# Patient Record
Sex: MALE | Race: WHITE | NOT HISPANIC OR LATINO | Employment: UNEMPLOYED | URBAN - METROPOLITAN AREA
[De-identification: names, ages, dates, MRNs, and addresses within clinical notes are randomized per-mention and may not be internally consistent; named-entity substitution may affect disease eponyms.]

---

## 2017-01-18 ENCOUNTER — ALLSCRIPTS OFFICE VISIT (OUTPATIENT)
Dept: OTHER | Facility: OTHER | Age: 52
End: 2017-01-18

## 2017-02-22 ENCOUNTER — ALLSCRIPTS OFFICE VISIT (OUTPATIENT)
Dept: OTHER | Facility: OTHER | Age: 52
End: 2017-02-22

## 2018-01-13 VITALS
HEART RATE: 67 BPM | TEMPERATURE: 99 F | SYSTOLIC BLOOD PRESSURE: 130 MMHG | HEIGHT: 70 IN | OXYGEN SATURATION: 97 % | WEIGHT: 189.13 LBS | DIASTOLIC BLOOD PRESSURE: 84 MMHG | BODY MASS INDEX: 27.08 KG/M2

## 2018-01-14 VITALS
BODY MASS INDEX: 25.98 KG/M2 | HEIGHT: 70 IN | DIASTOLIC BLOOD PRESSURE: 88 MMHG | SYSTOLIC BLOOD PRESSURE: 128 MMHG | WEIGHT: 181.5 LBS | OXYGEN SATURATION: 96 % | HEART RATE: 79 BPM | TEMPERATURE: 99 F

## 2018-01-16 NOTE — PROGRESS NOTES
Assessment    1  Accelerated hypertension (401 0) (I10)   2  Benign essential hypertension (401 1) (I10)   3  Need for prophylactic vaccination and inoculation against influenza (V04 81) (Z23)   4  Alcohol abuse, episodic (305 02) (F10 10)   5  Hyponatremia (276 1) (E87 1)    Plan  Accelerated hypertension    · Metoprolol Tartrate 25 MG Oral Tablet; TAKE 1 TABLET TWICE DAILY   · Call [phone] if: You become dizzy or lightheaded, especially when you stand up after sitting  for a while ; Status:Complete;   Done: 36VXK1613 05:12PM   · Call [phone] if: You develop double vision (see two of everything) ; Status:Complete;   Done:  62EKP1268 05:12PM   · Call [phone] if: Your blood pressure is frequently higher than 140/90 ; Status:Complete;    Done: 13EKQ3005 05:12PM   · Call 911 if: You experience a new kind of chest pain (angina) or pressure ; Status:Complete;   Done:  25ZMQ7658 05:12PM   · Call 911 if: You have any symptoms of a stroke ; Status:Complete;   Done: 67ZMH5273 05:12PM   · Seek Immediate Medical Attention if: You have a severe headache that will not go away ;  Status:Complete;   Done: 27LFQ1588 05:12PM   · Seek Immediate Medical Attention if: Your blood pressure is greater than 250/120 for 2 consecutive  readings ; Status:Complete;   Done: 43HWU7557 05:12PM   · Eat a low fat and low cholesterol diet ; Status:Complete;   Done: 52CSR8906 05:12PM   · Keep a diary of when and what you eat ; Status:Complete;   Done: 74UCM9978 05:12PM  Need for prophylactic vaccination and inoculation against influenza    · Stop: Influenza    Discussion/Summary  Discussion Summary:   Blood pressure still not controlled  Of note, pt also smoked prior to visit  Add metoprolol 25 mg twice daily in addition to amlodipine and valsartan  Need to get updated BMP, pt did not have done prior to visit  Pt continues to actively decrease alcohol and smoking  He decided to do this on his own  Great job!     Continue appropriate diet - 3 meals daily  Follow up 2 weeks  Get labs done prior  Do not smoke prior to appt  Counseling Documentation With Imm: The patient was counseled regarding instructions for management, risk factor reductions, prognosis, patient and family education, impressions, risks and benefits of treatment options, importance of compliance with treatment  Medication SE Review and Pt Understands Tx: Possible side effects of new medications were reviewed with the patient/guardian today  The treatment plan was reviewed with the patient/guardian  The patient/guardian understands and agrees with the treatment plan      Chief Complaint  Chief Complaint Free Text Note Form: pt is here for 2 wk htn f/u      History of Present Illness  Hypertension (Follow-Up): The patient presents for follow-up of essential hypertension  The patient states he has been doing poorly with his blood pressure control since the last visit  Interval Events: Telmisartan d/c and started on valstartan at last visit  Amlodipine inreased to 10 mg  Symptoms: denies impaired vision, denies dyspnea, denies chest pain, denies intermittent leg claudication and denies lower extremity edema  Associated symptoms include no headache and no focal neurologic deficits  Home monitoring: The patient is not checking blood pressure at home  Medications: the patient is adherent with his medication regimen  He denies medication side effects  Disease Management: the patient is not doing well with his blood pressure goals  Additional History: Pt cut down smoking to 4 cigs/day  Alcohol Abuse (Follow-Up): The patient is being seen for follow-up of alcohol abuse and Notes he was off form work over [employer] when this event occures and he was drinking very heaviliy  He has since returned to his normal intake of 2 beers per day  The patient reports continuing to use alcohol     Interval symptoms:  denies mood swings, denies shakiness, denies abdominal pain, denies nausea and denies vomiting  Associated symptoms: no falling    The patient presents with complaints of no job difficulties (wants to return full duty  Needs work note)  Medications include folic acid and thiamine  Medications:  the patient is adherent to his medication regimen, but he denies medication side effects  Disease management:  the patient is not doing well with his goals  Alcohol use is 2 beers drinks per day  Electrolyte Imbalance (Brief): The patient is being seen for a routine clinic follow-up of electrolyte imbalance  Symptoms:  No recurrance of passing out  No seizure like activity  No change in bowel or bladder function  , but no weakness, no dizziness, no lightheadedness and no seizure(s)  Review of Systems  Complete-Male:   Constitutional: no fever and no chills  Cardiovascular: no chest pain and no palpitations  Respiratory: no shortness of breath, no cough and no wheezing  Gastrointestinal: no abdominal pain, no nausea, no constipation and no diarrhea  Neurological: no headache and no dizziness  Active Problems    1  Accelerated hypertension (401 0) (I10)   2  Alcohol abuse, episodic (305 02) (F10 10)   3  Benign essential hypertension (401 1) (I10)   4  Elevated liver enzymes (790 5) (R74 8)   5  Hypertension, accelerated (401 0) (I10)   6  Hyponatremia (276 1) (E87 1)   7  Screening for depression (V79 0) (Z13 89)   8  Syncope (780 2) (R55)    Past Medical History    1  History of Benign essential hypertension (401 1) (I10)  Active Problems And Past Medical History Reviewed: The active problems and past medical history were reviewed and updated today  Surgical History    1  History of Hand Surgery  Surgical History Reviewed: The surgical history was reviewed and updated today  Family History    1  Family history of Benign essential hypertension    2  Family history of Benign essential hypertension    3   Family history of Benign essential hypertension  Family History Reviewed: The family history was reviewed and updated today  Social History    · Current every day smoker (305 1) (F17 200)   · No drug use   · Social alcohol use (F10 99)  Social History Reviewed: The social history was reviewed and updated today  The social history was reviewed and is unchanged  Current Meds   1  AmLODIPine Besylate 10 MG Oral Tablet; TAKE 1 TABLET DAILY; Therapy: 96ZKO8205 to (Evaluate:64Xoe9324)  Requested for: 12Jan2016; Last Rx:12Jan2016   Ordered   2  Folic Acid 1 MG Oral Tablet; Take 1 tablet daily; Therapy: 94MEB4718 to (Evaluate:28Ker8147)  Requested for: 20Jan2016; Last Rx:20Jan2016   Ordered   3  Thiamine HCl - 100 MG Oral Tablet; TAKE 1 TABLET DAILY; Therapy: 29DWS1800 to (Evaluate:53Ljb6668)  Requested for: 20Jan2016; Last Rx:20Jan2016   Ordered   4  Valsartan 320 MG Oral Tablet; TAKE 1 TABLET DAILY; Therapy: 90BYX0622 to (Evaluate:65Onp9045)  Requested for: 20Jan2016; Last Rx:20Jan2016   Ordered  Medication List Reviewed: The medication list was reviewed and updated today  Allergies    1  No Known Drug Allergies    2  No Known Environmental Allergies   3  No Known Food Allergies    Vitals  Vital Signs [Data Includes: Current Encounter]    Recorded: 15Xcq3985 05:09PM Recorded: 47Kdd1220 04:56PM   Temperature  98 3 F, Oral   Heart Rate  971   Systolic 091, RUE, Sitting 192, LUE, Sitting   Diastolic 889, RUE, Sitting 100, LUE, Sitting   BP Cuff Size  Large   Height  5 ft 10 in   Weight  177 lb    BMI Calculated  25 4   BSA Calculated  1 98   O2 Saturation  99, RA     Physical Exam    Constitutional   General appearance: Abnormal   disheveled clothing and unkempt appearance  Eyes   Conjunctiva and lids: No swelling, erythema, or discharge  Pupils and irises: Equal, round and reactive to light  Ears, Nose, Mouth, and Throat   Oropharynx: Normal with no erythema, edema, exudate or lesions      Pulmonary   Respiratory effort: No increased work of breathing or signs of respiratory distress  Auscultation of lungs: Clear to auscultation, equal breath sounds bilaterally, no wheezes, no rales, no rhonci  Cardiovascular   Auscultation of heart: Abnormal   The heart rate was tachycardic  The rhythm was regular  Examination of extremities for edema and/or varicosities: Normal     Abdomen   Abdomen: Non-tender, no masses  Lymphatic   Palpation of lymph nodes in neck: No lymphadenopathy  Musculoskeletal   Gait and station: Normal     Skin   Skin and subcutaneous tissue: Normal without rashes or lesions  Neurologic   Cranial nerves: Cranial nerves 2-12 intact  Psychiatric   Orientation to person, place and time: Normal     Mood and affect: Normal          Health Management  Screening for depression   *VB-Depression Screening; every 1 year; Last 04HQN8596; Next Due: 76NTJ7910;  Active    Signatures   Electronically signed by : DRISS Sweet; Feb 2 2016  5:17PM EST                       (Author)    Electronically signed by : Patricio Menjivar MD; Feb  3 2016  1:50PM EST

## 2018-02-17 ENCOUNTER — APPOINTMENT (INPATIENT)
Dept: RADIOLOGY | Facility: HOSPITAL | Age: 53
DRG: 264 | End: 2018-02-17
Payer: COMMERCIAL

## 2018-02-17 ENCOUNTER — HOSPITAL ENCOUNTER (INPATIENT)
Facility: HOSPITAL | Age: 53
LOS: 21 days | Discharge: HOSPICE | DRG: 264 | End: 2018-03-10
Attending: EMERGENCY MEDICINE | Admitting: INTERNAL MEDICINE
Payer: COMMERCIAL

## 2018-02-17 DIAGNOSIS — E87.1 HYPONATREMIA: ICD-10-CM

## 2018-02-17 DIAGNOSIS — K70.31 ASCITES DUE TO ALCOHOLIC CIRRHOSIS (HCC): ICD-10-CM

## 2018-02-17 DIAGNOSIS — R79.1 ELEVATED INR: ICD-10-CM

## 2018-02-17 DIAGNOSIS — K74.60 DECOMPENSATED HEPATIC CIRRHOSIS (HCC): ICD-10-CM

## 2018-02-17 DIAGNOSIS — K72.90 HEPATIC ENCEPHALOPATHY (HCC): ICD-10-CM

## 2018-02-17 DIAGNOSIS — R18.8 ASCITES: ICD-10-CM

## 2018-02-17 DIAGNOSIS — K76.7 HEPATORENAL SYNDROME (HCC): ICD-10-CM

## 2018-02-17 DIAGNOSIS — K70.31 ALCOHOLIC CIRRHOSIS OF LIVER WITH ASCITES (HCC): Primary | ICD-10-CM

## 2018-02-17 DIAGNOSIS — M86.9 OSTEOMYELITIS OF TOE OF RIGHT FOOT (HCC): ICD-10-CM

## 2018-02-17 DIAGNOSIS — R10.84 GENERALIZED ABDOMINAL PAIN: ICD-10-CM

## 2018-02-17 DIAGNOSIS — E88.09 HYPOALBUMINEMIA: ICD-10-CM

## 2018-02-17 DIAGNOSIS — D68.9 COAGULOPATHY (HCC): ICD-10-CM

## 2018-02-17 DIAGNOSIS — N17.9 AKI (ACUTE KIDNEY INJURY) (HCC): ICD-10-CM

## 2018-02-17 DIAGNOSIS — R45.1 RESTLESSNESS AND AGITATION: ICD-10-CM

## 2018-02-17 DIAGNOSIS — R17 ELEVATED BILIRUBIN: ICD-10-CM

## 2018-02-17 DIAGNOSIS — F41.8 ANXIETY ABOUT HEALTH: ICD-10-CM

## 2018-02-17 DIAGNOSIS — D62 ACUTE BLOOD LOSS ANEMIA: ICD-10-CM

## 2018-02-17 DIAGNOSIS — K66.8 PNEUMOPERITONEUM: ICD-10-CM

## 2018-02-17 DIAGNOSIS — E87.2 LACTIC ACIDOSIS: ICD-10-CM

## 2018-02-17 DIAGNOSIS — K72.90 DECOMPENSATED HEPATIC CIRRHOSIS (HCC): ICD-10-CM

## 2018-02-17 DIAGNOSIS — I95.9 HYPOTENSION: ICD-10-CM

## 2018-02-17 PROBLEM — D63.8 ANEMIA OF CHRONIC DISEASE: Status: ACTIVE | Noted: 2018-02-17

## 2018-02-17 PROBLEM — R74.8 ELEVATED ALKALINE PHOSPHATASE LEVEL: Status: ACTIVE | Noted: 2018-02-17

## 2018-02-17 PROBLEM — K70.10 ALCOHOLIC HEPATITIS: Status: ACTIVE | Noted: 2018-02-17

## 2018-02-17 PROBLEM — R05.9 COUGH: Status: ACTIVE | Noted: 2018-02-17

## 2018-02-17 PROBLEM — E72.20 HYPERAMMONEMIA (HCC): Status: ACTIVE | Noted: 2018-02-17

## 2018-02-17 PROBLEM — R65.10 SIRS (SYSTEMIC INFLAMMATORY RESPONSE SYNDROME) (HCC): Status: ACTIVE | Noted: 2018-02-17

## 2018-02-17 PROBLEM — R09.02 HYPOXIA: Status: ACTIVE | Noted: 2018-02-17

## 2018-02-17 PROBLEM — F10.10 ALCOHOL ABUSE: Status: ACTIVE | Noted: 2018-02-17

## 2018-02-17 PROBLEM — R10.9 ABDOMINAL PAIN: Status: ACTIVE | Noted: 2018-02-17

## 2018-02-17 LAB
ALBUMIN FLD-MCNC: <0.6 G/DL
ALBUMIN SERPL BCP-MCNC: 1.7 G/DL (ref 3.5–5)
ALP SERPL-CCNC: 122 U/L (ref 46–116)
ALT SERPL W P-5'-P-CCNC: 28 U/L (ref 12–78)
AMMONIA PLAS-SCNC: 78 UMOL/L (ref 11–35)
ANION GAP SERPL CALCULATED.3IONS-SCNC: 13 MMOL/L (ref 4–13)
APTT PPP: 45 SECONDS (ref 23–35)
AST SERPL W P-5'-P-CCNC: 59 U/L (ref 5–45)
ATRIAL RATE: 104 BPM
BASOPHILS # BLD AUTO: 0.06 THOUSANDS/ΜL (ref 0–0.1)
BASOPHILS NFR BLD AUTO: 1 % (ref 0–1)
BILIRUB DIRECT SERPL-MCNC: 2.12 MG/DL (ref 0–0.2)
BILIRUB SERPL-MCNC: 4.67 MG/DL (ref 0.2–1)
BUN SERPL-MCNC: 33 MG/DL (ref 5–25)
CALCIUM SERPL-MCNC: 7.9 MG/DL (ref 8.3–10.1)
CHLORIDE SERPL-SCNC: 95 MMOL/L (ref 100–108)
CO2 SERPL-SCNC: 19 MMOL/L (ref 21–32)
CREAT SERPL-MCNC: 3.32 MG/DL (ref 0.6–1.3)
CRP SERPL QL: 8.8 MG/L
EOSINOPHIL # BLD AUTO: 0.22 THOUSAND/ΜL (ref 0–0.61)
EOSINOPHIL NFR BLD AUTO: 3 % (ref 0–6)
ERYTHROCYTE [DISTWIDTH] IN BLOOD BY AUTOMATED COUNT: 24.6 % (ref 11.6–15.1)
ERYTHROCYTE [SEDIMENTATION RATE] IN BLOOD: 36 MM/HOUR (ref 0–10)
GFR SERPL CREATININE-BSD FRML MDRD: 20 ML/MIN/1.73SQ M
GLUCOSE FLD-MCNC: 134 MG/DL
GLUCOSE SERPL-MCNC: 125 MG/DL (ref 65–140)
HCT VFR BLD AUTO: 24 % (ref 36.5–49.3)
HGB BLD-MCNC: 8.5 G/DL (ref 12–17)
HISTIOCYTES NFR FLD: 15 %
INR PPP: 1.97 (ref 0.86–1.16)
LACTATE SERPL-SCNC: 2 MMOL/L (ref 0.5–2)
LACTATE SERPL-SCNC: 3.2 MMOL/L (ref 0.5–2)
LIPASE SERPL-CCNC: 247 U/L (ref 73–393)
LYMPHOCYTES # BLD AUTO: 1.31 THOUSANDS/ΜL (ref 0.6–4.47)
LYMPHOCYTES NFR BLD AUTO: 17 % (ref 14–44)
LYMPHOCYTES NFR BLD AUTO: 5 %
MCH RBC QN AUTO: 32.7 PG (ref 26.8–34.3)
MCHC RBC AUTO-ENTMCNC: 35.4 G/DL (ref 31.4–37.4)
MCV RBC AUTO: 92 FL (ref 82–98)
MONO+MESO NFR FLD MANUAL: 3 %
MONOCYTES # BLD AUTO: 1.37 THOUSAND/ΜL (ref 0.17–1.22)
MONOCYTES NFR BLD AUTO: 18 % (ref 4–12)
MONOCYTES NFR BLD AUTO: 37 %
NEUTROPHILS # BLD AUTO: 4.7 THOUSANDS/ΜL (ref 1.85–7.62)
NEUTS SEG NFR BLD AUTO: 40 %
NEUTS SEG NFR BLD AUTO: 61 % (ref 43–75)
NRBC BLD AUTO-RTO: 0 /100 WBCS
P AXIS: 66 DEGREES
PLATELET # BLD AUTO: 128 THOUSANDS/UL (ref 149–390)
PMV BLD AUTO: 9.6 FL (ref 8.9–12.7)
POTASSIUM SERPL-SCNC: 4.3 MMOL/L (ref 3.5–5.3)
PR INTERVAL: 168 MS
PROT SERPL-MCNC: 7.1 G/DL (ref 6.4–8.2)
PROTHROMBIN TIME: 22.6 SECONDS (ref 12.1–14.4)
QRS AXIS: 78 DEGREES
QRSD INTERVAL: 76 MS
QT INTERVAL: 308 MS
QTC INTERVAL: 405 MS
RBC # BLD AUTO: 2.6 MILLION/UL (ref 3.88–5.62)
SODIUM SERPL-SCNC: 127 MMOL/L (ref 136–145)
T WAVE AXIS: 39 DEGREES
TOTAL CELLS COUNTED SPEC: 100
VENTRICULAR RATE: 104 BPM
WBC # BLD AUTO: 7.71 THOUSAND/UL (ref 4.31–10.16)
WBC # FLD MANUAL: 51 /UL

## 2018-02-17 PROCEDURE — 85610 PROTHROMBIN TIME: CPT | Performed by: EMERGENCY MEDICINE

## 2018-02-17 PROCEDURE — 73630 X-RAY EXAM OF FOOT: CPT

## 2018-02-17 PROCEDURE — 88112 CYTOPATH CELL ENHANCE TECH: CPT | Performed by: EMERGENCY MEDICINE

## 2018-02-17 PROCEDURE — 99223 1ST HOSP IP/OBS HIGH 75: CPT | Performed by: INTERNAL MEDICINE

## 2018-02-17 PROCEDURE — 82140 ASSAY OF AMMONIA: CPT | Performed by: EMERGENCY MEDICINE

## 2018-02-17 PROCEDURE — 87205 SMEAR GRAM STAIN: CPT | Performed by: EMERGENCY MEDICINE

## 2018-02-17 PROCEDURE — 0W9G3ZX DRAINAGE OF PERITONEAL CAVITY, PERCUTANEOUS APPROACH, DIAGNOSTIC: ICD-10-PCS | Performed by: EMERGENCY MEDICINE

## 2018-02-17 PROCEDURE — 85025 COMPLETE CBC W/AUTO DIFF WBC: CPT | Performed by: EMERGENCY MEDICINE

## 2018-02-17 PROCEDURE — 94762 N-INVAS EAR/PLS OXIMTRY CONT: CPT

## 2018-02-17 PROCEDURE — 87040 BLOOD CULTURE FOR BACTERIA: CPT | Performed by: INTERNAL MEDICINE

## 2018-02-17 PROCEDURE — 88305 TISSUE EXAM BY PATHOLOGIST: CPT | Performed by: EMERGENCY MEDICINE

## 2018-02-17 PROCEDURE — 99254 IP/OBS CNSLTJ NEW/EST MOD 60: CPT | Performed by: INTERNAL MEDICINE

## 2018-02-17 PROCEDURE — 71046 X-RAY EXAM CHEST 2 VIEWS: CPT

## 2018-02-17 PROCEDURE — 36415 COLL VENOUS BLD VENIPUNCTURE: CPT | Performed by: EMERGENCY MEDICINE

## 2018-02-17 PROCEDURE — 85730 THROMBOPLASTIN TIME PARTIAL: CPT | Performed by: EMERGENCY MEDICINE

## 2018-02-17 PROCEDURE — 99284 EMERGENCY DEPT VISIT MOD MDM: CPT

## 2018-02-17 PROCEDURE — 87070 CULTURE OTHR SPECIMN AEROBIC: CPT | Performed by: EMERGENCY MEDICINE

## 2018-02-17 PROCEDURE — 93005 ELECTROCARDIOGRAM TRACING: CPT

## 2018-02-17 PROCEDURE — 82945 GLUCOSE OTHER FLUID: CPT | Performed by: EMERGENCY MEDICINE

## 2018-02-17 PROCEDURE — 93010 ELECTROCARDIOGRAM REPORT: CPT | Performed by: INTERNAL MEDICINE

## 2018-02-17 PROCEDURE — 80053 COMPREHEN METABOLIC PANEL: CPT | Performed by: EMERGENCY MEDICINE

## 2018-02-17 PROCEDURE — 85652 RBC SED RATE AUTOMATED: CPT | Performed by: INTERNAL MEDICINE

## 2018-02-17 PROCEDURE — 89051 BODY FLUID CELL COUNT: CPT | Performed by: EMERGENCY MEDICINE

## 2018-02-17 PROCEDURE — 88305 TISSUE EXAM BY PATHOLOGIST: CPT | Performed by: PATHOLOGY

## 2018-02-17 PROCEDURE — 86140 C-REACTIVE PROTEIN: CPT | Performed by: INTERNAL MEDICINE

## 2018-02-17 PROCEDURE — 87798 DETECT AGENT NOS DNA AMP: CPT | Performed by: INTERNAL MEDICINE

## 2018-02-17 PROCEDURE — 82248 BILIRUBIN DIRECT: CPT | Performed by: EMERGENCY MEDICINE

## 2018-02-17 PROCEDURE — 88112 CYTOPATH CELL ENHANCE TECH: CPT | Performed by: PATHOLOGY

## 2018-02-17 PROCEDURE — 83605 ASSAY OF LACTIC ACID: CPT | Performed by: EMERGENCY MEDICINE

## 2018-02-17 PROCEDURE — 82042 OTHER SOURCE ALBUMIN QUAN EA: CPT | Performed by: EMERGENCY MEDICINE

## 2018-02-17 PROCEDURE — 83690 ASSAY OF LIPASE: CPT | Performed by: EMERGENCY MEDICINE

## 2018-02-17 RX ORDER — LACTULOSE 20 G/30ML
20 SOLUTION ORAL 3 TIMES DAILY PRN
Status: DISCONTINUED | OUTPATIENT
Start: 2018-02-17 | End: 2018-02-17

## 2018-02-17 RX ORDER — AMLODIPINE BESYLATE 10 MG/1
10 TABLET ORAL DAILY
COMMUNITY
End: 2018-03-10 | Stop reason: HOSPADM

## 2018-02-17 RX ORDER — THIAMINE MONONITRATE (VIT B1) 100 MG
100 TABLET ORAL DAILY
Status: DISCONTINUED | OUTPATIENT
Start: 2018-02-18 | End: 2018-03-09

## 2018-02-17 RX ORDER — ONDANSETRON 2 MG/ML
4 INJECTION INTRAMUSCULAR; INTRAVENOUS EVERY 8 HOURS PRN
Status: DISCONTINUED | OUTPATIENT
Start: 2018-02-17 | End: 2018-03-10 | Stop reason: HOSPADM

## 2018-02-17 RX ORDER — LACTULOSE 20 G/30ML
20 SOLUTION ORAL 3 TIMES DAILY PRN
Status: DISCONTINUED | OUTPATIENT
Start: 2018-02-17 | End: 2018-02-18

## 2018-02-17 RX ORDER — FOLIC ACID 1 MG/1
TABLET ORAL DAILY
COMMUNITY
End: 2018-03-10 | Stop reason: HOSPADM

## 2018-02-17 RX ORDER — FOLIC ACID 1 MG/1
1 TABLET ORAL DAILY
Status: DISCONTINUED | OUTPATIENT
Start: 2018-02-18 | End: 2018-03-09

## 2018-02-17 RX ORDER — ALBUMIN (HUMAN) 12.5 G/50ML
25 SOLUTION INTRAVENOUS EVERY 8 HOURS
Status: DISCONTINUED | OUTPATIENT
Start: 2018-02-17 | End: 2018-02-19

## 2018-02-17 RX ORDER — VALSARTAN 320 MG/1
320 TABLET ORAL DAILY
COMMUNITY
End: 2018-03-10 | Stop reason: HOSPADM

## 2018-02-17 RX ADMIN — ALBUMIN HUMAN 25 G: 0.25 SOLUTION INTRAVENOUS at 18:18

## 2018-02-17 NOTE — ED PROVIDER NOTES
History  Chief Complaint   Patient presents with    Ascites     Pt "I need something for my belly  I got tapped for the first time two weeks ago  And its all hard and big again " Roomate "He is suppose to be on liver medication but its too expensive and the doctor wont prescribe anything" Pt c/o SOB "sometimes"      73-year-old male with past medical history of cirrhosis secondary to alcohol abuse and hypertension who is presenting with massive ascites, confusion, and generalized weakness  In triage, patient was noted to be significantly hypotensive and tachycardic  Collateral history is obtained from patient's girlfriend [employer] at the bedside  She states that the patient was recently hospitalized at [facility] and was diagnosed with cirrhosis  Review of records indicates the patient was hospitalized on January 29th and discharged on February 7th  At that time, he received a new diagnosis of alcoholic cirrhosis and was admitted to the medical ICU secondary to severe hyponatremia  He also received an upper endoscopy which demonstrated a 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  Since his discharge, patient has not followed up with a GI specialist   He was prescribed rifaximin but secondary to cost issues was unable to take it  [employer] indicates that he has been declining for the past 5 days  She reports that he is becoming slightly confused  His abdominal distension has worsened and it is affecting his breathing  Patient took his home antihypertensives on the morning of presentation  On my evaluation, patient was hypotensive and tachycardic  He however was alert and oriented to time, person, place, and situation  He reported mild abdominal tenderness but denied any other symptoms  Plan:  Decompensated alcoholic cirrhosis with concern for hepatic encephalopathy and possible spontaneous bacterial peritonitis   CBC to evaluate for leukocytosis, anemia, and thrombocytopenia; CMP to evaluate for electrolyte abnormalities, renal function, and hepatobiliary pathology; lipase to evaluate for pancreatitis; urinalysis to evaluate for UTI and hematuria  We will also add on ammonia level, direct bilirubin, and coagulation tests including PTT and INR  We will perform paracentesis and obtain ascitic fluid to evaluate for spontaneous bacterial peritonitis  Admission  Prior to Admission Medications   Prescriptions Last Dose Informant Patient Reported? Taking? amLODIPine (NORVASC) 10 mg tablet 2/17/2018 at Unknown time  Yes Yes   Sig: Take 10 mg by mouth daily   folic acid (FOLVITE) 1 mg tablet 2/17/2018 at Unknown time  Yes Yes   Sig: Take by mouth daily   rifaximin (XIFAXAN) 550 mg tablet Unknown at Unknown time  Yes No   Sig: Take 550 mg by mouth every 12 (twelve) hours   valsartan (DIOVAN) 320 MG tablet 2/17/2018 at Unknown time  Yes Yes   Sig: Take 320 mg by mouth daily      Facility-Administered Medications: None       Past Medical History:   Diagnosis Date    [facility]osis [facility] )     Hypertension        History reviewed  No pertinent surgical history  History reviewed  No pertinent family history  I have reviewed and agree with the history as documented  Social History   Substance Use Topics    Smoking status: Current Every Day Smoker     Packs/day: 0 20     Years: 35 00     Types: Cigarettes    Smokeless tobacco: Never Used    Alcohol use Yes      Comment: Pt "I quite a couple of weeks ago"        Review of Systems   Constitutional: Negative for diaphoresis, fever and unexpected weight change  HENT: Negative for congestion, rhinorrhea and sore throat  Eyes: Negative for pain, discharge and visual disturbance  Respiratory: Positive for shortness of breath  Negative for cough and wheezing  Cardiovascular: Positive for leg swelling  Negative for chest pain and palpitations  Gastrointestinal: Positive for abdominal distention   Negative for abdominal pain, blood in stool, constipation, diarrhea, nausea and vomiting  Genitourinary: Negative for dysuria, flank pain and hematuria  Musculoskeletal: Negative for arthralgias and myalgias  Skin: Positive for color change (mild jaundice)  Negative for rash and wound  Allergic/Immunologic: Negative for environmental allergies and food allergies  Neurological: Negative for dizziness, seizures, weakness and numbness  Hematological: Negative for adenopathy  Psychiatric/Behavioral: Negative for confusion and hallucinations  Physical Exam  ED Triage Vitals [02/17/18 1132]   Temperature Pulse Respirations Blood Pressure SpO2   97 5 °F (36 4 °C) (!) 111 (!) 24 (!) 87/56 100 %      Temp Source Heart Rate Source Patient Position - Orthostatic VS BP Location FiO2 (%)   Oral Monitor Sitting Right arm --      Pain Score       7           Orthostatic Vital Signs  Vitals:    02/17/18 1451 02/17/18 1500 02/17/18 1552 02/17/18 1700   BP: 128/61 119/65 118/62 95/60   Pulse: 103 (!) 108 (!) 106 (!) 111   Patient Position - Orthostatic VS: Lying  Lying Lying       Physical Exam   Constitutional: He is oriented to person, place, and time  He appears well-developed and well-nourished  HENT:   Head: Normocephalic and atraumatic  Right Ear: External ear normal    Left Ear: External ear normal    Nose: Nose normal    Eyes: EOM are normal  Pupils are equal, round, and reactive to light  Scleral icterus is present  Neck: Normal range of motion  Neck supple  Cardiovascular: Normal rate and regular rhythm  Murmur heard  Systolic ejection murmur consistent with aortic stenosis  Pulmonary/Chest: Effort normal and breath sounds normal  No respiratory distress  He has no wheezes  He has no rales  Abdominal: Bowel sounds are normal  He exhibits distension and fluid wave  There is tenderness  There is no guarding  Massive ascites with fluid wave  Abdomen is diffusely tender without guarding or peritoneal signs  Musculoskeletal: Normal range of motion  He exhibits edema  He exhibits no deformity  There is 2+ pitting edema of the bilateral lower extremities  There is diffuse mild anasarca  Neurological: He is alert and oriented to person, place, and time  No asterixis noted  Skin: Skin is warm and dry  Capillary refill takes less than 2 seconds  He is not diaphoretic  Psychiatric: He has a normal mood and affect  His behavior is normal  Judgment and thought content normal    Nursing note and vitals reviewed  ED Medications  Medications   folic acid (FOLVITE) tablet 1 mg (not administered)   albumin human (FLEXBUMIN) 25 % injection 25 g (0 g Intravenous Stopped 2/17/18 1900)   thiamine (VITAMIN B1) tablet 100 mg (not administered)   lactulose 20 g/30 mL oral solution 20 g (not administered)   ondansetron (ZOFRAN) injection 4 mg (not administered)   influenza inactivated quadrivalent vaccine (FLULAVAL) IM injection 0 5 mL (not administered)       Diagnostic Studies  Results Reviewed     Procedure Component Value Units Date/Time    Body fluid culture and Gram stain [[id number]] Collected:  02/17/18 1336    Lab Status:  Preliminary result Specimen: Body Fluid from Paracentesis Updated:  02/17/18 2223     Gram Stain Result Rare Polys      No bacteria seen    Lactic acid, plasma [[id number]]  (Normal) Collected:  02/17/18 1553    Lab Status:  Final result Specimen:  Blood from Arm, Right Updated:  02/17/18 1637     LACTIC ACID 2 0 mmol/L     Narrative:         Result may be elevated if tourniquet was used during collection  Body fluid white cell count with differential [[id number]] Collected:  02/17/18 1336    Lab Status:  Final result Specimen: Body Fluid from Paracentesis Updated:  02/17/18 1535     Site --     WBC, Fluid 51 /ul     Body Fluid Diff [[id number]] Collected:  02/17/18 1336    Lab Status:  Final result Specimen:   Body Fluid from Paracentesis Updated:  02/17/18 1535     Total Counted 100     Neutrophils % (Fluid) 40 %      Lymphs % (Fluid) 5 %      Mesothelial % (Fluid) 3 %      Histiocyte % (Fluid) 15 %      Monocytes % (Fluid) 37 %     Albumin, fluid [[id number]]  (Normal) Collected:  02/17/18 1336    Lab Status:  Final result Specimen: Body Fluid from Other Updated:  02/17/18 1435     Albumin, Fluid <0 6 g/dL     Glucose, body fluid [[id number]]  (Normal) Collected:  02/17/18 1336    Lab Status:  Final result Specimen: Body Fluid from Other Updated:  02/17/18 1435     Glucose, Fluid 134 mg/dL     Lactic acid, plasma [[id number]]  (Abnormal) Collected:  02/17/18 1217    Lab Status:  Final result Specimen:  Blood from Arm, Right Updated:  02/17/18 1316     LACTIC ACID 3 2 (HH) mmol/L     Narrative:         Result may be elevated if tourniquet was used during collection  Protime-INR [[id number]]  (Abnormal) Collected:  02/17/18 1217    Lab Status:  Final result Specimen:  Blood from Arm, Right Updated:  02/17/18 1303     Protime 22 6 (H) seconds      INR 1 97 (H)    APTT [[id number]]  (Abnormal) Collected:  02/17/18 1217    Lab Status:  Final result Specimen:  Blood from Arm, Right Updated:  02/17/18 1303     PTT 45 (H) seconds     Narrative:          Therapeutic Heparin Range = 60-90 seconds    Comprehensive metabolic panel [[id number]]  (Abnormal) Collected:  02/17/18 1154    Lab Status:  Final result Specimen:  Blood from Arm, Right Updated:  02/17/18 1301     Sodium 127 (L) mmol/L      Potassium 4 3 mmol/L      Chloride 95 (L) mmol/L      CO2 19 (L) mmol/L      Anion Gap 13 mmol/L      BUN 33 (H) mg/dL      Creatinine 3 32 (H) mg/dL      Glucose 125 mg/dL      Calcium 7 9 (L) mg/dL      AST 59 (H) U/L      ALT 28 U/L      Alkaline Phosphatase 122 (H) U/L      Total Protein 7 1 g/dL      Albumin 1 7 (L) g/dL      Total Bilirubin 4 67 (H) mg/dL      eGFR 20 ml/min/1 73sq m     Narrative:         National Kidney Disease Education Program recommendations are as follows:  GFR calculation is accurate only with a steady state creatinine  Chronic Kidney disease less than 60 ml/min/1 73 sq  meters  Kidney failure less than 15 ml/min/1 73 sq  meters      Lipase [[id number]]  (Normal) Collected:  02/17/18 1154    Lab Status:  Final result Specimen:  Blood from Arm, Right Updated:  02/17/18 1301     Lipase 247 u/L     Ammonia [[id number]]  (Abnormal) Collected:  02/17/18 1217    Lab Status:  Final result Specimen:  Blood from Arm, Right Updated:  02/17/18 1301     Ammonia 78 (H) umol/L     Bilirubin, direct [[id number]]  (Abnormal) Collected:  02/17/18 1154    Lab Status:  Final result Specimen:  Blood from Arm, Right Updated:  02/17/18 1301     Bilirubin, Direct 2 12 (H) mg/dL     CBC and differential [[id number]]  (Abnormal) Collected:  02/17/18 1155    Lab Status:  Final result Specimen:  Blood from Arm, Right Updated:  02/17/18 1200     WBC 7 71 Thousand/uL      RBC 2 60 (L) Million/uL      Hemoglobin 8 5 (L) g/dL      Hematocrit 24 0 (L) %      MCV 92 fL      MCH 32 7 pg      MCHC 35 4 g/dL      RDW 24 6 (H) %      MPV 9 6 fL      Platelets 073 (L) Thousands/uL      nRBC 0 /100 WBCs      Neutrophils Relative 61 %      Lymphocytes Relative 17 %      Monocytes Relative 18 (H) %      Eosinophils Relative 3 %      Basophils Relative 1 %      Neutrophils Absolute 4 70 Thousands/µL      Lymphocytes Absolute 1 31 Thousands/µL      Monocytes Absolute 1 37 (H) Thousand/µL      Eosinophils Absolute 0 22 Thousand/µL      Basophils Absolute 0 06 Thousands/µL                  XR chest pa & lateral    (Results Pending)   XR foot 3+ vw right    (Results Pending)         Procedures  ECG 12 Lead Documentation  Date/Time: 2/17/2018 3:02 PM  Performed by: Claudette Mayer  Authorized by: Shawna Curry     ECG reviewed by me, the ED Provider: yes    Patient location:  ED  Previous ECG:     Previous ECG:  Unavailable    Comparison to cardiac monitor: Yes    Interpretation:     Interpretation: abnormal    Quality:     Tracing quality:  Limited by artifact  Rate:     ECG rate:  104    ECG rate assessment: tachycardic Rhythm:     Rhythm: sinus rhythm and sinus tachycardia    Ectopy:     Ectopy: none    QRS:     QRS axis:  Normal    QRS intervals:  Normal  Conduction:     Conduction: normal    ST segments:     ST segments:  Normal  T waves:     T waves: normal    Comments:      EKG demonstrates sinus tachycardia at 104 beats per minute  No acute ischemic changes such as Q waves, T wave inversions, ST segment depressions, or ST segment elevations  No right heart strain  No prior EKG available  Overall, nonspecific EKG  Paracentesis  Date/Time: 2/17/2018 3:43 PM  Performed by: Chick Goodell by: Marcie Emerson     Patient location:  ED  Other Assisting Provider: Yes (comment) Juanita Artis MD)    Consent:     Consent obtained:  Verbal    Consent given by:  Patient    Risks discussed:  Bleeding, infection, pain and bowel perforation  Universal protocol:     Imaging studies available: yes      Site/side marked: yes      Patient identity confirmed:  Verbally with patient and arm band  Pre-procedure details:     Initial or Subsequent Exam:  Initial    Procedure purpose:  Diagnostic    Indications: suspected peritonitis      Preparation: Patient was prepped and draped in usual sterile fashion    Anesthesia (see MAR for exact dosages): Anesthesia method:  Local infiltration    Local anesthetic:  Lidocaine 1% w/o epi  Procedure details:     Needle gauge:  20    Equipment: Paracentesis kit used      Ultrasound guidance: yes      Approach:  Percutaneous    Puncture site:  L lower quadrant    Fluid removed amount:  60    Fluid appearance:  Yellow    Dressing:  4x4 sterile gauze  Post-procedure details:     Patient tolerance of procedure:   Tolerated well, no immediate complications          Phone Consults  ED Phone Contact    ED Course  ED Course as of Feb 17 2223   Sat Feb 17, 2018   1150 Blood Pressure: (!) 87/56   1150 Temperature: 97 5 °F (36 4 °C)   1150 Pulse: (!) 111   1150 Respirations: (!) 24   1150 SpO2: 100 %   1212 We will monitor blood pressure closely  Blood Pressure: (!) 85/51   1310 CMP demonstrates hyponatremia, hypochloremia, low bicarbonate, elevated BUN, elevated creatinine, hypocalcemia, elevated AST, low albumin, and elevated total bilirubin  [place]: (!) 2 12   1310 Ammonia: (!) 78   1310 PTT: (!) 45   1310 INR: (!) 1 97   1310 Improved after fluids  Blood Pressure: 97/60   1316 Patient has known hepatic cirrhosis; feel that this is secondary to inability to metabolize lactic acid  LACTIC ACID: (!!) 3 2   1339 Review of records indicates that patient's creatinine was 0 73 on his discharge from [facility] on February 7th 1347 Diagnostic paracentesis performed  Therapeutic paracentesis currently in process  [address] paged  1438 Albumin, Fluid: <0 6   1438 GLUCOSE FLUID: 134   1455 Blood Pressure: 128/61   1549 WBC, Fluid: 51   1549 Neutrophils % (Fluid): 40   1549 Results of diagnostic paracentesis are not consistent with spontaneous bacterial peritonitis  1550 Blood pressure continues to be stable  Blood Pressure: 119/65                         Initial Sepsis Screening     Row Name 02/17/18 2223                Is the patient's history suggestive of a new or worsening infection? No  -MG        Suspected source of infection          Are two or more of the following signs & symptoms of infection both present and new to the patient?         Indicate SIRS criteria          If the answer is yes to both questions, suspicion of sepsis is present          If severe sepsis is present AND tissue hypoperfusion perists in the hour after fluid resuscitation or lactate > 4, the patient meets criteria for SEPTIC SHOCK          Are any of the following organ dysfunction criteria present within 6 hours of suspected infection and SIRS criteria that are NOT considered to be chronic conditions?           Organ dysfunction          Date of presentation of severe sepsis   Time of presentation of severe sepsis          Tissue hypoperfusion persists in the hour after crystalloid fluid administration, evidenced, by either:          Was hypotension present within one hour of the conclusion of crystalloid fluid administration?         Date of presentation of septic shock          Time of presentation of septic shock            User Key  (r) = Recorded By, (t) = Taken By, (c) = Cosigned By    [address] Name Provider Type     Carolyn Petit MD Resident                  MDM  Number of Diagnoses or Management Options  Alcoholic cirrhosis of liver with ascites ([facility] ): established and worsening  Decompensated hepatic cirrhosis ([facility] ): established and worsening  Elevated bilirubin: established and worsening  Elevated INR: established and worsening  Hepatorenal syndrome ([facility] ): new and requires workup  Hypoalbuminemia: established and worsening  Hyponatremia: established and worsening  Hypotension: new and requires workup  Lactic acidosis: new and requires workup  Osteomyelitis of toe of right foot ([facility] [address] ): established and worsening  Diagnosis management comments:     50year old male with recently diagnosed alcoholic cirrhosis presenting with massive ascites and confusion  Noted to be hypotensive and tachycardic  Found to have marked elevation in creatinine concerning for hepatorenal syndrome and elevated ammonia level  1  Cirrhosis: Patient recently diagnosed with advanced cirrhosis  Presented with massive ascites and confusion in the setting noncompliance with medications  Physical examination consistent with cirrhosis; no findings were found to suggest SBP  Mild confusion was present which may be due to early hepatic encephalopathy  In the course of evaluation, numerous lab abnormalities were found that were all likely secondary to cirrhosis such as hyponatremia, elevated creatinine, elevated bilirubin, elevated ammonia, elevated INR, and others   Diagnostic paracentesis did not suggest SBP  2  Hepatorenal syndrome: Patient found to have marked elevation in creatinine  It was 0 73 on 2/7 and increased to 3 32 on presentation  In the setting of advanced cirrhosis, this is concerning for hepatorenal syndrome  BUN/creatinine is about 10, making pre-renal etiology less likely  GI and Nephrology evaluation recommended  3  Lactic acidosis: Patient has known severe cirrhosis with diminished ability to metabolize endogenous lactate  Patient did meet SIRS criteria on presentation  However, other than SBP, he did not have any source for infection  Diagnostic paracentesis did not suggest SBP  Feel that SIRS is likely due to patient's underlying medical condition rather than infection  No treatment for sepsis necessary at this time  Portions of the chart may have been created with voice recognition software  Occasional wrong word or "sound a like" substitutions may have occurred due to the inherent limitations of voice recognition software  Please read the chart carefully and recognize, using context, where substitutions have occurred         Amount and/or Complexity of Data Reviewed  Clinical lab tests: ordered and reviewed  Tests in the radiology section of CPT®: ordered and reviewed  Decide to obtain previous medical records or to obtain history from someone other than the patient: yes  Obtain history from someone other than the patient: yes  Review and summarize past medical records: yes  Discuss the patient with other providers: yes  Independent visualization of images, tracings, or specimens: yes    Risk of Complications, Morbidity, and/or Mortality  Presenting problems: high  Diagnostic procedures: low  Management options: minimal    Patient Progress  Patient progress: improved    CritCare Time    Disposition  Final diagnoses:   Alcoholic cirrhosis of liver with ascites ([facility] [address] )   Hypotension   Hepatorenal syndrome ([facility] [address] )   Hyponatremia   Lactic acidosis   Elevated INR   Elevated bilirubin   Hypoalbuminemia   Decompensated hepatic cirrhosis (HCC)   Osteomyelitis of toe of right foot ([facility] )   PRANAY (acute kidney injury) ([facility] [address] )     Time reflects when diagnosis was documented in both MDM as applicable and the Disposition within this note     Time User Action Codes Description Comment    2/17/2018  2:53 PM Monique Forward Add [R12 49] Alcoholic cirrhosis of liver with ascites ([facility] [address] )     2/17/2018  2:54 PM Monique Forward Add [I95 9] Hypotension     2/17/2018  2:54 PM Monique Forward Add [K76 7] Hepatorenal syndrome ([facility] )     2/17/2018  2:54 PM Monique Forward Add [E87 1] Hyponatremia     2/17/2018  2:54 PM Monique Forward Add [E87 2] Lactic acidosis     2/17/2018  2:54 PM Monique Forward Add [R79 1] Elevated INR     2/17/2018  2:54 PM Monique Forward Add [R17] Elevated bilirubin     2/17/2018  2:55 PM Monique Forward Add [E88 09] Hypoalbuminemia     2/17/2018  5:30 PM Kaye Pel [I95 9] Hypotension     2/17/2018  5:30 PM Kaye Pel [E87 1] Hyponatremia     2/17/2018  5:30 PM Kaye Pel [E87 2] Lactic acidosis     2/17/2018  5:30 PM [place] Modify [E88 09] Hypoalbuminemia     2/17/2018  5:30 PM Jacqualin Haus [K72 90] Decompensated hepatic cirrhosis ([facility] )     2/17/2018  5:30 PM Kaye Pel [I95 9] Hypotension     2/17/2018  5:30 PM Kaye Pel [E87 1] Hyponatremia     2/17/2018  5:30 PM [place] Modify [E87 2] Lactic acidosis     2/17/2018  5:30 PM [place] Modify [E88 09] Hypoalbuminemia     2/17/2018  5:30 PM Kaye Pel [I95 9] Hypotension     2/17/2018  5:30 PM [place] Modify [E87 1] Hyponatremia     2/17/2018  5:30 PM [place] Modify [E87 2] Lactic acidosis     2/17/2018  5:30 PM [place] Modify [E88 09] Hypoalbuminemia     2/17/2018  5:31 PM Baylee Gomez Modify [I95 9] Hypotension     2/17/2018  5:31 PM Baylee Gomez Modify [E87 1] Hyponatremia     2/17/2018  5:31 PM Cyna Titus [E87 2] Lactic acidosis     2/17/2018  5:31 PM Ellis Console Modify [E88 09] Hypoalbuminemia     2/17/2018  5:31 PM Ellis Console Add [M86 9] Osteomyelitis of toe of right foot ([facility] )     2/17/2018  5:31 PM Ellis Console Modify [I95 9] Hypotension     2/17/2018  5:31 PM Ellis Console Modify [E87 1] Hyponatremia     2/17/2018  5:31 PM [employer] Modify [E87 2] Lactic acidosis     2/17/2018  5:31 PM [employer] Modify [E88 09] Hypoalbuminemia     2/17/2018  5:31 PM [employer] Modify [I95 9] Hypotension     2/17/2018  5:31 PM Ellis Console Modify [E87 1] Hyponatremia     2/17/2018  5:31 PM Ellis Console Modify [E87 2] Lactic acidosis     2/17/2018  5:31 PM [employer] Console Modify [E88 09] Hypoalbuminemia     2/17/2018  5:33 PM Cyndra Titus [I95 9] Hypotension     2/17/2018  5:33 PM [employer] Modify [E87 1] Hyponatremia     2/17/2018  5:33 PM [employer] Modify [E87 2] Lactic acidosis     2/17/2018  5:33 PM [employer] Modify [E88 09] Hypoalbuminemia     2/17/2018  5:34 PM Cyndra Titus [I95 9] Hypotension     2/17/2018  5:34 PM Tea Douse, Mariama Modify [E87 1] Hyponatremia     2/17/2018  5:34 PM Ellis Console Modify [E87 2] Lactic acidosis     2/17/2018  5:34 PM Ellis Console Modify [E88 09] Hypoalbuminemia     2/17/2018  5:34 PM Tea Douse, Mariama Add [N17 9] PRANAY (acute kidney injury) ([facility] )     2/17/2018  5:34 PM Ellis Console Modify [I95 9] Hypotension     2/17/2018  5:34 PM Ellis Console Modify [E87 1] Hyponatremia     2/17/2018  5:34 PM Ellis Console Modify [E87 2] Lactic acidosis     2/17/2018  5:34 PM Ellis Console Modify [E88 09] Hypoalbuminemia     2/17/2018  5:34 PM Cyndra Titus [I95 9] Hypotension     2/17/2018  5:34 PM Ellis Console Modify [E87 1] Hyponatremia     2/17/2018  5:34 PM Ellis Console Modify [E87 2] Lactic acidosis     2/17/2018  5:34 PM Ellis Console Modify [E88 09] Hypoalbuminemia       ED Disposition     ED Disposition Condition Comment    Admit  Case was discussed with SOD and the patient's admission status was agreed to be Admission Status: inpatient status to the service of Dr Autumn Foley  Follow-up Information    None       Current Discharge Medication List      CONTINUE these medications which have NOT CHANGED    Details   amLODIPine (NORVASC) 10 mg tablet Take 10 mg by mouth daily      folic acid (FOLVITE) 1 mg tablet Take by mouth daily      valsartan (DIOVAN) 320 MG tablet Take 320 mg by mouth daily      rifaximin (XIFAXAN) 550 mg tablet Take 550 mg by mouth every 12 (twelve) hours           No discharge procedures on file  ED Provider  Attending physically available and evaluated Sita Moise  I managed the patient along with the ED Attending      Electronically Signed by         Mariola Penaloza MD  02/17/18 1125

## 2018-02-17 NOTE — H&P
INTERNAL MEDICINE HISTORY AND PHYSICAL  [facility] 628-01 SOD Team C     NAME: Ayad Burns  AGE: 50 y o  SEX: male  : 1965   MRN: [id number]  ENCOUNTER: [id number]    DATE: 2018  TIME: 9:14 PM    Primary Care Physician: Sunshine Parra MD  Admitting Provider: David Tadeo MD    Chief complaint: Abdominal pain    History of Present Illness     Ayad Burns is a 50 y o  male with history of recently diagnosed advanced liver cirrhosis 2/2 alcohol abuse, HTN and tobacco abuse who was brought to ER earlier today by his girlfriend for slight confusion and worsening abdominal pain  He states his drink of choice is beer, but he has not drank alcohol in 3 weeks and has never experienced withdrawal symptoms such as seizures or tremors  Of note, he was recently admitted to [place] [address] from 17- 18 (all records are Care Everywhere tab) for hyponatremia abdominal distention and it was during that admission he was found to have extensive ascites (had a therapeutic paracentesis of 12 L) and was newly diagnosed with cirrhosis  Initially on admission at [facility] his sodium was 112, so he was admitted to the ICU until his sodium improved  He was also anemic, hyponatremic and had elevated INR and low platelets  However his renal function was normal with avg Creatinine around 0 5  He was seen by the Gi service there and had a full gi workup including extensive imaging and labs as well as EGD  which showed portal gastropathy and one small varix  While there, he was also found to have right great toe gangrene and osteomyelitis, treated with brief course of IV Vancomycin by the ID service  He apparently refused surgical management by podiatry for this  His diuretics were held due to the large volume paracentesis that was done  For his anemia, EGD showed no source but C-scope was offered by Gi and the patient refused   He was ultimately discharged with a script for Rifaximin, which he was unfortunately unable to afford and lactulose 20 g nightly (ammonia was 112 on admission), but the patients states he stopped taking this because one dose gave him very severe diarrhea and he no longer wanted to take it  He currently smokes several cigarettes daily  He has c/o worsening abdominal pain and distention over the past few days, along with shortness of breath, chills, malaise and cough productive of yellow sputum  He admits his gf has also been sick with a cold recently  He denies fevers, blood in his stool, hemoptysis or hematemesis  He also stated his gf said he was getting confused on and off  In the ED, the patient was tachycardic, intermittently hypoxic on RA and hypotensive to the 16N systolic which improved without intervention  He had a LA of 3 2 which came down to 2 0  He has no elevated WBC  Ammonia level is 78, INR 1 97, Hg 8 5, Cr 3 32 (elevated from prior), AST 59, ALT 28, , albumin 1 7, t  Bili 4 67 (MELD of 34 currently), platelets of 154 and Na of 127 (at recent baseline)  Diagnostic tap in ED (1 L removed) is negative for SBP and showed SAAG of 1 1  No further imaging was done in ED  On exam he has noticeable jaundice, productive cough with rhonchi bilaterally, chills and edema of bilateral LEs  He is AAOx3 and does not appear confused at time of exam     Review of Systems   Review of Systems   Constitutional: Positive for chills and fatigue  Negative for diaphoresis and fever  HENT: Negative for congestion and sore throat  Respiratory: Positive for shortness of breath and wheezing  Cardiovascular: Positive for leg swelling  Negative for chest pain and palpitations  Gastrointestinal: Positive for abdominal distention, abdominal pain and nausea  Negative for blood in stool, constipation, diarrhea and vomiting  Genitourinary: Negative for dysuria  Skin: Negative for rash  Neurological: Negative for dizziness, tremors, weakness and headaches     Psychiatric/Behavioral: Negative for confusion  The patient is not nervous/anxious  Past Medical History     Past Medical History:   Diagnosis Date    Cirrhosis ([facility] [address] )     Hypertension        Past Surgical History   History reviewed  No pertinent surgical history  Social History     History   Alcohol Use    Yes     Comment: Pt "I quite a couple of weeks ago"     History   Drug Use No     Comment: prior history      History   Smoking Status    Current Every Day Smoker    Packs/day: 0 20    Years: 35 00    Types: Cigarettes   Smokeless Tobacco    Never Used       Family History   History reviewed  No pertinent family history  Medications Prior to Admission     Prior to Admission medications    Medication Sig Start Date End Date Taking? Authorizing Provider   amLODIPine (NORVASC) 10 mg tablet Take 10 mg by mouth daily   Yes Historical Provider, MD   folic acid (FOLVITE) 1 mg tablet Take by mouth daily   Yes Historical Provider, MD   valsartan (DIOVAN) 320 MG tablet Take 320 mg by mouth daily   Yes Historical Provider, MD   rifaximin (XIFAXAN) 550 mg tablet Take 550 mg by mouth every 12 (twelve) hours    Historical Provider, MD       Allergies   No Known Allergies    Objective     Vitals:    02/17/18 1552 02/17/18 1700 02/17/18 1955 02/17/18 1957   BP: 118/62 95/60     BP Location: Left arm Left arm     Pulse: (!) 106 (!) 111     Resp: 20 18     Temp:  97 5 °F (36 4 °C)     TempSrc:  Oral     SpO2: 99% 97% 97% 94%   Weight:       Height:         Body mass index is 25 11 kg/m²  No intake or output data in the 24 hours ending 02/17/18 2114  Invasive Devices     Peripheral Intravenous Line            Peripheral IV 02/17/18 Right Antecubital less than 1 day                Physical Exam  GENERAL: Thin-appearing, malnourished  Poor hygiene  AAOx3  HEENT: +scleral icterus   NECK: Neck supple with no lymphadenopathy  Trachea midline  CARDIOVASCULAR: S1 and S2 are present    Regular rate and rhythm  + systolic ejection murmur (new) RESPIRATORY: Significant rhonchi bilaterally   ABDOMINAL: +BS, distended, fluid-filled, tender to palpation diffusely  No rebound/guarding  EXTREMITIES: 2+ DP and PT pulses bilaterally; 3+ pitting edema bilateral LEs  +Right great toe ulcer, dry and not draining currently  NEUROLOGIC: Patient is alert and oriented to person, place, and time  No focal deficits  Answers questions appropriately  SKIN: +jaundice   PSYCHIATRIC: Normal mood and affect     Lab Results: I have personally reviewed pertinent reports      CBC:     Results from last 7 days  Lab Units 02/17/18  1155   WBC Thousand/uL 7 71   RBC Million/uL 2 60*   HEMOGLOBIN g/dL 8 5*   HEMATOCRIT % 24 0*   MCV fL 92   MCH pg 32 7   MCHC g/dL 35 4   RDW % 24 6*   MPV fL 9 6   PLATELETS Thousands/uL 128*   NRBC AUTO /100 WBCs 0   NEUTROS PCT % 61   LYMPHS PCT % 17   MONOS PCT % 18*   EOS PCT % 3   BASOS PCT % 1   NEUTROS ABS Thousands/µL 4 70   LYMPHS ABS Thousands/µL 1 31   MONOS ABS Thousand/µL 1 37*   EOS ABS Thousand/µL 0 22   , Chemistry Profile:     Results from last 7 days  Lab Units 02/17/18  1154   SODIUM mmol/L 127*   POTASSIUM mmol/L 4 3   CHLORIDE mmol/L 95*   CO2 mmol/L 19*   ANION GAP mmol/L 13   BUN mg/dL 33*   CREATININE mg/dL 3 32*   GLUCOSE RANDOM mg/dL 125   CALCIUM mg/dL 7 9*   AST U/L 59*   ALT U/L 28   ALK PHOS U/L 122*   TOTAL PROTEIN g/dL 7 1   BILIRUBIN TOTAL mg/dL 4 67*   EGFR ml/min/1 73sq m 20   , Coagulation Studies:     Results from last 7 days  Lab Units 02/17/18  1217   PROTIME seconds 22 6*   INR  1 97*   PTT seconds 45*   , Cardiac Studies:   , Additional Labs:     Results from last 7 days  Lab Units 02/17/18  1553 02/17/18  1217 02/17/18  1154   LIPASE u/L  --   --  247   LACTIC ACID mmol/L 2 0 3 2*  --    AMMONIA umol/L  --  78*  --    , iSTAT CHEM 8:     Results from last 7 days  Lab Units 02/17/18  1155 02/17/18  1154   EGFR ml/min/1 73sq m  --  20   GLUCOSE RANDOM mg/dL  --  125   HEMOGLOBIN g/dL 8 5*  -- Imaging:   None past 24h    Microbiology: UA pending    EKG: Unremarkable      Assessment and Plan     Plan:     #Decompensated cirrhosis 2/2 alcohol abuse  -MELD of 34 today   -Hypotension improved without intervention  -Discussed with Dr Grant Joel today, will initiate IV albumin therapy 25 g q8h  -Diagnostic tap in ED negative for SBP- no need for abx  Hold off on further tap for now given very elevated Cr  -CT abd and US abd with doppler studies at [facility] negative for liver lesions or acute thrombosis   -Low salt diet  -EGD 2/7/18 with small varix and portal gastropathy   Recommended repeat in 1-2 years   -Neuro checks qshift  -Hold diuresis at this time due to PRANAY  -Did not initiate Rifaximin outpatient due to cost, hold for now    #Hepatic Encephalopathy  -With hyperammonemia of 78, however stable as it was 114 on admission at [facility]  -Currently AAOx3, answered questions appropriately  -Pt did not want to take Lactulose at this time due to taste and frequent diarrhea, explained to patient that Lactulose is used to   cause frequent BMs and the risk of not taking; Pt agreeable to making Lactulose PRN for AMS at this time    #Alcoholic hepatitis  -Discriminant function score of 40 today  -last alcoholic drink was 3 weeks ago per patient  -AST/ALT close to WNL  -Per Dr Grant Joel will possible initiate steroid therapy tomorrow if infectious workup is negative    #PRANAY  -Unclear cause, likely multi-factorial  Pre-renal vs ATN vs hepatorenal syndrome  -48 hr fluid challenge with IV Albumin q8h standing  -Nephrology consult  -Urine Na ordered    #SIRS  -With hypoxia, chills, cough, tachycardia, hypotension (resolved without intervention), LA  -No obvious source at this time, no elevated WBC  -Infectious workup with CXR, UA, blood cultures, Flu PCR  -Does not appear to be 2/2 right toe osteomyelitis as blood cx were negative at [facility] and gangrene appears chronic  more than acute- no active draining    #Lactic acidosis- resolved without intervention  -Infectious workup as above    #Anemia of Chronic Disease  -Hg 8 5 (was between 6-8 at [facility])  -Iron panel and B12/Folate were normal at [facility]  -No active bleeding, EGD negative for bleeding source  -C-scope offered by Gi at [facility], pt refused    #Right great toe osteomyelitis  -S/p short course if IV Vancomycin, blood cx negative at [facility]  -F/u repeat blood cx and infectious workup  -Podiatry and ID consultation  -XR foot  -Toe amputation offered by podiatry at [facility] but patient refused    #Abdominal Pain  -Likely 2/2 above problems  -Avoid NSAIDS and opioids given PRANAY  -Possible therapeutic tap in future if Cr improves    #HTN  -Hold home Valsartan and Lisinopril given PRANAY    #Hyponatremia  -Likely 2/2 fluid overload  -Na is around baseline at 127 (was 112-132 at [facility])    #Hypoalbuminemia  -2/2 cirrhosis, management as above  -Treating with IV albumin    #Hyperbilirubinemia  -With diffuse jaundice  -Trend with AM CMP, if does not improve consider repeat   RUQ US with doppler study to r/o obstruction    #Chronic thrombocytopenia  -2/2 cirrhosis, stable at 128 (were 58-62 at [facility])    #Systolic murmur, new (LUSB): Consider obtaining ECHO  This was not done at [facility] and he   has never had one  Endocarditis less likely unless blood cx are positive here (they were negative at [facility]   and he was not systemically ill at that time)    #Hx of alcohol abuse  -Has not drank in 3 weeks  -C/t thiamine, folate  -Does not appear to be in withdrawal and has never experienced seizures or withdrawal in the past    #Tobacco abuse  -Pt refused nicotine patch      Code Status: Level 1 - Full Code  VTE Pharmacologic Prophylaxis: Reason for no pharmacologic prophylaxis low platelets   VTE Mechanical Prophylaxis: sequential compression device  Admission Status: INPATIENT     Admission Time  I spent 1 hour admitting the patient    This involved direct patient contact where I performed a full history and physical, reviewing previous records, and reviewing laboratory and other diagnostic studies      Jose Retana, DO  Internal Medicine  PGY-2

## 2018-02-18 ENCOUNTER — APPOINTMENT (INPATIENT)
Dept: RADIOLOGY | Facility: HOSPITAL | Age: 53
DRG: 264 | End: 2018-02-18
Payer: COMMERCIAL

## 2018-02-18 LAB
ABO GROUP BLD: NORMAL
ALBUMIN SERPL BCP-MCNC: 1.9 G/DL (ref 3.5–5)
ALP SERPL-CCNC: 92 U/L (ref 46–116)
ALT SERPL W P-5'-P-CCNC: 20 U/L (ref 12–78)
AMORPH URATE CRY URNS QL MICRO: ABNORMAL /HPF
ANION GAP SERPL CALCULATED.3IONS-SCNC: 10 MMOL/L (ref 4–13)
ANION GAP SERPL CALCULATED.3IONS-SCNC: 9 MMOL/L (ref 4–13)
ARTERIAL PATENCY WRIST A: YES
AST SERPL W P-5'-P-CCNC: 36 U/L (ref 5–45)
BACTERIA UR QL AUTO: ABNORMAL /HPF
BASE EXCESS BLDA CALC-SCNC: -3.5 MMOL/L
BILIRUB SERPL-MCNC: 3.22 MG/DL (ref 0.2–1)
BILIRUB UR QL STRIP: ABNORMAL
BLD GP AB SCN SERPL QL: NEGATIVE
BODY TEMPERATURE: 97.5 DEGREES FEHRENHEIT
BUN SERPL-MCNC: 38 MG/DL (ref 5–25)
BUN SERPL-MCNC: 39 MG/DL (ref 5–25)
CALCIUM SERPL-MCNC: 7.8 MG/DL (ref 8.3–10.1)
CALCIUM SERPL-MCNC: 7.9 MG/DL (ref 8.3–10.1)
CHLORIDE SERPL-SCNC: 96 MMOL/L (ref 100–108)
CHLORIDE SERPL-SCNC: 98 MMOL/L (ref 100–108)
CLARITY UR: ABNORMAL
CO2 SERPL-SCNC: 20 MMOL/L (ref 21–32)
CO2 SERPL-SCNC: 20 MMOL/L (ref 21–32)
COLOR UR: ABNORMAL
CREAT SERPL-MCNC: 3.59 MG/DL (ref 0.6–1.3)
CREAT SERPL-MCNC: 3.82 MG/DL (ref 0.6–1.3)
CREAT UR-MCNC: 47 MG/DL
ERYTHROCYTE [DISTWIDTH] IN BLOOD BY AUTOMATED COUNT: 24 % (ref 11.6–15.1)
FLUAV AG SPEC QL: NORMAL
FLUBV AG SPEC QL: NORMAL
GFR SERPL CREATININE-BSD FRML MDRD: 17 ML/MIN/1.73SQ M
GFR SERPL CREATININE-BSD FRML MDRD: 19 ML/MIN/1.73SQ M
GLUCOSE SERPL-MCNC: 100 MG/DL (ref 65–140)
GLUCOSE SERPL-MCNC: 123 MG/DL (ref 65–140)
GLUCOSE SERPL-MCNC: 174 MG/DL (ref 65–140)
GLUCOSE UR STRIP-MCNC: ABNORMAL MG/DL
HCO3 BLDA-SCNC: 20.8 MMOL/L (ref 22–28)
HCT VFR BLD AUTO: 18.4 % (ref 36.5–49.3)
HGB BLD-MCNC: 6.6 G/DL (ref 12–17)
HGB BLD-MCNC: 7 G/DL (ref 12–17)
HGB UR QL STRIP.AUTO: ABNORMAL
INR PPP: 2.39 (ref 0.86–1.16)
KETONES UR STRIP-MCNC: ABNORMAL MG/DL
LACTATE SERPL-SCNC: 1.9 MMOL/L (ref 0.5–2)
LEUKOCYTE ESTERASE UR QL STRIP: ABNORMAL
MCH RBC QN AUTO: 32.8 PG (ref 26.8–34.3)
MCHC RBC AUTO-ENTMCNC: 35.9 G/DL (ref 31.4–37.4)
MCV RBC AUTO: 92 FL (ref 82–98)
NITRITE UR QL STRIP: POSITIVE
NON VENT ROOM AIR: ABNORMAL %
NON-SQ EPI CELLS URNS QL MICRO: ABNORMAL /HPF
O2 CT BLDA-SCNC: 8.9 ML/DL (ref 16–23)
OSMOLALITY UR/SERPL-RTO: 272 MMOL/KG (ref 282–298)
OSMOLALITY UR: 284 MMOL/KG
OXYHGB MFR BLDA: 94.5 % (ref 94–97)
PCO2 BLDA: 33.4 MM HG (ref 36–44)
PCO2 TEMP ADJ BLDA: 32.5 MM HG (ref 36–44)
PH BLD: 7.42 [PH] (ref 7.35–7.45)
PH BLDA: 7.41 [PH] (ref 7.35–7.45)
PH UR STRIP.AUTO: 5 [PH] (ref 4.5–8)
PLATELET # BLD AUTO: 74 THOUSANDS/UL (ref 149–390)
PMV BLD AUTO: 9.2 FL (ref 8.9–12.7)
PO2 BLD: 80.8 MM HG (ref 75–129)
PO2 BLDA: 84 MM HG (ref 75–129)
POTASSIUM SERPL-SCNC: 4.1 MMOL/L (ref 3.5–5.3)
POTASSIUM SERPL-SCNC: 4.1 MMOL/L (ref 3.5–5.3)
PROT SERPL-MCNC: 5.7 G/DL (ref 6.4–8.2)
PROT UR STRIP-MCNC: ABNORMAL MG/DL
PROTHROMBIN TIME: 26.4 SECONDS (ref 12.1–14.4)
RBC # BLD AUTO: 2.01 MILLION/UL (ref 3.88–5.62)
RBC #/AREA URNS AUTO: ABNORMAL /HPF
RH BLD: POSITIVE
RSV B RNA SPEC QL NAA+PROBE: NORMAL
SODIUM 24H UR-SCNC: 6 MOL/L
SODIUM SERPL-SCNC: 126 MMOL/L (ref 136–145)
SODIUM SERPL-SCNC: 127 MMOL/L (ref 136–145)
SP GR UR STRIP.AUTO: 1.02 (ref 1–1.03)
SPECIMEN EXPIRATION DATE: NORMAL
SPECIMEN SOURCE: ABNORMAL
UROBILINOGEN UR QL STRIP.AUTO: 1 E.U./DL
WBC # BLD AUTO: 5.96 THOUSAND/UL (ref 4.31–10.16)
WBC #/AREA URNS AUTO: ABNORMAL /HPF

## 2018-02-18 PROCEDURE — 30233K1 TRANSFUSION OF NONAUTOLOGOUS FROZEN PLASMA INTO PERIPHERAL VEIN, PERCUTANEOUS APPROACH: ICD-10-PCS | Performed by: INTERNAL MEDICINE

## 2018-02-18 PROCEDURE — 99252 IP/OBS CONSLTJ NEW/EST SF 35: CPT | Performed by: INTERNAL MEDICINE

## 2018-02-18 PROCEDURE — 99291 CRITICAL CARE FIRST HOUR: CPT | Performed by: INTERNAL MEDICINE

## 2018-02-18 PROCEDURE — 83605 ASSAY OF LACTIC ACID: CPT | Performed by: PHYSICIAN ASSISTANT

## 2018-02-18 PROCEDURE — P9016 RBC LEUKOCYTES REDUCED: HCPCS

## 2018-02-18 PROCEDURE — 99233 SBSQ HOSP IP/OBS HIGH 50: CPT | Performed by: INTERNAL MEDICINE

## 2018-02-18 PROCEDURE — 86901 BLOOD TYPING SEROLOGIC RH(D): CPT | Performed by: INTERNAL MEDICINE

## 2018-02-18 PROCEDURE — 80048 BASIC METABOLIC PNL TOTAL CA: CPT | Performed by: PHYSICIAN ASSISTANT

## 2018-02-18 PROCEDURE — 94762 N-INVAS EAR/PLS OXIMTRY CONT: CPT

## 2018-02-18 PROCEDURE — P9017 PLASMA 1 DONOR FRZ W/IN 8 HR: HCPCS

## 2018-02-18 PROCEDURE — 86900 BLOOD TYPING SEROLOGIC ABO: CPT | Performed by: INTERNAL MEDICINE

## 2018-02-18 PROCEDURE — 30233N1 TRANSFUSION OF NONAUTOLOGOUS RED BLOOD CELLS INTO PERIPHERAL VEIN, PERCUTANEOUS APPROACH: ICD-10-PCS | Performed by: INTERNAL MEDICINE

## 2018-02-18 PROCEDURE — 82948 REAGENT STRIP/BLOOD GLUCOSE: CPT

## 2018-02-18 PROCEDURE — 80053 COMPREHEN METABOLIC PANEL: CPT | Performed by: INTERNAL MEDICINE

## 2018-02-18 PROCEDURE — 82805 BLOOD GASES W/O2 SATURATION: CPT | Performed by: PHYSICIAN ASSISTANT

## 2018-02-18 PROCEDURE — 83930 ASSAY OF BLOOD OSMOLALITY: CPT | Performed by: PHYSICIAN ASSISTANT

## 2018-02-18 PROCEDURE — 85018 HEMOGLOBIN: CPT | Performed by: PHYSICIAN ASSISTANT

## 2018-02-18 PROCEDURE — 84300 ASSAY OF URINE SODIUM: CPT | Performed by: PHYSICIAN ASSISTANT

## 2018-02-18 PROCEDURE — 83935 ASSAY OF URINE OSMOLALITY: CPT | Performed by: PHYSICIAN ASSISTANT

## 2018-02-18 PROCEDURE — 74176 CT ABD & PELVIS W/O CONTRAST: CPT

## 2018-02-18 PROCEDURE — 82570 ASSAY OF URINE CREATININE: CPT | Performed by: PHYSICIAN ASSISTANT

## 2018-02-18 PROCEDURE — 81001 URINALYSIS AUTO W/SCOPE: CPT | Performed by: INTERNAL MEDICINE

## 2018-02-18 PROCEDURE — 85027 COMPLETE CBC AUTOMATED: CPT | Performed by: INTERNAL MEDICINE

## 2018-02-18 PROCEDURE — 86850 RBC ANTIBODY SCREEN: CPT | Performed by: INTERNAL MEDICINE

## 2018-02-18 PROCEDURE — 86920 COMPATIBILITY TEST SPIN: CPT

## 2018-02-18 PROCEDURE — 99254 IP/OBS CNSLTJ NEW/EST MOD 60: CPT | Performed by: PHYSICIAN ASSISTANT

## 2018-02-18 PROCEDURE — 85610 PROTHROMBIN TIME: CPT | Performed by: INTERNAL MEDICINE

## 2018-02-18 PROCEDURE — 86927 PLASMA FRESH FROZEN: CPT

## 2018-02-18 PROCEDURE — 36620 INSERTION CATHETER ARTERY: CPT

## 2018-02-18 RX ORDER — ALBUMIN (HUMAN) 12.5 G/50ML
12.5 SOLUTION INTRAVENOUS ONCE
Status: COMPLETED | OUTPATIENT
Start: 2018-02-18 | End: 2018-02-18

## 2018-02-18 RX ORDER — ALBUMIN, HUMAN INJ 5% 5 %
12.5 SOLUTION INTRAVENOUS ONCE
Status: COMPLETED | OUTPATIENT
Start: 2018-02-18 | End: 2018-02-18

## 2018-02-18 RX ORDER — PANTOPRAZOLE SODIUM 40 MG/1
40 TABLET, DELAYED RELEASE ORAL
Status: DISCONTINUED | OUTPATIENT
Start: 2018-02-18 | End: 2018-02-22

## 2018-02-18 RX ORDER — LACTULOSE 20 G/30ML
20 SOLUTION ORAL 2 TIMES DAILY
Status: DISCONTINUED | OUTPATIENT
Start: 2018-02-18 | End: 2018-02-21

## 2018-02-18 RX ORDER — OCTREOTIDE ACETATE 100 UG/ML
100 INJECTION, SOLUTION INTRAVENOUS; SUBCUTANEOUS EVERY 8 HOURS SCHEDULED
Status: DISCONTINUED | OUTPATIENT
Start: 2018-02-18 | End: 2018-03-09

## 2018-02-18 RX ORDER — METHYLPREDNISOLONE 16 MG/1
32 TABLET ORAL
Status: DISCONTINUED | OUTPATIENT
Start: 2018-02-18 | End: 2018-02-23

## 2018-02-18 RX ORDER — ALBUMIN (HUMAN) 12.5 G/50ML
12.5 SOLUTION INTRAVENOUS ONCE
Status: DISCONTINUED | OUTPATIENT
Start: 2018-02-18 | End: 2018-02-18

## 2018-02-18 RX ORDER — MIDODRINE HYDROCHLORIDE 5 MG/1
10 TABLET ORAL
Status: DISCONTINUED | OUTPATIENT
Start: 2018-02-18 | End: 2018-02-21

## 2018-02-18 RX ORDER — LIDOCAINE HYDROCHLORIDE 10 MG/ML
INJECTION, SOLUTION EPIDURAL; INFILTRATION; INTRACAUDAL; PERINEURAL
Status: COMPLETED
Start: 2018-02-18 | End: 2018-02-18

## 2018-02-18 RX ADMIN — ALBUMIN HUMAN 25 G: 0.25 SOLUTION INTRAVENOUS at 01:53

## 2018-02-18 RX ADMIN — ALBUMIN HUMAN 12.5 G: 0.25 SOLUTION INTRAVENOUS at 10:32

## 2018-02-18 RX ADMIN — OCTREOTIDE ACETATE 100 MCG: 100 INJECTION, SOLUTION INTRAVENOUS; SUBCUTANEOUS at 15:00

## 2018-02-18 RX ADMIN — ALBUMIN HUMAN 12.5 G: 0.05 INJECTION, SOLUTION INTRAVENOUS at 11:05

## 2018-02-18 RX ADMIN — ALBUMIN HUMAN 25 G: 0.25 SOLUTION INTRAVENOUS at 08:00

## 2018-02-18 RX ADMIN — LACTULOSE 20 G: 20 SOLUTION ORAL at 18:11

## 2018-02-18 RX ADMIN — Medication 100 MG: at 08:03

## 2018-02-18 RX ADMIN — MIDODRINE HYDROCHLORIDE 10 MG: 5 TABLET ORAL at 18:11

## 2018-02-18 RX ADMIN — FOLIC ACID 1 MG: 1 TABLET ORAL at 08:03

## 2018-02-18 RX ADMIN — OCTREOTIDE ACETATE 100 MCG: 100 INJECTION, SOLUTION INTRAVENOUS; SUBCUTANEOUS at 23:12

## 2018-02-18 RX ADMIN — ALBUMIN HUMAN 25 G: 0.25 SOLUTION INTRAVENOUS at 17:00

## 2018-02-18 RX ADMIN — RIFAXIMIN 550 MG: 550 TABLET ORAL at 20:08

## 2018-02-18 RX ADMIN — LACTULOSE 20 G: 20 SOLUTION ORAL at 12:12

## 2018-02-18 RX ADMIN — PANTOPRAZOLE SODIUM 40 MG: 40 TABLET, DELAYED RELEASE ORAL at 12:12

## 2018-02-18 RX ADMIN — LIDOCAINE HYDROCHLORIDE 50 MG: 10 INJECTION, SOLUTION EPIDURAL; INFILTRATION; INTRACAUDAL; PERINEURAL at 13:19

## 2018-02-18 RX ADMIN — RIFAXIMIN 550 MG: 550 TABLET ORAL at 12:12

## 2018-02-18 RX ADMIN — PHYTONADIONE 10 MG: 10 INJECTION, EMULSION INTRAMUSCULAR; INTRAVENOUS; SUBCUTANEOUS at 16:00

## 2018-02-18 NOTE — PROGRESS NOTES
Patient has not voided since arrival on unit @ 1700  Bladder scanned patient for amounts in the 600 mL range but believe that these are inaccurate secondary to gross ascites present  Notified Adalberto (SOD resident) of findings  Advised not to straight cath patient at this time    Follow up in am

## 2018-02-18 NOTE — CONSULTS
Consultation - Nephrology   Benjamin Camara 50 y o  male MRN: [id number]  [facility]: [id number]      ASSESSMENT and PLAN:    1  Acute kidney injury  -initially presented yesterday  -was started on volume expansion with albumin  -blood pressures did improve hemoglobin dropped blood pressures decrease  -etiology acute kidney injury overall likely related to ATN from hypotension  -however given advanced liver disease cannot rule out hepatorenal syndrome  -check a urine sodium, urine creatinine  -start midodrine 10 mg t i d  along with octreotide 100 mcg t i d   -ICU to start pressors  -he is alert and oriented x3, knew the president knew the season  -consented for dialysis if needed overnight currently no indication  -agree with Mendoza catheter placement    2  Electrolytes  -hyponatremia-in the setting of volume overload    3   Acid-base  -respiratory alkalosis-in the setting of cirrhosis with metabolic acidosis likely to compensate  -would check a VBG    4  Volume overload  -continue volume expansion and pressors for now  -he has no respiratory distress  -hold off on diuretics    5  Cirrhosis  -alcoholic cirrhosis and GI following    6  Anemia  -blood transfusion per ICU      HISTORY OF PRESENT ILLNESS:  Requesting Physician: Meño Rich DO  Reason for Consult:  Acute kidney injury    Benjamin Camara is a 50y o  year old male who was admitted to [facility] after presenting with decompensated cirrhosis  A renal consultation is requested today for assistance in the management of acute kidney injury  He has a past medical history of newly diagnosed alcoholic cirrhosis he originally presented to [facility] where he had a large volume paracentesis and cirrhosis  He was also found to be hyponatremic and require ICU stay for improvement in the he was discharged home and subsequently presented to the emergency room decompensate    It was thought that he was volume depleted 2 L of fluid was removed from his abdomen in the ER he subsequently had a 2 unit drop in his hemoglobin after his paracentesis    He denies any chest pain or shortness of Breath no fevers or chills no nausea vomiting  Creatinine continues to worsen with worsening urine output    PAST MEDICAL HISTORY:  Past Medical History:   Diagnosis Date    Cirrhosis ([address] )     Hypertension        PAST SURGICAL HISTORY:  History reviewed  No pertinent surgical history  ALLERGIES:  No Known Allergies    SOCIAL HISTORY:  History   Alcohol Use    Yes     Comment: Pt "I quite a couple of weeks ago"     History   Drug Use No     Comment: prior history      History   Smoking Status    Current Every Day Smoker    Packs/day: 0 20    Years: 35 00    Types: Cigarettes   Smokeless Tobacco    Never Used       FAMILY HISTORY:  History reviewed  No pertinent family history      MEDICATIONS:    Current Facility-Administered Medications:     albumin human (FLEXBUMIN) 25 % injection 25 g, 25 g, Intravenous, Q8H, Mariama Hurtado DO, Last Rate: 0 mL/hr at 02/18/18 0230, 25 g at 51/29/15 5629    folic acid (FOLVITE) tablet 1 mg, 1 mg, Oral, Daily, Mariama Hurtado DO, 1 mg at 02/18/18 0803    influenza inactivated quadrivalent vaccine (FLULAVAL) IM injection 0 5 mL, 0 5 mL, Intramuscular, Prior to discharge, Alice Lopez MD    lactulose 20 g/30 mL oral solution 20 g, 20 g, Oral, BID, KARIME Castro-C, 20 g at 02/18/18 1212    methylPREDNISolone (MEDROL) tablet 32 mg, 32 mg, Oral, Daily With Breakfast, Oziel Nichole PA-C    ondansetron (ZOFRAN) injection 4 mg, 4 mg, Intravenous, Q8H PRN, Mariama Hurtado DO    pantoprazole (PROTONIX) EC tablet 40 mg, 40 mg, Oral, Early Morning, KARIME Castro-C, 40 mg at 02/18/18 1212    phytonadione (AQUA-MEPHYTON) 10 mg/mL 10 mg in sodium chloride 0 9 % 50 mL IVPB, 10 mg, Intravenous, Once, [facility]    rifaximin (XIFAXAN) tablet 550 mg, 550 mg, Oral, Q12H [facility] CUCA Luque PA-C, 550 mg at 02/18/18 1212    thiamine (VITAMIN B1) tablet 100 mg, 100 mg, Oral, Daily, Mariama Hurtado DO, 100 mg at 02/18/18 6003    REVIEW OF SYSTEMS:  All the systems were reviewed and were negative except as documented on the HPI        PHYSICAL EXAM:  Current Weight: Weight - Scale: 79 4 kg (175 lb)  First Weight: Weight - Scale: 79 4 kg (175 lb)  Vitals:    02/18/18 1154 02/18/18 1237 02/18/18 1330 02/18/18 1345   BP: (!) 88/38 (!) 82/34 (!) 85/40 (!) (P) 102/35   BP Location:       Pulse:   (!) 106 (!) 106   Resp:   18 21   Temp:   97 5 °F (36 4 °C) 97 5 °F (36 4 °C)   TempSrc:       SpO2:   99% (P) 92%   Weight:       Height:           Intake/Output Summary (Last 24 hours) at 02/18/18 1352  Last data filed at 02/18/18 1157   Gross per 24 hour   Intake             1130 ml   Output             3685 ml   Net            -2555 ml     General: conscious, cooperative, in not acute distress  Eyes: conjunctivae pink, scleral icterus  ENT: lips and mucous membranes moist  Neck: supple, no JVD  Chest: clear breath sounds bilateral, no crackles, ronchus or wheezings  CVS: distinct S1 & S2, normal rate, regular rhythm  Abdomen:  Abdomen nontender  Extremities:  Anasarca  Skin: no rash  Neuro:  Positive asterixis       Lab Results:     Results from last 7 days  Lab Units 02/18/18  0628 02/17/18  1155 02/17/18  1154   WBC Thousand/uL 5 96 7 71  --    HEMOGLOBIN g/dL 6 6* 8 5*  --    HEMATOCRIT % 18 4* 24 0*  --    PLATELETS Thousands/uL 74* 128*  --    SODIUM mmol/L 127*  --  127*   POTASSIUM mmol/L 4 1  --  4 3   CHLORIDE mmol/L 98*  --  95*   CO2 mmol/L 20*  --  19*   BUN mg/dL 38*  --  33*   CREATININE mg/dL 3 59*  --  3 32*   CALCIUM mg/dL 7 8*  --  7 9*   TOTAL PROTEIN g/dL 5 7*  --  7 1   BILIRUBIN TOTAL mg/dL 3 22*  --  4 67*   ALK PHOS U/L 92  --  122*   ALT U/L 20  --  28   AST U/L 36  --  59*   GLUCOSE RANDOM mg/dL 100  --  125       Other Studies:  Please see previous notes

## 2018-02-18 NOTE — CONSULTS
Consultation - Infectious Disease   Sharon Gonzales 50 y o  male MRN: [id number]  Unit/Bed#: [facility] 628-01 Encounter: [id number]      IMPRESSION & RECOMMENDATIONS:   Impression/Recommendations: This is a 50 y o  male, with alcoholic cirrhosis, came to the ER earlier today with abdominal pain and confusion  Patient has evidence of decompensated cirrhosis and hepatic encephalopathy  Patient also has chronic right great toe gangrene and osteomyelitis  1   Right great toe gangrene and osteomyelitis  There is no evidence of cellulitis clinically  Patient has no fever leukocytosis  No antibiotic is necessary  However, patient needs to have this toe amputated, to avoid development of wet gangrene  I discussed this with the patient in great detail  He still refuses  No antibiotic needed at present time  Serial toe exam   Recommend toe amputation  2   Decompensated cirrhosis  This is due to patient's noncompliance with prescribed treatment plan  There is no evidence of peritonitis clinically  Patient is status post paracentesis with benign peritoneal fluid  No antibiotic needed  Management per GI and primary service  3   Encephalopathy  This is most likely hepatic encephalopathy, with patient not compliant with lactulose  No clinical signs of CNS infection  Recommend restart lactulose  Monitor mental status  4   PRANAY  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  Dehydration may contribute  Any antibiotic will need to have dosages adjusted accordingly  Monitor creatinine  [facility] records reviewed in detail  As far 2nd toe, patient was never septic or systemically toxic during hospitalization  Discussed with the patient in detail regarding the above plan  Thank you for this consultation  We will follow along with you  HISTORY OF PRESENT ILLNESS:  Reason for Consult:  Right big toe osteomyelitis      HPI: Sharon Gonzales is a 50 y o  male, with alcoholic cirrhosis, came to the ER earlier today with abdominal pain and confusion  Patient had paracentesis done with relatively benign peritoneal fluid parameters  He has recently diagnosed right big toe osteomyelitis  For these reasons, we are asked to evaluate the patient  Patient was recently admitted at [facility] per  until  for hypernatremia and abdominal distention  He had and large ascites and had a therapeutic paracentesis done  No evidence of peritonitis  During that admission, patient was found to have right big toe gangrene and osteomyelitis  Toe amputation was recommended the patient refused  He was treated with a brief course of IV vancomycin  He was also given lactulose for hepatic encephalopathy  However, after discharge, patient did not take his/close as prescribed  At present, patient feels reasonably well  He is sleepy but arousable  He has mild confusion  He has mild diffuse abdominal discomfort  No pain in his right great toe  REVIEW OF SYSTEMS:  A complete 12 point system-based review of systems is otherwise negative  PAST MEDICAL HISTORY:  Past Medical History:   Diagnosis Date    Cirrhosis ([facility] [address] )     Hypertension      History reviewed  No pertinent surgical history  Problem list reviewed  FAMILY HISTORY:  Non-contributory    SOCIAL HISTORY:  History   Alcohol Use    Yes     Comment: Pt "I quite a couple of weeks ago"     History   Drug Use No     Comment: prior history      History   Smoking Status    Current Every Day Smoker    Packs/day: 0 20    Years: 35 00    Types: Cigarettes   Smokeless Tobacco    Never Used       ALLERGIES:  No Known Allergies    MEDICATIONS:  All current active medications have been reviewed  Patient is currently not on any antibiotic      PHYSICAL EXAM:  Vitals:  HR:  [100-111] 111  Resp:  [18-24] 18  BP: ()/(47-66) 95/60  SpO2:  [68 %-100 %] 94 %  Temp (24hrs), Av 5 °F (36 4 °C), Min:97 5 °F (36 4 °C), Max:97 5 °F (36 4 °C)  Current: Temperature: 97 5 °F (36 4 °C)     Physical Exam:  General:  Chronically ill-appearing, comfortable, nontoxic, in no acute distress  Awake, alert and oriented x 3 but with mild confusion  Eyes:  Conjunctive clear with no hemorrhages or effusions  Oropharynx:  No ulcers, no lesions, pharynx benign, no tonsillitis  Neck:  Supple, no lymphadenopathy, no mass, nontender  Lungs:  Expansion symmetric, no rales, no wheezing, no accessory muscle use  Cardiac:  Tachycardic with regular rhythm, normal S1, normal S2, II/VI RAYSA  Abdomen:  Soft, distended, mild diffuse tenderness, no HSM  Extremities:  1+ leg edema  Right great toe with ischemic/gangrenous changes  No erythema/warmth  No fluctuance  No tenderness  Skin:  No rashes, no ulcers  Neurological:  Moves all four extremities spontaneously, sensation grossly intact    LABS, IMAGING, & OTHER STUDIES:  Lab Results:  I have personally reviewed pertinent labs  Results from last 7 days  Lab Units 02/17/18  1154   SODIUM mmol/L 127*   POTASSIUM mmol/L 4 3   CHLORIDE mmol/L 95*   CO2 mmol/L 19*   ANION GAP mmol/L 13   BUN mg/dL 33*   CREATININE mg/dL 3 32*   EGFR ml/min/1 73sq m 20   GLUCOSE RANDOM mg/dL 125   CALCIUM mg/dL 7 9*   AST U/L 59*   ALT U/L 28   ALK PHOS U/L 122*   TOTAL PROTEIN g/dL 7 1   BILIRUBIN TOTAL mg/dL 4 67*       Results from last 7 days  Lab Units 02/17/18  1155   WBC Thousand/uL 7 71   HEMOGLOBIN g/dL 8 5*   PLATELETS Thousands/uL 128*           Imaging Studies:   I have personally reviewed pertinent imaging study reports and images in PACS  EKG, Pathology, and Other Studies:   I have personally reviewed pertinent reports

## 2018-02-18 NOTE — CONSULTS
Consultation - [facility]  Dyan Craig 50 y o  male MRN: [id number]  Unit/Bed#: [facility] 628-01 Encounter: [id number]         Reason for Consult / Principal Problem: Alcoholic cirrhosis    HPI:  Kavon is a 53-year-old male with a history of decompensated cirrhosis secondary to alcohol abuse complicated by large volume ascites and hepatic encephalopathy, chronic osteomyelitis of the right toe, medical noncompliance and hypertension  Patient presented to the ER last night for abdominal distension, shortness of breath and hypotension  LFTs on admission are as follows: AST 59, ALT 28, Alk Phos 122, total bili 4 67  Patient was also found to be hyponatremic at 131  It is also important to note that his creatinine was elevated at 3 32   A diagnostic paracentesis was performed in the ER early this morning which showed a SAAG greater than 1 1 consistent with her cirrhosis, no elevated ascitic white count, and Gram stain did not show any bacteria  Cytology pending  The site of paracentesis continued to drain, therefore a colostomy bag was placed over the site  Patient was seen at the bedside this morning  Patient seemed lethargic but was able to answer questions appropriately  Patient denies any abdominal pain, nausea, vomiting, hematemesis, melena, or hematochezia  He denies fevers or chills  Patient's only complaint is that he feels tired and has abdominal distention that is uncomfortable  His last bowel movement was yesterday morning, which he reports was soft and without any bright red blood or melena  Patient's last EGD was during a recent admission at [facility] in early February this year  Per Care everywhere, EGD showed portal hypertensive gastropathy and a small esophageal varix  Patient has never had a colonoscopy, and was encouraged to have one performed while at [facility] due to chronic anemia    During that admission, it was noted that the patient has a history of medical noncompliance  He does not take his lactulose regularly due to diarrhea complaints  His insurance also does not cover Xifaxan  When asked about alcohol use, he reports that his drink of choice is beer  He states that his last drink was 3 weeks ago  He denies experiencing any seizures or change in mental status at home  Review of Systems:    CONSTITUTIONAL: Denies any fever, chills, or rigors  Good appetite, and no recent weight loss  HEENT: No earache or tinnitus  Denies hearing loss or visual disturbances  CARDIOVASCULAR: No chest pain or palpitations  RESPIRATORY: Denies any cough, hemoptysis, shortness of breath or dyspnea on exertion  GASTROINTESTINAL: As noted in the History of Present Illness  GENITOURINARY: Denies any hematuria or dysuria  NEUROLOGIC: No dizziness or vertigo, denies headaches  MUSCULOSKELETAL: Denies any muscle or joint pain  SKIN: Denies skin rashes or itching  ENDOCRINE: Denies excessive thirst  Denies intolerance to heat or cold  PSYCHOSOCIAL: Denies depression or anxiety  Denies any recent memory loss  Historical Information   Past Medical History:   Diagnosis Date    [facility]     Hypertension      History reviewed  No pertinent surgical history  Social History   History   Alcohol Use    Yes     Comment: Pt "I quite a couple of weeks ago"     History   Drug Use No     Comment: prior history      History   Smoking Status    Current Every Day Smoker    Packs/day: 0 20    Years: 35 00    Types: Cigarettes   Smokeless Tobacco    Never Used     History reviewed  No pertinent family history       Meds/Allergies     Current Facility-Administered Medications   Medication Dose Route Frequency    albumin human (FLEXBUMIN) 25 % injection 25 g  25 g Intravenous D7U    folic acid (FOLVITE) tablet 1 mg  1 mg Oral Daily    influenza inactivated quadrivalent vaccine (FLULAVAL) IM injection 0 5 mL  0 5 mL Intramuscular Prior to discharge    lactulose 20 g/30 mL oral solution 20 g  20 g Oral BID    methylPREDNISolone (MEDROL) tablet 32 mg  32 mg Oral Daily With Breakfast    ondansetron (ZOFRAN) injection 4 mg  4 mg Intravenous Q8H PRN    pantoprazole (PROTONIX) EC tablet 40 mg  40 mg Oral Early Morning    rifaximin (XIFAXAN) tablet 550 mg  550 mg Oral Q12H [facility]    thiamine (VITAMIN B1) tablet 100 mg  100 mg Oral Daily       No Known Allergies      Objective     Blood pressure (!) 78/30, pulse (!) 107, temperature 97 5 °F (36 4 °C), temperature source Oral, resp  rate 20, height 5' 10" (1 778 m), weight 79 4 kg (175 lb), SpO2 100 %  Intake/Output Summary (Last 24 hours) at 02/18/18 1134  Last data filed at 02/18/18 1120   Gross per 24 hour   Intake              880 ml   Output             3535 ml   Net            -2655 ml         PHYSICAL EXAM:      General Appearance:   Alert and oriented x 3  Lethargic but cooperative, and in no respiratory distress  No asterixis  HEENT:   Scleral icterus noted  Normocephalic, atraumatic  Neck:  Supple, symmetrical, trachea midline   Lungs:   Clear to auscultation bilaterally   Heart[de-identified]   Regular rate and rhythm   Abdomen:   Soft, non-tender, markedly distended from ascites, ostomy bag noted over the paracentesis site on the left lower quadrant with blood-tinged fluid; normal bowel sounds; unable to assess for masses or organomegaly secondary to body habitus    Genitalia:   Deferred    Rectal:   Deferred    Extremities:  Clubbing of fingernails noted bilaterally  +2 pitting edema of the lower extremities     Pulses:  2+ and symmetric all extremities    Skin:  Patient is jaundiced   Lymph nodes:  No palpable cervical or supraclavicular lymphadenopathy        Lab Results:     Results from last 7 days  Lab Units 02/18/18  0628 02/17/18  1155   WBC Thousand/uL 5 96 7 71   HEMOGLOBIN g/dL 6 6* 8 5*   HEMATOCRIT % 18 4* 24 0*   PLATELETS Thousands/uL 74* 128*   NEUTROS PCT %  --  61   LYMPHS PCT %  --  17   MONOS PCT %  -- 18*   EOS PCT %  --  3       Results from last 7 days  Lab Units 02/18/18  0628   SODIUM mmol/L 127*   POTASSIUM mmol/L 4 1   CHLORIDE mmol/L 98*   CO2 mmol/L 20*   BUN mg/dL 38*   CREATININE mg/dL 3 59*   CALCIUM mg/dL 7 8*   TOTAL PROTEIN g/dL 5 7*   BILIRUBIN TOTAL mg/dL 3 22*   ALK PHOS U/L 92   ALT U/L 20   AST U/L 36   GLUCOSE RANDOM mg/dL 100       Results from last 7 days  Lab Units 02/18/18  0044   INR  2 39*       Results from last 7 days  Lab Units 02/17/18  1154   LIPASE u/L 247       Imaging Studies: I have personally reviewed pertinent imaging studies  Xr Chest Pa & Lateral    Result Date: 2/18/2018  Impression: 1  No active pulmonary disease  2   Free intraperitoneal air which may be related to reported paracentesis the prior day though clinical correlation for acute abdominal symptoms recommended  I personally discussed this study with Rhae Call on 2/18/2018 at 10:48 AM  Workstation performed: [id number]       ASSESSMENT and PLAN:    Principal Problem:    Decompensated hepatic cirrhosis ([address] )  Active Problems:    Abdominal pain    Ascites    Hepatic encephalopathy (HCC)    PRANAY (acute kidney injury) ([address] )    Hypoalbuminemia    Hyponatremia    Hypotension    SIRS (systemic inflammatory response syndrome) (HCC)    Hyperammonemia (HCC)    Lactic acidosis    Elevated alkaline phosphatase level    Anemia of chronic disease    Osteomyelitis of toe of right foot (HCC)    Alcohol abuse    Alcoholic hepatitis    Cough    Hypoxia    1) Decompensated cirrhosis secondary to alcohol abuse complicated by large volume ascites and episodes of hepatic encephalopathy - MELD 35, Child [place] C, and discriminant function is over 32 points  Recent EGD at [facility] revealed a small esophageal varix  Unlikely patient can be given nadalol in the setting of hypotension  Due to drowsiness, patient may have mild hepatic encephalopathy    However on exam he is able to answer questions appropriately such as person, place, and time  Patient has history of medical noncompliance has not been taking lactulose  His last drink was 3 weeks ago per patient  - We will schedule lactulose 20 g PO BID for now, titrate to 2-3 stools per day  - Ordered Xifaxan 550 mg BID  - Due to discriminant function, we will start 32 mg of methylprednisolone daily which should be continued for the next 28 days with a taper of 2 weeks afterwards; spoke to Infectious Disease who deemed his osteomyelitis as chronic and steroids should not affect this  - Continue to monitor mental status  - Continue to monitor for overt bleeding and transfuse as neccessary   - Educated to avoid NSAIDs  - Continue daily hepatic function and PT/INR labs    2) Hyponatermia with acute on chronic kidney injury - Na is 127 today  Agree with holding diuretics  Agree with Nephrology consult as he is at risk for hepatorenal syndrome   - Patient has large volume ascites, however given hypotension and hyponatremia we will hold off for now and defer to Nephrology for their recommendations  - Continue albumin as you are doing  - Follow up on urine sodium results      The patient was seen and examined by Dr Jerrilyn Spatz, all dooley medical decisions were made with Dr Jerrilyn Spatz  Thank you for allowing us to participate in the care of this pleasant patient  We will follow up with you closely

## 2018-02-18 NOTE — PROGRESS NOTES
Patient has remained hypotensive despite multiple boluses of albumin; patient hemoglobin 6 6 this AM and 1 unit PRBC transfusion is pending  I attempted to have goals of care discussion with patient, however patient with limited understanding of condition and unclear if patient fully understands gravity of situation at this time  He is, however, AAOx3 and responds readily to voice  I attempted to contact patient's son, Derald Holter, using listed number however I was unable to reach him  I discussed the case with ICU attending, Dr Stacey Dang, who has accepted patient for critical care level of care given persistent hypotension with potential need for pressor therapy  Further attempt to reach family member regarding change in level of care unsuccessful        Gabby Devlin,   PGY-3 IM

## 2018-02-18 NOTE — PROGRESS NOTES
Rounds done with Dr Mackenzie Hinojosa and sod resident Abdirahman Spearing  Patients bp continues to be low with large ammounts of drainage from paracentesis puncture site  Patient still mentating well  Plan for extra dose of albumin and 1unit PRBC (hgb 6 6)   Will continue to monitor

## 2018-02-18 NOTE — PROGRESS NOTES
Excessive volume of fluid draining from paracentesis puncture site  Discussed with on call SOD resident who came to assess patient  Agreed to try ostomy bag at site  Cleaned and prepared side, appliance placed over puncture site  Will continue to monitor

## 2018-02-18 NOTE — PROGRESS NOTES
Patients bp still 70/34 after extra dose of albumin (12 5g)  Awaiting one unit of blood  Also patient has still not urinated  Kelton Serve with SOD here to see patient   Will continue to monitor

## 2018-02-18 NOTE — PROGRESS NOTES
Patients bp 78/50, alert and able to follow commands  [facility] with SOD aware, will give scheduled albumin dose early   Will continue to monitor

## 2018-02-18 NOTE — PROGRESS NOTES
IM Residency Progress Note   Unit/Bed#: [facility] 628-01 Encounter: [id number]  SOD Team C       Manoj Ramirez 50 y o  male [id number]    Hospital Stay Days: 1      Assessment/Plan:    Principal Problem:    Decompensated hepatic cirrhosis ([facility] )  Active Problems:    Abdominal pain    Ascites    Hepatic encephalopathy (HCC)    PRANAY (acute kidney injury) ([facility] )    Hypoalbuminemia    Hyponatremia    Hypotension    SIRS (systemic inflammatory response syndrome) (HCC)    Hyperammonemia (HCC)    Lactic acidosis    Elevated alkaline phosphatase level    Anemia of chronic disease    Osteomyelitis of toe of right foot (HCC)    Alcohol abuse    Alcoholic hepatitis    Cough    Hypoxia    #Decompensated cirrhosis 2/2 alcohol abuse  -complicated by hepatic encephalopathy, ascites, small varices  -MELD of 34 on admission, 35 this AM  -Hypotension present this morning, will give scheduled albumin now was  -IV albumin therapy 25 g q8h  -Diagnostic tap in ED negative for SBP- no need for abx  Hold off on further tap for now given very elevated Cr  -CT abd and US abd with doppler studies at [facility] negative for liver lesions or acute thrombosis   -Low salt diet  -EGD 2/7/18 with small varix and portal gastropathy   Recommended repeat in 1-2 years   -Neuro checks qshift  -Hold diuresis at this time due to PRANAY     #Hepatic Encephalopathy  -With hyperammonemia of 78, however stable as it was 114 on admission at [facility]  -Currently AAOx3, answered questions appropriately  -will continue lactulose, will schedule     #Alcoholic hepatitis  -Discriminant function score of 40 on admission, 65 this AM  -last alcoholic drink was 3 weeks ago per patient  -AST/ALT close to WNL  -possible initiation of steroids, however await GI evaluation     #PRANAY  -Unclear cause, likely multi-factorial  Pre-renal vs ATN vs hepatorenal syndrome  -48 hr fluid challenge with IV Albumin q8h standing  -Nephrology consult, will appreciate recommendations     #SIRS  -With hypoxia, chills, cough, tachycardia, hypotension (resolved without intervention), LA  -No obvious source at this time, no elevated WBC  -Infectious workup with CXR, UA, blood cultures, Flu PCR  -Does not appear to be 2/2 right toe osteomyelitis as blood cx were negative at [facility] and gangrene appears chronic   -appreciate ID recommendations, hold antibiotics at this time unless clear source is found or patient should decompensate     #Lactic acidosis- resolved without intervention  -Infectious workup as above     #Anemia of Chronic Disease  -Hg 6 6 this AM (was between 6-8 at [facility])  -Iron panel and B12/Folate were normal at [facility]  -No active bleeding, EGD negative for bleeding source  -C-scope offered by Gi at [facility], pt refused  -transfuse 1 unit PRBC     #Right great toe osteomyelitis  -S/p short course if IV Vancomycin, blood cx negative at [facility]  -F/u repeat blood cx and infectious workup  -Podiatry and ID consultation  -XR foot  -Toe amputation offered by podiatry at [facility] but patient refused     #Abdominal Pain  -Likely 2/2 above problems  -Avoid NSAIDS and opioids given PRANAY  -Possible therapeutic tap in future if Cr improves     #HTN  -Hold home Valsartan and Lisinopril given PRANAY     #Hyponatremia  -Likely 2/2 fluid overload  -Na is around baseline at 127 (was 112-132 at [facility])     #Hypoalbuminemia  -2/2 cirrhosis, management as above  -Treating with IV albumin     #Hyperbilirubinemia  -With diffuse jaundice  -Trend with AM CMP, if does not improve consider repeat   RUQ US with doppler study to r/o obstruction     #Chronic thrombocytopenia  -2/2 cirrhosis, stable at 128 (were 58-62 at [facility])     Systolic murmur, new (LUSB): Consider obtaining ECHO  This was not done at [facility] and he has never had one   Endocarditis less likely unless blood cx are positive here     Hx of alcohol abuse  -Has not drank in 3 weeks  -C/t thiamine, folate  -Does not appear to be in withdrawal and has never experienced seizures or withdrawal in the past     #Tobacco abuse  -Pt refusing nicotine patch    Disposition:  Continue inpatient care given decompensated cirrhosis  Await specialist input, which will be appreciated  Subjective:   Patient report hypotensive this morning by nurse to 78/50  I saw and examined patient  He is answering questions appropriately, continues to complain of persistent abdominal pain  Denies nausea/vomiting  Denies fevers/chills, hemoptysis or hematemesis  Denies shortness of breath  Vitals: Temp (24hrs), Av 6 °F (36 4 °C), Min:97 5 °F (36 4 °C), Max:97 9 °F (36 6 °C)  Current: Temperature: 97 9 °F (36 6 °C)  Vitals:    18 1700 18 1955 18 19518 2300   BP: 95/60   117/59   BP Location: Left arm   Left arm   Pulse: (!) 111   (!) 114   Resp: 18   18   Temp: 97 5 °F (36 4 °C)   97 9 °F (36 6 °C)   TempSrc: Oral   Oral   SpO2: 97% 97% 94% 99%   Weight:       Height:        Body mass index is 25 11 kg/m²  I/O last 24 hours: In: 300 [P O :300]  Out: 1485 [Other:1485]      Physical Exam:   General:  Thin appearing, malnourished, appears older than stated age  HEENT:  Positive scleral and sublingual icterus, moist mucous membranes  Neck:  Supple, no adenopathy  Cardiovascular:  Regular rate rhythm, S3-R9, 3/6 holosystolic LUSB murmur radiating to left axilla  Respiratory:  Bilateral rhonchi  Abdomen:  Distended, positive fluid wave, mild tenderness to palpation    No rebound/guarding  Extremities:  2+ pitting edema bilaterally in lower extremities, right great toe ulcer that is dry and not currently draining  Skin:  Jaundiced  Neurologic:  AAO x3, no focal deficits    Invasive Devices     Peripheral Intravenous Line            Peripheral IV 18 Right Antecubital less than 1 day                          Labs:   Recent Results (from the past 24 hour(s))   Comprehensive metabolic panel    Collection Time: 18 11:54 AM   Result Value Ref Range    Sodium 127 (L) 136 - 145 mmol/L Potassium 4 3 3 5 - 5 3 mmol/L    Chloride 95 (L) 100 - 108 mmol/L    CO2 19 (L) 21 - 32 mmol/L    Anion Gap 13 4 - 13 mmol/L    BUN 33 (H) 5 - 25 mg/dL    Creatinine 3 32 (H) 0 60 - 1 30 mg/dL    Glucose 125 65 - 140 mg/dL    Calcium 7 9 (L) 8 3 - 10 1 mg/dL    AST 59 (H) 5 - 45 U/L    ALT 28 12 - 78 U/L    Alkaline Phosphatase 122 (H) 46 - 116 U/L    Total Protein 7 1 6 4 - 8 2 g/dL    Albumin 1 7 (L) 3 5 - 5 0 g/dL    Total Bilirubin 4 67 (H) 0 20 - 1 00 mg/dL    eGFR 20 ml/min/1 73sq m   Lipase    Collection Time: 02/17/18 11:54 AM   Result Value Ref Range    Lipase 247 73 - 393 u/L   Bilirubin, direct    Collection Time: 02/17/18 11:54 AM   Result Value Ref Range    Bilirubin, Direct 2 12 (H) 0 00 - 0 20 mg/dL   CBC and differential    Collection Time: 02/17/18 11:55 AM   Result Value Ref Range    WBC 7 71 4 31 - 10 16 Thousand/uL    RBC 2 60 (L) 3 88 - 5 62 Million/uL    Hemoglobin 8 5 (L) 12 0 - 17 0 g/dL    Hematocrit 24 0 (L) 36 5 - 49 3 %    MCV 92 82 - 98 fL    MCH 32 7 26 8 - 34 3 pg    MCHC 35 4 31 4 - 37 4 g/dL    RDW 24 6 (H) 11 6 - 15 1 %    MPV 9 6 8 9 - 12 7 fL    Platelets 045 (L) 515 - 390 Thousands/uL    nRBC 0 /100 WBCs    Neutrophils Relative 61 43 - 75 %    Lymphocytes Relative 17 14 - 44 %    Monocytes Relative 18 (H) 4 - 12 %    Eosinophils Relative 3 0 - 6 %    Basophils Relative 1 0 - 1 %    Neutrophils Absolute 4 70 1 85 - 7 62 Thousands/µL    Lymphocytes Absolute 1 31 0 60 - 4 47 Thousands/µL    Monocytes Absolute 1 37 (H) 0 17 - 1 22 Thousand/µL    Eosinophils Absolute 0 22 0 00 - 0 61 Thousand/µL    Basophils Absolute 0 06 0 00 - 0 10 Thousands/µL   ECG 12 lead    Collection Time: 02/17/18 12:01 PM   Result Value Ref Range    Ventricular Rate 104 BPM    Atrial Rate 104 BPM    KS Interval 168 ms    QRSD Interval 76 ms    QT Interval 308 ms    QTC Interval 405 ms    P Axis 66 degrees    QRS Axis 78 degrees    T Wave Axis 39 degrees   Lactic acid, plasma    Collection Time: 02/17/18 12:17 PM   Result Value Ref Range    LACTIC ACID 3 2 (HH) 0 5 - 2 0 mmol/L   Ammonia    Collection Time: 02/17/18 12:17 PM   Result Value Ref Range    Ammonia 78 (H) 11 - 35 umol/L   Protime-INR    Collection Time: 02/17/18 12:17 PM   Result Value Ref Range    Protime 22 6 (H) 12 1 - 14 4 seconds    INR 1 97 (H) 0 86 - 1 16   APTT    Collection Time: 02/17/18 12:17 PM   Result Value Ref Range    PTT 45 (H) 23 - 35 seconds   Body fluid culture and Gram stain    Collection Time: 02/17/18  1:36 PM   Result Value Ref Range    Gram Stain Result Rare Polys     Gram Stain Result No bacteria seen    Body fluid white cell count with differential    Collection Time: 02/17/18  1:36 PM   Result Value Ref Range    Site      WBC, Fluid 51 /ul   Albumin, fluid    Collection Time: 02/17/18  1:36 PM   Result Value Ref Range    Albumin, Fluid <0 6 g/dL   Glucose, body fluid    Collection Time: 02/17/18  1:36 PM   Result Value Ref Range    Glucose, Fluid 134 mg/dL   Body Fluid Diff    Collection Time: 02/17/18  1:36 PM   Result Value Ref Range    Total Counted 100     Neutrophils % (Fluid) 40 %    Lymphs % (Fluid) 5 %    Mesothelial % (Fluid) 3 %    Histiocyte % (Fluid) 15 %    Monocytes % (Fluid) 37 %   Lactic acid, plasma    Collection Time: 02/17/18  3:53 PM   Result Value Ref Range    LACTIC ACID 2 0 0 5 - 2 0 mmol/L   Sedimentation rate, automated    Collection Time: 02/17/18  7:23 PM   Result Value Ref Range    Sed Rate 36 (H) 0 - 10 mm/hour   C-reactive protein    Collection Time: 02/17/18  7:23 PM   Result Value Ref Range    CRP 8 8 (H) <3 0 mg/L   Protime-INR    Collection Time: 02/18/18 12:44 AM   Result Value Ref Range    Protime 26 4 (H) 12 1 - 14 4 seconds    INR 2 39 (H) 0 86 - 1 16       Radiology Results: I have personally reviewed pertinent reports  Other Diagnostic Testing:   I have personally reviewed pertinent reports          Active Meds:   Current Facility-Administered Medications   Medication Dose Route Frequency    albumin human (FLEXBUMIN) 25 % injection 25 g  25 g Intravenous M6T    folic acid (FOLVITE) tablet 1 mg  1 mg Oral Daily    influenza inactivated quadrivalent vaccine (FLULAVAL) IM injection 0 5 mL  0 5 mL Intramuscular Prior to discharge    lactulose 20 g/30 mL oral solution 20 g  20 g Oral TID PRN    ondansetron (ZOFRAN) injection 4 mg  4 mg Intravenous Q8H PRN    thiamine (VITAMIN B1) tablet 100 mg  100 mg Oral Daily         VTE Pharmacologic Prophylaxis: Reason for no pharmacologic prophylaxis Low platelet count  VTE Mechanical Prophylaxis: sequential compression device    Austin Patches, DO  PGY-3 IM

## 2018-02-18 NOTE — PROGRESS NOTES
Progress Note / acceptance note - Critical Care   Louis Guzman 50 y o  male MRN: [id number]  Unit/Bed#: MICU 07 Encounter: [id number]    Assessment:   1  Hypotension, suspect mild hypovolemic and due to liver disease, doubt sepsis  2  Acute blood loss anemia on anemia of chronic disease, suspect intraperitoneal/intra-abdominal bleed secondary to paracentesis  3  Decompensated alcoholic cirrhosis with portal hypertension and large ascites, MELD 35, with small esophageal varix  4  Oliguric PRANAY most likely HRS versus prerenal versus ATN  5  Improving hepatic encephalopathy  6  Pneumoperitoneum secondary to thoracentesis, cannot rule out iatrogenic bowel perforation  7  SIRS with no signs/source of sepsis, ascitic fluid does not reflect SBP but patient is at risk of secondary bacterial peritonitis given the recent paracentesis with bleeding and pneumoperitoneum  8  Resolved lactic acidosis secondary to liver disease and anemia  9  Right great toe gangrenous chronic osteomyelitis and no antibiotics necessary at this time as per ID  10  Chronic Thrombocytopenia secondary to cirrhosis  11  Alcohol abuse  12  Former smoker as per patient quit 1 year ago  15  Chronic hyponatremia secondary to liver cirrhosis    Plan:   · Transfer patient to MICU  · Transfuse blood  · Give albumin  · Check abdominal CT scan without contrast  · To consider surgery consult  · Consult Nephrology and start on midodrine, octreotide and continue albumin  · Mendoza catheter for acute I/O monitoring  · Continue to trend hemoglobin and hematocrit and if further bleeding or instability to consider IR consult  · Monitor off antibiotics, but if febrile or septic to start Zosyn or cefepime +Flagyl  · Give vitamin K and 2 units of FFP  · Follow with GI    Critical Care Time: 45 min  Documented critical care time excludes any procedures documented elsewhere   It also excludes any family updates    _____________________________________________________________________    HPI/24hr events:   Patient with history of alcoholic cirrhosis/ascites with paracentesis a month ago admitted yesterday with worsening shortness of breath and increasing ascites, underwent paracentesis in the ED yesterday for about 1 L (no documented note yet), today started to have hypotension and had bloody drainage from paracentesis site in the collection bag  No fevers or chills, no chest pain or shortness of breath or cough  Patient is known to have alcohol abuse/dependence, his last drink was 3 weeks ago as he states, he quit in the past for longest about 2 months without any withdrawal seizures  He is a former smoker, quit 1 year ago    He lives at home with his 2 adult sons, he is  in used to work as a      Medications:    Current Facility-Administered Medications:  albumin human 25 g Intravenous Q8H Mann Meneses DO Last Rate: 0 g (49/72/13 6536)   folic acid 1 mg Oral Daily Mariama Hurtado DO    influenza vaccine 0 5 mL Intramuscular Prior to discharge Eliana Villafana MD    lactulose 20 g Oral BID Marj Staples PA-C    methylprednisolone 32 mg Oral Daily With Breakfast Marj Staples PA-C    midodrine 10 mg Oral TID AC Mann Meneses DO    octreotide 100 mcg Intravenous [facility] Mati Glasgow DO    ondansetron 4 mg Intravenous Q8H PRN Mariama Hurtado DO    pantoprazole 40 mg Oral Early Morning Marj Staples PA-C    phytonadione 10 mg [facility]ous [facility]    rifaximin 550 mg Oral Q12H [address] Marj Staples PA-C    thiamine 100 mg Oral Daily Ericka Khanna DO               Physical exam:  Vitals:   Vitals:    02/18/18 1330 02/18/18 1345 02/18/18 1400 02/18/18 1445   BP: (!) 85/40 (!) 102/35     BP Location:       Pulse: (!) 106 (!) 106 104 (!) 108   Resp: 18 21 16 16   Temp: 97 5 °F (36 4 °C) 97 5 °F (36 4 °C) 97 5 °F (36 4 °C) (!) 97 2 °F (36 2 °C)   TempSrc:       SpO2: 99% 92% (!) 85% 98%   Weight:       Height:         Temp  Min: 97 2 °F (36 2 °C)  Max: 97 9 °F (36 6 °C)  IBW: 73 kg    SpO2: 98 %  SpO2 Activity: At Rest  O2 Device: None (Room air) (Simultaneous filing  User may not have seen previous data )      Intake/Output Summary (Last 24 hours) at 02/18/18 1511  Last data filed at 02/18/18 1401   Gross per 24 hour   Intake             1130 ml   Output             3745 ml   Net            -2615 ml       Invasive/non-invasive ventilation settings:   Respiratory    Lab Data (Last 4 hours)      02/18 1407            pH, Arterial       7 412           pCO2, Arterial       (!)33 4           pO2, Arterial       84 0           HCO3, Arterial       (!)20 8           Base Excess, Arterial       -3 5                O2/Vent Data     None              Invasive Devices     Peripheral Intravenous Line            Peripheral IV 02/17/18 Right Antecubital 1 day    Peripheral IV 02/18/18 Left Forearm less than 1 day          Arterial Line            Arterial Line 02/18/18 Right Radial less than 1 day          Drain            Urethral Catheter Temperature probe 16 Fr  less than 1 day                  Physical Exam:  Gen:  Alert, not in acute distress  HEENT: PERRL, no icteric sclera or cyanosis  Neck:  Supple, no lymphadenopathy  Chest:  Equal breath sounds and clear to auscultation bilaterally  Cor:  S1-S2 regular rate and rhythm, no murmurs or gallops  Abd:  Soft, distended with ascites, bowel sounds present, noted drainage bag around site of recent paracentesis with little amount of bloody secretion in the bag  Ext: + lower extremities edema, no clubbing or cyanosis  Right great toe the small ulceration  Neuro:  Awake alert and oriented x3, moves all extremities and follows commands, + flapping tremor/ Asterixes  Skin:  Intact except for wheezing from paracentesis site in the left lower quadrant      Diagnostic Data:  Lab: I have personally reviewed pertinent lab results     CBC:     Results from last 7 days  Lab Units 02/18/18  0628 02/17/18  1155   WBC Thousand/uL 5 96 7 71   HEMOGLOBIN g/dL 6 6* 8 5*   HEMATOCRIT % 18 4* 24 0*   PLATELETS Thousands/uL 74* 128*       CMP:     Results from last 7 days  Lab Units 02/18/18  0628 02/17/18  1154   SODIUM mmol/L 127* 127*   POTASSIUM mmol/L 4 1 4 3   CHLORIDE mmol/L 98* 95*   CO2 mmol/L 20* 19*   BUN mg/dL 38* 33*   CREATININE mg/dL 3 59* 3 32*   CALCIUM mg/dL 7 8* 7 9*   TOTAL PROTEIN g/dL 5 7* 7 1   BILIRUBIN TOTAL mg/dL 3 22* 4 67*   ALK PHOS U/L 92 122*   ALT U/L 20 28   AST U/L 36 59*   GLUCOSE RANDOM mg/dL 100 125     PT/INR:   Lab Results   Component Value Date    INR 2 39 (H) 02/18/2018   ,   Magnesium:     Phosphorous:       Microbiology:    Results from last 7 days  Lab Units 02/17/18  1829 02/17/18  1336   GRAM STAIN RESULT   --  Rare Polys  No bacteria seen   BODY FLUID CULTURE, STERILE   --  No growth   INFLUENZA A PCR  None Detected  --    INFLUENZA B PCR  None Detected  --    RSV PCR  None Detected  --         Imaging:  Chest x-ray reviewed on PACs:  Clear lungs  free air under right hemidiaphragm and also possible free air under left hemidiaphragm    Cardiac lab/EKG/telemetry/ECHO:   Telemetry:  Mild sinus tachycardia    VTE Prophylaxis:  SCDs    Code Status: Level 1 - Full Code    Yamile Trujillo MD    Portions of the record may have been created with voice recognition software  Occasional wrong word or "sound a like" substitutions may have occurred due to the inherent limitations of voice recognition software  Read the chart carefully and recognize, using context, where substitutions have occurred

## 2018-02-18 NOTE — CONSULTS
Consultation - General Surgery   Brian Lee 50 y o  male MRN: [id number]  Unit/Bed#: MICU 07 Encounter: [id number]    Assessment/Plan     Assessment:      50year old male with decompensated alcoholic cirrhosis (Child C, MELD 35) transferred to ICU for hypotension, oliguria, hepatic encephalopathy, blood loss anemia  CT abdomen pelvis noted possible iatrogenic injury to a patent umbilical vein  Also has free intrabdominal air which is more than expected after a paracentesis  Concerning for hollow viscus injury however patient has no tenderness and there is an open communication to atmosphere as evidenced by patient draininng bloody ascites through paracentesis site  Plan:      -transfuse for hemoglobin >7  -recommend transfusion of FFP to correct INR  -agree with Vitamin ki  -would transfuse a pack of platelets as they are decreasing  -serial abdominal exams  -paracentesis negative for SBP  Follow up culture   -recommend antibiotics if worsening abdominal pain or fevers or increasing white blood cell count  -high risk for any open abdominal procedure with mortality >80%      History of Present Illness     HPI:  Brian Lee is a 50 y o  male with cirrhosis 2/2 to alcohol who was admitted for worsening encephalopathy likely due to non compliance with lactulose and rifaximin  Recent admit to [facility] for decompensated cirrhosis, hyponatremia, anemia, coagulopathy, thrombocytopenia  Of note his renal function was normal earlier this month  At [facility], GI did EGD which showed portal gastropathy, esophageal varix non bleeding  Patient refused Cscope  He underwent decompressive paracentesis for 12L at [facility]  He was eventually stabilized and discharged on rifaximin and lactulose  Patient was non compliant and returns with encephalopathy  Apparently, although I do not see official documentation, our emergency department did a bedside paracentesis yesterday   Today on the floor he became hypotensive and unstable and was transferred to ICU  His hemoglobin dropped 2 grams  CT shows large volume ascites with dense layering in pelvis  He has ostomy bag around paracentesis site draining blood  Ct shows large umbilical vein collateral around paracentesis site  Obvious things being obvious, it is likely that the paracentesis needle injured this umbilical vein collateral and patient bled externally and into abdomen  He is currently non tender and being transfused blood  Consults    Review of Systems   Unable to perform ROS: Mental status change       Historical Information   Past Medical History:   Diagnosis Date    Cirrhosis ([facility] [address] )     Hypertension      History reviewed  No pertinent surgical history    Social History   History   Alcohol Use    Yes     Comment: Pt "I quite a couple of weeks ago"     History   Drug Use No     Comment: prior history      History   Smoking Status    Current Every Day Smoker    Packs/day: 0 20    Years: 35 00    Types: Cigarettes   Smokeless Tobacco    Never Used     Family History: non-contributory    Meds/Allergies   all current active meds have been reviewed  No Known Allergies    Objective   First Vitals:   Blood Pressure: (!) 87/56 (02/17/18 1132)  Pulse: (!) 111 (02/17/18 1132)  Temperature: 97 5 °F (36 4 °C) (02/17/18 1132)  Temp Source: Oral (02/17/18 1132)  Respirations: (!) 24 (02/17/18 1132)  Height: 5' 10" (177 8 cm) (02/17/18 1132)  Weight - Scale: 79 4 kg (175 lb) (02/17/18 1132)  SpO2: 100 % (02/17/18 1132)    Current Vitals:   Blood Pressure: (!) 102/35 (02/18/18 1345)  Pulse: (!) 106 (02/18/18 1515)  Temperature: (!) 97 2 °F (36 2 °C) (02/18/18 1515)  Temp Source: Oral (02/18/18 0729)  Respirations: 17 (02/18/18 1515)  Height: 5' 10" (177 8 cm) (02/17/18 1132)  Weight - Scale: 79 4 kg (175 lb) (02/17/18 1132)  SpO2: 100 % (02/18/18 1515)      Intake/Output Summary (Last 24 hours) at 02/18/18 1600  Last data filed at 02/18/18 1401   Gross per 24 hour   Intake             1130 ml Output             3745 ml   Net            -2615 ml       Invasive Devices     Peripheral Intravenous Line            Peripheral IV 02/17/18 Right Antecubital 1 day    Peripheral IV 02/18/18 Left Forearm less than 1 day          Arterial Line            Arterial Line 02/18/18 Right Radial less than 1 day          Drain            Urethral Catheter Temperature probe 16 Fr  less than 1 day                Physical Exam   Constitutional: He appears lethargic  Non-toxic appearance  He has a sickly appearance  He appears ill  No distress  Hypotension    Eyes:   jaundice   Neck: Trachea normal and phonation normal    Cardiovascular: Normal rate, regular rhythm, normal heart sounds and normal pulses  Pulmonary/Chest: No stridor  He is in respiratory distress  Abdominal:   LLQ ostomy bag draining bloody ascites  Mostly blood    Neurological: He appears lethargic  GCS eye subscore is 4  GCS verbal subscore is 4  GCS motor subscore is 6     Skin:   jaundice       Lab Results:   CBC:   Lab Results   Component Value Date    WBC 5 96 02/18/2018    HGB 6 6 (LL) 02/18/2018    HCT 18 4 (L) 02/18/2018    MCV 92 02/18/2018    PLT 74 (L) 02/18/2018    MCH 32 8 02/18/2018    MCHC 35 9 02/18/2018    RDW 24 0 (H) 02/18/2018    MPV 9 2 02/18/2018   , CMP:   Lab Results   Component Value Date     (L) 02/18/2018    K 4 1 02/18/2018    CL 98 (L) 02/18/2018    CO2 20 (L) 02/18/2018    ANIONGAP 9 02/18/2018    BUN 38 (H) 02/18/2018    CREATININE 3 59 (H) 02/18/2018    GLUCOSE 100 02/18/2018    CALCIUM 7 8 (L) 02/18/2018    AST 36 02/18/2018    ALT 20 02/18/2018    ALKPHOS 92 02/18/2018    PROT 5 7 (L) 02/18/2018    BILITOT 3 22 (H) 02/18/2018    EGFR 19 02/18/2018   , Coagulation:   Lab Results   Component Value Date    INR 2 39 (H) 02/18/2018   , Urinalysis:   Lab Results   Component Value Date    COLORU Briseyda 02/18/2018    CLARITYU Cloudy 02/18/2018    SPECGRAV 1 021 02/18/2018    PHUR 5 0 02/18/2018    LEUKOCYTESUR Small (A) 02/18/2018    NITRITE Positive (A) 02/18/2018    PROTEINUA 100 (2+) (A) 02/18/2018    GLUCOSEU 100 (1/10%) (A) 02/18/2018    KETONESU Trace (A) 02/18/2018    BILIRUBINUR Interference- unable to analyze (A) 02/18/2018    BLOODU Small (A) 02/18/2018   , Amylase: No results found for: AMYLASE, Lipase: No results found for: LIPASE,   Imaging: I have personally reviewed pertinent reports  EKG, Pathology, and Other Studies: I have personally reviewed pertinent reports

## 2018-02-18 NOTE — CONSULTS
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   2  B/L LE edema     Plan:  Patient was evaluated at bedside, all labs and imaging results were reviewed  Currently, foot wound is stable without acute signs of infection and agree no antibiotics are necessary  Given the chronicity of wound, OM is likely present although wound is not deep and does not probe to bone  Patient adamantly refusing any surgical intervention, but is amendable to local wound care while in house  Will ordered vascular leads, as pulses were non-palpable due to edema, and to rule out any vascular etiology for delayed wound healing  Recommend compression and elevation for B/L LE edema, however due to patient's non-compliance I doubt he will     Thank you for consultation  Will discuss plan with attending  Podiatry to follow along while in house  History of Present Illness   Physician Requesting Consult: Tiny Valentine MD  Reason for Consult / Principal Problem: right great toe ulcer with OM   Hx and PE limited by: patient very fatigue and poor historian   HPI: Antonio London is a 50y o  year old male who presents with right great toe ulceration  He states that it has been present for 4-5 years  He has been self treating it with neosporin and Band-Aid  He has not seen a doctor for treatment, except for when he was in the hospital   He has been repeatedly told that he has a bone infection and he required a toe amputation, however he adamantly refuses  He was asked about the swelling in his legs, but states "they don't bother me" and so doesn't think anything needs to be done       He states he is very hungry and very tired and was mumbling during entire evaluation, thus further more accurate history was unobtainable  He denies any pain to his lower extremities and does not think any treatment is necessary  Inpatient consult to Podiatry     Performed by  Adeel Trevino by Harpal Rodriguez       Consent: Verbal consent obtained  Review of Systems   Constitutional: Positive for fatigue  Cardiovascular: Positive for leg swelling  Gastrointestinal: Positive for abdominal pain  Skin: Positive for wound  Neurological: Positive for weakness  Historical Information   Past Medical History:   Diagnosis Date    Cirrhosis ([facility] [address] )     Hypertension      History reviewed  No pertinent surgical history  Social History   History   Alcohol Use    Yes     Comment: Pt "I quite a couple of weeks ago"     History   Drug Use No     Comment: prior history      History   Smoking Status    Current Every Day Smoker    Packs/day: 0 20    Years: 35 00    Types: Cigarettes   Smokeless Tobacco    Never Used     Family History: History reviewed  No pertinent family history  Meds/Allergies   all current active meds have been reviewed    No Known Allergies    Objective       Intake/Output Summary (Last 24 hours) at 02/18/18 4586  Last data filed at 02/18/18 0813   Gross per 24 hour   Intake              300 ml   Output             2260 ml   Net            -1960 ml           Physical Exam   Constitutional: He is oriented to person, place, and time  He appears well-developed and well-nourished  HENT:   Head: Normocephalic and atraumatic  Eyes: EOM are normal  Pupils are equal, round, and reactive to light  Neck: Normal range of motion  Cardiovascular:   DP/PT pulses non-palpable due to pitting edema, no pedal hairs present, +3/4 pitting edema, skin temp warm to warm from toes to tibial tuberosity, cap refill <3 secs    Pulmonary/Chest: No respiratory distress  Abdominal: There is tenderness  Musculoskeletal: He exhibits edema  He exhibits no tenderness     Motor function intact, MMT limited due to patient being fatigue, no calf tenderness   Neurological: He is alert and oriented to person, place, and time  Gross sensation intact, epicritic sensation diminished    Skin: Skin is warm  There is erythema  Ulceration noted to distal right hallux: measuring 1 0x0 6x0 2cm, no deep probe to bone, dry and granular wound base, no cellulitis, no fluctuance, no purulence, no crepitus, no malodor, no acute signs of infection     (right hallux edema > left hallux, however patient has diffuse B/L LE edema)       Lab Results:   I have personally reviewed pertinent labs  CBC:   Lab Results   Component Value Date    WBC 5 96 02/18/2018    RBC 2 01 (L) 02/18/2018     CMP:   Lab Results   Component Value Date     (L) 02/18/2018    CL 98 (L) 02/18/2018    CO2 20 (L) 02/18/2018    ANIONGAP 9 02/18/2018    BUN 38 (H) 02/18/2018    CREATININE 3 59 (H) 02/18/2018    GLUCOSE 100 02/18/2018    CALCIUM 7 8 (L) 02/18/2018    AST 36 02/18/2018    ALT 20 02/18/2018    ALKPHOS 92 02/18/2018    PROT 5 7 (L) 02/18/2018    BILITOT 3 22 (H) 02/18/2018    EGFR 19 02/18/2018     Lab Results   Component Value Date    CRP 8 8 (H) 02/17/2018     Blood Culture: No results found for: Judah Dubin      Results from last 7 days  Lab Units 02/17/18  1336   GRAM STAIN RESULT  Rare Polys  No bacteria seen     Imaging Studies: I have personally reviewed pertinent reports  XR chest pa & lateral    (Results Pending)   XR foot 3+ vw right    (Results Pending)     EKG, Pathology, and Other Studies: I have personally reviewed pertinent reports

## 2018-02-18 NOTE — ED ATTENDING ATTESTATION
Paul Carvalho DO, saw and evaluated the patient  I have discussed the patient with the resident/non-physician practitioner and agree with the resident's/non-physician practitioner's findings, Plan of Care, and MDM as documented in the resident's/non-physician practitioner's note, except where noted  All available labs and Radiology studies were reviewed  At this point I agree with the current assessment done in the Emergency Department  I have conducted an independent evaluation of this patient a history and physical is as follows:    51 y/o male past medical history of alcoholic cirrhosis and hypertension presenting with massive ascites, generalized weakness and shortness of breath  Patient claimed that his abdomen is getting increasingly more distended  Also notes worsening lower extremity edema  Denies fevers or chills  On exam, patient is hypotensive, tachycardic, has significant ascites with distended abdomen and 3+ lower extremity edema with ulceration on the right great toe  will perform paracentesis to assess for peritonitis  Administer IV fluids, albumin for hypotension, sepsis evaluation  Will med ICU for hepatorenal syndrome, alcoholic cirrhosis and decompensated cirrhosis  Critical Care Time  The patient presented with a condition in which there was a high probability of imminent or life-threatening deterioration, and critical care services (excluding separately billable procedures) totalled 30-74 minutes          Procedures

## 2018-02-19 ENCOUNTER — APPOINTMENT (INPATIENT)
Dept: RADIOLOGY | Facility: HOSPITAL | Age: 53
DRG: 264 | End: 2018-02-19
Payer: COMMERCIAL

## 2018-02-19 ENCOUNTER — APPOINTMENT (INPATIENT)
Dept: NON INVASIVE DIAGNOSTICS | Facility: HOSPITAL | Age: 53
DRG: 264 | End: 2018-02-19
Payer: COMMERCIAL

## 2018-02-19 LAB
ABO GROUP BLD BPU: NORMAL
ALBUMIN SERPL BCP-MCNC: 2.9 G/DL (ref 3.5–5)
ALP SERPL-CCNC: 80 U/L (ref 46–116)
ALT SERPL W P-5'-P-CCNC: 18 U/L (ref 12–78)
AMMONIA PLAS-SCNC: 104 UMOL/L (ref 11–35)
ANION GAP SERPL CALCULATED.3IONS-SCNC: 10 MMOL/L (ref 4–13)
ANION GAP SERPL CALCULATED.3IONS-SCNC: 11 MMOL/L (ref 4–13)
ANION GAP SERPL CALCULATED.3IONS-SCNC: 12 MMOL/L (ref 4–13)
ANION GAP SERPL CALCULATED.3IONS-SCNC: 12 MMOL/L (ref 4–13)
AST SERPL W P-5'-P-CCNC: 35 U/L (ref 5–45)
BASOPHILS # BLD AUTO: 0.02 THOUSANDS/ΜL (ref 0–0.1)
BASOPHILS NFR BLD AUTO: 0 % (ref 0–1)
BILIRUB SERPL-MCNC: 5.88 MG/DL (ref 0.2–1)
BPU ID: NORMAL
BUN SERPL-MCNC: 39 MG/DL (ref 5–25)
BUN SERPL-MCNC: 39 MG/DL (ref 5–25)
BUN SERPL-MCNC: 43 MG/DL (ref 5–25)
BUN SERPL-MCNC: 43 MG/DL (ref 5–25)
CALCIUM SERPL-MCNC: 8.1 MG/DL (ref 8.3–10.1)
CALCIUM SERPL-MCNC: 8.3 MG/DL (ref 8.3–10.1)
CHLORIDE SERPL-SCNC: 96 MMOL/L (ref 100–108)
CHLORIDE SERPL-SCNC: 96 MMOL/L (ref 100–108)
CHLORIDE SERPL-SCNC: 97 MMOL/L (ref 100–108)
CHLORIDE SERPL-SCNC: 97 MMOL/L (ref 100–108)
CO2 SERPL-SCNC: 18 MMOL/L (ref 21–32)
CO2 SERPL-SCNC: 19 MMOL/L (ref 21–32)
CO2 SERPL-SCNC: 19 MMOL/L (ref 21–32)
CO2 SERPL-SCNC: 20 MMOL/L (ref 21–32)
CREAT SERPL-MCNC: 3.96 MG/DL (ref 0.6–1.3)
CREAT SERPL-MCNC: 3.97 MG/DL (ref 0.6–1.3)
CREAT SERPL-MCNC: 4.17 MG/DL (ref 0.6–1.3)
CREAT SERPL-MCNC: 4.64 MG/DL (ref 0.6–1.3)
CROSSMATCH: NORMAL
EOSINOPHIL # BLD AUTO: 0.11 THOUSAND/ΜL (ref 0–0.61)
EOSINOPHIL NFR BLD AUTO: 2 % (ref 0–6)
ERYTHROCYTE [DISTWIDTH] IN BLOOD BY AUTOMATED COUNT: 23.5 % (ref 11.6–15.1)
GFR SERPL CREATININE-BSD FRML MDRD: 14 ML/MIN/1.73SQ M
GFR SERPL CREATININE-BSD FRML MDRD: 16 ML/MIN/1.73SQ M
GFR SERPL CREATININE-BSD FRML MDRD: 16 ML/MIN/1.73SQ M
GFR SERPL CREATININE-BSD FRML MDRD: 17 ML/MIN/1.73SQ M
GLUCOSE SERPL-MCNC: 126 MG/DL (ref 65–140)
GLUCOSE SERPL-MCNC: 132 MG/DL (ref 65–140)
GLUCOSE SERPL-MCNC: 165 MG/DL (ref 65–140)
GLUCOSE SERPL-MCNC: 180 MG/DL (ref 65–140)
HCT VFR BLD AUTO: 19.7 % (ref 36.5–49.3)
HGB BLD-MCNC: 6.9 G/DL (ref 12–17)
HGB BLD-MCNC: 7 G/DL (ref 12–17)
HGB BLD-MCNC: 7.2 G/DL (ref 12–17)
HGB BLD-MCNC: 7.7 G/DL (ref 12–17)
INR PPP: 2.36 (ref 0.86–1.16)
LYMPHOCYTES # BLD AUTO: 0.91 THOUSANDS/ΜL (ref 0.6–4.47)
LYMPHOCYTES NFR BLD AUTO: 16 % (ref 14–44)
MCH RBC QN AUTO: 32 PG (ref 26.8–34.3)
MCHC RBC AUTO-ENTMCNC: 35.5 G/DL (ref 31.4–37.4)
MCV RBC AUTO: 90 FL (ref 82–98)
MONOCYTES # BLD AUTO: 1.07 THOUSAND/ΜL (ref 0.17–1.22)
MONOCYTES NFR BLD AUTO: 19 % (ref 4–12)
NEUTROPHILS # BLD AUTO: 3.48 THOUSANDS/ΜL (ref 1.85–7.62)
NEUTS SEG NFR BLD AUTO: 63 % (ref 43–75)
NRBC BLD AUTO-RTO: 0 /100 WBCS
PLATELET # BLD AUTO: 72 THOUSANDS/UL (ref 149–390)
PMV BLD AUTO: 9.3 FL (ref 8.9–12.7)
POTASSIUM SERPL-SCNC: 4.3 MMOL/L (ref 3.5–5.3)
POTASSIUM SERPL-SCNC: 4.3 MMOL/L (ref 3.5–5.3)
POTASSIUM SERPL-SCNC: 4.4 MMOL/L (ref 3.5–5.3)
POTASSIUM SERPL-SCNC: 4.8 MMOL/L (ref 3.5–5.3)
PROT SERPL-MCNC: 6.1 G/DL (ref 6.4–8.2)
PROTHROMBIN TIME: 26.1 SECONDS (ref 12.1–14.4)
RBC # BLD AUTO: 2.19 MILLION/UL (ref 3.88–5.62)
SODIUM SERPL-SCNC: 126 MMOL/L (ref 136–145)
SODIUM SERPL-SCNC: 126 MMOL/L (ref 136–145)
SODIUM SERPL-SCNC: 127 MMOL/L (ref 136–145)
SODIUM SERPL-SCNC: 128 MMOL/L (ref 136–145)
UNIT DISPENSE STATUS: NORMAL
UNIT PRODUCT CODE: NORMAL
UNIT RH: NORMAL
WBC # BLD AUTO: 5.63 THOUSAND/UL (ref 4.31–10.16)

## 2018-02-19 PROCEDURE — 76705 ECHO EXAM OF ABDOMEN: CPT

## 2018-02-19 PROCEDURE — 85018 HEMOGLOBIN: CPT | Performed by: PHYSICIAN ASSISTANT

## 2018-02-19 PROCEDURE — 99232 SBSQ HOSP IP/OBS MODERATE 35: CPT | Performed by: INTERNAL MEDICINE

## 2018-02-19 PROCEDURE — 99232 SBSQ HOSP IP/OBS MODERATE 35: CPT | Performed by: SURGERY

## 2018-02-19 PROCEDURE — 85025 COMPLETE CBC W/AUTO DIFF WBC: CPT | Performed by: PHYSICIAN ASSISTANT

## 2018-02-19 PROCEDURE — 99233 SBSQ HOSP IP/OBS HIGH 50: CPT | Performed by: INTERNAL MEDICINE

## 2018-02-19 PROCEDURE — 80053 COMPREHEN METABOLIC PANEL: CPT | Performed by: INTERNAL MEDICINE

## 2018-02-19 PROCEDURE — 80048 BASIC METABOLIC PNL TOTAL CA: CPT | Performed by: INTERNAL MEDICINE

## 2018-02-19 PROCEDURE — 85018 HEMOGLOBIN: CPT | Performed by: INTERNAL MEDICINE

## 2018-02-19 PROCEDURE — 82140 ASSAY OF AMMONIA: CPT | Performed by: EMERGENCY MEDICINE

## 2018-02-19 PROCEDURE — 93923 UPR/LXTR ART STDY 3+ LVLS: CPT

## 2018-02-19 PROCEDURE — 85610 PROTHROMBIN TIME: CPT | Performed by: INTERNAL MEDICINE

## 2018-02-19 PROCEDURE — 80048 BASIC METABOLIC PNL TOTAL CA: CPT | Performed by: PHYSICIAN ASSISTANT

## 2018-02-19 RX ORDER — ALBUMIN (HUMAN) 12.5 G/50ML
25 SOLUTION INTRAVENOUS EVERY 6 HOURS
Status: COMPLETED | OUTPATIENT
Start: 2018-02-19 | End: 2018-02-21

## 2018-02-19 RX ADMIN — ALBUMIN HUMAN 25 G: 0.25 SOLUTION INTRAVENOUS at 21:39

## 2018-02-19 RX ADMIN — CEFTRIAXONE 1000 MG: 1 INJECTION, SOLUTION INTRAVENOUS at 00:21

## 2018-02-19 RX ADMIN — FOLIC ACID 1 MG: 1 TABLET ORAL at 08:47

## 2018-02-19 RX ADMIN — OCTREOTIDE ACETATE 100 MCG: 100 INJECTION, SOLUTION INTRAVENOUS; SUBCUTANEOUS at 13:33

## 2018-02-19 RX ADMIN — ALBUMIN HUMAN 25 G: 0.25 SOLUTION INTRAVENOUS at 14:10

## 2018-02-19 RX ADMIN — MIDODRINE HYDROCHLORIDE 10 MG: 5 TABLET ORAL at 11:11

## 2018-02-19 RX ADMIN — OCTREOTIDE ACETATE 100 MCG: 100 INJECTION, SOLUTION INTRAVENOUS; SUBCUTANEOUS at 06:03

## 2018-02-19 RX ADMIN — OCTREOTIDE ACETATE 100 MCG: 100 INJECTION, SOLUTION INTRAVENOUS; SUBCUTANEOUS at 23:00

## 2018-02-19 RX ADMIN — RIFAXIMIN 550 MG: 550 TABLET ORAL at 21:40

## 2018-02-19 RX ADMIN — LACTULOSE 20 G: 20 SOLUTION ORAL at 08:48

## 2018-02-19 RX ADMIN — ALBUMIN HUMAN 25 G: 0.25 SOLUTION INTRAVENOUS at 00:54

## 2018-02-19 RX ADMIN — MIDODRINE HYDROCHLORIDE 10 MG: 5 TABLET ORAL at 17:38

## 2018-02-19 RX ADMIN — PANTOPRAZOLE SODIUM 40 MG: 40 TABLET, DELAYED RELEASE ORAL at 06:03

## 2018-02-19 RX ADMIN — RIFAXIMIN 550 MG: 550 TABLET ORAL at 08:47

## 2018-02-19 RX ADMIN — Medication 100 MG: at 08:47

## 2018-02-19 RX ADMIN — MIDODRINE HYDROCHLORIDE 10 MG: 5 TABLET ORAL at 06:03

## 2018-02-19 RX ADMIN — ALBUMIN HUMAN 25 G: 0.25 SOLUTION INTRAVENOUS at 08:55

## 2018-02-19 RX ADMIN — METHYLPREDNISOLONE 32 MG: 16 TABLET ORAL at 08:47

## 2018-02-19 RX ADMIN — LACTULOSE 20 G: 20 SOLUTION ORAL at 17:38

## 2018-02-19 NOTE — PROGRESS NOTES
NEPHROLOGY PROGRESS NOTE   Ayad Burns 50 y o  male MRN: [id number]  Unit/Bed#: MICU 07 Encounter: [id number]      ASSESSMENT & PLAN:  1  Acute kidney injury, given history of decompensated cirrhosis suspicious for hepatorenal syndrome, of course other possibility could be ATN related to hypotension  Recommend to continue with triple therapy for possible hepatorenal syndrome with albumin (I increased the dose to 25 g q 6 hours for 48 hours), midodrine and octreotide  Monitor strict ins and outs, follow serial labs, unfortunately his kidney function is not improving  My partner got a consent yesterday for renal replacement therapy if needed  Discussed with critical care attending Dr Alondra Aguilar, there is no urgent indication for hemodialysis at this moment, will continue with full medical support, recommend to contact patient's family to discuss goals of care  Unfortunately seems like he is heading toward needing dialysis in the next 24-48 hours  2   Decompensated alcoholic cirrhosis, history of noncompliance  3   Encephalopathy, continue with medical treatment, suspected component of hepatic encephalopathy, noted his ammonia today is 104, continue with lactulose per primary team     4   Hyponatremia seems to be chronic in the setting end-stage liver disease now with a component of acute renal failure inability to excrete free mai, continue with fluid restriction follow serial labs  5   Lactic acidosis, it cleared  6   Hypotension, continue with volume expansion with albumin, midodrine and octreotide, further inotropics/pressor support as per primary team, goal to keep systolic blood pressure over 110 as possible  7   Anemia, follow H&H and transfusion as needed  Needs to rule out GI bleeding      Discussed with Dr Alondra Aguilar from critical care team     SUBJECTIVE:  Denies any significant complaints, denies chest pain or abdominal pain, denies shortness of breath, patient unfortunately is confused and disoriented      OBJECTIVE:  Current Weight: Weight - Scale: 96 8 kg (213 lb 6 5 oz)  Vitals:    02/19/18 0800   BP:    Pulse: 94   Resp: 15   Temp:    SpO2: 97%       Intake/Output Summary (Last 24 hours) at 02/19/18 0902  Last data filed at 02/19/18 0749   Gross per 24 hour   Intake             1350 ml   Output             1066 ml   Net              284 ml       General:  Confused, chronically ill, in no acute distress  Skin:  Warm, no rash  Eyes:  No jaundice  ENT:  Mucous membranes moist  Neck:  Supple  Chest:  Clear to auscultation   CVS:  Regular rate and rhythm  Abdomen:  Distended, normal bowel sounds, no rebound, positive ascites  Extremities:  1 to 2+ bilateral lower extremity edema   Neuro:  Alert, confused  Psych:  Unable to be assessed    Medications:    Current Facility-Administered Medications:     albumin human (FLEXBUMIN) 25 % injection 25 g, 25 g, Intravenous, Q6H, Eleonora Chi MD, 25 g at 02/19/18 0855    cefTRIAXone (ROCEPHIN) IVPB (premix) 1,000 mg, 1,000 mg, Intravenous, Q24H, Marla Lugo DO, Stopped at 01/47/69 3683    folic acid (FOLVITE) tablet 1 mg, 1 mg, Oral, Daily, Mariama Hurtado DO, 1 mg at 02/19/18 0847    influenza inactivated quadrivalent vaccine (FLULAVAL) IM injection 0 5 mL, 0 5 mL, Intramuscular, Prior to discharge, Zachary Roy MD    lactulose 20 g/30 mL oral solution 20 g, 20 g, Oral, BID, Shara Luque PA-C, 20 g at 02/19/18 0848    methylPREDNISolone (MEDROL) tablet 32 mg, 32 mg, Oral, Daily With Breakfast, Theo Bence, PA-C, 32 mg at 02/19/18 0847    midodrine (PROAMATINE) tablet 10 mg, 10 mg, Oral, TID AC, Mati Glasgow DO, 10 mg at 02/19/18 0603    octreotide (SandoSTATIN) injection 100 mcg, 100 mcg, Intravenous, Q8H [facility], Mati Glasgow DO, 100 mcg at 02/19/18 0603    ondansetron (ZOFRAN) injection 4 mg, 4 mg, Intravenous, Q8H PRN, Mariama Hurtado DO    pantoprazole (PROTONIX) EC tablet 40 mg, 40 mg, Oral, Early Morning, Shara THURMAN MOISÉS Luque, 40 mg at 02/19/18 0603    rifaximin (XIFAXAN) tablet 550 mg, 550 mg, Oral, Q12H [facility], Patrice LindseyMOISÉS, 550 mg at 02/19/18 0847    thiamine (VITAMIN B1) tablet 100 mg, 100 mg, Oral, Daily, Mariama Lawtoni, DO, 100 mg at 02/19/18 0847    Invasive Devices:   Urethral Catheter Temperature probe 16 Fr  (Active)   Amt returned on insertion(mL) 60 mL 2/18/2018  2:01 PM   Site Assessment Clean;Skin intact 2/19/2018  7:49 AM   Collection Container Standard drainage bag 2/19/2018  7:49 AM   Securement Method Securing device (Describe) 2/19/2018  7:49 AM   Output (mL) 10 mL 2/19/2018  7:49 AM       Lab Results:     Results from last 7 days  Lab Units 02/19/18  0439 02/19/18  0052 02/18/18  1801 02/18/18  0628 02/17/18  1155  02/17/18  1154   WBC Thousand/uL 5 63  --   --  5 96 7 71  --   --    HEMOGLOBIN g/dL 7 0* 6 9* 7 0* 6 6* 8 5*  < >  --    HEMATOCRIT % 19 7*  --   --  18 4* 24 0*  --   --    PLATELETS Thousands/uL 72*  --   --  74* 128*  --   --    SODIUM mmol/L 126* 126* 126* 127*  --   --  127*   POTASSIUM mmol/L 4 3 4 3 4 1 4 1  --   --  4 3   CHLORIDE mmol/L 96* 96* 96* 98*  --   --  95*   CO2 mmol/L 18* 20* 20* 20*  --   --  19*   BUN mg/dL 39* 39* 39* 38*  --   --  33*   CREATININE mg/dL 3 97* 3 96* 3 82* 3 59*  --   --  3 32*   CALCIUM mg/dL 8 3 8 1* 7 9* 7 8*  --   --  7 9*   TOTAL PROTEIN g/dL 6 1*  --   --  5 7*  --   --  7 1   BILIRUBIN TOTAL mg/dL 5 88*  --   --  3 22*  --   --  4 67*   ALK PHOS U/L 80  --   --  92  --   --  122*   ALT U/L 18  --   --  20  --   --  28   AST U/L 35  --   --  36  --   --  59*   GLUCOSE RANDOM mg/dL 126 132 123 100  --   --  125   < > = values in this interval not displayed  Previous work up:  Urine sodium 6  Urinalysis 2+ protein, 10-20 RBCs, 4-10 WBCs      Portions of the record may have been created with voice recognition software   Occasional wrong word or "sound a like" substitutions may have occurred due to the inherent limitations of voice recognition software  Read the chart carefully and recognize, using context, where substitutions have occurred  If you have any questions, please contact the dictating provider

## 2018-02-19 NOTE — CASE MANAGEMENT
Thank you,  [address] [facility] in the [facility] by Angus Lechuga for 2017  Network Utilization Review Department  Phone: [phone]; Fax [phone]  ATTENTION: The Network Utilization Review Department is now centralized for our 7 Facilities  Make a note that we have a new phone and fax numbers for our Department  Please call with any questions or concerns to [phone] and carefully follow the prompts so that you are directed to the right person  All voicemails are confidential  Fax any determinations, approvals, denials, and requests for initial or continue stay review clinical to [phone]  Due to HIGH CALL volume, it would be easier if you could please send faxed requests to expedite your requests and in part, help us provide discharge notifications faster  Initial Clinical Review    Admission: Date/Time/Statement: 2/17/18 @ 1456     Orders Placed This Encounter   Procedures    Inpatient Admission (expected length of stay for this patient is greater than two midnights)     Standing Status:   Standing     Number of Occurrences:   1     Order Specific Question:   Admitting Physician     Answer:   Caterina Potts [[id number]]     Order Specific Question:   Level of Care     Answer:   Level 2 Stepdown / HOT [14]     Order Specific Question:   Estimated length of stay     Answer:   More than 2 Midnights     Order Specific Question:   Certification     Answer:   I certify that inpatient services are medically necessary for this patient for a duration of greater than two midnights  See H&P and MD Progress Notes for additional information about the patient's course of treatment       ED: Date/Time/Mode of Arrival:   ED Arrival Information     Expected Arrival Acuity Means of Arrival Escorted By Service Admission Type    - 2/17/2018 11:22 Emergent Walk-In Self Critical Care/ICU Emergency    Arrival Complaint    abdominal pain        Chief Complaint:   Chief Complaint Patient presents with    Ascites     Pt "I need something for my belly  I got tapped for the first time two weeks ago  And its all hard and big again " Roomate "He is suppose to be on liver medication but its too expensive and the doctor wont prescribe anything" Pt c/o SOB "sometimes"      History of Illness: Ethan Bowens is a 50 y o  male with history of recently diagnosed advanced liver cirrhosis 2/2 alcohol abuse, HTN and tobacco abuse who was brought to ER earlier today by his girlfriend for slight confusion and worsening abdominal pain  He states his drink of choice is beer, but he has not drank alcohol in 3 weeks and has never experienced withdrawal symptoms such as seizures or tremors  Of note, he was recently admitted to [place] [address] from 1/30/17- 2/7/18 (all records are Care Everywhere tab) for hyponatremia abdominal distention and it was during that admission he was found to have extensive ascites (had a therapeutic paracentesis of 12 L) and was newly diagnosed with cirrhosis  Initially on admission at [facility] his sodium was 112, so he was admitted to the ICU until his sodium improved  He was also anemic, hyponatremic and had elevated INR and low platelets  However his renal function was normal with avg Creatinine around 0 5  He was seen by the Gi service there and had a full gi workup including extensive imaging and labs as well as EGD 2/7 which showed portal gastropathy and one small varix  While there, he was also found to have right great toe gangrene and osteomyelitis, treated with brief course of IV Vancomycin by the ID service  He apparently refused surgical management by podiatry for this  His diuretics were held due to the large volume paracentesis that was done  For his anemia, EGD showed no source but C-scope was offered by Gi and the patient refused   He was ultimately discharged with a script for Rifaximin, which he was unfortunately unable to afford and lactulose 20 g nightly (ammonia was 112 on admission), but the patients states he stopped taking this because one dose gave him very severe diarrhea and he no longer wanted to take it  He currently smokes several cigarettes daily  He has c/o worsening abdominal pain and distention over the past few days, along with shortness of breath, chills, malaise and cough productive of yellow sputum  He admits his gf has also been sick with a cold recently  He denies fevers, blood in his stool, hemoptysis or hematemesis  He also stated his gf said he was getting confused on and off      In the ED, the patient was tachycardic, intermittently hypoxic on RA and hypotensive to the 20M systolic which improved without intervention  He had a LA of 3 2 which came down to 2 0  He has no elevated WBC  Ammonia level is 78, INR 1 97, Hg 8 5, Cr 3 32 (elevated from prior), AST 59, ALT 28, , albumin 1 7, t  Bili 4 67 (MELD of 34 currently), platelets of 867 and Na of 127 (at recent baseline)  Diagnostic tap in ED (1 L removed) is negative for SBP and showed SAAG of 1 1  No further imaging was done in ED  On exam he has noticeable jaundice, productive cough with rhonchi bilaterally, chills and edema of bilateral LEs   He is AAOx3 and does not appear confused at time of exam      ED Vital Signs:   ED Triage Vitals [02/17/18 1132]   Temperature Pulse Respirations Blood Pressure SpO2   97 5 °F (36 4 °C) (!) 111 (!) 24 (!) 87/56 100 %      Temp Source Heart Rate Source Patient Position - Orthostatic VS BP Location FiO2 (%)   Oral Monitor Sitting Right arm --      Pain Score       7        Wt Readings from Last 1 Encounters:   02/19/18 96 8 kg (213 lb 6 5 oz)     Vital Signs (abnormal):   02/19/18 0700   96 8 °F (36 °C)  98  16  --  --  136/54  78 mmHg  96 %  --  --   02/19/18 0600   97 2 °F (36 2 °C)  100  17  --  --  140/56  78 mmHg  98 %  --  --   02/19/18 0500   97 2 °F (36 2 °C)  102  16  --  --  144/58  80 mmHg  98 %  --  --   02/19/18 0300  97 5 °F (36 4 °C)   108   24 --  --  106/70  86 mmHg  98 %  --  --   02/19/18 0000  97 9 °F (36 6 °C)   108   24  --  --  118/50  68 mmHg  97 %  None (Room air)  --   02/18/18 2300  98 2 °F (36 8 °C)   106  17  --  --  104/50  68 mmHg  97 %  --  --   02/18/18 2205  98 2 °F (36 8 °C)   108  17  --  --  114/52  72 mmHg  98 %  --  --   02/18/18 2100  98 2 °F (36 8 °C)   112  20  --  --  126/52  72 mmHg  100 %  --  --   02/18/18 2020  98 2 °F (36 8 °C)   112  15  116/53  75  132/58  78 mmHg  99 %  --  --   02/18/18 2000  98 2 °F (36 8 °C)   112  19  --  --  118/52  74 mmHg  99 %  --  --   02/18/18 1930  98 2 °F (36 8 °C)   110  16   110/20  --  110/50  70 mmHg  97 %  --  --   02/18/18 1925  98 2 °F (36 8 °C)   108  18  --  --  120/52  72 mmHg  98 %  --  --   02/18/18 1920  98 2 °F (36 8 °C)   108  14  --  --  124/54  74 mmHg  99 %  --  --   02/18/18 1915  98 2 °F (36 8 °C)   108  16  --  --  128/56  78 mmHg  100 %  --  --   02/18/18 1910  98 2 °F (36 8 °C)   110  16  --  --  134/60  82 mmHg  98 %  --  --   02/18/18 1900  97 9 °F (36 6 °C)   108  17  --  --  134/64  84 mmHg  100 %  --  --   02/18/18 1843  97 9 °F (36 6 °C)   112   29  --  --  --  --  98 %  --  --   02/18/18 1830  97 9 °F (36 6 °C)   108  17  140/70  --  --  --  99 %  --  --   02/18/18 1800  97 9 °F (36 6 °C)   110   30  --  --  106/54  70 mmHg  100 %  None (Room air)  --   02/18/18 1730   97 2 °F (36 2 °C)   108  16  --  --  100/52  66 mmHg  96 %  --  --   02/18/18 1700  --   110  17  --  --  126/58  136 mmHg  --  --  --   02/18/18 1630  97 5 °F (36 4 °C)   108  19  --  --  --  156 mmHg  99 %  --  --   02/18/18 1600   97 2 °F (36 2 °C)   108  20  --  --  88/46  58 mmHg  100 %  --  --   02/18/18 1530   97 2 °F (36 2 °C)   108  19  --  --  106/48  64 mmHg  100 %  --  --   02/18/18 1515   97 2 °F (36 2 °C)   106  17  --  --  98/46  62 mmHg  100 %  --  --   02/18/18 1500   97 2 °F (36 2 °C)   106  14  --  --  104/44  60 mmHg  96 %  --  --   02/18/18 1445   97 2 °F (36 2 °C)   108  16 --  --  98/42  56 mmHg  98 %  --  --   02/18/18 1400  97 5 °F (36 4 °C)  104  16  --  --  116/40  60 mmHg   85 %  --  --   02/18/18 1345  97 5 °F (36 4 °C)   106  21   102/35  --  --  --  92 %  --  --   02/18/18 1330  97 5 °F (36 4 °C)   106  18   85/40  --  114/36  58 mmHg  99 %  --  --   02/18/18 1237  --  --  --   82/34  --  --  --  --  --  --   02/18/18 1154  --  --  --   88/38  --  --  --  --  --  --   02/18/18 1130  --  --  --   78/30  --  --  --  --  --  --   02/18/18 1120  --  --  --   88/50  --  --  --  --  --  --   02/18/18 1056  --  --  --   70/34  --  --  --  --  --  --   02/18/18 0920  --  --  --   74/38  --  --  --  --  --  --   BP: Took bp per nurse request at 02/18/18 0920   02/18/18 0729  97 5 °F (36 4 °C)   107  20   78/50  --  --  --  100 %  None (Room air)  Lying   BP: Contacted nurse about low bp at 02/18/18 0729   SpO2: Simultaneous filing  User may not have seen previous data  at 02/18/18 0729   O2 Device: Simultaneous filing  User may not have seen previous data   at 02/18/18 0729   02/17/18 2300  97 9 °F (36 6 °C)   114  18  117/59  --  --  --  99 %  Nasal cannula  Lying   02/17/18 1700  97 5 °F (36 4 °C)   111  18  95/60  --  --  --  97 %  None (Room air)  Lying   02/17/18 1552  --   106  20  118/62  --  --  --  99 %  None (Room air)  Lying   02/17/18 1500  --   108  --  119/65  84  --  --  93 %  --  --   02/17/18 1440  --   108  --  128/61  86  --  --  98 %  --  --   02/17/18 1430  --   106  --  93/52  68  --  --   81 %  --  --   02/17/18 1420  --  104  --  102/55  71  --  --   68 %  --  --   02/17/18 1410  --   108  --  106/58  78  --  --   85 %  --  --   02/17/18 1400  --  104  --  110/66  83  --  --  97 %  --  --   02/17/18 1358  --  103  20  102/58  --  --  --  98 %  None (Room air)  Lying   02/17/18 1350  --   106  --  102/58  76  --  --  99 %  --  --   02/17/18 1340  --   106  --  107/60  74  --  --  97 %  --  --   02/17/18 1330  --  104  --  121/66  81  --  --   83 %  --  -- 02/17/18 1310  --  102  --  90/54  66  --  --   84 %  --  --   02/17/18 1220  --  102  --   87/55  65  --  --  --  --  --   02/17/18 1203  --  102  20   85/51  --  --  --  95 %  --  --   02/17/18 1153  --  --  --   72/47  55  --  --  --  --  --   02/17/18 1132  97 5 °F (36 4 °C)   111   24   87/56  --  --  --  100 %  None (Room air)  Sitting        Abnormal Labs  Ref Range & Units 02/18/18 1407   pH, Arterial 7 412    PH ART TC 7 421    pCO2, Arterial 33 4     PCO2 (TC) Arterial 32 5     pO2, Arterial 84 0    PO2 (TC) Arterial 80 8    HCO3, Arterial 20 8     Base Excess, Arterial -3 5    O2 Content, Arterial 8 9       Sodium 127    126     126 126 128   Chloride 98 96  96 96 97   CO2 20 20  20 18 19   BUN 38 39  39 39 43   Creatinine 3 59 3 82  3 96 3 97 4 17   Glucose 100 123  132 126 165   Calcium 7 8 7 9  8 1 8 3 8 3   Total Protein 5 7    6 1    Albumin 1 9    2 9    Total Bilirubin 3 22    5 88    Ammonia     104      Serum osmolality 272   2/18 2/19     RBC 2 01   2 19    Hemoglobin 6 6 7 0 6 9 7 0 7 2   Hematocrit 18 4   19 7    RDW 24 0   23 5    Platelets 74   72      PT/INR 26 4/2 39, 26 1/2 36  POC glucose 174  Blood culture pending     02/18/18 1423   Color, UA Briseyda    Clarity, UA Cloudy    Specific Gravity, UA 1 021    pH, UA 5 0    Leukocytes, UA Small     Nitrite, UA  Positive     Protein, UA  100 (2+)     Glucose, UA  100 (1/10%)     Ketones, UA Trace     Urobilinogen, UA 1 0    Bilirubin, UA  Interference- unable to analyze     Blood, UA Small        02/18/18 1840   RBC, UA 10-20     WBC, UA 4-10       Diagnostic Test Results:     2/17 EKG - Sinus tachycardia  Septal infarct , age undetermined  Abnormal ECG    2/17 CXR -   Free intraperitoneal air which may be related to reported paracentesis the prior day though clinical correlation for acute abdominal symptoms recommended  2/17 Xray R foot - Soft tissue ulceration with bony changes of the tuft of the great toe, suggesting osteomyelitis    If there is concern for osteomyelitis, consider nuclear medicine white blood cell scan or MRI with gadolinium for further evaluation      2/1/8 CT abd, pelvis - 1  Cirrhosis with stigmata of portal hypertension  2   Large volume of abdominopelvic ascites  There is a layering hematocrit level in the dependent pelvis  However, with the attenuation of the dependent component measuring only 15 Hounsfield units, this is most likely small volume of blood products rather than large volume hemoperitoneum  3   Postprocedural pneumoperitoneum  Needle tract in the left mid abdomen is in the vicinity of a large body wall collateral   4   Small right pleural effusion and right base atelectasis  5   Anterior compression deformities of T7 and T12     2/19 VAS BERNIE & waveform analysis -   R lower limb - Great Toe Pressure: 125 mmHg, within limits for healing potential  L lower limb - Great Toe Pressure: 110 mmHg, within limits for healing potential    2/19 US Gallbladder pending     ED Treatment:   Medication Administration from 02/17/2018 1122 to 02/17/2018 1610     None        Past Medical/Surgical History:    Active Ambulatory Problems     Diagnosis Date Noted    No Active Ambulatory Problems     Resolved Ambulatory Problems     Diagnosis Date Noted    No Resolved Ambulatory Problems     Past Medical History:   Diagnosis Date    Cirrhosis ([facility] [address] )     Hypertension      Admitting Diagnosis: Hepatorenal syndrome ([facility] [address] ) [K76 7]  Lactic acidosis [E87 2]  Hyponatremia [E87 1]  Hypoalbuminemia [E88 09]  Abdominal pain [R10 9]  Hypotension [I95 9]  Elevated INR [E23 5]  Alcoholic cirrhosis of liver with ascites (HCC) [K70 31]  Elevated bilirubin [R17]    Age/Sex: 50 y o  male    Assessment/Plan:   #Decompensated cirrhosis 2/2 alcohol abuse  -MELD of 34 today   -Hypotension improved without intervention  -Discussed with Dr Braden Merritt today, will initiate IV albumin therapy 25 g q8h  -Diagnostic tap in ED negative for SBP- no need for abx  Hold off on further tap for now given very elevated Cr  -CT abd and US abd with doppler studies at [facility] negative for liver lesions or acute thrombosis   -Low salt diet  -EGD 2/7/18 with small varix and portal gastropathy   Recommended repeat in 1-2 years   -Neuro checks qshift  -Hold diuresis at this time due to PRANAY  -Did not initiate Rifaximin outpatient due to cost, hold for now     #Hepatic Encephalopathy  -With hyperammonemia of 78, however stable as it was 114 on admission at [facility]  -Currently AAOx3, answered questions appropriately  -Pt did not want to take Lactulose at this time due to taste and frequent diarrhea, explained to patient that Lactulose is used to   cause frequent BMs and the risk of not taking; Pt agreeable to making Lactulose PRN for AMS at this time     #Alcoholic hepatitis  -Discriminant function score of 40 today  -last alcoholic drink was 3 weeks ago per patient  -AST/ALT close to WNL  -Per Dr Keanu Shah will possible initiate steroid therapy tomorrow if infectious workup is negative     #PRANAY  -Unclear cause, likely multi-factorial  Pre-renal vs ATN vs hepatorenal syndrome  -48 hr fluid challenge with IV Albumin q8h standing  -Nephrology consult  -Urine Na ordered     #SIRS  -With hypoxia, chills, cough, tachycardia, hypotension (resolved without intervention), LA  -No obvious source at this time, no elevated WBC  -Infectious workup with CXR, UA, blood cultures, Flu PCR  -Does not appear to be 2/2 right toe osteomyelitis as blood cx were negative at [facility] and gangrene appears chronic  more than acute- no active draining     #Lactic acidosis- resolved without intervention  -Infectious workup as above     #Anemia of Chronic Disease  -Hg 8 5 (was between 6-8 at [facility])  -Iron panel and B12/Folate were normal at [facility]  -No active bleeding, EGD negative for bleeding source  -C-scope offered by Gi at [facility], pt refused     #Right great toe osteomyelitis  -S/p short course if IV Vancomycin, blood cx negative at [facility]  -F/u repeat blood cx and infectious workup  -Podiatry and ID consultation  -XR foot  -Toe amputation offered by podiatry at [facility] but patient refused     #Abdominal Pain  -Likely 2/2 above problems  -Avoid NSAIDS and opioids given PRANAY  -Possible therapeutic tap in future if Cr improves     #HTN  -Hold home Valsartan and Lisinopril given PRANAY     #Hyponatremia  -Likely 2/2 fluid overload  -Na is around baseline at 127 (was 112-132 at [facility])     #Hypoalbuminemia  -2/2 cirrhosis, management as above  -Treating with IV albumin     #Hyperbilirubinemia  -With diffuse jaundice  -Trend with AM CMP, if does not improve consider repeat   RUQ US with doppler study to r/o obstruction     #Chronic thrombocytopenia  -2/2 cirrhosis, stable at 128 (were 58-62 at [facility])     #Systolic murmur, new (LUSB): Consider obtaining ECHO  This was not done at [facility] and he   has never had one   Endocarditis less likely unless blood cx are positive here (they were negative at [facility]   and he was not systemically ill at that time)     #Hx of alcohol abuse  -Has not drank in 3 weeks  -C/t thiamine, folate  -Does not appear to be in withdrawal and has never experienced seizures or withdrawal in the past     #Tobacco abuse  -Pt refused nicotine patch     Code Status: Level 1 - Full Code  VTE Pharmacologic Prophylaxis: Reason for no pharmacologic prophylaxis low platelets   VTE Mechanical Prophylaxis: sequential compression device  Admission Status: INPATIENT     Admission Orders:  Scheduled Meds:   Current Facility-Administered Medications:  albumin human 25 g Intravenous Q6H Antwon Ragsdale MD Last Rate: 0 g (02/19/18 0915)   cefTRIAXone 1,000 mg Intravenous Q24H Lisa Roach DO Last Rate: Stopped (75/88/59 1904)   folic acid 1 mg Oral Daily Mariama Hurtado DO    influenza vaccine 0 5 mL Intramuscular Prior to discharge Oscar Martel MD    lactulose 20 g Oral BID Anjel Zarate PA-C    methylprednisolone 32 mg Oral Daily With Breakfast Mayrareynold Guy, MOISÉS    midodrine 10 mg Oral TID YAZMIN Ferreira, DO    octreotide 100 mcg Intravenous [facility] Mati Glasgow, DO    ondansetron 4 mg Intravenous Q8H PRN Cinda Azul, DO    pantoprazole 40 mg Oral Early Morning Mayra DeckMOISÉS    rifaximin 550 mg Oral Q12H [address] Mayra Deck, PA-C    thiamine 100 mg Oral Daily Mariama Hurtado, DO      Continuous Infusions:    PRN Meds: influenza vaccine    ondansetron    MICU  Cont pulse ox  Neuro checks q shift   Up w/ assist   Diet GI; Lo Fiber/Lo Residue; Sodium 2 Gm, Fluid Restriction 1500 ML  Cons Surgery   Cons Nephrology  Cons Podiatry   Cons GI   Cons ID   Cons CM   PT eval/tx   Blood Products:  2/18 2 U FFP and 1 U PRBCs   ___________________________  2/17 ID Consult   This is a 50 y o  male, with alcoholic cirrhosis, came to the ER earlier today with abdominal pain and confusion  Patient has evidence of decompensated cirrhosis and hepatic encephalopathy  Patient also has chronic right great toe gangrene and osteomyelitis      1  Right great toe gangrene and osteomyelitis  There is no evidence of cellulitis clinically  Patient has no fever leukocytosis  No antibiotic is necessary  However, patient needs to have this toe amputated, to avoid development of wet gangrene  I discussed this with the patient in great detail  He still refuses  No antibiotic needed at present time  Serial toe exam   Recommend toe amputation      2   Decompensated cirrhosis  This is due to patient's noncompliance with prescribed treatment plan  There is no evidence of peritonitis clinically  Patient is status post paracentesis with benign peritoneal fluid  No antibiotic needed  Management per GI and primary service      3  Encephalopathy  This is most likely hepatic encephalopathy, with patient not compliant with lactulose  No clinical signs of CNS infection  Recommend restart lactulose  Monitor mental status      4  PRANAY    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  Dehydration may contribute  Any antibiotic will need to have dosages adjusted accordingly  Monitor creatinine      [facility] records reviewed in detail  As far 2nd toe, patient was never septic or systemically toxic during hospitalization  Discussed with the patient in detail regarding the above plan   _________________________  2/18 Medicine Progress Note      #Decompensated cirrhosis 2/2 alcohol abuse  -complicated by hepatic encephalopathy, ascites, small varices  -MELD of 34 on admission, 35 this AM  -Hypotension present this morning, will give scheduled albumin now was  -IV albumin therapy 25 g q8h  -Diagnostic tap in ED negative for SBP- no need for abx  Hold off on further tap for now given very elevated Cr  -CT abd and US abd with doppler studies at [facility] negative for liver lesions or acute thrombosis   -Low salt diet  -EGD 2/7/18 with small varix and portal gastropathy   Recommended repeat in 1-2 years   -Neuro checks qshift  -Hold diuresis at this time due to PRANAY     #Hepatic Encephalopathy  -With hyperammonemia of 78, however stable as it was 114 on admission at [facility]  -Currently AAOx3, answered questions appropriately  -will continue lactulose, will schedule     #Alcoholic hepatitis  -Discriminant function score of 40 on admission, 65 this AM  -last alcoholic drink was 3 weeks ago per patient  -AST/ALT close to WNL  -possible initiation of steroids, however await GI evaluation     #PRANAY  -Unclear cause, likely multi-factorial  Pre-renal vs ATN vs hepatorenal syndrome  -48 hr fluid challenge with IV Albumin q8h standing  -Nephrology consult, will appreciate recommendations     #SIRS  -With hypoxia, chills, cough, tachycardia, hypotension (resolved without intervention), LA  -No obvious source at this time, no elevated WBC  -Infectious workup with CXR, UA, blood cultures, Flu PCR  -Does not appear to be 2/2 right toe osteomyelitis as blood cx were negative at [facility] and gangrene appears chronic   -appreciate ID recommendations, hold antibiotics at this time unless clear source is found or patient should decompensate     #Lactic acidosis- resolved without intervention  -Infectious workup as above     #Anemia of Chronic Disease  -Hg 6 6 this AM (was between 6-8 at [facility])  -Iron panel and B12/Folate were normal at [facility]  -No active bleeding, EGD negative for bleeding source  -C-scope offered by Gi at [facility], pt refused  -transfuse 1 unit PRBC     #Right great toe osteomyelitis  -S/p short course if IV Vancomycin, blood cx negative at [facility]  -F/u repeat blood cx and infectious workup  -Podiatry and ID consultation  -XR foot  -Toe amputation offered by podiatry at [facility] but patient refused     #Abdominal Pain  -Likely 2/2 above problems  -Avoid NSAIDS and opioids given PRANAY  -Possible therapeutic tap in future if Cr improves     #HTN  -Hold home Valsartan and Lisinopril given PRANAY     #Hyponatremia  -Likely 2/2 fluid overload  -Na is around baseline at 127 (was 112-132 at [facility])     #Hypoalbuminemia  -2/2 cirrhosis, management as above  -Treating with IV albumin     #Hyperbilirubinemia  -With diffuse jaundice  -Trend with AM CMP, if does not improve consider repeat   RUQ US with doppler study to r/o obstruction     #Chronic thrombocytopenia  -2/2 cirrhosis, stable at 128 (were 58-62 at [facility])     Systolic murmur, new (LUSB): Consider obtaining ECHO  This was not done at [facility] and he has never had one  Endocarditis less likely unless blood cx are positive here     Hx of alcohol abuse  -Has not drank in 3 weeks  -C/t thiamine, folate  -Does not appear to be in withdrawal and has never experienced seizures or withdrawal in the past     #Tobacco abuse  -Pt refusing nicotine patch     Disposition:  Continue inpatient care given decompensated cirrhosis  Await specialist input, which will be appreciated     _____________________  2/18 Podiatry Consult   1   A 50year old male with chronic ulceration to distal aspect of right hallux with 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   2  B/L LE edema      Plan:  Patient was evaluated at bedside, all labs and imaging results were reviewed  Currently, foot wound is stable without acute signs of infection and agree no antibiotics are necessary  Given the chronicity of wound, OM is likely present although wound is not deep and does not probe to bone  Patient adamantly refusing any surgical intervention, but is amendable to local wound care while in house       Will ordered vascular leads, as pulses were non-palpable due to edema, and to rule out any vascular etiology for delayed wound healing       Recommend compression and elevation for B/L LE edema, however due to patient's non-compliance I doubt he will  ____________________  2/18 GI Consult   1) Decompensated cirrhosis secondary to alcohol abuse complicated by large volume ascites and episodes of hepatic encephalopathy - MELD 35, Child Baker C, and discriminant function is over 32 points  Recent EGD at [facility] revealed a small esophageal varix  Unlikely patient can be given nadalol in the setting of hypotension  Due to drowsiness, patient may have mild hepatic encephalopathy  However on exam he is able to answer questions appropriately such as person, place, and time  Patient has history of medical noncompliance has not been taking lactulose  His last drink was 3 weeks ago per patient    - We will schedule lactulose 20 g PO BID for now, titrate to 2-3 stools per day  - Ordered Xifaxan 550 mg BID  - Due to discriminant function, we will start 32 mg of methylprednisolone daily which should be continued for the next 28 days with a taper of 2 weeks afterwards; spoke to Infectious Disease who deemed his osteomyelitis as chronic and steroids should not affect this  - Continue to monitor mental status  - Continue to monitor for overt bleeding and transfuse as neccessary   - Educated to avoid NSAIDs  - Continue daily hepatic function and PT/INR labs     2) Hyponatermia with acute on chronic kidney injury - Na is 127 today  Agree with holding diuretics  Agree with Nephrology consult as he is at risk for hepatorenal syndrome   - Patient has large volume ascites, however given hypotension and hyponatremia we will hold off for now and defer to Nephrology for their recommendations  - Continue albumin as you are doing  - Follow up on urine sodium results  ______________________  2/19 Medicine Progress Note  Patient has remained hypotensive despite multiple boluses of albumin; patient hemoglobin 6 6 this AM and 1 unit PRBC transfusion is pending  I attempted to have goals of care discussion with patient, however patient with limited understanding of condition and unclear if patient fully understands gravity of situation at this time  He is, however, AAOx3 and responds readily to voice  I attempted to contact patient's son, Sheila Kamara, using listed number however I was unable to reach him    I discussed the case with ICU attending, Dr Paolo Green, who has accepted patient for critical care level of care given persistent hypotension with potential need for pressor therapy

## 2018-02-19 NOTE — PROGRESS NOTES
Progress Note - Infectious Disease   Natalia Henley 50 y o  male MRN: [id number]  Unit/Bed#: MICU 07 Encounter: [id number]      Impression/Recommendations:  1  Right great toe gangrenous ulcer and osteomyelitis  There is no evidence of cellulitis clinically  Patient has no fever leukocytosis  No antibiotic is necessary  However, patient needs to have this toe amputated, to avoid development of wet gangrene  Patient still refuses toe amputation  No antibiotic needed at present time  Serial toe exam   Recommend toe amputation      2   Decompensated cirrhosis  This is due to patient's noncompliance with prescribed treatment plan  There is no evidence of peritonitis clinically  Patient is status post paracentesis with benign peritoneal fluid  No antibiotic needed  Management per GI and primary service  Okay from ID viewpoint for systemic corticosteroid      3  Hypotension  Patient is not febrile and without leukocytosis  He is not systemically toxic  Admission blood cultures are negative  Doubt septic shock  Suspect hypovolemia  Close monitoring in ICU  Plan for transfusion and albumin noted  4  Encephalopathy  This is most likely hepatic encephalopathy, with patient not compliant with lactulose  No clinical signs of CNS infection  Mental status is improved with restart of lactulose  Monitor mental status      5  PRANAY  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  Dehydration may contribute  Creatinine stable  Any antibiotic will need to have dosages adjusted accordingly  Monitor creatinine      Discussed with the patient in detail regarding the above plan  Discussed with GI service  Antibiotics:  None    Subjective:  Patient was seen earlier  He is comfortable  Stable abdominal distention and discomfort  No toe pain  Temperature stays down  No chills  Patient is now transferred to ICU secondary to hypotension      Objective:  Vitals:  HR:  [104-114] 112  Resp:  [14-30] 19  BP: ()/(20-70) 110/20  SpO2:  [85 %-100 %] 99 %  Temp (24hrs), Av 7 °F (36 5 °C), Min:97 2 °F (36 2 °C), Max:98 2 °F (36 8 °C)  Current: Temperature: 98 2 °F (36 8 °C)    Physical Exam:     General: Awake, alert, cooperative, no distress  Lungs: Decreased breath sounds, no rales, no wheezing, respirations unlabored  Heart[de-identified]  Tachycardic with regular rhythm, S1 and S2 normal, no murmur  Abdomen: Soft, stable distension, stable mild diffuse tenderness, bowel sounds active all four quadrants,        no masses, no organomegaly  Extremities: Stable edema  Right 1st toe with stable gangrenous ulcer  Stable chronic changes  No purulence  No tenderness  Skin:  No rash  Invasive Devices     Peripheral Intravenous Line            Peripheral IV 18 Right Antecubital 1 day    Peripheral IV 18 Left Forearm less than 1 day          Arterial Line            Arterial Line 18 Right Radial less than 1 day          Drain            Urethral Catheter Temperature probe 16 Fr  less than 1 day                Labs studies:   I have personally reviewed pertinent labs      Results from last 7 days  Lab Units 18  18018  0628 18  1154   SODIUM mmol/L 126* 127* 127*   POTASSIUM mmol/L 4 1 4 1 4 3   CHLORIDE mmol/L 96* 98* 95*   CO2 mmol/L 20* 20* 19*   ANION GAP mmol/L 10 9 13   BUN mg/dL 39* 38* 33*   CREATININE mg/dL 3 82* 3 59* 3 32*   EGFR ml/min/1 73sq m 17 19 20   GLUCOSE RANDOM mg/dL 123 100 125   CALCIUM mg/dL 7 9* 7 8* 7 9*   AST U/L  --  36 59*   ALT U/L  --  20 28   ALK PHOS U/L  --  92 122*   TOTAL PROTEIN g/dL  --  5 7* 7 1   BILIRUBIN TOTAL mg/dL  --  3 22* 4 67*       Results from last 7 days  Lab Units 18  1801 18  0628 18  1155   WBC Thousand/uL  --  5 96 7 71   HEMOGLOBIN g/dL 7 0* 6 6* 8 5*   PLATELETS Thousands/uL  --  74* 128*       Results from last 7 days  Lab Units 18  1829 18  1336   GRAM STAIN RESULT   --  Rare Polys  No bacteria seen   BODY FLUID CULTURE, STERILE   --  No growth   INFLUENZA A PCR  None Detected  --    INFLUENZA B PCR  None Detected  --    RSV PCR  None Detected  --        Imaging Studies:   I have personally reviewed pertinent imaging study reports and images in PACS  EKG, Pathology, and Other Studies:   I have personally reviewed pertinent reports

## 2018-02-19 NOTE — RESTORATIVE TECHNICIAN NOTE
Restorative Specialist Mobility Note       Activity: Other (Comment) (Educated/encouraged pt to ambulate with assistance 3-4 x's/day, pt refused  Bed alarm on  Pt callbell, phone/tray within reach )                          Range of Motion: All extremities  Anti-Embolism Device On:  Bilateral, Sequential compression devices, below knee         [employer] BS, Restorative Technician, [employer]

## 2018-02-19 NOTE — PROGRESS NOTES
Progress Note - Critical Care   Brian Diss 50 y o  male MRN: [id number]  Unit/Bed#: MICU 07 Encounter: [id number]    Attending Physician: Joe Kumar, DO      ______________________________________________________________________  Assessment and Plan:   Principal Problem:    Decompensated hepatic cirrhosis ([address] )  Active Problems:    Abdominal pain    Ascites    Hepatic encephalopathy (HCC)    PRANAY (acute kidney injury) ([facility] )    Hypoalbuminemia    Hyponatremia    Hypotension    SIRS (systemic inflammatory response syndrome) (HCC)    Hyperammonemia (HCC)    Lactic acidosis    Elevated alkaline phosphatase level    Anemia of chronic disease    Osteomyelitis of toe of right foot (HCC)    Alcohol abuse    Alcoholic hepatitis    Cough    Hypoxia  Resolved Problems:    * No resolved hospital problems  *        Neuro:    Documented encephalopathy, felt to be hepatic in the presence of elevated ammonia  MS improving  See GI below   On thiamine 127AJ daily and folic acid 1mg daily for hx of ETOH abuse    CV:    Hypotension, likely combination of hypovolemia secondary to cirrhosis vs blood loss -- favor the former at this point   BP ranging 88/46 - 138/56 via a-line   Midodrine 10mg TID   Documented hx of hypertension at home      Pulm:    No acute issues   Former smoker, quit 1 year ago per pt    GI:    Recent dx of alcoholic cirrhosis with recent worsening of ascites s/p paracentesis in ED on 2/17 with removal of about 1 L of peritoneal fluid  Report of large volume (2L) of bloody drainage with subsequent placement of ostomy bag  Drainage as much improved, only minimal at this time  GI following: appreciate input  Starting Medrol (32mg currently) and treating for 28 days for cirrhosis  Will confirm dosing whether this is to be tapered  CT scan of abd/pelvis was obtained when having more bloody drainage to r/o injury during paracentesis -- note of pneumoperitoneum   Seen by surgery and felt this was air introduced during the procedure and does not require intervention at this time  Hypoalbuminemia: 2 9 today   LFTs WNL (AST 35, ALT 18, alk phos 80) but tbili 5 88  Ammonia higher from 78 to 104 -- on rifaximin 550mg Q 12   Rifaximin 550mg Q 12 and lactulose 20mg  BID   Bowel: no BM documented since arrival to unit  Consider starting regimen       :    Oliguric PRANAY: Cr worse today from 3 82 to 3 97  Started on albumin 25% Q 8H   FENa 0 4%, indicative of pre-renal cause  Possible hepatorenal syndrome vs ATN from hypotension  On octreotide and midodrine   No obstruction or other cause noted on CT abd/pelvis   UA ordered for osmolality studies  Noted to be nitrite + so started on Rocephin  Unsure if culture was sent -- will follow up   Nephrology following: favor dx of ATN but cannot r/o HRS  Consented for dialysis if needed   Continue Mendoza for accurate I/O      F/E/N:     Fluids: Albumin 25% Q 8 H for intravascular expansion    Electrolytes: Na 126, secondary to volume overload  Stable from yesterday  K+ 4 3, Cl 96, bicarb 18, Ca 8 3    Nutrition: Lo fiber/low Na diet, fluid restriction 1500mL    ID:    Chronic osteomyelitis of the R great toe  ID following and do not feel this is an acute source of infection or underlying source of HRS  No abx at this time   Rocephin for UTI, follow up culture   WBC normal at 5 6   Afebrile    Heme:    Acute on chronic anemia -- chronic anemia likely d/t chronic disease with some acute component of blood loss  May have had injury to varix during paracentesis  On octreotide per Gi   No drainage/bleeding currently from the site   Hgb stable at 7 0   Platelets 74,397 from 128,000   Coagulopathy secondary to liver dz: INR 1 97 --? 2 39 --> 2 36   S/P 1 unit PRBC and 2 u FFP and vitamin K   Consider platelet transfusion if any further bleeding    Endo:    Medrol treatment OK per ID as osteomyelitis is chronic    this AM     Msk/Skin:    PT/OT consult    Disposition: ICU    Code Status: Level 1 - Full Code    Counseling / Coordination of Care  Total Critical Care time spent 45 minutes excluding procedures, teaching and family updates  ______________________________________________________________________    Chief Complaint: None    24 Hour Events: Pt transferred here from the floor for ongoing hypotension, PRANAY in the setting of exacerbation of cirrhosis  ______________________________________________________________________    Physical Exam:     Constitutional: NAD  Sleeping, but wakes easily  HENT: NC/AT    Cv: RRR, no murmur appreciated    Pulm: CTAB    Gi: distended, nontender  Ostomy bag placed over site of paracentesis on the L abdomen and is without drainage or blood    Gu: Deferred    Ms: Swelling with wound of the distal R great toe noted, pedal pulses intact  1-2+ pitting edema of the BLEs    Neuro: Alert and oriented x 4    Skin: warm, dry    ______________________________________________________________________  Vitals:    18 0300 18 0400 18 0500 18 0600   BP:       BP Location:       Pulse: (!) 108 100 102 100   Resp: (!) 24 16 16 17   Temp: 97 5 °F (36 4 °C) 97 5 °F (36 4 °C) (!) 97 2 °F (36 2 °C) (!) 97 2 °F (36 2 °C)   TempSrc:       SpO2: 98% 99% 98% 98%   Weight:    96 8 kg (213 lb 6 5 oz)   Height:           Temperature:   Temp (24hrs), Av 7 °F (36 5 °C), Min:97 2 °F (36 2 °C), Max:98 2 °F (36 8 °C)    Current Temperature: (!) 97 2 °F (36 2 °C)  Weights:   IBW: 73 kg    Body mass index is 30 62 kg/m²    Weight (last 2 days)     Date/Time   Weight    18 0600  96 8 (213 41)    18 1132  79 4 (175)            Hemodynamic Monitoring:  N/A     Non-Invasive/Invasive Ventilation Settings:  Respiratory    Lab Data (Last 4 hours)    None         O2/Vent Data (Last 4 hours)    None              Lab Results   Component Value Date    PHART 7 412 2018    TJQ8LQZ 33 4 (L) 2018    PO2ART 84 0 2018    [id number] 20 8 (L) 2018 BEART -3 5 02/18/2018    SOURCE Line, Arterial 02/18/2018     SpO2: SpO2: 96 %  Intake and Outputs:  I/O       02/17 0701 - 02/18 0700 02/18 0701 - 02/19 0700    P  O  300 180    I V  (mL/kg)  630 (6 5)    Blood  280    IV Piggyback  360    Total Intake(mL/kg) 300 (3 8) 1450 (15)    Urine (mL/kg/hr)  131 (0 1)    Other 1485 2200 (0 9)    Total Output 1485 2331    Net -1184 -881              UOP: 0 1cc/kg/hour   Nutrition:        Diet Orders            Start     Ordered    02/17/18 1730  Diet GI; Lo Fiber/Lo Residue; Sodium 2 Gm, Fluid Restriction 1500 ML  Diet effective now     Question Answer Comment   Diet Type GI    GI Lo Fiber/Lo Residue    Other Restriction(s): Sodium 2 Gm    Other Restriction(s): Fluid Restriction 1500 ML    RD to adjust diet per protocol?  No        02/17/18 1736          Labs:     Results from last 7 days  Lab Units 02/19/18  0439 02/19/18 0052 02/18/18  1801 02/18/18  0628 02/17/18  1155   WBC Thousand/uL 5 63  --   --  5 96 7 71   HEMOGLOBIN g/dL 7 0* 6 9* 7 0* 6 6* 8 5*   HEMATOCRIT % 19 7*  --   --  18 4* 24 0*   PLATELETS Thousands/uL 72*  --   --  74* 128*   NEUTROS PCT % 63  --   --   --  61   MONOS PCT % 19*  --   --   --  18*       Results from last 7 days  Lab Units 02/19/18  0439 02/19/18  0052 02/18/18  1801 02/18/18  0628 02/17/18  1154   SODIUM mmol/L 126* 126* 126* 127* 127*   POTASSIUM mmol/L 4 3 4 3 4 1 4 1 4 3   CHLORIDE mmol/L 96* 96* 96* 98* 95*   CO2 mmol/L 18* 20* 20* 20* 19*   BUN mg/dL 39* 39* 39* 38* 33*   CREATININE mg/dL 3 97* 3 96* 3 82* 3 59* 3 32*   CALCIUM mg/dL 8 3 8 1* 7 9* 7 8* 7 9*   TOTAL PROTEIN g/dL 6 1*  --   --  5 7* 7 1   BILIRUBIN TOTAL mg/dL 5 88*  --   --  3 22* 4 67*   ALK PHOS U/L 80  --   --  92 122*   ALT U/L 18  --   --  20 28   AST U/L 35  --   --  36 59*   GLUCOSE RANDOM mg/dL 126 132 123 100 125         No results found for: PHOS     Results from last 7 days  Lab Units 02/19/18  0439 02/18/18  0044 02/17/18  1217   INR  2 36* 2 39* 1 97* PTT seconds  --   --  45*     No results found for: TROPONINI    Results from last 7 days  Lab Units 02/18/18  1400 02/17/18  1553 02/17/18  1217   LACTIC ACID mmol/L 1 9 2 0 3 2*     ABG:  Lab Results   Component Value Date    PHART 7 412 02/18/2018    [id number] 33 4 (L) 02/18/2018    PO2ART 84 0 02/18/2018    [id number] 20 8 (L) 02/18/2018    BEART -3 5 02/18/2018    SOURCE Line, Arterial 02/18/2018     Imaging: CT abd/pelvis I have personally reviewed pertinent reports  EKG: none  Micro:  No results found for: Shirline Downs, WOUNDCULT, SPUTUMCULTUR  Allergies: No Known Allergies  Medications:   Scheduled Meds:  Current Facility-Administered Medications:  albumin human 25 g Intravenous Q8H Mati Glasgow DO Last Rate: Stopped (02/19/18 0115)   cefTRIAXone 1,000 mg Intravenous Q24H Romana Taylor DO Last Rate: Stopped (47/05/96 9917)   folic acid 1 mg Oral Daily Mariama Hurtado DO    influenza vaccine 0 5 mL Intramuscular Prior to discharge Mamie Jaureugi MD    lactulose 20 g Oral BID Raghav Branch PA-C    methylprednisolone 32 mg Oral Daily With Breakfast Raghav Branch PA-C    midodrine 10 mg Oral TID AC Elijah Selby DO    octreotide 100 mcg Intravenous [facility] Mati Glasgow DO    ondansetron 4 mg Intravenous Q8H PRN Mariama Hurtado DO    pantoprazole 40 mg Oral Early Morning Raghav Branch PA-C    rifaximin 550 mg Oral Q12H [address] Raghav Branch PA-C    thiamine 100 mg Oral Daily Mariama Hurtado DO      Continuous Infusions:   PRN Meds:    influenza vaccine 0 5 mL Prior to discharge   ondansetron 4 mg Q8H PRN     VTE Pharmacologic Prophylaxis: Pharmacologic VTE Prophylaxis contraindicated due to ABLA  VTE Mechanical Prophylaxis: sequential compression device  Invasive lines and devices:   Invasive Devices     Peripheral Intravenous Line            Peripheral IV 02/17/18 Right Antecubital 1 day    Peripheral IV 02/18/18 Left Forearm less than 1 day          Arterial Line            Arterial Line 02/18/18 Right Radial less than 1 day          Drain            Urethral Catheter Temperature probe 16 Fr  less than 1 day                     Portions of the record may have been created with voice recognition software  Occasional wrong word or "sound a like" substitutions may have occurred due to the inherent limitations of voice recognition software  Read the chart carefully and recognize, using context, where substitutions have occurred      Joy Muñoz PA-C

## 2018-02-19 NOTE — PLAN OF CARE
CARDIOVASCULAR - ADULT     Maintains optimal cardiac output and hemodynamic stability Progressing        GASTROINTESTINAL - ADULT     Minimal or absence of nausea and/or vomiting Progressing     Maintains or returns to baseline bowel function Progressing     Maintains adequate nutritional intake Progressing        GENITOURINARY - ADULT     Maintains or returns to baseline urinary function Progressing     Absence of urinary retention Progressing     Urinary catheter remains patent Progressing        HEMATOLOGIC - ADULT     Maintains hematologic stability Progressing        INFECTION - ADULT     Absence or prevention of progression during hospitalization Progressing     Absence of fever/infection during neutropenic period Progressing        METABOLIC, FLUID AND ELECTROLYTES - ADULT     Electrolytes maintained within normal limits Progressing     Fluid balance maintained Progressing     Glucose maintained within target range Progressing        Nutrition/Hydration-ADULT     Nutrient/Hydration intake appropriate for improving, restoring or maintaining nutritional needs Progressing        PAIN - ADULT     Verbalizes/displays adequate comfort level or baseline comfort level Progressing        Potential for Falls     Patient will remain free of falls Progressing        Prexisting or High Potential for Compromised Skin Integrity     Skin integrity is maintained or improved Progressing        SKIN/TISSUE INTEGRITY - ADULT     Skin integrity remains intact Progressing     Incision(s), wounds(s) or drain site(s) healing without S/S of infection Progressing     Oral mucous membranes remain intact Progressing

## 2018-02-19 NOTE — PROGRESS NOTES
Progress Note - General Surgery  Yo Encarnacion 50 y o  male MRN: [id number]  [facility] En[facility]: [id number]    Assessment:  50y o -year-old male with pneumoperitoneum in setting of liver and renal failure    Plan:  1  Pneumoperitoneum   - unusual for paracentesis to cause this, however his abdomen is benign other than having ascites   - no evidence of ongoing bleeding; previous bleeding likely occurred when paracentesis needle was passed through varices   - wound continue to monitor hemoglobin, however there are no reasons to consider surgical intervention at this time   - the CT scan was done without contrast, however the intraabdominal fluid is likely ascites and not blood    2  Renal failure   - there is a possibly that there is an element of ATN, however given the patient's positive urinary tract infection there is a strong possibility that the patient is in type 1 hepatorenal failure   - would advise aggressive sepsis control with antibiotics and using albumin to increase blood pressure as needed   - agree with following up paracentesis culture to ensure that patient is not in SBP   - f/u podiatry to discuss whether or not patient has right foot osteomyelitis driving hepatorenal failure; if this is true then patient may need definitive source control with guillotine amputation since there is soft tissue ulceration of great toe   - f/u renal    3  Liver failure   - alcohol-related   - f/u GI    4  DVT Prophylaxis   - autoanticoagulated   - SCDs    5  Disposition   - per [facility]   - general surgery will be available as needed    Maximiliano Smith MD PGY-4  6:07 AM  02/19/18      Subjective:  Overnight patient has been given FFP and pRBC  His blood pressure is improving but he remains anuric       Objective:  Patient Vitals for the past 24 hrs:   BP Temp Temp src Pulse Resp SpO2   02/19/18 0400 - 97 5 °F (36 4 °C) - 100 16 99 %   02/19/18 0300 - 97 5 °F (36 4 °C) - (!) 108 (!) 24 98 %   02/19/18 0200 - 97 9 °F (36 6 °C) - 104 15 99 %   02/19/18 0100 - 97 9 °F (36 6 °C) - 104 16 98 %   02/19/18 0000 - 97 9 °F (36 6 °C) - (!) 108 (!) 24 97 %   02/18/18 2300 - 98 2 °F (36 8 °C) - (!) 106 17 97 %   02/18/18 2205 - 98 2 °F (36 8 °C) - (!) 108 17 98 %   02/18/18 2100 - 98 2 °F (36 8 °C) - (!) 112 20 100 %   02/18/18 2020 116/53 98 2 °F (36 8 °C) - (!) 112 15 99 %   02/18/18 2000 - 98 2 °F (36 8 °C) - (!) 112 19 99 %   02/18/18 1930 (!) 110/20 98 2 °F (36 8 °C) - (!) 110 16 97 %   02/18/18 1925 - 98 2 °F (36 8 °C) - (!) 108 18 98 %   02/18/18 1920 - 98 2 °F (36 8 °C) - (!) 108 14 99 %   02/18/18 1915 - 98 2 °F (36 8 °C) - (!) 108 16 100 %   02/18/18 1910 - 98 2 °F (36 8 °C) - (!) 110 16 98 %   02/18/18 1906 135/61 - - - - -   02/18/18 1900 - 97 9 °F (36 6 °C) - (!) 108 17 100 %   02/18/18 1843 - 97 9 °F (36 6 °C) - (!) 112 (!) 29 98 %   02/18/18 1830 140/70 97 9 °F (36 6 °C) - (!) 108 17 99 %   02/18/18 1800 - 97 9 °F (36 6 °C) - (!) 110 (!) 30 100 %   02/18/18 1730 - (!) 97 2 °F (36 2 °C) - (!) 108 16 96 %   02/18/18 1700 - - - (!) 110 17 -   02/18/18 1630 - 97 5 °F (36 4 °C) - (!) 108 19 99 %   02/18/18 1600 - (!) 97 2 °F (36 2 °C) - (!) 108 20 100 %   02/18/18 1530 - (!) 97 2 °F (36 2 °C) - (!) 108 19 100 %   02/18/18 1515 - (!) 97 2 °F (36 2 °C) - (!) 106 17 100 %   02/18/18 1500 - (!) 97 2 °F (36 2 °C) - (!) 106 14 96 %   02/18/18 1445 - (!) 97 2 °F (36 2 °C) - (!) 108 16 98 %   02/18/18 1400 - 97 5 °F (36 4 °C) - 104 16 (!) 85 %   02/18/18 1345 (!) 102/35 97 5 °F (36 4 °C) - (!) 106 21 92 %   02/18/18 1330 (!) 85/40 97 5 °F (36 4 °C) - (!) 106 18 99 %   02/18/18 1237 (!) 82/34 - - - - -   02/18/18 1154 (!) 88/38 - - - - -   02/18/18 1130 (!) 78/30 - - - - -   02/18/18 1120 (!) 88/50 - - - - -   02/18/18 1056 (!) 70/34 - - - - -   02/18/18 0920 (!) 74/38 - - - - -   02/18/18 0729 (!) 78/50 97 5 °F (36 4 °C) Oral (!) 107 20 100 %          Diet Orders            Start     Ordered    02/17/18 1730  Diet GI; Lo Fiber/Lo Residue; Sodium 2 Gm, Fluid Restriction 1500 ML  Diet effective now     Question Answer Comment   Diet Type GI    GI Lo Fiber/Lo Residue    Other Restriction(s): Sodium 2 Gm    Other Restriction(s): Fluid Restriction 1500 ML    RD to adjust diet per protocol?  No        02/17/18 1736        Intake/Output Summary (Last 24 hours) at 02/19/18 4250  Last data filed at 02/19/18 0415   Gross per 24 hour   Intake             1450 ml   Output             2565 ml   Net            -1115 ml   UOP 50 overnight     Physical Exam:  General: NAD  Cardiovascular: RRR  Respiratory: breath sounds b/l  Abdomen: soft, distended, non tender, no drainage from paracentesis site  Extremities: no edema    Medications:    Current Facility-Administered Medications:  albumin human 25 g Intravenous Q8H Mati Glasgow DO Last Rate: Stopped (02/19/18 0115)   cefTRIAXone 1,000 mg Intravenous Q24H Anmol Ortega DO Last Rate: Stopped (01/50/80 2646)   folic acid 1 mg Oral Daily Mariama Hurtado, DO    influenza vaccine 0 5 mL Intramuscular Prior to discharge Thien Sandoval MD    lactulose 20 g Oral BID Elizabeth Young PA-C    methylprednisolone 32 mg Oral Daily With Breakfast Elizabeth Young PA-C    midodrine 10 mg Oral TID AC Pegge Milks, DO    octreotide 100 mcg Intravenous [facility] Mati Glasgow,     ondansetron 4 mg Intravenous Q8H PRN Mariama Hurtado,     pantoprazole 40 mg Oral Early Morning Elizabeth Young PA-C    rifaximin 550 mg Oral Q12H [facility] Elizabeth Young PA-C    thiamine 100 mg Oral Daily Mariama Hurtado,        influenza vaccine 0 5 mL Prior to discharge   ondansetron 4 mg Q8H PRN     Laboratory results:   CBC:   Lab Results   Component Value Date    WBC 5 63 02/19/2018    HGB 7 0 (L) 02/19/2018    HCT 19 7 (L) 02/19/2018    MCV 90 02/19/2018    PLT 72 (L) 02/19/2018    MCH 32 0 02/19/2018    MCHC 35 5 02/19/2018    RDW 23 5 (H) 02/19/2018    MPV 9 3 02/19/2018    NRBC 0 02/19/2018   , CMP:   Lab Results Component Value Date     (L) 02/19/2018    K 4 3 02/19/2018    CL 96 (L) 02/19/2018    CO2 18 (L) 02/19/2018    ANIONGAP 12 02/19/2018    BUN 39 (H) 02/19/2018    CREATININE 3 97 (H) 02/19/2018    GLUCOSE 126 02/19/2018    CALCIUM 8 3 02/19/2018    AST 35 02/19/2018    ALT 18 02/19/2018    ALKPHOS 80 02/19/2018    PROT 6 1 (L) 02/19/2018    BILITOT 5 88 (H) 02/19/2018    EGFR 16 02/19/2018   , Coagulation:   Lab Results   Component Value Date    INR 2 36 (H) 02/19/2018   , Urinalysis:   Lab Results   Component Value Date    COLORU Briseyda 02/18/2018    CLARITYU Cloudy 02/18/2018    SPECGRAV 1 021 02/18/2018    PHUR 5 0 02/18/2018    LEUKOCYTESUR Small (A) 02/18/2018    NITRITE Positive (A) 02/18/2018    PROTEINUA 100 (2+) (A) 02/18/2018    GLUCOSEU 100 (1/10%) (A) 02/18/2018    KETONESU Trace (A) 02/18/2018    BILIRUBINUR Interference- unable to analyze (A) 02/18/2018    BLOODU Small (A) 02/18/2018   , Amylase: No results found for: AMYLASE, Lipase: No results found for: LIPASE    VTE Pharmacologic Prophylaxis: Reason for no pharmacologic prophylaxis held since patient is autoanticoagulated  VTE Mechanical Prophylaxis: sequential compression device

## 2018-02-19 NOTE — PROGRESS NOTES
Progress Note - Natalia Henley 50 y o  male MRN: [id number]    Unit/Bed#: [facility] 07 Encounter: [id number]         Assessment/ Plan:  Alcoholic cirrhosis  Alcoholic hepatitis    1  Alcoholic cirrhosis - Pt was admitted w/ increasing abd distention  S/p paracentesis 1-2L bloody drainage  No SBP  Pneumoperitoneum after procedure but no pain  MELD 37  Now with possible HRS  On octreotide, midodrine & albumin  Prognosis extremely poor  He was drinking up until a few weeks ago  Not a transplant candidate    -Monitor abd distention - paracentesis as needed  -Follow H&H - Hbg 7  -Follow platelets - 72   -Continue octreotide, midodrine, albumin  -Consider palliative care  -Continue lactulose - titrate 2-3 BM's daily & Xifaxan    2  Alcoholic hepatitis - found to have elevated LFT's on admission  DF 70  Started on methylprednisolone  So far no improvement    -Continue steroids      Subjective:   Pt denies pain  Transferred from floor b/c of hypotension, PRANAY  Objective:     Vitals: Blood pressure 134/74, pulse 96, temperature 98 °F (36 7 °C), temperature source Oral, resp  rate 15, height 5' 10" (1 778 m), weight 96 8 kg (213 lb 6 5 oz), SpO2 99 %  ,Body mass index is 30 62 kg/m²  Physical Exam: General appearance: alert and oriented, in no acute distress and however falls asleep mid sentence  Lungs: clear to auscultation bilaterally  Heart: regular rate and rhythm  Abdomen: +BS, soft, distended, NT  Skin: Jaundice     Invasive Devices     Peripheral Intravenous Line            Peripheral IV 02/17/18 Right Antecubital 2 days    Peripheral IV 02/18/18 Left Forearm 1 day          Arterial Line            Arterial Line 02/18/18 Right Radial less than 1 day          Drain            Urethral Catheter Temperature probe 16 Fr  less than 1 day                Lab, Imaging and other studies: I have personally reviewed pertinent reports       CBC:   Lab Results   Component Value Date    WBC 5 63 02/19/2018    HGB 7 2 (L) 02/19/2018 HCT 19 7 (L) 02/19/2018    MCV 90 02/19/2018    PLT 72 (L) 02/19/2018    MCH 32 0 02/19/2018    MCHC 35 5 02/19/2018    RDW 23 5 (H) 02/19/2018    MPV 9 3 02/19/2018    NRBC 0 02/19/2018   ,   CMP:   Lab Results   Component Value Date     (L) 02/19/2018    K 4 3 02/19/2018    CL 96 (L) 02/19/2018    CO2 18 (L) 02/19/2018    ANIONGAP 12 02/19/2018    BUN 39 (H) 02/19/2018    CREATININE 3 97 (H) 02/19/2018    GLUCOSE 126 02/19/2018    CALCIUM 8 3 02/19/2018    AST 35 02/19/2018    ALT 18 02/19/2018    ALKPHOS 80 02/19/2018    PROT 6 1 (L) 02/19/2018    BILITOT 5 88 (H) 02/19/2018    EGFR 16 02/19/2018   ,   Lipase: No results found for: LIPASE,  PT/INR:   Lab Results   Component Value Date    INR 2 36 (H) 02/19/2018   ,

## 2018-02-20 PROBLEM — R34 OLIGURIA: Status: ACTIVE | Noted: 2018-02-20

## 2018-02-20 PROBLEM — E87.2 LACTIC ACIDOSIS: Status: RESOLVED | Noted: 2018-02-17 | Resolved: 2018-02-20

## 2018-02-20 PROBLEM — R65.10 SIRS (SYSTEMIC INFLAMMATORY RESPONSE SYNDROME) (HCC): Status: RESOLVED | Noted: 2018-02-17 | Resolved: 2018-02-20

## 2018-02-20 PROBLEM — I95.9 HYPOTENSION: Status: RESOLVED | Noted: 2018-02-17 | Resolved: 2018-02-20

## 2018-02-20 PROBLEM — R10.9 ABDOMINAL PAIN: Status: RESOLVED | Noted: 2018-02-17 | Resolved: 2018-02-20

## 2018-02-20 LAB
ALBUMIN SERPL BCP-MCNC: 3.6 G/DL (ref 3.5–5)
ALP SERPL-CCNC: 85 U/L (ref 46–116)
ALT SERPL W P-5'-P-CCNC: 20 U/L (ref 12–78)
ANION GAP SERPL CALCULATED.3IONS-SCNC: 11 MMOL/L (ref 4–13)
ANION GAP SERPL CALCULATED.3IONS-SCNC: 11 MMOL/L (ref 4–13)
AST SERPL W P-5'-P-CCNC: 31 U/L (ref 5–45)
BACTERIA SPEC BFLD CULT: NO GROWTH
BASOPHILS # BLD AUTO: 0 THOUSANDS/ΜL (ref 0–0.1)
BASOPHILS NFR BLD AUTO: 0 % (ref 0–1)
BILIRUB SERPL-MCNC: 3.64 MG/DL (ref 0.2–1)
BUN SERPL-MCNC: 45 MG/DL (ref 5–25)
BUN SERPL-MCNC: 47 MG/DL (ref 5–25)
CALCIUM SERPL-MCNC: 8.4 MG/DL (ref 8.3–10.1)
CALCIUM SERPL-MCNC: 8.6 MG/DL (ref 8.3–10.1)
CHLORIDE SERPL-SCNC: 96 MMOL/L (ref 100–108)
CHLORIDE SERPL-SCNC: 97 MMOL/L (ref 100–108)
CO2 SERPL-SCNC: 20 MMOL/L (ref 21–32)
CO2 SERPL-SCNC: 20 MMOL/L (ref 21–32)
CREAT SERPL-MCNC: 4.75 MG/DL (ref 0.6–1.3)
CREAT SERPL-MCNC: 4.96 MG/DL (ref 0.6–1.3)
EOSINOPHIL # BLD AUTO: 0 THOUSAND/ΜL (ref 0–0.61)
EOSINOPHIL NFR BLD AUTO: 0 % (ref 0–6)
ERYTHROCYTE [DISTWIDTH] IN BLOOD BY AUTOMATED COUNT: 21.6 % (ref 11.6–15.1)
ERYTHROCYTE [DISTWIDTH] IN BLOOD BY AUTOMATED COUNT: 23.2 % (ref 11.6–15.1)
GFR SERPL CREATININE-BSD FRML MDRD: 13 ML/MIN/1.73SQ M
GFR SERPL CREATININE-BSD FRML MDRD: 13 ML/MIN/1.73SQ M
GLUCOSE SERPL-MCNC: 145 MG/DL (ref 65–140)
GLUCOSE SERPL-MCNC: 152 MG/DL (ref 65–140)
GRAM STN SPEC: NORMAL
GRAM STN SPEC: NORMAL
HCT VFR BLD AUTO: 19 % (ref 36.5–49.3)
HCT VFR BLD AUTO: 21.8 % (ref 36.5–49.3)
HGB BLD-MCNC: 6.8 G/DL (ref 12–17)
HGB BLD-MCNC: 8 G/DL (ref 12–17)
INR PPP: 2.43 (ref 0.86–1.16)
LYMPHOCYTES # BLD AUTO: 0.79 THOUSANDS/ΜL (ref 0.6–4.47)
LYMPHOCYTES NFR BLD AUTO: 11 % (ref 14–44)
MAGNESIUM SERPL-MCNC: 2.3 MG/DL (ref 1.6–2.6)
MCH RBC QN AUTO: 32.4 PG (ref 26.8–34.3)
MCH RBC QN AUTO: 32.9 PG (ref 26.8–34.3)
MCHC RBC AUTO-ENTMCNC: 35.8 G/DL (ref 31.4–37.4)
MCHC RBC AUTO-ENTMCNC: 36.7 G/DL (ref 31.4–37.4)
MCV RBC AUTO: 90 FL (ref 82–98)
MCV RBC AUTO: 91 FL (ref 82–98)
MONOCYTES # BLD AUTO: 0.73 THOUSAND/ΜL (ref 0.17–1.22)
MONOCYTES NFR BLD AUTO: 10 % (ref 4–12)
NEUTROPHILS # BLD AUTO: 5.68 THOUSANDS/ΜL (ref 1.85–7.62)
NEUTS SEG NFR BLD AUTO: 79 % (ref 43–75)
NRBC BLD AUTO-RTO: 0 /100 WBCS
PHOSPHATE SERPL-MCNC: 6 MG/DL (ref 2.7–4.5)
PLATELET # BLD AUTO: 78 THOUSANDS/UL (ref 149–390)
PLATELET # BLD AUTO: 82 THOUSANDS/UL (ref 149–390)
PMV BLD AUTO: 8.9 FL (ref 8.9–12.7)
PMV BLD AUTO: 9.9 FL (ref 8.9–12.7)
POTASSIUM SERPL-SCNC: 4.8 MMOL/L (ref 3.5–5.3)
POTASSIUM SERPL-SCNC: 4.9 MMOL/L (ref 3.5–5.3)
PROT SERPL-MCNC: 6.7 G/DL (ref 6.4–8.2)
PROTHROMBIN TIME: 26.7 SECONDS (ref 12.1–14.4)
RBC # BLD AUTO: 2.1 MILLION/UL (ref 3.88–5.62)
RBC # BLD AUTO: 2.43 MILLION/UL (ref 3.88–5.62)
SODIUM SERPL-SCNC: 127 MMOL/L (ref 136–145)
SODIUM SERPL-SCNC: 128 MMOL/L (ref 136–145)
WBC # BLD AUTO: 7.23 THOUSAND/UL (ref 4.31–10.16)
WBC # BLD AUTO: 8.89 THOUSAND/UL (ref 4.31–10.16)

## 2018-02-20 PROCEDURE — 93923 UPR/LXTR ART STDY 3+ LVLS: CPT | Performed by: SURGERY

## 2018-02-20 PROCEDURE — 99233 SBSQ HOSP IP/OBS HIGH 50: CPT | Performed by: INTERNAL MEDICINE

## 2018-02-20 PROCEDURE — P9016 RBC LEUKOCYTES REDUCED: HCPCS

## 2018-02-20 PROCEDURE — 99232 SBSQ HOSP IP/OBS MODERATE 35: CPT | Performed by: INTERNAL MEDICINE

## 2018-02-20 PROCEDURE — 85027 COMPLETE CBC AUTOMATED: CPT | Performed by: PHYSICIAN ASSISTANT

## 2018-02-20 PROCEDURE — 85025 COMPLETE CBC W/AUTO DIFF WBC: CPT | Performed by: PHYSICIAN ASSISTANT

## 2018-02-20 PROCEDURE — 85610 PROTHROMBIN TIME: CPT | Performed by: PHYSICIAN ASSISTANT

## 2018-02-20 PROCEDURE — 83735 ASSAY OF MAGNESIUM: CPT | Performed by: PHYSICIAN ASSISTANT

## 2018-02-20 PROCEDURE — 80048 BASIC METABOLIC PNL TOTAL CA: CPT | Performed by: INTERNAL MEDICINE

## 2018-02-20 PROCEDURE — 80053 COMPREHEN METABOLIC PANEL: CPT | Performed by: PHYSICIAN ASSISTANT

## 2018-02-20 PROCEDURE — 84100 ASSAY OF PHOSPHORUS: CPT | Performed by: PHYSICIAN ASSISTANT

## 2018-02-20 PROCEDURE — 99251 PR INITL INPATIENT CONSULT NEW/ESTAB PT 20 MIN: CPT | Performed by: NURSE PRACTITIONER

## 2018-02-20 RX ORDER — HEPARIN SODIUM 5000 [USP'U]/ML
5000 INJECTION, SOLUTION INTRAVENOUS; SUBCUTANEOUS EVERY 8 HOURS SCHEDULED
Status: DISCONTINUED | OUTPATIENT
Start: 2018-02-20 | End: 2018-02-21

## 2018-02-20 RX ADMIN — HEPARIN SODIUM 5000 UNITS: 5000 INJECTION, SOLUTION INTRAVENOUS; SUBCUTANEOUS at 15:01

## 2018-02-20 RX ADMIN — ALBUMIN HUMAN 25 G: 0.25 SOLUTION INTRAVENOUS at 15:01

## 2018-02-20 RX ADMIN — Medication 100 MG: at 08:38

## 2018-02-20 RX ADMIN — OCTREOTIDE ACETATE 100 MCG: 100 INJECTION, SOLUTION INTRAVENOUS; SUBCUTANEOUS at 21:25

## 2018-02-20 RX ADMIN — PANTOPRAZOLE SODIUM 40 MG: 40 TABLET, DELAYED RELEASE ORAL at 05:03

## 2018-02-20 RX ADMIN — ALBUMIN HUMAN 25 G: 0.25 SOLUTION INTRAVENOUS at 09:09

## 2018-02-20 RX ADMIN — ALBUMIN HUMAN 25 G: 0.25 SOLUTION INTRAVENOUS at 03:42

## 2018-02-20 RX ADMIN — LACTULOSE 20 G: 20 SOLUTION ORAL at 17:27

## 2018-02-20 RX ADMIN — MIDODRINE HYDROCHLORIDE 10 MG: 5 TABLET ORAL at 07:43

## 2018-02-20 RX ADMIN — FOLIC ACID 1 MG: 1 TABLET ORAL at 08:38

## 2018-02-20 RX ADMIN — HEPARIN SODIUM 5000 UNITS: 5000 INJECTION, SOLUTION INTRAVENOUS; SUBCUTANEOUS at 21:24

## 2018-02-20 RX ADMIN — LACTULOSE 20 G: 20 SOLUTION ORAL at 08:38

## 2018-02-20 RX ADMIN — MIDODRINE HYDROCHLORIDE 10 MG: 5 TABLET ORAL at 12:02

## 2018-02-20 RX ADMIN — OCTREOTIDE ACETATE 100 MCG: 100 INJECTION, SOLUTION INTRAVENOUS; SUBCUTANEOUS at 15:01

## 2018-02-20 RX ADMIN — RIFAXIMIN 550 MG: 550 TABLET ORAL at 08:38

## 2018-02-20 RX ADMIN — RIFAXIMIN 550 MG: 550 TABLET ORAL at 21:23

## 2018-02-20 RX ADMIN — ALBUMIN HUMAN 25 G: 0.25 SOLUTION INTRAVENOUS at 21:27

## 2018-02-20 RX ADMIN — OCTREOTIDE ACETATE 100 MCG: 100 INJECTION, SOLUTION INTRAVENOUS; SUBCUTANEOUS at 05:10

## 2018-02-20 RX ADMIN — MIDODRINE HYDROCHLORIDE 10 MG: 5 TABLET ORAL at 15:08

## 2018-02-20 RX ADMIN — METHYLPREDNISOLONE 32 MG: 16 TABLET ORAL at 07:43

## 2018-02-20 NOTE — PROGRESS NOTES
IMR Unit Transfer Note   Encounter: [id number]  SOD Team C           Carmen Shannonjesus 50 y o  male [id number]      Active Hospital Problems: Principal Problem:    Decompensated hepatic cirrhosis ([facility] 75 )  Active Problems:    Ascites    Hepatic encephalopathy (HCC)    PRANAY (acute kidney injury) ([facility] )    Hypoalbuminemia    Hyponatremia    Hyperammonemia (HCC)    Elevated alkaline phosphatase level    Anemia of chronic disease    Osteomyelitis of toe of right foot (HCC)    Alcohol abuse    Alcoholic hepatitis    Cough    Hypoxia    Oliguria    1  Decompensated alcoholic cirrhosis: Secondary to alcohol abuse  Cirrhosis recently diagnosed at [facility], at which time has was found to have hyponatremia with massive ascites, ultimately discharged on Rifaximin and lactulose, of which he took neither  During admission presentation to the ED 2/17 with massive ascites, generalized weakness and SOB, MELD on admission 34  S/p ED paracentesis 2/17 with 1L fluid removed, report of bloody drainage with placement of ostomy bag  · MELD-NA score 36 day of transfer   · Continue low salt diet   · Continue lactulose, rifaximin  · Patient not initially not thought to be liver transplant as his last drink was 3 weeks ago, however per transfer note with CCU team and Dr Abby Sandoval patient may be considered per TP team at [facility] if voices desire to stay sober   · Prognosis extremely poor, per palliative consult patient is treatment-focused  When seen by palliative in PM patient noted to be encephalopathic, unable to approprietly answer questions, default POA adult children  2  Hepatic encephalopathy: Per prior notes with waxing/waning mental status, likely secondary to hepatic encephalopathy with possible contribution from uremia  Per transfer note mental status seems to wax and wane somewhat  · On evaluation at transfer AAOx1, tired-appearing, intermittently responding to questions     · Continue lactulose 20mg PO Qd, titrate to 2-3BM daily · Currently with good stool output  3  Alcoholic hepatitis: Elevated LFTs on admission  Maddrey discriminant function above 32, initiated on methylprednisolone to treat for 28 days for alcoholic hepatitis  · Continue solumedrol 32mg PO QD for 28 days     4  PRANAY, ATN v HRS with oliguria: Creatinine 3 32 on admission, started on volume expansion from albumin 25g q6H for 48H  Seen by nephrology, started on midrodrine 10mg TID and ocreotide 100mcg TID  · SBP trending 130s-160s   · Creatinine uptrending, 4 96 this AM  · Continue to monitor Is/Os, per notes with minimal urine output   · Follow-up IR consult for Hd, will likely go for dialysis in AM    5  Normocytic Anemia: H/H 8 5 on admission, downtrended <7 on 2/18, suspected acute blood loss anemia on setting of AOCD secondaty to intraperitoneal/intra-abdominal bleed secondary to paracentesis  S/p 1 units 2/18, 2u FFP and vitamin K after acute bleed  Additional unitpRBC  given 2/20 in Am  · Post-transfusion H/H stable   · Continue to trend hemoglobin QD    6  Hyponatremia: Sodium 127, low in setting of cirrhosis  Stable  · Continue to monitor     7  Right toe gangrene ulcer and osteomyelitis: ID consulted, no evidence of cellulitis clinically  No fevers/no leukocytosis, no antibiotics necessary  Recommended per ID to have toe amputated to avoid development wet gangrene, patient refused  8  Pneumoperitoneum: After paracentesis had more air than expected; evaluated by surgery, OK for diet as tolerated, no plans for surgical intervention  VTE Pharmacologic Prophylaxis: Heparin  VTE Mechanical Prophylaxis: sequential compression device    Disposition: Patient requires Med/Surg    Hospital Course: (per ICU transfer note) The patient is a 49 yo male who was recently diagnosed with alcoholic cirrhosis during an admission to [facility] at the end of January   Had been discharged on rifaximin and lactulose, but his son reports that the rifaximin was very expensive and the lactulose caused GI upset so he was not taking either of them       He presented to the [address] [place] [address] ED on 2/17 for worsening abdominal distension, SOB, LE edema, and fatigue, as well as some confusion  Was tachycardic and hypotensive at that time  PRANAY with Cr about 3 (baseline around 0 7 per  records)  Also anemic, hgb 7-8 which is stable from his  admission  LFTs not significantly abnormal, but tbili elevated  Ammonia high at 78  Hyponatremic as expected, which was stable from his records at        Had a paracentesis in the ED that removed about 1 L of fluid  Was given IVF for resuscitation  Was noted to have some peggy blood vs bloody peritoneal fluid -- verbal reports stated this was about 2L of fluid  Had an ostomy bag placed over the site, and it resolved on its own  A CT of the abd/pelvis was obtained out of concern for intraabdominal injury (known esophageal varix on EGD at [facility])  None noted, but there was pneumoperitoneum; general surgery evaluated the patient and felt this was secondary to air tracking from the paracentesis and did not require intervention  Other findings on CT included known cirrhotic liver, gallstones without inflammation, anasarca, compression deformities of T12 and T7, small R pleural effusion and atelectasis       Restarted lactulose and rifaximin for elevated ammonia  There was discussion about whether his PRANAY was related to ATN from hypotension (which was felt to be intravascular depletion from third spacing) vs hepatorenal syndrome  There was no obvious source of infection (chronic osteomyelitis of the R great toe, but ID following and felt to not need abx at this time), so did not suspect septic shock -- blood cx were drawn and are negative so far  Cx of peritoneal fluid also negative thus far  Nephrology has been following the pt  Started octreotide and midodrine  Hgb dropped to < 7 and received a unit of blood   Out of concern for hypotension and worsening renal function, as well as acute on chronic anemia, was transferred to the ICU      Patient was monitored in the unit  His mental status has continued to wax and wane; at worst is oriented only to person  At best, "seems confused" per his son  BP improved with albumin scheduled -- increased frmo Q 6 h to Q 8 H  Did not require pressors  Urine output has been consistently poor -- 0 1cc/kg/hour on average  His creatinine worsened consistently, and there has been discussion of dialysis with nephrology  Still unclear at this point whether this is ATN vs hepatorenal syndrome, but his renal function is such that he will require HD regardless  Still a possibility that this is ATN that has yet to improve with IVF, but also possible that this is hepatorenal syndrome  Based on his Cr today, 4 7, the plan is to consult IR for temporary catheter placement and proceed with dialysis tomorrow      Patient had consented to HD when he was more lucid; his son also confirmed today that he is agreeable to HD if needed  (pt has no specific POA; emergency contact is his son  He is   Son Rhiannon De Leon is his oldest child)  The patient and his son understand that his prognosis is very poor, and that his renal failure may or may not be directly related to his liver failure, but that regardless, dialysis will not change the prognosis -- only may provide more time for the etiology of his renal failure to manifest itself (ATN vs HRS)  Initially, the understanding was that the patient would not be a candidate for liver transplant as his last drink was only about 3 weeks ago  However, newly today it was discussed between Dr Kellie Mix and me that he may be considered by the transplant team at [facility] if family is supportive and can endorse a desire to remain sober   This will be further discussed between GI team and the patient/family      If patient goes for HD tomorrow, can discontinue Mendoza catheter      IR has been consulted for placement of temporary HD catheter  Palliative has been consulted to assist with goals of care  Nephrology, GI, and ID are following the patient        Subjective: Patient seen and examined  Currently AAOx3, feels well  Objective:   Vitals:    02/20/18 1202 02/20/18 1209 02/20/18 1309 02/20/18 1409   BP:  148/81 153/88 161/89   Pulse:  98 (!) 106 100   Resp:  20 17 16   Temp: 97 8 °F (36 6 °C)      TempSrc: Oral      SpO2: 93%      Weight:       Height:         I/O last 24 hours: In: 1230 [P O :480; I V :400; Blood:350]  Out: 190 [Urine:190]    Physical Exam   Constitutional: He is oriented to person, place, and time  No distress  HENT:   Head: Normocephalic  Eyes: Pupils are equal, round, and reactive to light  Cardiovascular: Normal rate and regular rhythm  Pulmonary/Chest: Effort normal    Abdominal: Bowel sounds are normal  He exhibits distension  Musculoskeletal: He exhibits no edema  Neurological: He is alert and oriented to person, place, and time  AAOx1   Skin: Skin is warm and dry  He is not diaphoretic             Wanda Arzola MD

## 2018-02-20 NOTE — PROGRESS NOTES
Spoke with patient's son Juanito Simeon over the phone about his father's current condition  Discussed with him about the fact that he has alcoholic cirrhosis complicated now with acute kidney failure suspected likely secondary to hepatorenal syndrome, we discussed that unfortunately his kidney function continues to worsen  We discussed that his prognosis is in general poor, we discussed about the possibility of dialysis and all the risks and benefits  Maria Elena Sutherland seems to express understanding he gave me verbal consent for hemodialysis if needed    Discussed with critical care team

## 2018-02-20 NOTE — PROGRESS NOTES
Progress Note - Infectious Disease   Girish Hoff 50 y o  male MRN: [id number]  Unit/Bed#: MICU 07 Encounter: [id number]      Impression/Recommendations:  1   Right great toe gangrenous ulcer and osteomyelitis  Bhavesh Mirza is no evidence of cellulitis clinically   Patient has no fever leukocytosis   No antibiotic is necessary   However, patient needs to have this toe amputated, to avoid development of wet gangrene   Patient still refuses toe amputation  No antibiotic needed at present time  Monitor toe  Recommend toe amputation      2   Decompensated cirrhosis   This is due to patient's noncompliance with prescribed treatment plan  Bhavesh Mirza is no evidence of peritonitis clinically   Patient is status post paracentesis with benign peritoneal fluid  Systemic corticosteroid started  No antibiotic needed  Management per GI and primary service      3   Hypotension   Patient is not febrile and without leukocytosis   He is not systemically toxic   Admission blood cultures are negative   Doubt septic shock   Suspect hypovolemia  Hypotension resolved with IV fluid  Blood cultures remain negative  Observe off antibiotic  Monitor hemodynamics        4  Encephalopathy   This is most likely hepatic encephalopathy, with patient not compliant with lactulose   No clinical signs of CNS infection   Mental status had improved with restart of lactulose  Monitor mental status      5   PRANAY   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   Dehydration may contribute   Creatinine worsening  Any antibiotic will need to have dosages adjusted accordingly  Monitor creatinine      Discussed with the patient in detail regarding the above plan  Discussed with Critical Care Medicine Service      Antibiotics:  Off antibiotic     Subjective:  Patient remains in ICU  He is much more awake and alert today  Abdomen remains distended with mild discomfort  No toe pain    Temperature stays down   No chills        Objective:  Vitals:  HR:  [] 104  Resp:  [13-23] 20  BP: (112-159)/(54-80) 155/79  SpO2:  [91 %-99 %] 98 %  Temp (24hrs), Av 3 °F (36 8 °C), Min:97 9 °F (36 6 °C), Max:98 9 °F (37 2 °C)  Current: Temperature: 97 9 °F (36 6 °C)    Physical Exam:     General: More awake and alert, cooperative, nontoxic, no distress  Lungs: Decreased breath sounds, no rales, no wheezing, respirations unlabored  Heart[de-identified]  Tachycardic with regular rhythm, S1 and S2 normal, no murmur  Abdomen: Soft, moderate distention, mild diffuse tenderness, bowel sounds active all four quadrants,        no masses, no organomegaly  Extremities: Stable leg edema  No erythema/warmth  No ulcer  Nontender to palpation  Right 1st toe with stable necrotic ulcer and chronic changes  No purulence  Skin:  No rash  Invasive Devices     Peripheral Intravenous Line            Peripheral IV 18 Right Antecubital 2 days    Peripheral IV 18 Left Forearm 1 day          Drain            Urethral Catheter Temperature probe 16 Fr  1 day                Labs studies:   I have personally reviewed pertinent labs  Results from last 7 days  Lab Units 18  0455 18  2015 18  1131 18  0439  18  0628   SODIUM mmol/L 128* 127* 128* 126*  < > 127*   POTASSIUM mmol/L 4 8 4 8 4 4 4 3  < > 4 1   CHLORIDE mmol/L 97* 97* 97* 96*  < > 98*   CO2 mmol/L 20* 19* 19* 18*  < > 20*   ANION GAP mmol/L 11 11 12 12  < > 9   BUN mg/dL 45* 43* 43* 39*  < > 38*   CREATININE mg/dL 4 75* 4 64* 4 17* 3 97*  < > 3 59*   EGFR ml/min/1 73sq m 13 14 16 16  < > 19   GLUCOSE RANDOM mg/dL 145* 180* 165* 126  < > 100   CALCIUM mg/dL 8 4 8 3 8 3 8 3  < > 7 8*   AST U/L 31  --   --  35  --  36   ALT U/L 20  --   --  18  --  20   ALK PHOS U/L 85  --   --  80  --  92   TOTAL PROTEIN g/dL 6 7  --   --  6 1*  --  5 7*   BILIRUBIN TOTAL mg/dL 3 64*  --   --  5 88*  --  3 22*   < > = values in this interval not displayed      Results from last 7 days  Lab Units 18  2629 02/19/18  2015 02/19/18  1132 02/19/18  0439  02/18/18  0628   WBC Thousand/uL 7 23  --   --  5 63  --  5 96   HEMOGLOBIN g/dL 6 8* 7 7* 7 2* 7 0*  < > 6 6*   PLATELETS Thousands/uL 82*  --   --  72*  --  74*   < > = values in this interval not displayed  Results from last 7 days  Lab Units 02/17/18  1929 02/17/18  1829 02/17/18  1336   BLOOD CULTURE  No Growth at 48 hrs  No Growth at 48 hrs   --   --    GRAM STAIN RESULT   --   --  Rare Polys  No bacteria seen   BODY FLUID CULTURE, STERILE   --   --  No growth   INFLUENZA A PCR   --  None Detected  --    INFLUENZA B PCR   --  None Detected  --    RSV PCR   --  None Detected  --        Imaging Studies:   I have personally reviewed pertinent imaging study reports and images in PACS  EKG, Pathology, and Other Studies:   I have personally reviewed pertinent reports

## 2018-02-20 NOTE — CONSULTS
Consultation - Palliative Care   Shauna Donovan 50 y o  male MRN: [id number]  Unit/Bed#: MICU 07 Encounter: [id number]      Assessment/Plan     Assessment:  Patient Active Problem List   Diagnosis    Abdominal pain    [facility]compens[facility]    Ascites    Hepatic encephalopathy ([facility]    [facility]    Hypoalbuminemia    Hyponatremia    Hypotension    SIRS (systemic inflammatory response syndrome) (HCC)    Hyperammonemia (HCC)    Elevated alkaline phosphatase level    Anemia of chronic disease    Osteomyelitis of toe of right foot ([facility]    Alcohol abuse    Alcoholic hepatitis    Cough    Hypoxia     Plan: At this time, patient is treatment-focused in plan of care  Patient and family have consented for dialysis if needed  They have endorsed understanding of his poor prognosis  When seen this afternoon, patient was encephalopathic, unable to appropriately answer questions, and not competent to make decisions  Default POA would be shared between patient's adult children  Spoke briefly with son Shayan Shah over the phone but he was unable to have conversation at that time, will call again tomorrow morning  Contact info for patient's other son is not on file  No advance directives in [facility]  Palliative care will continue to follow along  Symptom management per critical care team        History of Present Illness   Physician Requesting Consult: Yohannes Garibay DO  Reason for Consult / Principal Problem: goals of care  Hx and PE limited by: encephalopathy  HPI: Shauna Donovan is a 50 y o  male with history of alcoholic liver cirrhosis who presents with confusion and worsening abdominal pain  Reported last drink was 3 weeks ago  Previous admission at [address] [place] [address], discharged about 1 5 weeks prior to this admission  Unable to take prescribed meds on discharge due to cost and side effects    On this admission, found to have decompensated alcoholic cirrhosis, PRANAY, encephalopathy, elevated ammonia, tachycardia, hypotension  History of chronic right great toe gangrene and osteomyelitis, for which he has previously and consistently refused amputation  S/p diagnostic paracentesis in ER, negative for SBP  Was admitted, later transferred to ICU due to persistent hypotension, presumed to be hypovolemic and in setting of liver disease, has improved, on midodrine  Concern for hepatorenal syndrome  Son has consented for dialysis (and patient had also been agreeable) if needed  Patient to be transferred out of ICU today  Inpatient consult to Palliative Care  Consult performed by: Theresa Shoemaker  Consult ordered by: Josefina Cortes          Review of Systems   Unable to perform ROS: Mental status change       Historical Information   Past Medical History:   Diagnosis Date    Cirrhosis ([facility]     Hypertension      History reviewed  No pertinent surgical history  Social History     Social History    Marital status: /Civil Union     Spouse name: N/A    Number of children: N/A    Years of education: N/A     Social History Main Topics    Smoking status: Current Every Day Smoker     Packs/day: 0 20     Years: 35 00     Types: Cigarettes    Smokeless tobacco: Never Used    Alcohol use Yes      Comment: Pt "I quite a couple of weeks ago"    Drug use: No      Comment: prior history     Sexual activity: Not Asked     Other Topics Concern    None     Social History Narrative    None     History reviewed  No pertinent family history      Meds/Allergies   all current active meds have been reviewed and current meds:   Current Facility-Administered Medications   Medication Dose Route Frequency    albumin human (FLEXBUMIN) 25 % injection 25 g  25 g Intravenous O8N    folic acid (FOLVITE) tablet 1 mg  1 mg Oral Daily    heparin (porcine) subcutaneous injection 5,000 Units  5,000 Units Subcutaneous Q8H [facility]    influenza inactivated quadrivalent vaccine (FLULAVAL) IM injection 0 5 mL  0 5 mL Intramuscular Prior to discharge    lactulose 20 g/30 mL oral solution 20 g  20 g Oral BID    methylPREDNISolone (MEDROL) tablet 32 mg  32 mg Oral Daily With Breakfast    midodrine (PROAMATINE) tablet 10 mg  10 mg Oral TID AC    octreotide (SandoSTATIN) injection 100 mcg  100 mcg Intravenous Q8H [address]    ondansetron (ZOFRAN) injection 4 mg  4 mg Intravenous Q8H PRN    pantoprazole (PROTONIX) EC tablet 40 mg  40 mg Oral Early Morning    rifaximin (XIFAXAN) tablet 550 mg  550 mg Oral Q12H [address]    thiamine (VITAMIN B1) tablet 100 mg  100 mg Oral Daily         No Known Allergies    Objective     Physical Exam   Constitutional: He appears ill  He appears distressed  Restless, inconsolable, uncomfortable   HENT:   Head: Normocephalic and atraumatic  Right Ear: External ear normal    Left Ear: External ear normal    Mouth/Throat: Mucous membranes are normal    Eyes: Lids are normal    Neck: Trachea normal  Neck supple  Cardiovascular: Tachycardia present  Pulmonary/Chest: Effort normal    Abdominal: He exhibits distension  Neurological: He is disoriented  perseverative   Skin:   jaundice   Psychiatric: His mood appears anxious  Cognition and memory are impaired  restless       Lab Results: I have personally reviewed pertinent labs  Imaging Studies: I have personally reviewed pertinent reports  EKG, Pathology, and Other Studies: I have personally reviewed pertinent reports        Code Status: Level 1 - Full Code  Advance Directive and Living Will:    none on file  Power of :  presumed to be shared among two adult children by default  POLST:  none

## 2018-02-20 NOTE — PROGRESS NOTES
Progress Note - Infectious Disease   Kanwal Dorsey 50 y o  male MRN: [id number]  Unit/Bed#: MICU 07 Encounter: [id number]      Impression/Recommendations:  1   Right great toe gangrenous ulcer and osteomyelitis  Hillary Andre is no evidence of cellulitis clinically   Patient has no fever leukocytosis   No antibiotic is necessary   However, patient needs to have this toe amputated, to avoid development of wet gangrene   Patient still refuses toe amputation  No antibiotic needed at present time  Serial toe exam   Recommend toe amputation      2   Decompensated cirrhosis   This is due to patient's noncompliance with prescribed treatment plan  Hillary Andre is no evidence of peritonitis clinically   Patient is status post paracentesis with benign peritoneal fluid  Systemic corticosteroid started  No antibiotic needed  Management per GI and primary service      3  Hypotension  Patient is not febrile and without leukocytosis  He is not systemically toxic  Admission blood cultures are negative  Doubt septic shock  Suspect hypovolemia  Hypotension resolved with IV fluid  Blood cultures remain negative  No indication for antibiotic  Discontinue ceftriaxone  Monitor hemodynamics        4  Encephalopathy   This is most likely hepatic encephalopathy, with patient not compliant with lactulose   No clinical signs of CNS infection  Mental status had improved with restart of lactulose  Monitor mental status      5   PRANAY   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   Dehydration may contribute  Creatinine worsening  Any antibiotic will need to have dosages adjusted accordingly  Monitor creatinine      Discussed with the patient in detail regarding the above plan  Discussed with Dr Chad Roberts from [address]      Antibiotics:  Ceftriaxone     Subjective:  Patient remains in ICU  He is lethargic/sleepy but arousable  No toe pain  Temperature stays down  No chills  SBP improved    Ceftriaxone started for unclear reason  UTI? Objective:  Vitals:  HR:  [] 100  Resp:  [13-24] 23  BP: (116-152)/(53-74) 138/68  SpO2:  [96 %-100 %] 97 %  Temp (24hrs), Av 8 °F (36 6 °C), Min:96 8 °F (36 °C), Max:98 4 °F (36 9 °C)  Current: Temperature: 98 4 °F (36 9 °C)    Physical Exam:     General: Sleepy but arousable  Comfortable  Nontoxic  Stable confusion  Lungs: Decreased breath sounds, no rales, no wheezing, respirations unlabored  Heart[de-identified]  Tachycardic with regular rhythm, S1 and S2 normal, no murmur  Abdomen: Soft, stable distension, non-tender, bowel sounds active all four quadrants,        no masses, no organomegaly  Extremities: Stable edema  No erythema/warmth  No ulcer  Nontender to palpation  Skin:  No rash  Invasive Devices     Peripheral Intravenous Line            Peripheral IV 18 Right Antecubital 2 days    Peripheral IV 18 Left Forearm 1 day          Drain            Urethral Catheter Temperature probe 16 Fr  1 day                Labs studies:   I have personally reviewed pertinent labs  Results from last 7 days  Lab Units 18  1131 18  0439 18  0052  18  0628 18  1154   SODIUM mmol/L 128* 126* 126*  < > 127* 127*   POTASSIUM mmol/L 4 4 4 3 4 3  < > 4 1 4 3   CHLORIDE mmol/L 97* 96* 96*  < > 98* 95*   CO2 mmol/L 19* 18* 20*  < > 20* 19*   ANION GAP mmol/L 12 12 10  < > 9 13   BUN mg/dL 43* 39* 39*  < > 38* 33*   CREATININE mg/dL 4 17* 3 97* 3 96*  < > 3 59* 3 32*   EGFR ml/min/1 73sq m 16 16 17  < > 19 20   GLUCOSE RANDOM mg/dL 165* 126 132  < > 100 125   CALCIUM mg/dL 8 3 8 3 8 1*  < > 7 8* 7 9*   AST U/L  --  35  --   --  36 59*   ALT U/L  --  18  --   --  20 28   ALK PHOS U/L  --  80  --   --  92 122*   TOTAL PROTEIN g/dL  --  6 1*  --   --  5 7* 7 1   BILIRUBIN TOTAL mg/dL  --  5 88*  --   --  3 22* 4 67*   < > = values in this interval not displayed      Results from last 7 days  Lab Units 18  1132 18  0439 18  0470 02/18/18  0628 02/17/18  1155   WBC Thousand/uL  --  5 63  --   --  5 96 7 71   HEMOGLOBIN g/dL 7 2* 7 0* 6 9*  < > 6 6* 8 5*   PLATELETS Thousands/uL  --  72*  --   --  74* 128*   < > = values in this interval not displayed  Results from last 7 days  Lab Units 02/17/18  1929 02/17/18  1829 02/17/18  1336   BLOOD CULTURE  No Growth at 24 hrs  No Growth at 24 hrs   --   --    GRAM STAIN RESULT   --   --  Rare Polys  No bacteria seen   BODY FLUID CULTURE, STERILE   --   --  No growth   INFLUENZA A PCR   --  None Detected  --    INFLUENZA B PCR   --  None Detected  --    RSV PCR   --  None Detected  --        Imaging Studies:   I have personally reviewed pertinent imaging study reports and images in PACS  EKG, Pathology, and Other Studies:   I have personally reviewed pertinent reports

## 2018-02-20 NOTE — PROGRESS NOTES
Progress Note - Critical Care   Tacho Trujilloman 50 y o  male MRN: [id number]  Unit/Bed#: MICU 07 Encounter: [id number]    Attending Physician: Zhou Lund, DO      ______________________________________________________________________  Assessment and Plan:   Principal Problem:    Decompensated hepatic cirrhosis ([facility] [address] )  Active Problems:    Abdominal pain    Ascites    Hepatic encephalopathy (HCC)    PRANAY (acute kidney injury) ([facility] [address] )    Hypoalbuminemia    Hyponatremia    Hypotension    SIRS (systemic inflammatory response syndrome) (HCC)    Hyperammonemia (HCC)    Lactic acidosis    Elevated alkaline phosphatase level    Anemia of chronic disease    Osteomyelitis of toe of right foot (HCC)    Alcohol abuse    Alcoholic hepatitis    Cough    Hypoxia  Resolved Problems:    * No resolved hospital problems  *        Neuro:    -Metabolic encephalopathy: likely a combination of hepatic encephalopathy (last ammonia 104) and uremia  Worsening renal function with Cr of 4 75 from 3 9 yesterday morning  Likely will need HD today    -Less oriented this morning than yesterday morning, but not significantly different overall  MS waxes and wanes  CV:    -Hypotension, resolved  MAPs  in last 24H   -Midodrine 10mg TID   -Documented hx of HTN at home -- Norvasc and Diovan on hold    Pulm:    -No acute issues  Saturating well on room air    -Former smoker, quit 1 year ago per pt    GI:    -Recent dx of alcoholic cirrhosis with recent worsening of ascites s/p paracentesis in the ED on 2/17 with removal of about 1L of fluid  Had some bloody drainage from the site afterwards, which has resolved  Do not suspect SBP  Fluid cx so far are negative    -Per GI, not a candidate for transplant  Last drink was 3 weeks ago  -GI following: appreciate input  Discriminant function >32, so on Medrol 32mg day 3/28; taper after 28 days    -Elevated ammonia: last was 104 from 78   On lactulose and rifaximin with 6 BMs yesterday, so not trending ammonia level  -Hypoalbuminemia: albumin 25% Q 6 H per nephrology  Today is improved at 3 6    -Pneumoperitoneum on Ct, likely due to air tracking from paracentesis  No tx necessary at this time per surgery team  Appreciate input    -Esophageal varix noted on EGD per documentation from [address] 40mg daily   -Zofran ordered prn, none given    :    -PRANAY: nephrology following  Appreciate input  Ddx hepatorenal syndrome vs ATN d/t hypotension     -His hypotension has resolved for the last 48 hours; has also been receiving albumin Q 6 H with further decline in renal function  Cr 3 9 --> 4 75    -MS waxes/wanes; is confused this Am  Coupled with his worsening renal fx, will likely need dialysis today   -On octreotide 100mcg Q 8 H and midodrine 10mg TID per nephrology   -Had suspected UTI, now off abx  See ID      F/E/N:     Fluids: Albumin 25% Q 6 H    Electrolytes: Na 128 (126), K 4 8, Cl 97, bicarb 20, Ca 8 4, phos 6 0, Mg 2 3    Nutrition: low fiber diet    ID:    -Had suspected UTI based on UA (positive nitrite, small leuks)  WBC normal; afebrile  Rocephin discontinued yesterday by Id  Appreciate input    -Chronic osteomyelitis of R great toe: not suspected to be an acute source of infection or driving cause of potential hepatorenal syndrome   -Blood cx (at 24H) and peritoneal fluid cx negative so far    Heme:    -Acute on chronic anemia of chronic disease  Hgb was 6 6 this AM --> transfuse  Check post-transfusion labs   -Platelets stable at 27,587 from 72,000      Endo:    -Medrol tx per nephrology, OK per ID due to no active infection   - this AM     Msk/Skin:    -Reposition to prevent ulcer   -Had consulted PT/OT yesterday, but likely would not be able to participate due to mental status  Will cancel    Disposition: ICU    Code Status: Level 1 - Full Code    Counseling / Coordination of Care  Total Critical Care time spent 40 minutes excluding procedures, teaching and family updates  ______________________________________________________________________    Chief Complaint: None    24 Hour Events: Worsening renal function since yesterday  Continues with waxing/waning mental status  Confused this Am  Oriented x 1 (person)  ______________________________________________________________________    Physical Exam:     Constitutional: Sleeping, wakes easily, but remains very lethargic; mumbles answer    HENT: NCAT, EOM intact    Cv: RRR, no murmur appreciated, but difficult to determine due to phonation  Warm extremities    Pulm: No adventitious breath sounds appreciated, but difficult to determine due to phonation    Gi: Distended, nontender, no drainage from paracentesis site on L abdomen  No redness surrounding the site  Gu: Deferred    Ms: trace edema of the BL feet    Neuro: Lethargic, oriented to person  Knows he is in a hospital but says [facility]  Says "I don't really know" when asked the month and why he came to the hospital  Follows commands  Skin: Swelling and wound of the distal R great toe          ______________________________________________________________________  Vitals:    18 0345 18 0400 18 0500 18 0600   BP: 121/58 142/68 112/75 137/72   Pulse: 100 100 100 98   Resp: 15 18 13 15   Temp: 98 2 °F (36 8 °C)      TempSrc: Oral      SpO2: 97% 97% 97% 95%   Weight:       Height:           Temperature:   Temp (24hrs), Av 3 °F (36 8 °C), Min:98 °F (36 7 °C), Max:98 9 °F (37 2 °C)    Current Temperature: 98 2 °F (36 8 °C)  Weights:   IBW: 73 kg    Body mass index is 30 62 kg/m²    Weight (last 2 days)     Date/Time   Weight    18 0600  96 8 (213 41)            Hemodynamic Monitoring:  N/A     Non-Invasive/Invasive Ventilation Settings:  Respiratory    Lab Data (Last 4 hours)    None         O2/Vent Data (Last 4 hours)    None              No results found for: PHART, EBI4HML, PO2ART, VWW3NAG, J2ERGJNV, BEART, SOURCE  SpO2: SpO2: 97 %  Intake and Outputs:  I/O       02/18 0701 - 02/19 0700 02/19 0701 - 02/20 0700 02/20 0701 - 02/21 0700    P  O  180 360     I V  (mL/kg) 630 (6 5) 300 (3 1)     Blood 280      IV Piggyback 360      Total Intake(mL/kg) 1450 (15) 660 (6 8)     Urine (mL/kg/hr) 131 (0 1) 150 (0 1)     Other 2200 (0 9)      Stool  0 (0)     Total Output 2331 150      Net -881 +510             Unmeasured Stool Occurrence  6 x         UOP: 0 1c/kg/hour   Nutrition:        Diet Orders            Start     Ordered    02/19/18 1528  Diet GI; Lo Fiber/Lo Residue; Fluid Restriction 1500 ML  Diet effective now     Question Answer Comment   Diet Type GI    GI Lo Fiber/Lo Residue    Other Restriction(s): Fluid Restriction 1500 ML    RD to adjust diet per protocol? No        02/19/18 1529        Labs:     Results from last 7 days  Lab Units 02/20/18 0455 02/19/18 2015 02/19/18  1132 02/19/18  0439  02/18/18  0628 02/17/18  1155   WBC Thousand/uL 7 23  --   --  5 63  --  5 96 7 71   HEMOGLOBIN g/dL 6 8* 7 7* 7 2* 7 0*  < > 6 6* 8 5*   HEMATOCRIT % 19 0*  --   --  19 7*  --  18 4* 24 0*   PLATELETS Thousands/uL 82*  --   --  72*  --  74* 128*   NEUTROS PCT % 79*  --   --  63  --   --  61   MONOS PCT % 10  --   --  19*  --   --  18*   < > = values in this interval not displayed      Results from last 7 days  Lab Units 02/20/18  0455 02/19/18 2015 02/19/18  1131 02/19/18  0439  02/18/18  0628   SODIUM mmol/L 128* 127* 128* 126*  < > 127*   POTASSIUM mmol/L 4 8 4 8 4 4 4 3  < > 4 1   CHLORIDE mmol/L 97* 97* 97* 96*  < > 98*   CO2 mmol/L 20* 19* 19* 18*  < > 20*   BUN mg/dL 45* 43* 43* 39*  < > 38*   CREATININE mg/dL 4 75* 4 64* 4 17* 3 97*  < > 3 59*   CALCIUM mg/dL 8 4 8 3 8 3 8 3  < > 7 8*   TOTAL PROTEIN g/dL 6 7  --   --  6 1*  --  5 7*   BILIRUBIN TOTAL mg/dL 3 64*  --   --  5 88*  --  3 22*   ALK PHOS U/L 85  --   --  80  --  92   ALT U/L 20  --   --  18  --  20   AST U/L 31  --   --  35  --  36   GLUCOSE RANDOM mg/dL 145* 180* 165* 126  < > 100   < > = values in this interval not displayed  Results from last 7 days  Lab Units 02/20/18  0455   MAGNESIUM mg/dL 2 3     Lab Results   Component Value Date    PHOS 6 0 (H) 02/20/2018        Results from last 7 days  Lab Units 02/20/18  0455 02/19/18  0439 02/18/18  0044 02/17/18  1217   INR  2 43* 2 36* 2 39* 1 97*   PTT seconds  --   --   --  45*     No results found for: TROPONINI    Results from last 7 days  Lab Units 02/18/18  1400 02/17/18  1553 02/17/18  1217   LACTIC ACID mmol/L 1 9 2 0 3 2*     ABG:  Lab Results   Component Value Date    PHART 7 412 02/18/2018    BHT2KTN 33 4 (L) 02/18/2018    PO2ART 84 0 02/18/2018    UWE1IWR 20 8 (L) 02/18/2018    BEART -3 5 02/18/2018    SOURCE Line, Arterial 02/18/2018     Imaging: None new I have personally reviewed pertinent reports      EKG: None  Micro:  Lab Results   Component Value Date    BLOODCX No Growth at 24 hrs  02/17/2018    BLOODCX No Growth at 24 hrs  02/17/2018     Allergies: No Known Allergies  Medications:   Scheduled Meds:  Current Facility-Administered Medications:  albumin human 25 g Intravenous Q6H Suzanne Red MD Last Rate: 0 g (18/82/02 8794)   folic acid 1 mg Oral Daily Mariama Hurtado DO    influenza vaccine 0 5 mL Intramuscular Prior to discharge Barbara Gorman MD    lactulose 20 g Oral BID Nya Mojica, PA-C    methylprednisolone 32 mg Oral Daily With Breakfast Nya Mojica, PA-C    midodrine 10 mg Oral TID AC Lakia Jones DO    octreotide 100 mcg Intravenous [facility] Mati Glasgow DO    ondansetron 4 mg Intravenous Q8H PRN Mariama Hurtado DO    pantoprazole 40 mg Oral Early Morning Nya Mojica, PA-C    rifaximin 550 mg Oral Q12H [address] Nya Mojica, PA-C    thiamine 100 mg Oral Daily Mariama Hurtado DO      Continuous Infusions:   PRN Meds:    influenza vaccine 0 5 mL Prior to discharge   ondansetron 4 mg Q8H PRN     VTE Pharmacologic Prophylaxis: Pharmacologic VTE Prophylaxis contraindicated due to ABLA  VTE Mechanical Prophylaxis: sequential compression device  Invasive lines and devices: Invasive Devices     Peripheral Intravenous Line            Peripheral IV 02/17/18 Right Antecubital 2 days    Peripheral IV 02/18/18 Left Forearm 1 day          Drain            Urethral Catheter Temperature probe 16 Fr  1 day                     Portions of the record may have been created with voice recognition software  Occasional wrong word or "sound a like" substitutions may have occurred due to the inherent limitations of voice recognition software  Read the chart carefully and recognize, using context, where substitutions have occurred      Eva Galloway PA-C

## 2018-02-20 NOTE — PROGRESS NOTES
Progress Note - Iesha Rowland 50 y o  male MRN: [id number]    Unit/Bed#: MICU 07 Encounter: [id number]         Assessment/ Plan:  Alcoholic cirrhosis  Alcoholic hepatitis     1  Alcoholic cirrhosis - Pt was admitted w/ increasing abd distention  S/p paracentesis 1-2L bloody drainage  No SBP  Pneumoperitoneum after procedure but no pain  MELD 36  Now with possible HRS  On octreotide, midodrine & albumin  Prognosis extremely poor  He was drinking up until a few weeks ago  Not a transplant candidate    -Monitor abd distention - paracentesis as needed  -Follow H&H - Hbg 6 8  -Follow platelets - 82   -Continue octreotide, midodrine, albumin  -Consider palliative care  -6 BM's yesterday - Continue lactulose - titrate 2-3 BM's daily & Xifaxan     2  Alcoholic hepatitis - found to have elevated LFT's on admission  DF 70  Started on methylprednisolone  Improvement in TB today   -Continue steroids      Subjective:   Pt denies any abd pain  Hypotension improved  Objective:     Vitals: Blood pressure 155/79, pulse 104, temperature 97 9 °F (36 6 °C), temperature source Oral, resp  rate 20, height 5' 10" (1 778 m), weight 96 8 kg (213 lb 6 5 oz), SpO2 98 %  ,Body mass index is 30 62 kg/m²  Physical Exam: General appearance: alert and oriented, in no acute distress  Lungs: clear to auscultation bilaterally  Heart: regular rate and rhythm  Abdomen: +BS, soft, NT, distended  Skin: Jaundice     Invasive Devices     Peripheral Intravenous Line            Peripheral IV 02/17/18 Right Antecubital 3 days    Peripheral IV 02/18/18 Left Forearm 2 days          Drain            Urethral Catheter Temperature probe 16 Fr  1 day                Lab, Imaging and other studies: I have personally reviewed pertinent reports       CBC:   Lab Results   Component Value Date    WBC 7 23 02/20/2018    HGB 6 8 (LL) 02/20/2018    HCT 19 0 (L) 02/20/2018    MCV 91 02/20/2018    PLT 82 (L) 02/20/2018    MCH 32 4 02/20/2018    MCHC 35 8 02/20/2018 RDW 23 2 (H) 02/20/2018    MPV 9 9 02/20/2018    NRBC 0 02/20/2018   ,   CMP:   Lab Results   Component Value Date     (L) 02/20/2018    K 4 8 02/20/2018    CL 97 (L) 02/20/2018    CO2 20 (L) 02/20/2018    ANIONGAP 11 02/20/2018    BUN 45 (H) 02/20/2018    CREATININE 4 75 (H) 02/20/2018    GLUCOSE 145 (H) 02/20/2018    CALCIUM 8 4 02/20/2018    AST 31 02/20/2018    ALT 20 02/20/2018    ALKPHOS 85 02/20/2018    PROT 6 7 02/20/2018    BILITOT 3 64 (H) 02/20/2018    EGFR 13 02/20/2018   ,   Lipase: No results found for: LIPASE,  PT/INR:   Lab Results   Component Value Date    INR 2 43 (H) 02/20/2018   ,   Troponin: No results found for: TROPONINI,   Magnesium: No results found for: MAG,   Phosphorous:   Lab Results   Component Value Date    PHOS 6 0 (H) 02/20/2018

## 2018-02-20 NOTE — SOCIAL WORK
CM met with pt and his son Jt Arias at bedside  CM reviewed role with dcp  Pt goes between his sons home and g/fs home  His son Jt Cantrellr lives in a ranch home with 5 TAHMINA  Pt g/f lives in a 2nd floor apartment with flight of stairs  Pt son works full time and g/f works part time  Pt independent with ADLs and ambulation PTA  Pt able to drive  Pt does not work  Pt denies hx of MH or drug abuse  Pt has hx of ETOH abuse and according to pt has been sober since previous admissions  Pharmacy - [facility]  Pt does not have medical insurance  In the event he would need STR at d/c CM to completed MA-51 and PASRR

## 2018-02-20 NOTE — PROGRESS NOTES
NEPHROLOGY PROGRESS NOTE   Barry Claude 50 y o  male MRN: [id number]  Unit/Bed#: MICU 07 Encounter: [id number]      ASSESSMENT & PLAN:  1  Acute kidney injury, given history of decompensated cirrhosis suspicious for hepatorenal syndrome, versus ATN given hypotension  Continue with triple therapy for possible hepatorenal syndrome with albumin 25 g q 6 hours for 48 hours, midodrine and octreotide  Monitor strict ins and outs, follow serial labs, unfortunately his kidney function is not improving  My partner got a consent on Sunday for renal replacement therapy if needed  There is no urgent indication for hemodialysis at this moment  Discussed with critical care team, recommend to arrange family meeting to discuss goals of cares including dialysis  Anticipate he may need dialysis in the next 24 hours  2   Decompensated alcoholic cirrhosis, history of noncompliance  GI on board, as per GI note his prognosis is extremely poor and he is not a transplant candidate  3   Encephalopathy, continue with medical treatment, suspected component of hepatic encephalopathy, noted his ammonia yesterday was 104, continue with lactulose per primary team     4   Hyponatremia seems to be chronic in the setting end-stage liver disease now with a component of acute renal failure inability to excrete free mai, continue with fluid restriction and follow serial labs  5   Lactic acidosis, results  6   Hypotension, continue with volume expansion with albumin, midodrine and octreotide, further inotropics/pressor support as per primary team, goal to keep systolic blood pressure over 110 as possible  7   Anemia, follow H&H, getting blood transfusion now  Discussed with critical care team     SUBJECTIVE:  Patient remains awake and confused, denies any shortness of breath or chest pain, denies any abdominal pain  Minimal urine output in the last 24 hours      OBJECTIVE:  Current Weight: Weight - Scale: 96 8 kg (213 lb 6 5 oz)  Vitals:    02/20/18 0900   BP: 155/79   Pulse: 104   Resp: 20   Temp:    SpO2: 98%       Intake/Output Summary (Last 24 hours) at 02/20/18 9268  Last data filed at 02/20/18 2866   Gross per 24 hour   Intake             1130 ml   Output              160 ml   Net              970 ml     General:  Alert, confused, interactive  Skin:  Warm, no rashes  Eyes:  No jaundice  ENT:  Mucous membranes moist  Neck:  Supple  Chest:  Clear to auscultation anteriorly   CVS:  Regular rate and rhythm, slightly tachycardic  Abdomen:  Positive ascites, nontender, nondistended, normal bowel sounds  Extremities:  Positive bilateral lower extremity edema   Neuro:  Alert, confused  Psych:  Unable to be assessed      Medications:    Current Facility-Administered Medications:     albumin human (FLEXBUMIN) 25 % injection 25 g, 25 g, Intravenous, Q6H, Kiarra Ott MD, Last Rate: 0 mL/hr at 02/19/18 2200, 25 g at 02/70/62 3092    folic acid (FOLVITE) tablet 1 mg, 1 mg, Oral, Daily, Mariama Hurtado DO, 1 mg at 02/20/18 3294    influenza inactivated quadrivalent vaccine (FLULAVAL) IM injection 0 5 mL, 0 5 mL, Intramuscular, Prior to discharge, Jared Manriquez MD    lactulose 20 g/30 mL oral solution 20 g, 20 g, Oral, BID, Shara Luque PA-C, 20 g at 02/20/18 4021    methylPREDNISolone (MEDROL) tablet 32 mg, 32 mg, Oral, Daily With Breakfast, Babak Joyce PA-C, 32 mg at 02/20/18 0743    midodrine (PROAMATINE) tablet 10 mg, 10 mg, Oral, TID AC, Mati Glasgow DO, 10 mg at 02/20/18 0743    octreotide (SandoSTATIN) injection 100 mcg, 100 mcg, Intravenous, Q8H [facility], Mati Glasgow DO, 100 mcg at 02/20/18 0510    ondansetron (ZOFRAN) injection 4 mg, 4 mg, Intravenous, Q8H PRN, Mariama Hurtado DO    pantoprazole (PROTONIX) EC tablet 40 mg, 40 mg, Oral, Early Morning, KARIME Castro-C, 40 mg at 02/20/18 0503    rifaximin (XIFAXAN) tablet 550 mg, 550 mg, Oral, Q12H [facility], [facility] CUCA Luque PA-C, 550 mg at 02/20/18 3894    thiamine (VITAMIN B1) tablet 100 mg, 100 mg, Oral, Daily, Mariama Hurtado DO, 100 mg at 02/20/18 8463    Invasive Devices:   Urethral Catheter Temperature probe 16 Fr  (Active)   Amt returned on insertion(mL) 60 mL 2/18/2018  2:01 PM   Site Assessment Clean;Skin intact 2/19/2018  7:49 AM   Collection Container Standard drainage bag 2/19/2018  7:49 AM   Securement Method Securing device (Describe) 2/19/2018  7:49 AM   Output (mL) 10 mL 2/19/2018  7:49 AM       Lab Results:     Results from last 7 days  Lab Units 02/20/18  0455 02/19/18 2015 02/19/18  1132 02/19/18  1131 02/19/18  0439  02/18/18  0628   WBC Thousand/uL 7 23  --   --   --  5 63  --  5 96   HEMOGLOBIN g/dL 6 8* 7 7* 7 2*  --  7 0*  < > 6 6*   HEMATOCRIT % 19 0*  --   --   --  19 7*  --  18 4*   PLATELETS Thousands/uL 82*  --   --   --  72*  --  74*   SODIUM mmol/L 128* 127*  --  128* 126*  < > 127*   POTASSIUM mmol/L 4 8 4 8  --  4 4 4 3  < > 4 1   CHLORIDE mmol/L 97* 97*  --  97* 96*  < > 98*   CO2 mmol/L 20* 19*  --  19* 18*  < > 20*   BUN mg/dL 45* 43*  --  43* 39*  < > 38*   CREATININE mg/dL 4 75* 4 64*  --  4 17* 3 97*  < > 3 59*   CALCIUM mg/dL 8 4 8 3  --  8 3 8 3  < > 7 8*   MAGNESIUM mg/dL 2 3  --   --   --   --   --   --    PHOSPHORUS mg/dL 6 0*  --   --   --   --   --   --    TOTAL PROTEIN g/dL 6 7  --   --   --  6 1*  --  5 7*   BILIRUBIN TOTAL mg/dL 3 64*  --   --   --  5 88*  --  3 22*   ALK PHOS U/L 85  --   --   --  80  --  92   ALT U/L 20  --   --   --  18  --  20   AST U/L 31  --   --   --  35  --  36   GLUCOSE RANDOM mg/dL 145* 180*  --  165* 126  < > 100   < > = values in this interval not displayed  Previous work up:  Urine sodium 6  Urinalysis 2+ protein, 10-20 RBCs, 4-10 WBCs      Portions of the record may have been created with voice recognition software  Occasional wrong word or "sound a like" substitutions may have occurred due to the inherent limitations of voice recognition software   Read the chart carefully and recognize, using context, where substitutions have occurred  If you have any questions, please contact the dictating provider

## 2018-02-20 NOTE — WOUND OSTOMY CARE
Progress Note - Wound   Antionette Riggs 50 y o  male MRN: [id number]  Unit/Bed#: MICU 07 Encounter: [id number]      Assessment:   Patient is 50year old male who presented with c/o confusion, abdominal pain and wad admitted with decompensated hepatic cirrhosis 2/2 etoh abuse  Patient was seen this morning by wound care for skin assessment and noted sandhya cleft/sacrum with two linear PTW secondary to MASD measuring as documented below with 100% pink base and blanching intact periwound  R great toe with partial thickness wound of unknown etiology, patient cannot elaborate on wounds history, kept saying he will clip his toe nail whenever he feels like clipping his toe nail  mall unstageable to L hip measuring as documented below with thin yellow slough at base and dried skin to periwound  No other wounds seen, patient is confused but able to turn self in bed with direction, has rivera catheter in place and appears continent of bowel  No other skin breakdown seen  Plan:   1-Cleanse L hip wound with NSS, skin prep periwound then cover with hydrocolloid  Change q3d  2-Calazime paste to sacrum, buttocks BID and PRN  3-Paint R great toe wound with betadine daily  4-Soft care cushion when out in chair  5-Moisturize skin daily with skin nourishing cream  6-Elevate heels to offload pressure  7-Hydraguard to b/l heels BID and PRN      Vitals: Blood pressure 155/79, pulse 104, temperature 97 9 °F (36 6 °C), temperature source Oral, resp  rate 20, height 5' 10" (1 778 m), weight 96 8 kg (213 lb 6 5 oz), SpO2 98 %  ,Body mass index is 30 62 kg/m²  Pressure Ulcer 18 Hip Outer;Left      L hip unstageable Pressure injury with dried brown skin to periwoud (Active)   Staging Unstagable 2018 11:00 AM   Wound Description Slough; Yellow 2018 11:00 AM   Mary-wound Assessment Dry 2018 11:00 AM   Shape round irregular 2018  7:25 AM   Wound Length (cm) 1 2 cm 2018 11:00 AM   Wound Width (cm) 0 6 cm 2018 11:00 AM   Calculated Wound Area (cm^2) 0 72 cm^2 2018 11:00 AM   Tunneling 0 cm 2018 11:00 AM   Undermining 0 2018 11:00 AM   Drainage Amount None 2018  7:25 AM   Treatment Irrigation with NSS;Turn & reposition 2018  7:25 AM   Dressing Protective barrier 2018  7:25 AM   Patient Tolerance Tolerated well 2018 11:00 AM       Wound 18 Other (Comment) Toe (Comment  which one) Right      Tip or R great toe with partial thickness wound-  (Active)   Wound Description Beefy red 2018 11:00 AM   Mary-wound Assessment Erythema 2018 11:00 AM   Wound Length (cm) 0 6 cm 2018 11:00 AM   Wound Width (cm) 0 8 cm 2018 11:00 AM   Wound Depth (cm) 0 1 2018 11:00 AM   Calculated Wound Volume (cm^3) 0 05 cm^3 2018 11:00 AM   Non-staged Wound Description Partial thickness 2018 11:00 AM   Patient Tolerance Tolerated well 2018 11:00 AM       Wound 18 Moisture associated skin damage Sacrum Mid      Sacrum/ cleft with linear MASD r/t PTW (Active)   Wound Description Pink 2018 11:00 AM   Mary-wound Assessment Erythema 2018 11:00 AM   Wound Length (cm) 2 7 cm 2018 11:00 AM   Wound Width (cm) 0 2 cm 2018 11:00 AM   Wound Depth (cm) 0 1 2018 11:00 AM   Calculated Wound Volume (cm^3) 0 05 cm^3 2018 11:00 AM   Non-staged Wound Description Partial thickness 2018 11:00 AM   Patient Tolerance Tolerated well 2018 11:00 AM         Our recommendations are placed as orders, please call wound care to ext [phone] with questions or concerns      Maureen Bangura, RN, BSN, [facility]

## 2018-02-20 NOTE — RESTORATIVE TECHNICIAN NOTE
Restorative Specialist Mobility Note       Activity: Ambulate in room, Chair, Dangle, Stand at bedside (Educated/encouraged pt to ambulate with assistance 3-4 x's/day  Chair alarm on  Pt callbell, phone/tray within reach )     Assistive Device: Other (Comment) (HHA x1 Assisted pt to the chair/pt used the bedpan in chair/cleaned pt 2* BM, Rn aware  )                    Range of Motion: All extremities  Anti-Embolism Device On:  Bilateral, Sequential compression devices, below knee       Dominik SIMON, Restorative Technician, [employer]

## 2018-02-20 NOTE — PROGRESS NOTES
Progress Note - ICU Transfer to Step down/med  surg  - Sita Suma 50 y o  male MRN: [id number]    Unit/Bed#: MICU 07 Encounter: [id number]      Code Status: Level 1 - Full Code    Date of ICU admission: 2/18/18    Reason for ICU adm: Hypovolemic shock    Active problems:   Patient Active Problem List   Diagnosis    Decompensated hepatic cirrhosis ([facility] )    Ascites    [facility]    PRANAY (acute kidney injury) ([facility] )    Hypoalbuminemia    Hyponatremia    Hyperammonemia (HCC)    Elevated alkaline phosphatase level    Anemia of chronic disease    Osteomyelitis of toe of right foot ([facility]    Alcohol abuse    Alcoholic hepatitis    Cough    Hypoxia    Oliguria       Summary of clinical course: The patient is a 51 yo male who was recently diagnosed with alcoholic cirrhosis during an admission to [facility] at the end of January  Had been discharged on rifaximin and lactulose, but his son reports that the rifaximin was very expensive and the lactulose caused GI upset so he was not taking either of them  He presented to the [place] [address] ED on 2/17 for worsening abdominal distension, SOB, LE edema, and fatigue, as well as some confusion  Was tachycardic and hypotensive at that time  PRANAY with Cr about 3 (baseline around 0 7 per  records)  Also anemic, hgb 7-8 which is stable from his  admission  LFTs not significantly abnormal, but tbili elevated  Ammonia high at 78  Hyponatremic as expected, which was stable from his records at   Had a paracentesis in the ED that removed about 1 L of fluid  Was given IVF for resuscitation  Was noted to have some peggy blood vs bloody peritoneal fluid -- verbal reports stated this was about 2L of fluid  Had an ostomy bag placed over the site, and it resolved on its own  A CT of the abd/pelvis was obtained out of concern for intraabdominal injury (known esophageal varix on EGD at [facility])   None noted, but there was pneumoperitoneum; general surgery evaluated the patient and felt this was secondary to air tracking from the paracentesis and did not require intervention  Other findings on CT included known cirrhotic liver, gallstones without inflammation, anasarca, compression deformities of T12 and T7, small R pleural effusion and atelectasis  Restarted lactulose and rifaximin for elevated ammonia  There was discussion about whether his PRANAY was related to ATN from hypotension (which was felt to be intravascular depletion from third spacing) vs hepatorenal syndrome  There was no obvious source of infection (chronic osteomyelitis of the R great toe, but ID following and felt to not need abx at this time), so did not suspect septic shock -- blood cx were drawn and are negative so far  Cx of peritoneal fluid also negative thus far  Nephrology has been following the pt  Started octreotide and midodrine  Hgb dropped to < 7 and received a unit of blood  Out of concern for hypotension and worsening renal function, as well as acute on chronic anemia, was transferred to the ICU  Patient was monitored in the unit  His mental status has continued to wax and wane; at worst is oriented only to person  At best, "seems confused" per his son  BP improved with albumin scheduled -- increased frmo Q 6 h to Q 8 H  Did not require pressors  Urine output has been consistently poor -- 0 1cc/kg/hour on average  His creatinine worsened consistently, and there has been discussion of dialysis with nephrology  Still unclear at this point whether this is ATN vs hepatorenal syndrome, but his renal function is such that he will require HD regardless  Still a possibility that this is ATN that has yet to improve with IVF, but also possible that this is hepatorenal syndrome  Based on his Cr today, 4 7, the plan is to consult IR for temporary catheter placement and proceed with dialysis tomorrow      Patient had consented to HD when he was more lucid; his son also confirmed today that he is agreeable to HD if needed  (pt has no specific POA; emergency contact is his son  He is   Son Sheila Kamara is his oldest child)  The patient and his son understand that his prognosis is very poor, and that his renal failure may or may not be directly related to his liver failure, but that regardless, dialysis will not change the prognosis -- only may provide more time for the etiology of his renal failure to manifest itself (ATN vs HRS)  Initially, the understanding was that the patient would not be a candidate for liver transplant as his last drink was only about 3 weeks ago  However, newly today it was discussed between Dr Liam Calle and me that he may be considered by the transplant team at [facility] if family is supportive and can endorse a desire to remain sober  This will be further discussed between GI team and the patient/family  If patient goes for HD tomorrow, can discontinue Mendoza catheter  IR has been consulted for placement of temporary HD catheter  Palliative has been consulted to assist with goals of care  Nephrology, GI, and ID are following the patient  Recent or scheduled procedures:   IR consulted for temporary HD catheter  Plan for HD 2/21    Outstanding/pending diagnostics:       Hospital discharge planning:  PT/OT has not been consulted as of yet because of pt's limited ability to participate based on mental status  Consider re-evaluating this if improvement

## 2018-02-21 ENCOUNTER — APPOINTMENT (INPATIENT)
Dept: DIALYSIS | Facility: HOSPITAL | Age: 53
DRG: 264 | End: 2018-02-21
Payer: COMMERCIAL

## 2018-02-21 ENCOUNTER — APPOINTMENT (INPATIENT)
Dept: RADIOLOGY | Facility: HOSPITAL | Age: 53
DRG: 264 | End: 2018-02-21
Payer: COMMERCIAL

## 2018-02-21 ENCOUNTER — APPOINTMENT (INPATIENT)
Dept: RADIOLOGY | Facility: HOSPITAL | Age: 53
DRG: 264 | End: 2018-02-21
Attending: RADIOLOGY
Payer: COMMERCIAL

## 2018-02-21 ENCOUNTER — APPOINTMENT (INPATIENT)
Dept: RADIOLOGY | Facility: HOSPITAL | Age: 53
DRG: 264 | End: 2018-02-21
Attending: INTERNAL MEDICINE
Payer: COMMERCIAL

## 2018-02-21 LAB
ABO GROUP BLD BPU: NORMAL
ALBUMIN FLD-MCNC: 0.7 G/DL
ALBUMIN SERPL BCP-MCNC: 3.9 G/DL (ref 3.5–5)
ALBUMIN SERPL BCP-MCNC: 4.5 G/DL (ref 3.5–5)
ALP SERPL-CCNC: 94 U/L (ref 46–116)
ALP SERPL-CCNC: 98 U/L (ref 46–116)
ALT SERPL W P-5'-P-CCNC: 25 U/L (ref 12–78)
ALT SERPL W P-5'-P-CCNC: 26 U/L (ref 12–78)
AMMONIA PLAS-SCNC: 58 UMOL/L (ref 11–35)
ANION GAP SERPL CALCULATED.3IONS-SCNC: 12 MMOL/L (ref 4–13)
ANION GAP SERPL CALCULATED.3IONS-SCNC: 12 MMOL/L (ref 4–13)
ANISOCYTOSIS BLD QL SMEAR: PRESENT
ARTERIAL PATENCY WRIST A: YES
AST SERPL W P-5'-P-CCNC: 37 U/L (ref 5–45)
AST SERPL W P-5'-P-CCNC: 38 U/L (ref 5–45)
BASE EXCESS BLDA CALC-SCNC: -6 MMOL/L (ref -2–3)
BASE EXCESS BLDA CALC-SCNC: -7.9 MMOL/L
BASOPHILS # BLD MANUAL: 0 THOUSAND/UL (ref 0–0.1)
BASOPHILS NFR MAR MANUAL: 0 % (ref 0–1)
BILIRUB DIRECT SERPL-MCNC: 2.42 MG/DL (ref 0–0.2)
BILIRUB SERPL-MCNC: 4.99 MG/DL (ref 0.2–1)
BILIRUB SERPL-MCNC: 5.57 MG/DL (ref 0.2–1)
BPU ID: NORMAL
BUN SERPL-MCNC: 43 MG/DL (ref 5–25)
BUN SERPL-MCNC: 56 MG/DL (ref 5–25)
BURR CELLS BLD QL SMEAR: PRESENT
CA-I BLD-SCNC: 1.18 MMOL/L (ref 1.12–1.32)
CALCIUM SERPL-MCNC: 8.3 MG/DL (ref 8.3–10.1)
CALCIUM SERPL-MCNC: 8.9 MG/DL (ref 8.3–10.1)
CHLORIDE SERPL-SCNC: 95 MMOL/L (ref 100–108)
CHLORIDE SERPL-SCNC: 95 MMOL/L (ref 100–108)
CO2 SERPL-SCNC: 19 MMOL/L (ref 21–32)
CO2 SERPL-SCNC: 22 MMOL/L (ref 21–32)
CREAT SERPL-MCNC: 4.5 MG/DL (ref 0.6–1.3)
CREAT SERPL-MCNC: 5.4 MG/DL (ref 0.6–1.3)
CROSSMATCH: NORMAL
EOSINOPHIL # BLD MANUAL: 0 THOUSAND/UL (ref 0–0.4)
EOSINOPHIL NFR BLD MANUAL: 0 % (ref 0–6)
ERYTHROCYTE [DISTWIDTH] IN BLOOD BY AUTOMATED COUNT: 22.2 % (ref 11.6–15.1)
ERYTHROCYTE [DISTWIDTH] IN BLOOD BY AUTOMATED COUNT: 22.6 % (ref 11.6–15.1)
FIO2 GAS DIL.REBREATH: 44 L
GFR SERPL CREATININE-BSD FRML MDRD: 11 ML/MIN/1.73SQ M
GFR SERPL CREATININE-BSD FRML MDRD: 14 ML/MIN/1.73SQ M
GLUCOSE SERPL-MCNC: 139 MG/DL (ref 65–140)
GLUCOSE SERPL-MCNC: 151 MG/DL (ref 65–140)
GLUCOSE SERPL-MCNC: 158 MG/DL (ref 65–140)
GLUCOSE SERPL-MCNC: 163 MG/DL (ref 65–140)
HBV CORE AB SER QL: NORMAL
HBV CORE IGM SER QL: NORMAL
HBV SURFACE AB SER-ACNC: <3.1 MIU/ML
HBV SURFACE AG SER QL: NORMAL
HCO3 BLDA-SCNC: 19 MMOL/L (ref 22–28)
HCO3 BLDA-SCNC: 20.7 MMOL/L (ref 22–28)
HCT VFR BLD AUTO: 21.2 % (ref 36.5–49.3)
HCT VFR BLD AUTO: 22 % (ref 36.5–49.3)
HCT VFR BLD CALC: 23 % (ref 36.5–49.3)
HCV AB SER QL: NORMAL
HGB BLD-MCNC: 7.4 G/DL (ref 12–17)
HGB BLD-MCNC: 7.7 G/DL (ref 12–17)
HGB BLDA-MCNC: 7.8 G/DL (ref 12–17)
HISTIOCYTES NFR FLD: 47 %
INR PPP: 2.53 (ref 0.86–1.16)
INR PPP: 2.7 (ref 0.86–1.16)
LACTATE SERPL-SCNC: 3.7 MMOL/L (ref 0.5–2)
LACTATE SERPL-SCNC: 3.7 MMOL/L (ref 0.5–2)
LDH FLD L TO P-CCNC: 46 U/L
LYMPHOCYTES # BLD AUTO: 0.63 THOUSAND/UL (ref 0.6–4.47)
LYMPHOCYTES # BLD AUTO: 4 % (ref 14–44)
LYMPHOCYTES NFR BLD AUTO: 6 %
MAGNESIUM SERPL-MCNC: 2.5 MG/DL (ref 1.6–2.6)
MCH RBC QN AUTO: 32.2 PG (ref 26.8–34.3)
MCH RBC QN AUTO: 32.4 PG (ref 26.8–34.3)
MCHC RBC AUTO-ENTMCNC: 34.9 G/DL (ref 31.4–37.4)
MCHC RBC AUTO-ENTMCNC: 35 G/DL (ref 31.4–37.4)
MCV RBC AUTO: 92 FL (ref 82–98)
MCV RBC AUTO: 92 FL (ref 82–98)
MONO+MESO NFR FLD MANUAL: 1 %
MONOCYTES # BLD AUTO: 1.42 THOUSAND/UL (ref 0–1.22)
MONOCYTES NFR BLD AUTO: 9 %
MONOCYTES NFR BLD: 9 % (ref 4–12)
NASAL CANNULA: 6
NEUTROPHILS # BLD MANUAL: 13.74 THOUSAND/UL (ref 1.85–7.62)
NEUTS SEG NFR BLD AUTO: 37 %
NEUTS SEG NFR BLD AUTO: 87 % (ref 43–75)
NRBC BLD AUTO-RTO: 0 /100 WBCS
O2 CT BLDA-SCNC: 10.4 ML/DL (ref 16–23)
OXYHGB MFR BLDA: 92.5 % (ref 94–97)
PCO2 BLD: 22 MMOL/L (ref 21–32)
PCO2 BLD: 42.9 MM HG (ref 36–44)
PCO2 BLDA: 45.5 MM HG (ref 36–44)
PH BLD: 7.29 [PH] (ref 7.35–7.45)
PH BLDA: 7.24 [PH] (ref 7.35–7.45)
PHOSPHATE SERPL-MCNC: 8 MG/DL (ref 2.7–4.5)
PLATELET # BLD AUTO: 104 THOUSANDS/UL (ref 149–390)
PLATELET # BLD AUTO: 67 THOUSANDS/UL (ref 149–390)
PLATELET BLD QL SMEAR: ABNORMAL
PMV BLD AUTO: 8.7 FL (ref 8.9–12.7)
PMV BLD AUTO: 9.5 FL (ref 8.9–12.7)
PO2 BLD: 67 MM HG (ref 75–129)
PO2 BLDA: 78.5 MM HG (ref 75–129)
POIKILOCYTOSIS BLD QL SMEAR: PRESENT
POTASSIUM BLD-SCNC: 4.5 MMOL/L (ref 3.5–5.3)
POTASSIUM SERPL-SCNC: 4.4 MMOL/L (ref 3.5–5.3)
POTASSIUM SERPL-SCNC: 4.8 MMOL/L (ref 3.5–5.3)
PROT FLD-MCNC: <2 G/DL
PROT SERPL-MCNC: 7 G/DL (ref 6.4–8.2)
PROT SERPL-MCNC: 7.7 G/DL (ref 6.4–8.2)
PROTHROMBIN TIME: 27.6 SECONDS (ref 12.1–14.4)
PROTHROMBIN TIME: 29 SECONDS (ref 12.1–14.4)
RBC # BLD AUTO: 2.3 MILLION/UL (ref 3.88–5.62)
RBC # BLD AUTO: 2.38 MILLION/UL (ref 3.88–5.62)
RBC MORPH BLD: PRESENT
SAO2 % BLD FROM PO2: 91 % (ref 95–98)
SODIUM BLD-SCNC: 129 MMOL/L (ref 136–145)
SODIUM SERPL-SCNC: 126 MMOL/L (ref 136–145)
SODIUM SERPL-SCNC: 129 MMOL/L (ref 136–145)
SPECIMEN SOURCE: ABNORMAL
SPECIMEN SOURCE: ABNORMAL
TOTAL CELLS COUNTED SPEC: 100
UNIT DISPENSE STATUS: NORMAL
UNIT PRODUCT CODE: NORMAL
UNIT RH: NORMAL
WBC # BLD AUTO: 13.35 THOUSAND/UL (ref 4.31–10.16)
WBC # BLD AUTO: 15.79 THOUSAND/UL (ref 4.31–10.16)
WBC # FLD MANUAL: 211 /UL

## 2018-02-21 PROCEDURE — 94760 N-INVAS EAR/PLS OXIMETRY 1: CPT

## 2018-02-21 PROCEDURE — 82947 ASSAY GLUCOSE BLOOD QUANT: CPT

## 2018-02-21 PROCEDURE — 85014 HEMATOCRIT: CPT

## 2018-02-21 PROCEDURE — 49083 ABD PARACENTESIS W/IMAGING: CPT

## 2018-02-21 PROCEDURE — 99233 SBSQ HOSP IP/OBS HIGH 50: CPT | Performed by: INTERNAL MEDICINE

## 2018-02-21 PROCEDURE — 70450 CT HEAD/BRAIN W/O DYE: CPT

## 2018-02-21 PROCEDURE — 0W9G3ZZ DRAINAGE OF PERITONEAL CAVITY, PERCUTANEOUS APPROACH: ICD-10-PCS | Performed by: RADIOLOGY

## 2018-02-21 PROCEDURE — 71045 X-RAY EXAM CHEST 1 VIEW: CPT

## 2018-02-21 PROCEDURE — 76937 US GUIDE VASCULAR ACCESS: CPT | Performed by: RADIOLOGY

## 2018-02-21 PROCEDURE — 83615 LACTATE (LD) (LDH) ENZYME: CPT | Performed by: INTERNAL MEDICINE

## 2018-02-21 PROCEDURE — 84157 ASSAY OF PROTEIN OTHER: CPT | Performed by: INTERNAL MEDICINE

## 2018-02-21 PROCEDURE — 94640 AIRWAY INHALATION TREATMENT: CPT

## 2018-02-21 PROCEDURE — 85027 COMPLETE CBC AUTOMATED: CPT | Performed by: INTERNAL MEDICINE

## 2018-02-21 PROCEDURE — 80076 HEPATIC FUNCTION PANEL: CPT | Performed by: PHYSICIAN ASSISTANT

## 2018-02-21 PROCEDURE — 83735 ASSAY OF MAGNESIUM: CPT | Performed by: PHYSICIAN ASSISTANT

## 2018-02-21 PROCEDURE — 76937 US GUIDE VASCULAR ACCESS: CPT

## 2018-02-21 PROCEDURE — 86704 HEP B CORE ANTIBODY TOTAL: CPT | Performed by: INTERNAL MEDICINE

## 2018-02-21 PROCEDURE — 82805 BLOOD GASES W/O2 SATURATION: CPT | Performed by: PHYSICIAN ASSISTANT

## 2018-02-21 PROCEDURE — 88112 CYTOPATH CELL ENHANCE TECH: CPT | Performed by: INTERNAL MEDICINE

## 2018-02-21 PROCEDURE — 87340 HEPATITIS B SURFACE AG IA: CPT | Performed by: INTERNAL MEDICINE

## 2018-02-21 PROCEDURE — 87070 CULTURE OTHR SPECIMN AEROBIC: CPT | Performed by: INTERNAL MEDICINE

## 2018-02-21 PROCEDURE — 85610 PROTHROMBIN TIME: CPT | Performed by: INTERNAL MEDICINE

## 2018-02-21 PROCEDURE — 36556 INSERT NON-TUNNEL CV CATH: CPT | Performed by: RADIOLOGY

## 2018-02-21 PROCEDURE — 84295 ASSAY OF SERUM SODIUM: CPT

## 2018-02-21 PROCEDURE — 82042 OTHER SOURCE ALBUMIN QUAN EA: CPT | Performed by: INTERNAL MEDICINE

## 2018-02-21 PROCEDURE — 80053 COMPREHEN METABOLIC PANEL: CPT | Performed by: PHYSICIAN ASSISTANT

## 2018-02-21 PROCEDURE — 85027 COMPLETE CBC AUTOMATED: CPT | Performed by: PHYSICIAN ASSISTANT

## 2018-02-21 PROCEDURE — 86706 HEP B SURFACE ANTIBODY: CPT | Performed by: INTERNAL MEDICINE

## 2018-02-21 PROCEDURE — 82803 BLOOD GASES ANY COMBINATION: CPT

## 2018-02-21 PROCEDURE — 85610 PROTHROMBIN TIME: CPT | Performed by: PHYSICIAN ASSISTANT

## 2018-02-21 PROCEDURE — 94762 N-INVAS EAR/PLS OXIMTRY CONT: CPT

## 2018-02-21 PROCEDURE — 90935 HEMODIALYSIS ONE EVALUATION: CPT | Performed by: INTERNAL MEDICINE

## 2018-02-21 PROCEDURE — 5A1D70Z PERFORMANCE OF URINARY FILTRATION, INTERMITTENT, LESS THAN 6 HOURS PER DAY: ICD-10-PCS | Performed by: INTERNAL MEDICINE

## 2018-02-21 PROCEDURE — 88305 TISSUE EXAM BY PATHOLOGIST: CPT | Performed by: PATHOLOGY

## 2018-02-21 PROCEDURE — 83605 ASSAY OF LACTIC ACID: CPT | Performed by: PHYSICIAN ASSISTANT

## 2018-02-21 PROCEDURE — 82948 REAGENT STRIP/BLOOD GLUCOSE: CPT

## 2018-02-21 PROCEDURE — 87040 BLOOD CULTURE FOR BACTERIA: CPT | Performed by: PHYSICIAN ASSISTANT

## 2018-02-21 PROCEDURE — 49083 ABD PARACENTESIS W/IMAGING: CPT | Performed by: RADIOLOGY

## 2018-02-21 PROCEDURE — 88305 TISSUE EXAM BY PATHOLOGIST: CPT | Performed by: INTERNAL MEDICINE

## 2018-02-21 PROCEDURE — 36600 WITHDRAWAL OF ARTERIAL BLOOD: CPT

## 2018-02-21 PROCEDURE — 89051 BODY FLUID CELL COUNT: CPT | Performed by: INTERNAL MEDICINE

## 2018-02-21 PROCEDURE — 84100 ASSAY OF PHOSPHORUS: CPT | Performed by: PHYSICIAN ASSISTANT

## 2018-02-21 PROCEDURE — C1752 CATH,HEMODIALYSIS,SHORT-TERM: HCPCS

## 2018-02-21 PROCEDURE — 86705 HEP B CORE ANTIBODY IGM: CPT | Performed by: INTERNAL MEDICINE

## 2018-02-21 PROCEDURE — 99232 SBSQ HOSP IP/OBS MODERATE 35: CPT | Performed by: INTERNAL MEDICINE

## 2018-02-21 PROCEDURE — 02HV33Z INSERTION OF INFUSION DEVICE INTO SUPERIOR VENA CAVA, PERCUTANEOUS APPROACH: ICD-10-PCS | Performed by: RADIOLOGY

## 2018-02-21 PROCEDURE — 84132 ASSAY OF SERUM POTASSIUM: CPT

## 2018-02-21 PROCEDURE — 36556 INSERT NON-TUNNEL CV CATH: CPT

## 2018-02-21 PROCEDURE — 87205 SMEAR GRAM STAIN: CPT | Performed by: INTERNAL MEDICINE

## 2018-02-21 PROCEDURE — 82140 ASSAY OF AMMONIA: CPT | Performed by: PHYSICIAN ASSISTANT

## 2018-02-21 PROCEDURE — 80048 BASIC METABOLIC PNL TOTAL CA: CPT | Performed by: PHYSICIAN ASSISTANT

## 2018-02-21 PROCEDURE — C1894 INTRO/SHEATH, NON-LASER: HCPCS

## 2018-02-21 PROCEDURE — 88112 CYTOPATH CELL ENHANCE TECH: CPT | Performed by: PATHOLOGY

## 2018-02-21 PROCEDURE — 86803 HEPATITIS C AB TEST: CPT | Performed by: INTERNAL MEDICINE

## 2018-02-21 PROCEDURE — 85007 BL SMEAR W/DIFF WBC COUNT: CPT | Performed by: INTERNAL MEDICINE

## 2018-02-21 PROCEDURE — 82330 ASSAY OF CALCIUM: CPT

## 2018-02-21 RX ORDER — MIDODRINE HYDROCHLORIDE 5 MG/1
5 TABLET ORAL
Status: DISCONTINUED | OUTPATIENT
Start: 2018-02-21 | End: 2018-03-02

## 2018-02-21 RX ORDER — CHLORHEXIDINE GLUCONATE 0.12 MG/ML
15 RINSE ORAL EVERY 12 HOURS SCHEDULED
Status: DISCONTINUED | OUTPATIENT
Start: 2018-02-21 | End: 2018-02-21

## 2018-02-21 RX ORDER — LEVALBUTEROL 1.25 MG/.5ML
SOLUTION, CONCENTRATE RESPIRATORY (INHALATION)
Status: COMPLETED
Start: 2018-02-21 | End: 2018-02-21

## 2018-02-21 RX ORDER — LEVALBUTEROL 1.25 MG/.5ML
1.25 SOLUTION, CONCENTRATE RESPIRATORY (INHALATION) EVERY 8 HOURS PRN
Status: DISCONTINUED | OUTPATIENT
Start: 2018-02-21 | End: 2018-02-22

## 2018-02-21 RX ORDER — ALBUMIN (HUMAN) 12.5 G/50ML
25 SOLUTION INTRAVENOUS 4 TIMES DAILY
Status: DISCONTINUED | OUTPATIENT
Start: 2018-02-21 | End: 2018-02-22

## 2018-02-21 RX ORDER — SODIUM CHLORIDE FOR INHALATION 0.9 %
3 VIAL, NEBULIZER (ML) INHALATION ONCE
Status: COMPLETED | OUTPATIENT
Start: 2018-02-21 | End: 2018-02-22

## 2018-02-21 RX ORDER — SODIUM CHLORIDE FOR INHALATION 0.9 %
VIAL, NEBULIZER (ML) INHALATION
Status: COMPLETED
Start: 2018-02-21 | End: 2018-02-21

## 2018-02-21 RX ORDER — LACTULOSE 20 G/30ML
20 SOLUTION ORAL 3 TIMES DAILY
Status: DISCONTINUED | OUTPATIENT
Start: 2018-02-21 | End: 2018-02-24

## 2018-02-21 RX ADMIN — OCTREOTIDE ACETATE 100 MCG: 100 INJECTION, SOLUTION INTRAVENOUS; SUBCUTANEOUS at 23:35

## 2018-02-21 RX ADMIN — METHYLPREDNISOLONE 32 MG: 16 TABLET ORAL at 09:54

## 2018-02-21 RX ADMIN — Medication 100 MG: at 08:29

## 2018-02-21 RX ADMIN — LACTULOSE 20 G: 20 SOLUTION ORAL at 19:44

## 2018-02-21 RX ADMIN — ALBUMIN HUMAN 25 G: 0.25 SOLUTION INTRAVENOUS at 03:52

## 2018-02-21 RX ADMIN — ISODIUM CHLORIDE: 0.03 SOLUTION RESPIRATORY (INHALATION) at 17:48

## 2018-02-21 RX ADMIN — SODIUM CHLORIDE 500 ML: 0.9 INJECTION, SOLUTION INTRAVENOUS at 19:51

## 2018-02-21 RX ADMIN — PANTOPRAZOLE SODIUM 40 MG: 40 TABLET, DELAYED RELEASE ORAL at 05:06

## 2018-02-21 RX ADMIN — RIFAXIMIN 550 MG: 550 TABLET ORAL at 08:29

## 2018-02-21 RX ADMIN — LEVALBUTEROL HYDROCHLORIDE: 1.25 SOLUTION, CONCENTRATE RESPIRATORY (INHALATION) at 17:48

## 2018-02-21 RX ADMIN — VANCOMYCIN HYDROCHLORIDE 1500 MG: 10 INJECTION, POWDER, LYOPHILIZED, FOR SOLUTION INTRAVENOUS at 19:44

## 2018-02-21 RX ADMIN — OCTREOTIDE ACETATE 100 MCG: 100 INJECTION, SOLUTION INTRAVENOUS; SUBCUTANEOUS at 05:08

## 2018-02-21 RX ADMIN — OCTREOTIDE ACETATE 100 MCG: 100 INJECTION, SOLUTION INTRAVENOUS; SUBCUTANEOUS at 15:11

## 2018-02-21 RX ADMIN — LACTULOSE 20 G: 20 SOLUTION ORAL at 08:33

## 2018-02-21 RX ADMIN — FOLIC ACID 1 MG: 1 TABLET ORAL at 08:29

## 2018-02-21 RX ADMIN — HEPARIN SODIUM 5000 UNITS: 5000 INJECTION, SOLUTION INTRAVENOUS; SUBCUTANEOUS at 05:06

## 2018-02-21 RX ADMIN — RIFAXIMIN 550 MG: 550 TABLET ORAL at 22:05

## 2018-02-21 RX ADMIN — ALBUMIN HUMAN 25 G: 0.25 SOLUTION INTRAVENOUS at 18:54

## 2018-02-21 RX ADMIN — ALBUMIN HUMAN 25 G: 0.25 SOLUTION INTRAVENOUS at 22:05

## 2018-02-21 RX ADMIN — MIDODRINE HYDROCHLORIDE 10 MG: 5 TABLET ORAL at 07:59

## 2018-02-21 RX ADMIN — HEPARIN SODIUM 5000 UNITS: 5000 INJECTION, SOLUTION INTRAVENOUS; SUBCUTANEOUS at 15:11

## 2018-02-21 RX ADMIN — PIPERACILLIN SODIUM,TAZOBACTAM SODIUM 2.25 G: 2; .25 INJECTION, POWDER, FOR SOLUTION INTRAVENOUS at 18:58

## 2018-02-21 NOTE — PROGRESS NOTES
Progress Note - Podiatry  Lg Foster 50 y o  male MRN: [id number]  [facility] Encounter: [id number]    Assessment/Plan:  3  65-year-old male ulcer to the distal right hallux with chronic osteomyelitis    -patient does not need antibiotics at this time per Infectious Disease as there are no acute signs of infection  -As patient is refusing the toe amputation, Podiatry will continue local wound care while in-house with Betadine paint  -podiatry will see  qweekly and have nursing apply Betadine paint Tuesday Thursday Saturday    Subjective/Objective   Chief Complaint:   Chief Complaint   Patient presents with    Ascites     Pt "I need something for my belly  I got tapped for the first time two weeks ago  And its all hard and big again " Roomate "He is suppose to be on liver medication but its too expensive and the doctor wont prescribe anything" Pt c/o SOB "sometimes"        Subjective: 50 y o  y/o male was seen and evaluated at bedside  Blood pressure 149/72, pulse 105, temperature 98 °F (36 7 °C), temperature source Oral, resp  rate 18, height 5' 10" (1 778 m), weight 96 8 kg (213 lb 6 5 oz), SpO2 92 %  ,Body mass index is 30 62 kg/m²  Invasive Devices     Peripheral Intravenous Line            Peripheral IV 02/21/18 Right;Ventral (anterior) Forearm less than 1 day          Drain            Urethral Catheter Temperature probe 16 Fr  2 days                Physical Exam:   General: Alert, cooperative and no distress  Lungs: Non labored breathing  Heart: Positive S1, S2  Abdomen: Soft, non-tender  Extremity:       Neurovascular status and gross motor function at baseline    Right hallux continues to be dry gangrene without clinical signs of infection    Lab, Imaging and other studies:   CBC:   Lab Results   Component Value Date    WBC 13 35 (H) 02/21/2018    HGB 7 4 (L) 02/21/2018    HCT 21 2 (L) 02/21/2018    MCV 92 02/21/2018     (L) 02/21/2018    MCH 32 2 02/21/2018    MCHC 34 9 02/21/2018    RDW 22 2 (H) 02/21/2018    MPV 9 5 02/21/2018       Imaging: I have personally reviewed pertinent films in PACS  EKG, Pathology, and Other Studies: I have personally reviewed pertinent reports

## 2018-02-21 NOTE — PROGRESS NOTES
IM Residency Progress Note   Unit/Bed#: [facility] 808-01 Encounter: [id number]  SOD Team C       Ada Cure 50 y o  male [id number]    Hospital Stay Days: 4      Assessment/Plan:    Principal Problem:    Decompensated hepatic cirrhosis ([facility] )  Active Problems:    Ascites    Hepatic encephalopathy (HCC)    PRANAY (acute kidney injury) ([facility] )    Hypoalbuminemia    Hyponatremia    Hyperammonemia (HCC)    Elevated alkaline phosphatase level    Anemia of chronic disease    Osteomyelitis of toe of right foot (HCC)    Alcohol abuse    Alcoholic hepatitis    Cough    Hypoxia    Oliguria    Hypoxemic respiratory failure  ? Likely component of atelectasis as noted on imaging  ? Large, tense ascites likely contributing  ? Will reassess for paracentesis after consideration of kidney function and possible HRS  ? Continue NC oxygen with goal SaO2>90%    Decompensated alcoholic cirrhosis:   ? MELD-NA score 36 on 2/20/18  ? Continue low salt diet, fluid restriction   ? Continue lactulose, rifaximin  ? Patient not initially not thought to be liver transplant as his last drink was 3 weeks ago, however per transfer note with CCU team and Dr Ross Bess patient may be considered per TP team at [place] if voices desire to stay sober   ? Prognosis extremely poor, per palliative consult patient is treatment-focused  When seen by palliative in PM patient noted to be encephalopathic, unable to approprietly answer questions, default POA adult children       Hepatic encephalopathy - currently stage 2  ? Lactulose 20mg PO TID, titrate to 3BM daily   ? 1-2 BM per day currently charted, which is below goal      Alcoholic hepatitis  ? Shashi discriminant function above 32 on admission  ? Plan to continue solumedrol 32mg PO QD for 28 days   ? Mortality benefit questionable after 7 days  If continued issues with edema will consider tapering steroid      PRANAY, ATN v HRS with oliguria:   ?  SBP increased greater than 30 on regimen with hypertension at this time, will decrease midodrine to 5mg TID  ? Continue ocreotide  ? Trend creatinine  ? Monitor urine output  ? IR consult for temporary catheter placement      Normocytic Anemia:  ? S/p 1 units , 2u FFP and vitamin K after acute bleed  Additional unitpRBC  given  in Am  ? Post-transfusion H/H stable   ? Continue to monitor for signs of bleeding      Hyponatremia  ? In setting of cirrhosis  ? Continue to monitor      Right toe gangrene ulcer and osteomyelitis: ID consulted, no evidence of cellulitis clinically  No fevers/no leukocytosis, no antibiotics necessary  Recommended per ID to have toe amputated to avoid development wet gangrene, patient refused  Pneumoperitoneum: After paracentesis had more air than expected; evaluated by surgery, OK for diet as tolerated, no plans for surgical intervention  Disposition: Placement of dialysis catheter, dialysis, decrease midodrine, increase lactulose  Subjective:   Patient is somnolent this morning and per nursing intermittently confused  Currently he denies pain, fever or chills  He denies difficulty breathing  Vitals: Temp (24hrs), Av 9 °F (36 6 °C), Min:97 8 °F (36 6 °C), Max:98 °F (36 7 °C)  Current: Temperature: 98 °F (36 7 °C)  Vitals:    18 1855 18 2300 18 0300 18 0700   BP: 135/78 131/75  149/72   BP Location: Right arm Right arm  Right arm   Pulse: 102 98  105   Resp: 16 20  18   Temp: 97 9 °F (36 6 °C) 97 9 °F (36 6 °C)  98 °F (36 7 °C)   TempSrc: Oral Oral  Oral   SpO2: 90% 90% 92% 92%   Weight:       Height:        Body mass index is 30 62 kg/m²  I/O last 24 hours: In: 18 [P O :120; I V :100; Blood:350]  Out: 230 [Urine:230]      Physical Exam:    Gen - Lying on side in bed, appears in mild discomfort with mild respiratory distress  HEENT - Icteric, membranes mildly dry  CV - Rapid, appearently regular rhythm  1+ DP bilaterally  Pulm - Poor effort with no obvious wheezing    Upper respiratory breath sounds  Abd - Distended, tense  Ostomy bag over abdomen with drainage    Extr - Edema up legs bilaterally    Invasive Devices     Peripheral Intravenous Line            Peripheral IV 02/21/18 Right;Ventral (anterior) Forearm less than 1 day          Drain            Urethral Catheter Temperature probe 16 Fr  2 days                      Labs:   Recent Results (from the past 24 hour(s))   CBC    Collection Time: 02/20/18 11:59 AM   Result Value Ref Range    WBC 8 89 4 31 - 10 16 Thousand/uL    RBC 2 43 (L) 3 88 - 5 62 Million/uL    Hemoglobin 8 0 (L) 12 0 - 17 0 g/dL    Hematocrit 21 8 (L) 36 5 - 49 3 %    MCV 90 82 - 98 fL    MCH 32 9 26 8 - 34 3 pg    MCHC 36 7 31 4 - 37 4 g/dL    RDW 21 6 (H) 11 6 - 15 1 %    Platelets 78 (L) 227 - 390 Thousands/uL    MPV 8 9 8 9 - 12 7 fL   Basic metabolic panel    Collection Time: 02/20/18 11:59 AM   Result Value Ref Range    Sodium 127 (L) 136 - 145 mmol/L    Potassium 4 9 3 5 - 5 3 mmol/L    Chloride 96 (L) 100 - 108 mmol/L    CO2 20 (L) 21 - 32 mmol/L    Anion Gap 11 4 - 13 mmol/L    BUN 47 (H) 5 - 25 mg/dL    Creatinine 4 96 (H) 0 60 - 1 30 mg/dL    Glucose 152 (H) 65 - 140 mg/dL    Calcium 8 6 8 3 - 10 1 mg/dL    eGFR 13 ml/min/1 73sq m   Comprehensive metabolic panel    Collection Time: 02/21/18  5:33 AM   Result Value Ref Range    Sodium 126 (L) 136 - 145 mmol/L    Potassium 4 8 3 5 - 5 3 mmol/L    Chloride 95 (L) 100 - 108 mmol/L    CO2 19 (L) 21 - 32 mmol/L    Anion Gap 12 4 - 13 mmol/L    BUN 56 (H) 5 - 25 mg/dL    Creatinine 5 40 (H) 0 60 - 1 30 mg/dL    Glucose 139 65 - 140 mg/dL    Calcium 8 9 8 3 - 10 1 mg/dL    AST 37 5 - 45 U/L    ALT 25 12 - 78 U/L    Alkaline Phosphatase 98 46 - 116 U/L    Total Protein 7 7 6 4 - 8 2 g/dL    Albumin 4 5 3 5 - 5 0 g/dL    Total Bilirubin 5 57 (H) 0 20 - 1 00 mg/dL    eGFR 11 ml/min/1 73sq m   Magnesium    Collection Time: 02/21/18  5:33 AM   Result Value Ref Range    Magnesium 2 5 1 6 - 2 6 mg/dL   Phosphorus Collection Time: 02/21/18  5:33 AM   Result Value Ref Range    Phosphorus 8 0 (H) 2 7 - 4 5 mg/dL   Protime-INR    Collection Time: 02/21/18  5:33 AM   Result Value Ref Range    Protime 27 6 (H) 12 1 - 14 4 seconds    INR 2 53 (H) 0 86 - 1 16   CBC    Collection Time: 02/21/18  5:33 AM   Result Value Ref Range    WBC 13 35 (H) 4 31 - 10 16 Thousand/uL    RBC 2 30 (L) 3 88 - 5 62 Million/uL    Hemoglobin 7 4 (L) 12 0 - 17 0 g/dL    Hematocrit 21 2 (L) 36 5 - 49 3 %    MCV 92 82 - 98 fL    MCH 32 2 26 8 - 34 3 pg    MCHC 34 9 31 4 - 37 4 g/dL    RDW 22 2 (H) 11 6 - 15 1 %    Platelets 469 (L) 118 - 390 Thousands/uL    MPV 9 5 8 9 - 12 7 fL   Prepare RBC:Has consent been obtained? Yes, 1 Units    Collection Time: 02/21/18  5:55 AM   Result Value Ref Range    Unit Product Code W6533T00     Unit Number [id number]     Unit ABO O     Unit RH NEG     Crossmatch Compatible     Unit Dispense Status Presumed Trans        Radiology Results: I have personally reviewed pertinent reports  Ct Abdomen Pelvis Wo Contrast    Result Date: 2/18/2018  Impression: 1  Cirrhosis with stigmata of portal hypertension  2   Large volume of abdominopelvic ascites  There is a layering hematocrit level in the dependent pelvis  However, with the attenuation of the dependent component measuring only 15 Hounsfield units, this is most likely small volume of blood products rather than large volume hemoperitoneum  3   Postprocedural pneumoperitoneum  Needle tract in the left mid abdomen is in the vicinity of a large body wall collateral  4   Small right pleural effusion and right base atelectasis  5   Anterior compression deformities of T7 and T12  I personally discussed this study with Bernie Russell on 2/18/2018 at 2:42 PM  Workstation performed: [id number]     Xr Chest Pa & Lateral    Result Date: 2/18/2018  Impression: 1  No active pulmonary disease   2   Free intraperitoneal air which may be related to reported paracentesis the prior day though clinical correlation for acute abdominal symptoms recommended  I personally discussed this study with Anuj Hickman on 2/18/2018 at 10:48 AM  Workstation performed: [id number]     Xr Foot 3+ Vw Right    Result Date: 2/19/2018  Impression: Soft tissue ulceration with bony changes of the tuft of the great toe, suggesting osteomyelitis  If there is concern for osteomyelitis, consider nuclear medicine white blood cell scan or MRI with gadolinium for further evaluation  A verbal report will be called by the reading room liaison following this dictation  Workstation performed: [id number]     Us Abdomen Limited    Result Date: 2/20/2018  Impression: Cirrhosis with sequela of portal hypertension and ascites as detailed above  Cholelithiasis  If there is clinical concern for cholecystitis, nuclear medicine HIDA scan may be obtained   Workstation performed: [id number]       Active Meds:   Current Facility-Administered Medications   Medication Dose Route Frequency    folic acid (FOLVITE) tablet 1 mg  1 mg Oral Daily    heparin (porcine) subcutaneous injection 5,000 Units  5,000 Units Subcutaneous Q8H [address]    influenza inactivated quadrivalent vaccine (FLULAVAL) IM injection 0 5 mL  0 5 mL Intramuscular Prior to discharge    lactulose 20 g/30 mL oral solution 20 g  20 g Oral TID    methylPREDNISolone (MEDROL) tablet 32 mg  32 mg Oral Daily With Breakfast    midodrine (PROAMATINE) tablet 5 mg  5 mg Oral TID AC    octreotide (SandoSTATIN) injection 100 mcg  100 mcg Intravenous Q8H [address]    ondansetron (ZOFRAN) injection 4 mg  4 mg Intravenous Q8H PRN    pantoprazole (PROTONIX) EC tablet 40 mg  40 mg Oral Early Morning    rifaximin (XIFAXAN) tablet 550 mg  550 mg Oral Q12H [address]    thiamine (VITAMIN B1) tablet 100 mg  100 mg Oral Daily         VTE Pharmacologic Prophylaxis: Heparin  VTE Mechanical Prophylaxis: sequential compression device    Faby Cordero

## 2018-02-21 NOTE — PROGRESS NOTES
Accept Note - Critical Care  Lg Foster 50 y o  male MRN: [id number]  [facility] Encounter: [id number]     Reason for Admission / Chief Complaint: Rapid response for altered mental status and increased work of breathing     History of Present Illness:  Lg Foster is a 50 y o  male who was admitted to [facility] on 2/17 for acute decompensated alcoholic liver cirrhosis  This was recently diagnosed during an admission at [facility] in January  He was in the MICU from 2/18-2/20 out of concern for worsening hypotension, anemia, and renal function  He never required pressors  Continued scheduled albumin  Received a total of 2 units PRBCs but hgb was then stable  GI and nephrology have been following  DDX has been between ATN from hypotension versus hepatorenal syndrome  Have been treating with octreotide and midodrine  He was transferred out of the unit on 2/21 as he was stable, despite an overall very poor prognosis from his liver failure  MELD score had been about 35  Is 45 today  His renal function worsened such that he had a temporary dialysis catheter placed by IR on today and underwent his first session this afternoon  He also underwent paracentesis today with removal of 4100cc  A rapid response was called around [phone] for altered mental status  During exam, patient was having increased work of breathing and it required a significant amount of stimulation to get him to respond to voice  He is not oriented to person, place, time, or situation  Has visible intercostal retractions  Discussion with staff: has had only 1 BM in the last 24 H  Had been titrating lactulose to 2-4 Bms  Bedside ABG istat shows pH of 7 27  History obtained from chart review  Past Medical History:  Past Medical History:   Diagnosis Date    Cirrhosis ([address] )     Hypertension         Past Surgical History:  History reviewed  No pertinent surgical history  Past Family History:  History reviewed   No pertinent family history  Social History:  History   Smoking Status    Current Every Day Smoker    Packs/day: 0 20    Years: 35 00    Types: Cigarettes   Smokeless Tobacco    Never Used     History   Alcohol Use    Yes     Comment: Pt "I quite a couple of weeks ago"     History   Drug Use No     Comment: prior history      Marital Status: /Civil Union       Medications:  Current Facility-Administered Medications   Medication Dose Route Frequency    folic acid (FOLVITE) tablet 1 mg  1 mg Oral Daily    heparin (porcine) subcutaneous injection 5,000 Units  5,000 Units Subcutaneous Q8H [address]    influenza inactivated quadrivalent vaccine (FLULAVAL) IM injection 0 5 mL  0 5 mL Intramuscular Prior to discharge    lactulose 20 g/30 mL oral solution 20 g  20 g Oral TID    lactulose retention enema 200 g  200 g Rectal Once    methylPREDNISolone (MEDROL) tablet 32 mg  32 mg Oral Daily With Breakfast    midodrine (PROAMATINE) tablet 5 mg  5 mg Oral TID AC    octreotide (SandoSTATIN) injection 100 mcg  100 mcg Intravenous Q8H [address]    ondansetron (ZOFRAN) injection 4 mg  4 mg Intravenous Q8H PRN    pantoprazole (PROTONIX) EC tablet 40 mg  40 mg Oral Early Morning    rifaximin (XIFAXAN) tablet 550 mg  550 mg Oral Q12H [address]    thiamine (VITAMIN B1) tablet 100 mg  100 mg Oral Daily     Home medications:  Prior to Admission medications    Medication Sig Start Date End Date Taking?  Authorizing Provider   amLODIPine (NORVASC) 10 mg tablet Take 10 mg by mouth daily   Yes Historical Provider, MD   folic acid (FOLVITE) 1 mg tablet Take by mouth daily   Yes Historical Provider, MD   valsartan (DIOVAN) 320 MG tablet Take 320 mg by mouth daily   Yes Historical Provider, MD   rifaximin (XIFAXAN) 550 mg tablet Take 550 mg by mouth every 12 (twelve) hours    Historical Provider, MD     Allergies:  No Known Allergies     ROS:   Review of Systems   Unable to obtain d/t mental status     Vitals:  Vitals:    02/21/18 1625 18 1637 18 1642 18 1642   BP: 143/73 143/73 142/68    BP Location:       Pulse: 91 92  91   Resp: 14   (!) 23   Temp: (!) 96 8 °F (36 °C)      TempSrc: Rectal      SpO2: 91% 93%  94%   Weight:       Height:         Temperature:   Temp (24hrs), Av 1 °F (36 2 °C), Min:95 7 °F (35 4 °C), Max:98 °F (36 7 °C)    Current: Temperature: (!) 96 8 °F (36 °C)     Weights:   IBW: 73 kg  Body mass index is 30 62 kg/m²  Hemodynamic Monitoring:  N/A     Non-Invasive/Invasive Ventilation Settings:  Respiratory    Lab Data (Last 4 hours)    None         O2/Vent Data (Last 4 hours)    None              No results found for: PHART, YME7NVG, PO2ART, HTE7HXH, A0HIUJYB, BEART, SOURCE  SpO2: SpO2: 94 %     Physical Exam:  Physical Exam     Constitutional: lethargic, respiratory distress  HENT: R IJ catheter  Cv: tachy, regular  Pulm: diminished breath sounds throughout, more on the R  Increased work of breathing with intercostal retractions  Gi: distended, but soft  Gu: deferred  Neuro: Lethargic  GCS of 12 (E 2, V 4 M 6)     Labs:    Results from last 7 days  Lab Units 18  0533 18  1159 18  0455  18  0439  18  1155   WBC Thousand/uL 13 35* 8 89 7 23  --  5 63  < > 7 71   HEMOGLOBIN g/dL 7 4* 8 0* 6 8*  < > 7 0*  < > 8 5*   HEMATOCRIT % 21 2* 21 8* 19 0*  --  19 7*  < > 24 0*   PLATELETS Thousands/uL 104* 78* 82*  --  72*  < > 128*   NEUTROS PCT %  --   --  79*  --  63  --  61   MONOS PCT %  --   --  10  --  19*  --  18*   < > = values in this interval not displayed     Results from last 7 days  Lab Units 18  0533 18  1159 18  0455  18  0439   SODIUM mmol/L 126* 127* 128*  < > 126*   POTASSIUM mmol/L 4 8 4 9 4 8  < > 4 3   CHLORIDE mmol/L 95* 96* 97*  < > 96*   CO2 mmol/L 19* 20* 20*  < > 18*   BUN mg/dL 56* 47* 45*  < > 39*   CREATININE mg/dL 5 40* 4 96* 4 75*  < > 3 97*   CALCIUM mg/dL 8 9 8 6 8 4  < > 8 3   TOTAL PROTEIN g/dL 7 7  --  6 7  --  6 1* BILIRUBIN TOTAL mg/dL 5 57*  --  3 64*  --  5 88*   ALK PHOS U/L 98  --  85  --  80   ALT U/L 25  --  20  --  18   AST U/L 37  --  31  --  35   GLUCOSE RANDOM mg/dL 139 152* 145*  < > 126   < > = values in this interval not displayed  Results from last 7 days  Lab Units 02/21/18  0533 02/20/18  0455   MAGNESIUM mg/dL 2 5 2 3       Results from last 7 days  Lab Units 02/21/18  0533 02/20/18  0455   PHOSPHORUS mg/dL 8 0* 6 0*        Results from last 7 days  Lab Units 02/21/18  0533 02/20/18  0455 02/19/18  0439  02/17/18  1217   INR  2 53* 2 43* 2 36*  < > 1 97*   PTT seconds  --   --   --   --  45*   < > = values in this interval not displayed  Results from last 7 days  Lab Units 02/18/18  1400   LACTIC ACID mmol/L 1 9     No results found for: TROPONINI     Imaging: None new at this time I have personally reviewed pertinent reports  EKG: None new This was personally reviewed by myself  Micro:  Lab Results   Component Value Date    BLOODCX No Growth at 72 hrs  02/17/2018    BLOODCX No Growth at 72 hrs  02/17/2018       Assessment: 50 yom with acute decompensated liver cirrhosis, transferred from MICU yesterday, now with rapid response called for altered mental status and increased work of breathing  Plan:                  Neuro: Altered mental status -- worse from his previous waxing/waning mental status in the MICU  Pupils are unequal; check stat head CT  Check ammonia level in the presence of inadequate Bms with laculose/rifaximin tx   CMP, CBC                    CV:    No acute issues  Check 12 lead   Monitor BP --  Currently WNL   Monitor for hypovolemia in light of recent paracentesis (4100cc) -- giving albumin now                 Lung:    Acute increased work of breathing: check CXR given his recent dialysis catheter placement to r/o pneumo  Does have breath sounds bilat     istat ABG shows pH of 7 27                   GI:    Acute decompensated liver cirrhosis: titrate lactulose to 2-4 Bms per day  Continue rifaximin   Check stat ammonia level   Underwent paracentesis today with 4100 cc out  Giving albumin now                 FEN:    Albumin now to replace losses during paracentesis   Scheduled albumin 25% Q 6 H   Check stat CMP to evaluate for underlying electrolyte abnormalities   Last Na 129, K 4 4 Co2 22, Ag 12, BUN 43 Ca 8 3 on BMP at 1644   NPO while tenuous respiratory status                 :    Acute renal failure: possible hepatorenal syndrome   First HD session today   Nephrology following   Stat CMP                 ID:    Evaluate for new sepsis: meets SIRS with AMS, leukocytosis, tachypnea   Check stat lactic and trend   Blood cultures x 2   Follow peritoneal fluid studies                    Heme:   Chronic anemia: had been having hgb 7-8  Check CBC now   Thrombocytopenia: last was 104,000   Hold off on VTE ppx until source of AMS/respiratory distress is more certain                 Endo:    POCT glucose while NPO   Continue Medrol per GI                 Msk/Skin:    Chronic osteomyelitis of the R great toe  No active infection  ID following                 Disposition: ICU     VTE Pharmacologic Prophylaxis: Venodyne contraindicated due to anemia  VTE Mechanical Prophylaxis: sequential compression device     Invasive lines and devices: Invasive Devices     Peripheral Intravenous Line            Peripheral IV 02/21/18 Right;Ventral (anterior) Forearm less than 1 day          Line            HD Cath Double less than 1 day          Drain            Urethral Catheter Temperature probe 16 Fr  3 days                 Code Status: Level 1 - Full Code  POA:    POLST:       Given critical illness, patient length of stay will require greater than two midnights  Counseling / Coordination of Care  Total critical care time spent 35 minutes, excluding family updates, teaching, and procedures  Portions of the record may have been created with voice recognition software    Occasional wrong word or "sound a like" substitutions may have occurred due to the inherent limitations of voice recognition software  Read the chart carefully and recognize, using context, where substitutions have occurred          Cecile Renee PA-C

## 2018-02-21 NOTE — PROGRESS NOTES
Spoke with SOD resident regarding excessive drainage from paracentesis site, instructed to place ostomy bag over site

## 2018-02-21 NOTE — CASE MANAGEMENT
Continued Stay Review    Date: 2/21/18    Vital Signs: BP (!) 117/49   Pulse 89   Temp (!) 95 7 °F (35 4 °C) (Tympanic)   Resp 20   Ht 5' 10" (1 778 m)   Wt 96 8 kg (213 lb 6 5 oz)   SpO2 98%   BMI 30 62 kg/m²     Medications:   Scheduled Meds:   Current Facility-Administered Medications:  folic acid 1 mg Oral Daily Mariama Hurtado, DO   heparin (porcine) 5,000 Units Subcutaneous [facility] Mariola Major PA-C   influenza vaccine 0 5 mL Intramuscular Prior to discharge Mamie Jauregui MD   lactulose 20 g Oral TID Lucilla Rogers   methylprednisolone 32 mg Oral Daily With Breakfast Raghav Branch PA-C   midodrine 5 mg Oral TID AC Emmanuel Amaya   octreotide 100 mcg Intravenous Q8H [address] Mati Glasgow DO   ondansetron 4 mg Intravenous Q8H PRN Mariama Hurtado DO   pantoprazole 40 mg Oral Early Morning Raghav Branch PA-C   rifaximin 550 mg Oral Q12H [address] Raghav Branch PA-C   thiamine 100 mg Oral Daily Mariama Hurtado, DO     Continuous Infusions:    PRN Meds: influenza vaccine    ondansetron    Abnormal Labs   02/21/18 0533   Sodium 126     Chloride 95     CO2 19     BUN 56     Creatinine 5 40     Total Bilirubin 5 57     Phosphorus 8 0  PT/INR 27 5/2 53     02/21/18 0533   WBC 13 35     RBC 2 30     Hemoglobin 7 4     Hematocrit 21 2     RDW 22 2     Platelets 100       Diagnostic Results:   2/21 CXR - 1  Right internal jugular dialysis catheter with tip in the midsuperior vena cava  2   Mild vascular congestion  Right-sided alveolar infiltrates, likely cardiogenic in nature, however superimposed pneumonia not excluded  2/21 IR Paracentesis done     2/21 IR - nontunnel HD cath inserted    Age/Sex: 50 y o  male     Assessment/Plan:   2/21 Medicine Progress Note  Hypoxemic respiratory failure  ? Likely component of atelectasis as noted on imaging  ? Large, tense ascites likely contributing  ? Will reassess for paracentesis after consideration of kidney function and possible HRS  ?  Continue NC oxygen with goal SaO2>90%     Decompensated alcoholic cirrhosis:   ? MELD-NA score 36 on 2/20/18  ? Continue low salt diet, fluid restriction   ? Continue lactulose, rifaximin  ? Patient not initially not thought to be liver transplant as his last drink was 3 weeks ago, however per transfer note with CCU team and Dr Brittney Rendon patient may be considered per TP team at [place] if voices desire to stay sober   ? Prognosis extremely poor, per palliative consult patient is treatment-focused  When seen by palliative in PM patient noted to be encephalopathic, unable to approprietly answer questions, default POA adult children       Hepatic encephalopathy - currently stage 2  ? Lactulose 20mg PO TID, titrate to 3BM daily   ? 1-2 BM per day currently charted, which is below goal      Alcoholic hepatitis  ? Maddrey discriminant function above 32 on admission  ? Plan to continue solumedrol 32mg PO QD for 28 days   ? Mortality benefit questionable after 7 days  If continued issues with edema will consider tapering steroid      PRANAY, ATN v HRS with oliguria:   ? SBP increased greater than 30 on regimen with hypertension at this time, will decrease midodrine to 5mg TID  ? Continue ocreotide  ? Trend creatinine  ? Monitor urine output  ? IR consult for temporary catheter placement      Normocytic Anemia:  ? S/p 1 units 2/18, 2u FFP and vitamin K after acute bleed  Additional unitpRBC  given 2/20 in [place]  ? Post-transfusion H/H stable   ? Continue to monitor for signs of bleeding      Hyponatremia  ? In setting of cirrhosis  ? Continue to monitor      Right toe gangrene ulcer and osteomyelitis: ID consulted, no evidence of cellulitis clinically  No fevers/no leukocytosis, no antibiotics necessary  Recommended per ID to have toe amputated to avoid development wet gangrene, patient refused       Pneumoperitoneum: After paracentesis had more air than expected; evaluated by surgery, OK for diet as tolerated, no plans for surgical intervention       Disposition: Placement of dialysis catheter, dialysis, decrease midodrine, increase lactulose    ______________________  2/21 Podiatry Progress Note  3  21-year-old male ulcer to the distal right hallux with chronic osteomyelitis     -patient does not need antibiotics at this time per Infectious Disease as there are no acute signs of infection  -As patient is refusing the toe amputation, Podiatry will continue local wound care while in-house with Betadine paint  -podiatry will see  qweekly and have nursing apply Betadine paint Tuesday Thursday Saturday  _______________________  2/21 GI Progress Note  Alcoholic cirrhosis  Alcoholic hepatitis  Ascites     1  Alcoholic cirrhosis/ heaptitis - Pt increasingly encephalopathic  MELD 38 - creatinine, TB & INR all increasing despite methylprednisolone  Possible HRS, on octreotide, midodrine & albumin  Prognosis extremely poor  He was drinking up until a few weeks ago  Family wants all measures  To start on dialysis today  -Monitor abd distention - paracentesis as needed  -Follow H&H - Hbg 7 4 s/p 1 unit  -Follow platelets - 261   -Continue octreotide, midodrine, albumin  -Consider palliative care  -2 BM's yesterday - Continue lactulose - titrate 2-3 BM's daily & Xifaxan; if his mental status does not improve may need lactulose enema vs NGT      2  Ascites - he is distended but not tense  Unfortunately he continues to ooze from previous site  Would recommend therapeutic paracentesis today which should also help decrease oozing    -Paracentesis   _____________________  2/21 Nephrology Progress Note  PRANAY with previous baseline creatinine 0 5 in 2016, no other labs available since  - creatinine 3 3 on admission continue to worsen to 5 4 today  - PRANAY could be secondary to hepatorenal syndrome versus ATN  - urine sodium 6  - currently remains on octreotide and midodrine  Albumin is discontinued as serum albumin has improved    - being started on dialysis today first session on 2/21/18   - currently has Mendoza catheter, urine output remains poor, previous urinalysis showed 2+ proteinuria, 10 to 20 RBCs, 4 to 10 WBCs  - CT scan of abdomen this admission without contrast showed no hydronephrosis      I saw and examined patient during hemodialysis treatment at 12:16 PM on 2/21/2018  The patient was receiving hemodialysis for treatment of PRANAY  I also reviewed vital signs, intake and output, lab results and recent events, and agree with dialysis order  Tolerating HD  BP acceptable  Time: 2 hours              Qb: 250                       Sodium: 135  Dialyzer: F160             Qd: 1 5 times Qb                     Potassium: as per protocol  Access: Temp Hd cath                       UF goal: Even             Bicarbonate: 32           Calcium 2 5     Hyponatremia, likely hypervolemic in the setting of cirrhosis  - strict fluid restriction 1 2 L per day  Will do sodium bath 135 on dialysis today  - continue to closely monitor      Likely chronic respiratory alkalosis with compensated bicarb  - continue to closely monitor  No need for additional bicarb supplement      Severe hyperphosphatemia with serum phosphorus eight above goal   Dialysis today  Continue to monitor serum phosphorus  - avoiding phosphorus binders at this time     Severe anemia, transfuse p r n  for hemoglobin less than seven  - check iron studies once infection issues are resolved     Right great toe ulcer/osteomyelitis, currently off antibiotic as per ID  Decompensated alcohol cirrhosis, portal hypertension, ascites, status post paracentesis with about 4 L ascites fluid removal on 2/21/18    Currently has drain present       Discharge Plan: possible palliative care consideration otherwise TBD

## 2018-02-21 NOTE — PROGRESS NOTES
02/21/18 1700   Clinical Encounter Type   Visited With Family   Crisis Visit (rapid response)   Family Spiritual Encounters   Family Coping Anxiety;Open/discussion  (significant other Bunny Landin)

## 2018-02-21 NOTE — PROGRESS NOTES
Progress Note - Infectious Disease   Barry Claude 50 y o  male MRN: [id number]  Unit/Bed#: [facility] 808-01 Encounter: [id number]      Impression/Recommendations:  1   Right great toe gangrenous ulcer and osteomyelitis  Moi Gonzalez is no evidence of cellulitis clinically   Patient has no fever leukocytosis   No antibiotic is necessary   However, patient needs to have this toe amputated, to avoid development of wet gangrene   Patient still refuses toe amputation  No antibiotic needed at present time  Monitor toe  Recommend toe amputation      2   Decompensated cirrhosis   This is due to patient's noncompliance with prescribed treatment plan  Moi Gonzalez is no evidence of peritonitis clinically   Patient is status post paracentesis with benign peritoneal fluid   Systemic corticosteroid started  Patient is status post repeat person thesis, with benign parameters also  No antibiotic needed  Management per GI and primary service      3   Hypotension   Patient is not febrile and without leukocytosis   He is not systemically toxic   Admission blood cultures are negative   Doubt septic shock   Suspect hypovolemia   Hypotension resolved with IV fluid   Blood cultures remain negative     Observe off antibiotic  Monitor hemodynamics        4  Encephalopathy   This is most likely hepatic encephalopathy, with patient not compliant with lactulose   No clinical signs of CNS infection   Mental status had improved with restart of lactulose  Monitor mental status      5   PRANAY   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   Dehydration may contribute   Creatinine worsening  Any antibiotic will need to have dosages adjusted accordingly  Monitor creatinine      Discussed with the patient in detail regarding the above plan      Antibiotics:  Off antibiotic     Subjective:  Patient is out of ICU  He is awake and alert  Abdomen remains distended with stable mild discomfort  No toe pain  Temperature stays down   No chills  Objective:  Vitals:  HR:  [] 90  Resp:  [16-26] 20  BP: (110-162)/(49-84) 110/61  SpO2:  [88 %-98 %] 98 %  Temp (24hrs), Av 2 °F (36 2 °C), Min:95 7 °F (35 4 °C), Max:98 °F (36 7 °C)  Current: Temperature: (!) 96 5 °F (35 8 °C)    Physical Exam:     General: Awake, alert, cooperative, no distress  Lungs: Expansion symmetric, no rales, no wheezing, respirations unlabored  Heart[de-identified]  Regular rate and rhythm, S1 and S2 normal, no murmur  Abdomen: Soft, distended, stable mild diffuse tenderness, bowel sounds active all four quadrants,        no masses, no organomegaly  Extremities: Stable leg edema  Stable venous stasis changes  No erythema/warmth  Nontender to palpation  Right first toe with stable chronic changes and stable distal gangrenous ulcer  Skin:  No rash  Invasive Devices     Peripheral Intravenous Line            Peripheral IV 18 Right;Ventral (anterior) Forearm less than 1 day          Line            HD Cath Double less than 1 day          Drain            Urethral Catheter Temperature probe 16 Fr  3 days                Labs studies:   I have personally reviewed pertinent labs  Results from last 7 days  Lab Units 18  0533 18  1159 18  0455  18  0439   SODIUM mmol/L 126* 127* 128*  < > 126*   POTASSIUM mmol/L 4 8 4 9 4 8  < > 4 3   CHLORIDE mmol/L 95* 96* 97*  < > 96*   CO2 mmol/L 19* 20* 20*  < > 18*   ANION GAP mmol/L 12 11 11  < > 12   BUN mg/dL 56* 47* 45*  < > 39*   CREATININE mg/dL 5 40* 4 96* 4 75*  < > 3 97*   EGFR ml/min/1 73sq m 11 13 13  < > 16   GLUCOSE RANDOM mg/dL 139 152* 145*  < > 126   CALCIUM mg/dL 8 9 8 6 8 4  < > 8 3   AST U/L 37  --  31  --  35   ALT U/L 25  --  20  --  18   ALK PHOS U/L 98  --  85  --  80   TOTAL PROTEIN g/dL 7 7  --  6 7  --  6 1*   BILIRUBIN TOTAL mg/dL 5 57*  --  3 64*  --  5 88*   < > = values in this interval not displayed      Results from last 7 days  Lab Units 18  0533 18  1159 18  0455 WBC Thousand/uL 13 35* 8 89 7 23   HEMOGLOBIN g/dL 7 4* 8 0* 6 8*   PLATELETS Thousands/uL 104* 78* 82*       Results from last 7 days  Lab Units 02/17/18  1929 02/17/18  1829 02/17/18  1336   BLOOD CULTURE  No Growth at 72 hrs  No Growth at 72 hrs   --   --    GRAM STAIN RESULT   --   --  Rare Polys  No bacteria seen   BODY FLUID CULTURE, STERILE   --   --  No growth   INFLUENZA A PCR   --  None Detected  --    INFLUENZA B PCR   --  None Detected  --    RSV PCR   --  None Detected  --        Imaging Studies:   I have personally reviewed pertinent imaging study reports and images in PACS  EKG, Pathology, and Other Studies:   I have personally reviewed pertinent reports

## 2018-02-21 NOTE — CODE DOCUMENTATION
Patient being evaluated for increasing level of care and possible transfer to micu  Jessica FAIR  At bedside

## 2018-02-21 NOTE — PROGRESS NOTES
NEPHROLOGY PROGRESS NOTE   Natalia Henley 50 y o  male MRN: [id number]  Unit/Bed#: [facility] 808-01 Encounter: [id number]  Reason for Consult: PRANAY    ASSESSMENT AND PLAN:  PRANAY with previous baseline creatinine 0 5 in 2016, no other labs available since  - creatinine 3 3 on admission continue to worsen to 5 4 today  - PRANAY could be secondary to hepatorenal syndrome versus ATN  - urine sodium 6  - currently remains on octreotide and midodrine  Albumin is discontinued as serum albumin has improved  - being started on dialysis today first session on 2/21/18   - currently has Mendoza catheter, urine output remains poor, previous urinalysis showed 2+ proteinuria, 10 to 20 RBCs, 4 to 10 WBCs  - CT scan of abdomen this admission without contrast showed no hydronephrosis  I saw and examined patient during hemodialysis treatment at 12:16 PM on 2/21/2018  The patient was receiving hemodialysis for treatment of PRANAY  I also reviewed vital signs, intake and output, lab results and recent events, and agree with dialysis order  Tolerating HD  BP acceptable  Time: 2 hours  Qb: 250  Sodium: 135  Dialyzer: F160  Qd: 1 5 times Qb  Potassium: as per protocol  Access: Temp Hd cath  UF goal: Even  Bicarbonate: 32 Calcium 2 5    Hyponatremia, likely hypervolemic in the setting of cirrhosis  - strict fluid restriction 1 2 L per day  Will do sodium bath 135 on dialysis today  - continue to closely monitor  Likely chronic respiratory alkalosis with compensated bicarb  - continue to closely monitor  No need for additional bicarb supplement  Severe hyperphosphatemia with serum phosphorus eight above goal   Dialysis today  Continue to monitor serum phosphorus  - avoiding phosphorus binders at this time    Severe anemia, transfuse p r n  for hemoglobin less than seven  - check iron studies once infection issues are resolved    Right great toe ulcer/osteomyelitis, currently off antibiotic as per ID    Decompensated alcohol cirrhosis, portal hypertension, ascites, status post paracentesis with about 4 L ascites fluid removal on 2/21/18  Currently has drain present  SUBJECTIVE:  Patient seen and examined at bedside  Overall very confused and does not provide most answers  OBJECTIVE:  Current Weight: Weight - Scale: 96 8 kg (213 lb 6 5 oz)  Vitals:    02/21/18 1138   BP: 162/75   Pulse: (!) 109   Resp: (!) 26   Temp:    SpO2: 97%       Intake/Output Summary (Last 24 hours) at 02/21/18 1159  Last data filed at 02/21/18 0901   Gross per 24 hour   Intake                0 ml   Output              470 ml   Net             -470 ml       Physical Examination:  General:  Lying in bed, overall confused and drowsy   Eyes:  Mild conjunctival pallor present, sclerae icteric  ENT:  External examination of ears and nose unremarkable  Neck:  Supple  Respiratory:  Bilateral coarse breath sound, bilateral air entry present  CVS:  S1, S2 present  GI:  Soft, mild distended, nontender  CNS:  Drowsy, opens eyes, tells me his name although does not answer any other questions    Extremities:trace edema in LE  Skin: icteric    Medications:    Current Facility-Administered Medications:     folic acid (FOLVITE) tablet 1 mg, 1 mg, Oral, Daily, Mariama Hurtado DO, 1 mg at 02/21/18 0829    heparin (porcine) subcutaneous injection 5,000 Units, 5,000 Units, Subcutaneous, Q8H [facility], Mariola Major PA-C, 5,000 Units at 02/21/18 0506    influenza inactivated quadrivalent vaccine (FLULAVAL) IM injection 0 5 mL, 0 5 mL, Intramuscular, Prior to discharge, Allison Daly MD    lactulose 20 g/30 mL oral solution 20 g, 20 g, Oral, TID, Serad Deis, 20 g at 02/21/18 3583    methylPREDNISolone (MEDROL) tablet 32 mg, 32 mg, Oral, Daily With Breakfast, Dex Casey PA-C, 32 mg at 02/21/18 0954    midodrine (PROAMATINE) tablet 5 mg, 5 mg, Oral, TID AC, Glenard Deis    octreotide (SandoSTATIN) injection 100 mcg, 100 mcg, Intravenous, Q8H [facility], Anika Mancia DO, 100 mcg at 02/21/18 0508    ondansetron (ZOFRAN) injection 4 mg, 4 mg, Intravenous, Q8H PRN, Pedro Guerra DO    pantoprazole (PROTONIX) EC tablet 40 mg, 40 mg, Oral, [facility], Jeremias Romo PA-C, 40 mg at 02/21/18 0506    rifaximin (XIFAXAN) tablet 550 mg, 550 mg, Oral, Q12H [facility], Jeremias Romo PA-C, 550 mg at 02/21/18 5422    thiamine (VITAMIN B1) tablet 100 mg, 100 mg, Oral, Daily, Mariama Hurtado DO, 100 mg at 02/21/18 3618    Laboratory Results:    Results from last 7 days  Lab Units 02/21/18  0533 02/20/18  1159 02/20/18  0455 02/19/18 2015 02/19/18  1132 02/19/18  1131 02/19/18  0439 02/19/18  0052  02/18/18  0628 02/17/18  1155  02/17/18  1154   WBC Thousand/uL 13 35* 8 89 7 23  --   --   --  5 63  --   --  5 96 7 71  --   --    HEMOGLOBIN g/dL 7 4* 8 0* 6 8* 7 7* 7 2*  --  7 0* 6 9*  < > 6 6* 8 5*  < >  --    HEMATOCRIT % 21 2* 21 8* 19 0*  --   --   --  19 7*  --   --  18 4* 24 0*  --   --    PLATELETS Thousands/uL 104* 78* 82*  --   --   --  72*  --   --  74* 128*  --   --    SODIUM mmol/L 126* 127* 128* 127*  --  128* 126* 126*  < > 127*  --   --  127*   POTASSIUM mmol/L 4 8 4 9 4 8 4 8  --  4 4 4 3 4 3  < > 4 1  --   --  4 3   CHLORIDE mmol/L 95* 96* 97* 97*  --  97* 96* 96*  < > 98*  --   --  95*   CO2 mmol/L 19* 20* 20* 19*  --  19* 18* 20*  < > 20*  --   --  19*   BUN mg/dL 56* 47* 45* 43*  --  43* 39* 39*  < > 38*  --   --  33*   CREATININE mg/dL 5 40* 4 96* 4 75* 4 64*  --  4 17* 3 97* 3 96*  < > 3 59*  --   --  3 32*   CALCIUM mg/dL 8 9 8 6 8 4 8 3  --  8 3 8 3 8 1*  < > 7 8*  --   --  7 9*   MAGNESIUM mg/dL 2 5  --  2 3  --   --   --   --   --   --   --   --   --   --    PHOSPHORUS mg/dL 8 0*  --  6 0*  --   --   --   --   --   --   --   --   --   --    TOTAL PROTEIN g/dL 7 7  --  6 7  --   --   --  6 1*  --   --  5 7*  --   --  7 1   GLUCOSE RANDOM mg/dL 139 152* 145* 180*  --  165* 126 132  < > 100  --   --  125   < > = values in this interval not displayed      No results found for this or any previous visit  Results for orders placed during the hospital encounter of 02/17/18   XR chest pa & lateral    Narrative CHEST     INDICATION:  Cough and hypoxia    COMPARISON:  None    VIEWS:  Frontal and lateral projections    IMAGES:  2    FINDINGS:    Cardiomediastinal silhouette appears unremarkable  The lungs are clear  No pneumothorax or pleural effusion  Visualized osseous structures appear within normal limits for the patient's age  There is free air under the right hemidiaphragm  Impression 1  No active pulmonary disease  2   Free intraperitoneal air which may be related to reported paracentesis the prior day though clinical correlation for acute abdominal symptoms recommended  I personally discussed this study with Sabas Bernal on 2/18/2018 at 10:48 AM           Workstation performed: [id number]       No results found for this or any previous visit  No results found for this or any previous visit  Results for orders placed during the hospital encounter of 02/17/18   CT abdomen pelvis wo contrast    Narrative CT ABDOMEN AND PELVIS WITHOUT IV CONTRAST    INDICATION:  32-year-old man status post paracentesis 2/17/2018  Clinical concern for abdominal hemorrhage due to decreasing hemoglobin  COMPARISON: None available at time of image interpretation       TECHNIQUE:  CT examination of the abdomen and pelvis was performed without intravenous contrast   Axial, sagittal, and coronal 2D reformatted images were created from the source data and submitted for interpretation  Radiation dose length product (DLP) for this visit:  1457 61 mGy-cm   This examination, like all CT scans performed in the [facility], was performed utilizing techniques to minimize radiation dose exposure, including the use of   iterative reconstruction and automated exposure control  Enteric contrast was administered       FINDINGS:    ABDOMEN    LOWER CHEST:  Small right pleural effusion  Right basal opacity most likely associated passive atelectasis  Trace left effusion with minimal left base atelectasis  LIVER/BILIARY TREE:  The liver is cirrhotic  Lack of contrast precludes evaluation for underlying hepatic mass  No biliary dilation on this noncontrast study  GALLBLADDER:  There are gallstone(s) within the gallbladder, without pericholecystic inflammatory changes  SPLEEN:  Not enlarged  PANCREAS:  Unremarkable  ADRENAL GLANDS:  Unremarkable  KIDNEYS/URETERS:  Unremarkable  No hydronephrosis  STOMACH AND BOWEL:  Unremarkable  APPENDIX:  A normal appendix was visualized  ABDOMINOPELVIC CAVITY:  Large volume of abdominal pelvic ascites  In the dependent pelvis, there is layering effect with the dependent ascites having attenuation 17 Hounsfield units with the nondependent ascites around having attenuation of -3 5 HU  This suggests some degree   of blood products layering in the pelvis  Trace pneumoperitoneum from recent paracentesis  No lymphadenopathy  VESSELS:  Atherosclerotic changes are present  No evidence of aneurysm  PELVIS    REPRODUCTIVE ORGANS:  Unremarkable for patient's age  URINARY BLADDER:  Class by Mendoza catheter  ABDOMINAL WALL/INGUINAL REGIONS:  Anasarca  Needle tract in the left mid abdomen is in the vicinity of a large body wall collateral (series 2 image 60)  OSSEOUS STRUCTURES:  No acute fracture or destructive osseous lesion  Anterior compression deformities of T12 and T7  Impression 1  Cirrhosis with stigmata of portal hypertension  2   Large volume of abdominopelvic ascites  There is a layering hematocrit level in the dependent pelvis  However, with the attenuation of the dependent component measuring only 15 Hounsfield units, this is most likely small volume of blood products   rather than large volume hemoperitoneum  3   Postprocedural pneumoperitoneum    Needle tract in the left mid abdomen is in the vicinity of a large body wall collateral   4   Small right pleural effusion and right base atelectasis  5   Anterior compression deformities of T7 and T12  I personally discussed this study with John Acosta on 2/18/2018 at 2:42 PM           Workstation performed: [id number]       No results found for this or any previous visit  Portions of the record may have been created with voice recognition software  Occasional wrong word or "sound a like" substitutions may have occurred due to the inherent limitations of voice recognition software  Read the chart carefully and recognize, using context, where substitutions have occurred

## 2018-02-21 NOTE — SOCIAL WORK
Cm to submit [employer] paperwork tomorrow  Rapid response was called for patient and may be transferred to MICU  Cm to follow up in the morning

## 2018-02-21 NOTE — RAPID RESPONSE
Progress Note - Rapid Response   Lydia Landau 50 y o  male MRN: [id number]    Time Called ( Time): 0505  Room#: 12  Arrival Time ( Time): 0044  Event End Time ( Time): [phone]    Primary reason for call: Acute change in mental status  Interventions:  Airway/Breathing:  O2 Mask/Nasal  Circulation: EKG and N/A  Other Treatments: N/A       Assessment:     Altered mental status  -secondary to hepatic encephalopathy from decompensated hepatic cirrhosis  -saturating over 90% on 4 L nasal cannula  -blood pressure stable  -Glucose 151  -obtain stat BMP  -obtain ammonia  -head CT  -NG tube placement with lactulose  -transferred to MICU       HPI/Chief Complaint (Background/Situation):   Lydia Landau is a 50y o  year old male who presents with  History of recently diagnosed advanced cirrhosis secondary to alcohol abuse, hypertension, tobacco abuse was brought into the ER by his girlfriend for confusion worsening abdominal pain  Of note patient was recently admitted to [facility] for hyponatremia abdominal dissection and it was during that admission he was found to have extensive ascites and newly diagnosed cirrhosis  At [facility] patient had a full GI workup with EGD, was treated for right toe gangrene and osteomyelitis  Patient was discharged from [facility] 11 work with  Rifaximin and lactulose which she did not take due to finances and severe diarrhea  In the emergency department [facility] the patient presented with a meld of 34 and received a diagnostic tap with 1 L removed  Subsequently the patient had catheter placement and had dialysis  Patient was hypotensive and was treated with midorine  He was also treated with octreotide  On the day of the rapid response the patient also had approximately 4 L of fluid drained by IR paracentesis  Rapid response was called for altered mental status and labored breathing    Upon arrival the patient was encephalopathic but arousable with loud verbal stimuli  He was able to follow commands after great stimulation  Patient was saturating over 90% on 4 L nasal cannula and vitals remained stable  Per nursing report patient only had 1 bowel movement the previous day  Historical Information   Past Medical History:   Diagnosis Date    Cirrhosis ([address] )     Hypertension      History reviewed  No pertinent surgical history  Social History   History   Alcohol Use    Yes     Comment: Pt "I quite a couple of weeks ago"     History   Drug Use No     Comment: prior history      History   Smoking Status    Current Every Day Smoker    Packs/day: 0 20    Years: 35 00    Types: Cigarettes   Smokeless Tobacco    Never Used     Family History: History reviewed  No pertinent family history  Meds/Allergies     Current Facility-Administered Medications:  folic acid 1 mg Oral Daily Mariama Hurtado DO   heparin (porcine) 5,000 Units Subcutaneous [facility] Mariola Major PA-C   influenza vaccine 0 5 mL Intramuscular Prior to discharge Aure Ku MD   lactulose 20 g Oral TID Elizabeth Conte   lactulose 200 g Rectal Once Rhys Ham DO   methylprednisolone 32 mg Oral Daily With Breakfast Phill Grullon PA-C   midodrine 5 mg Oral TID  Emmanuel Amaya   octreotide 100 mcg Intravenous Q8H [facility] Mati Glasgow DO   ondansetron 4 mg Intravenous Q8H PRN Mariama Hurtado DO   pantoprazole 40 mg Oral Early Morning Phill Grullon PA-C   rifaximin 550 mg Oral Q12H [facility] Phill Grullon PA-C   thiamine 100 mg Oral Daily Mariama Hurtado DO            No Known Allergies    ROS: unable to obtain  Patient encephalopathic    Physical Exam   Constitutional: He appears well-developed and well-nourished  He appears distressed  HENT:   Head: Normocephalic and atraumatic  Right Ear: External ear normal    Left Ear: External ear normal    Mouth/Throat: No oropharyngeal exudate     Eyes: Conjunctivae are normal  Right eye exhibits no discharge  Left eye exhibits no discharge  No scleral icterus  Left pupil greater than right  pupils Round and reactive to light   Neck: Normal range of motion  Neck supple  No JVD present  Cardiovascular: Normal rate, regular rhythm, normal heart sounds and intact distal pulses  Exam reveals no gallop and no friction rub  No murmur heard  Pulmonary/Chest: Breath sounds normal  No stridor  He has no wheezes  He has no rales  Labored breathing   Abdominal: Soft  He exhibits distension  There is no tenderness  There is no rebound and no guarding  Organomegaly noted  Slightly distended   Musculoskeletal: He exhibits no edema, tenderness or deformity  Neurological:   Encephalopathic but minimally arousable and responding to commands   Skin: Skin is warm and dry  Capillary refill takes less than 2 seconds  No rash noted  He is not diaphoretic  No erythema  No pallor  Vitals reviewed  Intake/Output Summary (Last 24 hours) at 02/21/18 1714  Last data filed at 02/21/18 1452   Gross per 24 hour   Intake              500 ml   Output             2266 ml   Net            -1766 ml       Respiratory    Lab Data (Last 4 hours)    None         O2/Vent Data (Last 4 hours)    None              Invasive Devices     Peripheral Intravenous Line            Peripheral IV 02/21/18 Right;Ventral (anterior) Forearm less than 1 day          Line            HD Cath Double less than 1 day          Drain            Urethral Catheter Temperature probe 16 Fr  3 days                DIAGNOSTIC DATA:    Lab: I have personally reviewed pertinent lab results     CBC:     Results from last 7 days  Lab Units 02/21/18  0533   WBC Thousand/uL 13 35*   HEMOGLOBIN g/dL 7 4*   HEMATOCRIT % 21 2*   PLATELETS Thousands/uL 104*     CMP:     Results from last 7 days  Lab Units 02/21/18  0533 02/20/18  1159 02/20/18  0455  02/19/18  0439   SODIUM mmol/L 126* 127* 128*  < > 126*   POTASSIUM mmol/L 4 8 4 9 4 8  < > 4  3   CHLORIDE mmol/L 95* 96* 97*  < > 96*   CO2 mmol/L 19* 20* 20*  < > 18*   BUN mg/dL 56* 47* 45*  < > 39*   CREATININE mg/dL 5 40* 4 96* 4 75*  < > 3 97*   CALCIUM mg/dL 8 9 8 6 8 4  < > 8 3   TOTAL PROTEIN g/dL 7 7  --  6 7  --  6 1*   BILIRUBIN TOTAL mg/dL 5 57*  --  3 64*  --  5 88*   ALK PHOS U/L 98  --  85  --  80   ALT U/L 25  --  20  --  18   AST U/L 37  --  31  --  35   GLUCOSE RANDOM mg/dL 139 152* 145*  < > 126   < > = values in this interval not displayed  PT/INR:   Lab Results   Component Value Date    INR 2 53 (H) 02/21/2018   ,   Magnesium: No results found for: MAG,   Phosphorous:   Lab Results   Component Value Date    PHOS 8 0 (H) 02/21/2018       Microbiology:  Lab Results   Component Value Date    BLOODCX No Growth at 72 hrs  02/17/2018    BLOODCX No Growth at 72 hrs  02/17/2018         OUTCOME:   Transferred to Critical Care Unit  Family notified of transfer: yes  Family member contacted: Son  Code Status: Level 1 - Full Code  Critical Care Time: Total Critical Care time spent 30 minutes excluding procedures, teaching and family updates

## 2018-02-21 NOTE — PROGRESS NOTES
02/21/18 [address]    Spiritual Beliefs/Perceptions   Concept of God Accepting   Support Systems Spouse/significant other;Children   Coping Responses   Family Coping Anxiety;Open/discussion  (significant other Desi)   Family Spiritual Assessment   Feelings of Discouragement yes- SO Desi   Feelings of Guilt/Regret yes- SO Desi   Plan of Care   Comments Pt was a rapid response due to decreased mental status  Pt was previously a pt in MICU and had to return to the unit from P8  Pt's SO, Eleanor Goodpasture, was present and anxious  Escorted her to the [address] [place] [address], but she decided that she needed to leave the hospital for a little while  Supported Eleanor Goodpasture throughout     Assessment Completed by: Unit visit

## 2018-02-21 NOTE — PROGRESS NOTES
Progress Note - Lydia Landau 50 y o  male MRN: [id number]    Unit/Bed#: [facility] 808-01 Encounter: [id number]         Assessment/ Plan:  Alcoholic cirrhosis  Alcoholic hepatitis  Ascites     1  Alcoholic cirrhosis/ heaptitis - Pt increasingly encephalopathic  MELD 38 - creatinine, TB & INR all increasing despite methylprednisolone  Possible HRS, on octreotide, midodrine & albumin  Prognosis extremely poor  He was drinking up until a few weeks ago  Family wants all measures  To start on dialysis today  -Monitor abd distention - paracentesis as needed  -Follow H&H - Hbg 7 4 s/p 1 unit  -Follow platelets - 939   -Continue octreotide, midodrine, albumin  -Consider palliative care  -2 BM's yesterday - Continue lactulose - titrate 2-3 BM's daily & Xifaxan; if his mental status does not improve may need lactulose enema vs NGT      2  Ascites - he is distended but not tense  Unfortunately he continues to ooze from previous site  Would recommend therapeutic paracentesis today which should also help decrease oozing    -Paracentesis       Subjective:   Pt very encephalopathic this AM, oriented x 1  Objective:     Vitals: Blood pressure 127/78, pulse 95, temperature 98 °F (36 7 °C), temperature source Oral, resp  rate 18, height 5' 10" (1 778 m), weight 96 8 kg (213 lb 6 5 oz), SpO2 97 %  ,Body mass index is 30 62 kg/m²  Physical Exam: General appearance: Encephalopathic  Lungs: clear to auscultation bilaterally  Heart: regular rate and rhythm  Abdomen: +BS, soft, distended; persistent drainage into ostomy bag  Skin: Jaundice     Invasive Devices     Peripheral Intravenous Line            Peripheral IV 02/21/18 Right;Ventral (anterior) Forearm less than 1 day          Drain            Urethral Catheter Temperature probe 16 Fr  2 days                Lab, Imaging and other studies: I have personally reviewed pertinent reports       CBC:   Lab Results   Component Value Date    WBC 13 35 (H) 02/21/2018    HGB 7 4 (L) 02/21/2018    HCT 21 2 (L) 02/21/2018    MCV 92 02/21/2018     (L) 02/21/2018    MCH 32 2 02/21/2018    MCHC 34 9 02/21/2018    RDW 22 2 (H) 02/21/2018    MPV 9 5 02/21/2018   ,   CMP:   Lab Results   Component Value Date     (L) 02/21/2018    K 4 8 02/21/2018    CL 95 (L) 02/21/2018    CO2 19 (L) 02/21/2018    ANIONGAP 12 02/21/2018    BUN 56 (H) 02/21/2018    CREATININE 5 40 (H) 02/21/2018    GLUCOSE 139 02/21/2018    CALCIUM 8 9 02/21/2018    AST 37 02/21/2018    ALT 25 02/21/2018    ALKPHOS 98 02/21/2018    PROT 7 7 02/21/2018    BILITOT 5 57 (H) 02/21/2018    EGFR 11 02/21/2018   ,   Lipase: No results found for: LIPASE,  PT/INR:   Lab Results   Component Value Date    INR 2 53 (H) 02/21/2018   ,     Phosphorous:   Lab Results   Component Value Date    PHOS 8 0 (H) 02/21/2018

## 2018-02-22 ENCOUNTER — APPOINTMENT (INPATIENT)
Dept: DIALYSIS | Facility: HOSPITAL | Age: 53
DRG: 264 | End: 2018-02-22
Payer: COMMERCIAL

## 2018-02-22 PROBLEM — J69.0 ASPIRATION PNEUMONIA OF RIGHT LOWER LOBE (HCC): Status: ACTIVE | Noted: 2018-02-22

## 2018-02-22 PROBLEM — D68.9 COAGULOPATHY (HCC): Status: ACTIVE | Noted: 2018-02-22

## 2018-02-22 PROBLEM — D72.829 LEUKOCYTOSIS: Status: ACTIVE | Noted: 2018-02-22

## 2018-02-22 PROBLEM — A41.9 SEPSIS (HCC): Status: ACTIVE | Noted: 2018-02-17

## 2018-02-22 LAB
ALBUMIN SERPL BCP-MCNC: 4.2 G/DL (ref 3.5–5)
ALP SERPL-CCNC: 86 U/L (ref 46–116)
ALT SERPL W P-5'-P-CCNC: 28 U/L (ref 12–78)
ANION GAP SERPL CALCULATED.3IONS-SCNC: 12 MMOL/L (ref 4–13)
ANION GAP SERPL CALCULATED.3IONS-SCNC: 14 MMOL/L (ref 4–13)
APTT PPP: 61 SECONDS (ref 23–35)
AST SERPL W P-5'-P-CCNC: 41 U/L (ref 5–45)
BASE EX.OXY STD BLDV CALC-SCNC: 91.4 % (ref 60–80)
BASE EXCESS BLDA CALC-SCNC: 0 MMOL/L (ref -2–3)
BASE EXCESS BLDV CALC-SCNC: -10.6 MMOL/L
BILIRUB SERPL-MCNC: 4.56 MG/DL (ref 0.2–1)
BUN SERPL-MCNC: 46 MG/DL (ref 5–25)
BUN SERPL-MCNC: 47 MG/DL (ref 5–25)
CA-I BLD-SCNC: 1.2 MMOL/L (ref 1.12–1.32)
CALCIUM SERPL-MCNC: 8.3 MG/DL (ref 8.3–10.1)
CALCIUM SERPL-MCNC: 8.4 MG/DL (ref 8.3–10.1)
CHLORIDE SERPL-SCNC: 95 MMOL/L (ref 100–108)
CHLORIDE SERPL-SCNC: 97 MMOL/L (ref 100–108)
CO2 SERPL-SCNC: 20 MMOL/L (ref 21–32)
CO2 SERPL-SCNC: 21 MMOL/L (ref 21–32)
CREAT SERPL-MCNC: 4.75 MG/DL (ref 0.6–1.3)
CREAT SERPL-MCNC: 4.81 MG/DL (ref 0.6–1.3)
ERYTHROCYTE [DISTWIDTH] IN BLOOD BY AUTOMATED COUNT: 22.3 % (ref 11.6–15.1)
GFR SERPL CREATININE-BSD FRML MDRD: 13 ML/MIN/1.73SQ M
GFR SERPL CREATININE-BSD FRML MDRD: 13 ML/MIN/1.73SQ M
GLUCOSE SERPL-MCNC: 138 MG/DL (ref 65–140)
GLUCOSE SERPL-MCNC: 141 MG/DL (ref 65–140)
GLUCOSE SERPL-MCNC: 145 MG/DL (ref 65–140)
HCO3 BLDA-SCNC: 26 MMOL/L (ref 24–30)
HCO3 BLDV-SCNC: 17.8 MMOL/L (ref 24–30)
HCT VFR BLD AUTO: 21.2 % (ref 36.5–49.3)
HCT VFR BLD CALC: 22 % (ref 36.5–49.3)
HGB BLD-MCNC: 7.3 G/DL (ref 12–17)
HGB BLDA-MCNC: 7.5 G/DL (ref 12–17)
INR PPP: 3.12 (ref 0.86–1.16)
LACTATE SERPL-SCNC: 3.3 MMOL/L (ref 0.5–2)
MAGNESIUM SERPL-MCNC: 2.3 MG/DL (ref 1.6–2.6)
MCH RBC QN AUTO: 32.4 PG (ref 26.8–34.3)
MCHC RBC AUTO-ENTMCNC: 34.4 G/DL (ref 31.4–37.4)
MCV RBC AUTO: 94 FL (ref 82–98)
NASAL CANNULA: 6
O2 CT BLDV-SCNC: 10.7 ML/DL
PCO2 BLD: 27 MMOL/L (ref 21–32)
PCO2 BLD: 50.3 MM HG (ref 42–50)
PCO2 BLDV: 53.1 MM HG (ref 42–50)
PH BLD: 7.32 [PH] (ref 7.3–7.4)
PH BLDV: 7.14 [PH] (ref 7.3–7.4)
PHOSPHATE SERPL-MCNC: 8 MG/DL (ref 2.7–4.5)
PLATELET # BLD AUTO: 73 THOUSANDS/UL (ref 149–390)
PMV BLD AUTO: 9.3 FL (ref 8.9–12.7)
PO2 BLD: 86 MM HG (ref 35–45)
PO2 BLDV: 80.2 MM HG (ref 35–45)
POTASSIUM BLD-SCNC: 3.9 MMOL/L (ref 3.5–5.3)
POTASSIUM SERPL-SCNC: 4.4 MMOL/L (ref 3.5–5.3)
POTASSIUM SERPL-SCNC: 4.5 MMOL/L (ref 3.5–5.3)
PROT SERPL-MCNC: 7.2 G/DL (ref 6.4–8.2)
PROTHROMBIN TIME: 32.6 SECONDS (ref 12.1–14.4)
RBC # BLD AUTO: 2.25 MILLION/UL (ref 3.88–5.62)
SAO2 % BLD FROM PO2: 96 % (ref 95–98)
SODIUM BLD-SCNC: 132 MMOL/L (ref 136–145)
SODIUM SERPL-SCNC: 128 MMOL/L (ref 136–145)
SODIUM SERPL-SCNC: 131 MMOL/L (ref 136–145)
SPECIMEN SOURCE: ABNORMAL
VANCOMYCIN SERPL-MCNC: 13 UG/ML
WBC # BLD AUTO: 16.81 THOUSAND/UL (ref 4.31–10.16)

## 2018-02-22 PROCEDURE — 99232 SBSQ HOSP IP/OBS MODERATE 35: CPT | Performed by: INTERNAL MEDICINE

## 2018-02-22 PROCEDURE — 87081 CULTURE SCREEN ONLY: CPT | Performed by: PHYSICIAN ASSISTANT

## 2018-02-22 PROCEDURE — 82330 ASSAY OF CALCIUM: CPT

## 2018-02-22 PROCEDURE — 36600 WITHDRAWAL OF ARTERIAL BLOOD: CPT

## 2018-02-22 PROCEDURE — 82947 ASSAY GLUCOSE BLOOD QUANT: CPT

## 2018-02-22 PROCEDURE — 84132 ASSAY OF SERUM POTASSIUM: CPT

## 2018-02-22 PROCEDURE — C9113 INJ PANTOPRAZOLE SODIUM, VIA: HCPCS | Performed by: EMERGENCY MEDICINE

## 2018-02-22 PROCEDURE — 80202 ASSAY OF VANCOMYCIN: CPT | Performed by: PHYSICIAN ASSISTANT

## 2018-02-22 PROCEDURE — 84295 ASSAY OF SERUM SODIUM: CPT

## 2018-02-22 PROCEDURE — 82803 BLOOD GASES ANY COMBINATION: CPT

## 2018-02-22 PROCEDURE — 85027 COMPLETE CBC AUTOMATED: CPT | Performed by: INTERNAL MEDICINE

## 2018-02-22 PROCEDURE — 80053 COMPREHEN METABOLIC PANEL: CPT | Performed by: INTERNAL MEDICINE

## 2018-02-22 PROCEDURE — 85610 PROTHROMBIN TIME: CPT | Performed by: PHYSICIAN ASSISTANT

## 2018-02-22 PROCEDURE — 82805 BLOOD GASES W/O2 SATURATION: CPT | Performed by: EMERGENCY MEDICINE

## 2018-02-22 PROCEDURE — 94640 AIRWAY INHALATION TREATMENT: CPT

## 2018-02-22 PROCEDURE — 99233 SBSQ HOSP IP/OBS HIGH 50: CPT | Performed by: INTERNAL MEDICINE

## 2018-02-22 PROCEDURE — 83605 ASSAY OF LACTIC ACID: CPT | Performed by: PHYSICIAN ASSISTANT

## 2018-02-22 PROCEDURE — 94760 N-INVAS EAR/PLS OXIMETRY 1: CPT

## 2018-02-22 PROCEDURE — 84100 ASSAY OF PHOSPHORUS: CPT | Performed by: PHYSICIAN ASSISTANT

## 2018-02-22 PROCEDURE — 85014 HEMATOCRIT: CPT

## 2018-02-22 PROCEDURE — 85730 THROMBOPLASTIN TIME PARTIAL: CPT | Performed by: PHYSICIAN ASSISTANT

## 2018-02-22 PROCEDURE — 83735 ASSAY OF MAGNESIUM: CPT | Performed by: PHYSICIAN ASSISTANT

## 2018-02-22 PROCEDURE — 80048 BASIC METABOLIC PNL TOTAL CA: CPT | Performed by: EMERGENCY MEDICINE

## 2018-02-22 PROCEDURE — 5A1D70Z PERFORMANCE OF URINARY FILTRATION, INTERMITTENT, LESS THAN 6 HOURS PER DAY: ICD-10-PCS | Performed by: INTERNAL MEDICINE

## 2018-02-22 RX ORDER — SODIUM CHLORIDE FOR INHALATION 0.9 %
3 VIAL, NEBULIZER (ML) INHALATION
Status: DISCONTINUED | OUTPATIENT
Start: 2018-02-22 | End: 2018-03-03

## 2018-02-22 RX ORDER — ALBUMIN (HUMAN) 12.5 G/50ML
12.5 SOLUTION INTRAVENOUS ONCE
Status: COMPLETED | OUTPATIENT
Start: 2018-02-22 | End: 2018-02-22

## 2018-02-22 RX ORDER — VANCOMYCIN HYDROCHLORIDE 1 G/200ML
10 INJECTION, SOLUTION INTRAVENOUS ONCE
Status: COMPLETED | OUTPATIENT
Start: 2018-02-22 | End: 2018-02-22

## 2018-02-22 RX ORDER — ALBUTEROL SULFATE 2.5 MG/3ML
2.5 SOLUTION RESPIRATORY (INHALATION) EVERY 4 HOURS PRN
Status: DISCONTINUED | OUTPATIENT
Start: 2018-02-22 | End: 2018-03-07

## 2018-02-22 RX ORDER — HEPARIN SODIUM 5000 [USP'U]/ML
5000 INJECTION, SOLUTION INTRAVENOUS; SUBCUTANEOUS EVERY 8 HOURS SCHEDULED
Status: DISCONTINUED | OUTPATIENT
Start: 2018-02-22 | End: 2018-02-23

## 2018-02-22 RX ORDER — PANTOPRAZOLE SODIUM 40 MG/1
40 INJECTION, POWDER, FOR SOLUTION INTRAVENOUS
Status: DISCONTINUED | OUTPATIENT
Start: 2018-02-22 | End: 2018-02-28

## 2018-02-22 RX ORDER — LEVALBUTEROL 1.25 MG/.5ML
1.25 SOLUTION, CONCENTRATE RESPIRATORY (INHALATION)
Status: DISCONTINUED | OUTPATIENT
Start: 2018-02-22 | End: 2018-03-03

## 2018-02-22 RX ADMIN — LACTULOSE 20 G: 20 SOLUTION ORAL at 20:36

## 2018-02-22 RX ADMIN — LEVALBUTEROL HYDROCHLORIDE 1.25 MG: 1.25 SOLUTION, CONCENTRATE RESPIRATORY (INHALATION) at 14:59

## 2018-02-22 RX ADMIN — RIFAXIMIN 550 MG: 550 TABLET ORAL at 20:36

## 2018-02-22 RX ADMIN — RIFAXIMIN 550 MG: 550 TABLET ORAL at 08:18

## 2018-02-22 RX ADMIN — MIDODRINE HYDROCHLORIDE 5 MG: 5 TABLET ORAL at 12:04

## 2018-02-22 RX ADMIN — PIPERACILLIN SODIUM,TAZOBACTAM SODIUM 2.25 G: 2; .25 INJECTION, POWDER, FOR SOLUTION INTRAVENOUS at 10:47

## 2018-02-22 RX ADMIN — OCTREOTIDE ACETATE 100 MCG: 100 INJECTION, SOLUTION INTRAVENOUS; SUBCUTANEOUS at 05:45

## 2018-02-22 RX ADMIN — VANCOMYCIN HYDROCHLORIDE 1000 MG: 1 INJECTION, SOLUTION INTRAVENOUS at 11:22

## 2018-02-22 RX ADMIN — MIDODRINE HYDROCHLORIDE 5 MG: 5 TABLET ORAL at 07:23

## 2018-02-22 RX ADMIN — LEVALBUTEROL HYDROCHLORIDE 1.25 MG: 1.25 SOLUTION, CONCENTRATE RESPIRATORY (INHALATION) at 05:08

## 2018-02-22 RX ADMIN — DEXMEDETOMIDINE HYDROCHLORIDE 0.1 MCG/KG/HR: 100 INJECTION, SOLUTION INTRAVENOUS at 23:04

## 2018-02-22 RX ADMIN — ISODIUM CHLORIDE 3 ML: 0.03 SOLUTION RESPIRATORY (INHALATION) at 05:09

## 2018-02-22 RX ADMIN — Medication 100 MG: at 08:18

## 2018-02-22 RX ADMIN — VANCOMYCIN HYDROCHLORIDE 1000 MG: 1 INJECTION, SOLUTION INTRAVENOUS at 17:45

## 2018-02-22 RX ADMIN — PIPERACILLIN SODIUM,TAZOBACTAM SODIUM 2.25 G: 2; .25 INJECTION, POWDER, FOR SOLUTION INTRAVENOUS at 03:30

## 2018-02-22 RX ADMIN — PIPERACILLIN SODIUM,TAZOBACTAM SODIUM 2.25 G: 2; .25 INJECTION, POWDER, FOR SOLUTION INTRAVENOUS at 19:13

## 2018-02-22 RX ADMIN — ISODIUM CHLORIDE 3 ML: 0.03 SOLUTION RESPIRATORY (INHALATION) at 21:01

## 2018-02-22 RX ADMIN — ISODIUM CHLORIDE 3 ML: 0.03 SOLUTION RESPIRATORY (INHALATION) at 15:00

## 2018-02-22 RX ADMIN — LACTULOSE 20 G: 20 SOLUTION ORAL at 16:48

## 2018-02-22 RX ADMIN — METHYLPREDNISOLONE 32 MG: 16 TABLET ORAL at 07:24

## 2018-02-22 RX ADMIN — LEVALBUTEROL HYDROCHLORIDE 1.25 MG: 1.25 SOLUTION, CONCENTRATE RESPIRATORY (INHALATION) at 21:01

## 2018-02-22 RX ADMIN — PANTOPRAZOLE SODIUM 40 MG: 40 INJECTION, POWDER, FOR SOLUTION INTRAVENOUS at 10:03

## 2018-02-22 RX ADMIN — LACTULOSE 20 G: 20 SOLUTION ORAL at 08:18

## 2018-02-22 RX ADMIN — HEPARIN SODIUM 5000 UNITS: 5000 INJECTION, SOLUTION INTRAVENOUS; SUBCUTANEOUS at 21:15

## 2018-02-22 RX ADMIN — OCTREOTIDE ACETATE 100 MCG: 100 INJECTION, SOLUTION INTRAVENOUS; SUBCUTANEOUS at 21:15

## 2018-02-22 RX ADMIN — MIDODRINE HYDROCHLORIDE 5 MG: 5 TABLET ORAL at 16:48

## 2018-02-22 RX ADMIN — FOLIC ACID 1 MG: 1 TABLET ORAL at 08:19

## 2018-02-22 RX ADMIN — OCTREOTIDE ACETATE 100 MCG: 100 INJECTION, SOLUTION INTRAVENOUS; SUBCUTANEOUS at 17:05

## 2018-02-22 RX ADMIN — ALBUMIN HUMAN 12.5 G: 0.25 SOLUTION INTRAVENOUS at 14:53

## 2018-02-22 NOTE — PLAN OF CARE
CARDIOVASCULAR - ADULT     Maintains optimal cardiac output and hemodynamic stability Not Progressing        DISCHARGE PLANNING - CARE MANAGEMENT     Discharge to post-acute care or home with appropriate resources Not Progressing        GASTROINTESTINAL - ADULT     Minimal or absence of nausea and/or vomiting Not Progressing     Maintains or returns to baseline bowel function Not Progressing     Maintains adequate nutritional intake Not Progressing        GENITOURINARY - ADULT     Maintains or returns to baseline urinary function Not Progressing     Absence of urinary retention Not Progressing     Urinary catheter remains patent Not Progressing        INFECTION - ADULT     Absence or prevention of progression during hospitalization Not Progressing     Absence of fever/infection during neutropenic period Not Progressing        METABOLIC, FLUID AND ELECTROLYTES - ADULT     Electrolytes maintained within normal limits Not Progressing     Fluid balance maintained Not Progressing        Potential for Falls     Patient will remain free of falls Not Progressing        Prexisting or High Potential for Compromised Skin Integrity     Skin integrity is maintained or improved Not Progressing          HEMATOLOGIC - ADULT     Maintains hematologic stability Progressing        METABOLIC, FLUID AND ELECTROLYTES - ADULT     Glucose maintained within target range Progressing        Nutrition/Hydration-ADULT     Nutrient/Hydration intake appropriate for improving, restoring or maintaining nutritional needs Progressing        PAIN - ADULT     Verbalizes/displays adequate comfort level or baseline comfort level Progressing        SKIN/TISSUE INTEGRITY - ADULT     Skin integrity remains intact Progressing     Incision(s), wounds(s) or drain site(s) healing without S/S of infection Progressing     Oral mucous membranes remain intact Progressing

## 2018-02-22 NOTE — PROGRESS NOTES
Progress Note - Infectious Disease   Juan Duenas 50 y o  male MRN: [id number]  Unit/Bed#: MICU 07 Encounter: [id number]      Impression/Recommendations:  1   Pneumonia, probably aspiration  Given significant comorbid illnesses and recent hospitalizations, patient is at risk for nosocomial pathogens, especially nosocomial gram negatives any MRSA  Agree with empiric vancomycin/Zosyn  Will check for MRSA colonization  2   Acute superimposed on chronic hypoxic respiratory failure  This is most likely secondary to aspiration above  Patient appears to be more stable with O2 support  3   Worsening encephalopathy  This is most likely secondary to aspiration pneumonia above, superimposed on baseline hepatic encephalopathy  At present, patient is minimally responsive  4  Right great toe gangrenous ulcer and osteomyelitis   There is no evidence of cellulitis clinically   Patient has no fever leukocytosis   No antibiotic is necessary   However, patient needs to have this toe amputated, to avoid development of wet gangrene   Patient has been refusing toe amputation  No antibiotic needed at present time  Monitor toe  Recommend toe amputation      5   Decompensated cirrhosis   This is due to patient's noncompliance with prescribed treatment plan  Yevonne Haff is no evidence of peritonitis clinically   Patient is status post paracentesis with benign peritoneal fluid   Systemic corticosteroid started  Patient is status post repeat paracentesis, with benign parameters also  Even with active pneumonia, it should be fine to continue systemic corticosteroid  No antibiotic needed  Management per GI and primary service      6  Ankita Humbles   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   Dehydration may contribute   Creatinine worsening  HD was started yesterday  Antibiotic dosages adjusted accordingly    Monitor creatinine      Discussed with Dr Yousif Petit from [address]      Antibiotics:  Vancomycin/Zosyn # 1     Subjective:  Events yesterday evening noted  Rapid response was called for altered mental status and respiratory distress  Patient was transferred to ICU  At present, he is lethargic/sleepy, minimally responsive  Abdomen remains distended  Patient is status post repeat paracentesis yesterday  Temperature stays down   No chills  Objective:  Vitals:  HR:  [] 92  Resp:  [13-25] 13  BP: (110-169)/(48-96) 133/59  SpO2:  [91 %-100 %] 99 %  Temp (24hrs), Av 2 °F (36 2 °C), Min:96 5 °F (35 8 °C), Max:97 9 °F (36 6 °C)  Current: Temperature: (!) 96 6 °F (35 9 °C)    Physical Exam:     General: Awake, alert, cooperative, no distress  Lungs: Expansion symmetric, no rales, no wheezing, respirations unlabored  Heart[de-identified]  Regular rate and rhythm, S1 and S2 normal, no murmur  Abdomen: Soft, nondistended, non-tender, bowel sounds active all four quadrants,        no masses, no organomegaly  Extremities: No edema  No erythema/warmth  No ulcer  Nontender to palpation  Skin:  No rash  Invasive Devices     Peripheral Intravenous Line            Peripheral IV 18 Right Arm less than 1 day    Peripheral IV 18 Left;Dorsal (posterior) Arm less than 1 day          Line            HD Cath Double 1 day          Drain            Urethral Catheter Temperature probe 16 Fr  3 days    Open Drain Left Abdomen 1 day    NG/OG/Enteral Tube Nasogastric Right nares less than 1 day                Labs studies:   I have personally reviewed pertinent labs      Results from last 7 days  Lab Units 18  1159 18  0431 18  0019 18  1836  18  1644 18  0533   SODIUM mmol/L  --  131* 128*  --   --  129* 126*   POTASSIUM mmol/L  --  4 5 4 4  --   --  4 4 4 8   CHLORIDE mmol/L  --  97* 95*  --   --  95* 95*   CO2 mmol/L  --  20* 21  --   --  22 19*   ANION GAP mmol/L  --  14* 12  --   --  12 12   BUN mg/dL  --  46* 47*  --   --  43* 56*   CREATININE mg/dL  --  4 81* 4 75*  --   -- 4 50* 5 40*   EGFR ml/min/1 73sq m  --  13 13  --   --  14 11   GLUCOSE RANDOM mg/dL  --  141* 138  --   --  158* 139   GLUCOSE, ISTAT mg/dl 145*  --   --   --   < >  --   --    CALCIUM mg/dL  --  8 3 8 4  --   --  8 3 8 9   AST U/L  --  41  --  38  --   --  37   ALT U/L  --  28  --  26  --   --  25   ALK PHOS U/L  --  86  --  94  --   --  98   TOTAL PROTEIN g/dL  --  7 2  --  7 0  --   --  7 7   BILIRUBIN TOTAL mg/dL  --  4 56*  --  4 99*  --   --  5 57*   < > = values in this interval not displayed  Results from last 7 days  Lab Units 02/22/18  1159 02/22/18  0431 02/21/18  1836  02/21/18  0533   WBC Thousand/uL  --  16 81* 15 79*  --  13 35*   HEMOGLOBIN g/dL  --  7 3* 7 7*  --  7 4*   I STAT HEMOGLOBIN g/dl 7 5*  --   --   < >  --    PLATELETS Thousands/uL  --  73* 67*  --  104*   < > = values in this interval not displayed  Results from last 7 days  Lab Units 02/21/18  1156 02/17/18  1929 02/17/18  1829 02/17/18  1336   BLOOD CULTURE   --  No Growth After 4 Days  No Growth After 4 Days  --   --    GRAM STAIN RESULT  Rare Polys  No bacteria seen  --   --  Rare Polys  No bacteria seen   BODY FLUID CULTURE, STERILE  No growth  --   --  No growth   INFLUENZA A PCR   --   --  None Detected  --    INFLUENZA B PCR   --   --  None Detected  --    RSV PCR   --   --  None Detected  --        Imaging Studies:   I have personally reviewed pertinent imaging study reports and images in PACS  CXR reviewed personally  Bibasilar infiltrates  Head CT reviewed personally  No acute changes  EKG, Pathology, and Other Studies:   I have personally reviewed pertinent reports

## 2018-02-22 NOTE — PROGRESS NOTES
Upon initial assessment pt was found to be somnolent and laboriously breathing  Pt unable to follow commands, open his eyes, or provide verbal responses  Per another RN's report, patient was able to state his name prior to be transported to dialysis earlier in the day  Pt was also alert enough to take his morning medications  T 98 6 rectally HR 91 R 14 /73 O2 94% on 4L nasal cannula  SOD paged to notify of pt's current condition  Rapid response called d/t change in mental status, decompensation, and concern for inability to maintain  airway  Pt evaluated and transferred to MICU for a higher level of care

## 2018-02-22 NOTE — PROGRESS NOTES
02/22/18 1400   Plan of Care   Comments  visited with pt  Pt was not awake but pt's girlfriend was present   and pt's friend talked about the pt  life and circumstances  Assessment Completed by:  Other (Comment)  (follow-up)

## 2018-02-22 NOTE — MEDICAL STUDENT
Progress Note - Critical Care   Lydia Landau 50 y o  male MRN: [id number]  Unit/Bed#: [facility] 07 Encounter: [id number]    Assessment:   Principal Problem:    Decompensated hepatic cirrhosis ([facility] )  Active Problems:    Ascites    Hepatic encephalopathy ([facility] [address] )    PRANAY (acute kidney injury) ([facility] )    Hypoalbuminemia    Hyponatremia    Hyperammonemia (HCC)    Elevated alkaline phosphatase level    Anemia of chronic disease    Osteomyelitis of toe of right foot (HCC)    Alcohol abuse    Alcoholic hepatitis    Cough    Hypoxia    Oliguria  Resolved Problems:    Abdominal pain    Hypotension    SIRS (systemic inflammatory response syndrome) (HCC)    Lactic acidosis    Problems: AMS 2/2 to hepatic encephalopathy, decompensated hepatic cirrhosis, ESLD w/ ascites, PRANAY on ESRD, possible pneumonia, hyperammonemia, anemia of chronic dz, lactic acidosis, hypoalbuminemia, hyponatremia, hypochloremia, elevated liver enzymes, osteomyelitis of R big toe, alcohol abuse      Plan:  Neuro:   · Altered mental status 2/2 to hepatic encephalopathy vs  sepsis from RLL pneumonia  · Alert but oriented only to person and location (hospital but not [facility]); GCS 11-13 (alternates between incomprehensible sounds and confused speech); CAM ICU+  · Ammonia level 58 @ 16:44 on 2/21 (was 104 on 2/19)  · Stat head CT 2/21 showed motion degraded examination and no gross intracranial abnormality    · Continue waist belt restraints  · No sedition or pain medication on-board as of now   · Regulate sleep/wake cycle  · Trend neuro exam  CV:   · No active issues  · Hypotension from previous MICU stay - resolved  · Not requiring pressors  · Albumin 25mg q6hr due to concern for hypovolemia s/p paracentesis yesterday (2/21)  · Monitor for hypovolemia in light of paracentesis (4,100cc) on 2/21  · Rhythm: NSR currently w/ intermittent sinus tachycardia  · Follow rhythm on telemetry  Lung:   · Increased WOB in the setting of possible RLL pneumonia  · CXR 2/21 showed increasing airspace opacities in the right perihilar region and left suprahilar region may represent aspiration and/or pneumonia  · RT ordered Xopenex udns TID and albuterol udns QID per Respiratory Protocol  · Pulmonary toileting and IS  · Wean oxygen as able - currently satting in the high 90s on 6L  · Was on RA when he left the MICU on 2/20  · SpO2 goal >88%  · Respiratory acidosis w/ superimposed metabolic acidosis   · ABG yesterday: pH 7 291, Co2 42 9, pO2 67, bicarb 20, base deficit deficit of 6  VBG today around at 0343 showed pH of 7 143 and base deficit of 10   Bicarb on BMP is 20  GI:   · Acute decompensated liver cirrhosis  · GI is following; per them, poor transplant candidate   · MELD score worsening at 38   · Continue midodrine 5mg PO TID and octreotide 100mcg IV TID; also on methylprednisone 32mg PO qD for alcoholic hepatitis, but consider d/c'ing due to possible pneumonia   · Titrating lactulose to 2-4 BMs/day; continue rifaximin 550mg BID - NG tube in place to give these  · Stat ammonia level 2/21was 58<--104<--78; continue to trend  · Underwent paracentesis yesterday (2/21) w/ 4,100cc out; fluid showed , 37% neutrophils, albumin 0 7 (serum album 4 2), LDH 46, and protein <2  · Continue albumin 25g q6hr   · Stress ulcer prophylaxis: Protonix 40mg IV  · Nausea controlled w/ ondansetron 4mg IV TID prn  · Bowel regimen: lactulose  FEN:   · Metabolic acidosis w/ AG of 14 this morning (2/22)  · Lactic acid 1 9 (2/19)-->3 7 (2/21)--> 3 3 (2/22)  · Hyponatremic and hypochloremic  · Fluid/Diuretic plan: s/p 500mL bolus of NSS; consider second bolus for hyponatremia and hypochloremia   · Nutrition/diet plan: NPO for now w/ NG tube in place due to AMS and in order to administer lactulose  · Replete electrolytes with goals: K >4 0, Mag >2 0, and Phos >3 0  · Phos 8 this morning; consider 21338qg PO PhosLo  · Alcoholism  · Continue thiamine 472HB PO qD and folic acid 1mg PO qD  :   · PRANAY 2/2 to possible hepatorenal syndrome given hx of decompensated cirrhosis  · S/p dialysis 2/21 (IR placed temporary R IJ cath on 2/21); will receive HD today  · Nephrology is following  · Cr 4 75-->4 81; baseline of 0 5 in 2016  · Indwelling rivera in-place  · Trend UOP and BUN/creat  · Strict I and O  ID:   · Sepsis (hypothermic, leukocytosis, AMS at time of rapid response) 2/2 to possible pneumonia  · WBC 15 79-->16 81; continue to trend  · F/u [facility] 2/21; BC 2/17 NGTD (@4d)  · F/u peritoneal fluid cx; fluid analysis showed , 37% neutrophils, albumin 0 7 (serum album 4 2), LDH 46, and protein <2  · Trend temps and WBC count  · WBC: 15 79-->16 81  · Afebrile; maintain normothermia  · Continue Zosyn 2 25g IV and reorder vancomycin at 10mg/kg (rounded up to 1g) for one dose before HD today and one dose follow HD today; random vanc level 13 this morning; order vanc trough before 3rd HD session   Heme:   · Trend hgb and plts  · Transfuse as needed for goal hgb >7 and transfuse plts as needed for goal >50  Endo:   · Glycemic control plan: Blood glucose not controlled - start SSI  MSK/Skin:   · Unstageable pressure ulcer of L hip and moisture-associated skin damage of the sacrum  · Continue wound care   · Frequent turning and pressure off-loading  · R great toe osteomyelitis   Disposition: ICU admission  Given critical illness, patient length of stay will require greater than two midnights  · F/u CM and palliative care consults     ______________________________________________________________________    HPI/24hr events:   Kenzie Stevens is a 50 y o  M who was admitted to [facility] on 2/17 for acute decompensated alcoholic liver cirrhosis that was recently diagnosed during an admission at [facility] in January      He was in the MICU from 2/18-2/20 out of concern for worsening hypotension, anemia, and renal function  He never required pressors but was on scheduled albumin q6hr for his hypotension and a total of 2 units PRBCs for his anemia   GI and nephrology have been following  DDX has been between ATN from hypotension versus hepatorenal syndrome  Have been treating with octreotide and midodrine      He was transferred out of the unit on 2/21 as he was stable, despite an overall very poor prognosis from his liver failure  MELD score had been about 35  Is 38 as of yesterday (2/21)  His renal function worsened such that he had a temporary dialysis catheter placed by IR yesterday (2/21) and underwent his first session this afternoon  He also underwent paracentesis today with removal of 4,100cc      A rapid response was called around 16:55 for altered mental status  During exam, patient was having increased work of breathing and it required a significant amount of stimulation to get him to respond to voice  He is not oriented to person, place, time, or situation  He also had visible intercostal retractions  Bedside ABG istat showed pH of 7 27      Discussion with staff revealed that he only had 1 BM in the last 24hr  Had been titrating lactulose to 2-4 BMs/day  No o/n events per nursing aside for agitation and difficulty sleeping  Lines  Peripheral IV 02/21/18 Right Arm less than 1 day   Peripheral IV 02/22/18 Left;Dorsal (posterior) Arm less than 1 day         HD Cath Double less than 1 day        Infusions  none  ______________________________________________________________________  Physical Exam:     Physical Exam   Constitutional: He appears well-developed  He appears distressed  HENT:   Head: Normocephalic and atraumatic  Cheilitis  NG tube in place  Eyes:   Pupils ~4 5mm b/l and sluggishly reactive to light  Neck: Normal range of motion  Neck supple  Cardiovascular: Normal rate and regular rhythm  Pulmonary/Chest: He is in respiratory distress  Increased WOB w/ some retractions  Diminished breath sounds at R lung base  Rhonchi present in R lung base  Abdominal: Bowel sounds are normal  He exhibits distension   There is no tenderness  There is no rebound and no guarding  Genitourinary: Penis normal    Genitourinary Comments: Mendoza in-place  Musculoskeletal:   Anasarca  Unstageable pressure ulcer of L hip and moisture-associated skin damage of the sacrum  R great toe osteomyelitis w/ overlying skin changes  Neurological: He is alert  Oriented to person but not place or situation  GCS 11-13 (soemtimes responds appropriately and sometimes just produces incomprehensible sounds)  Skin: Skin is warm and dry      ______________________________________________________________________  Temperature:   Temp (24hrs), Av 2 °F (36 2 °C), Min:95 7 °F (35 4 °C), Max:98 °F (36 7 °C)    Current Temperature: 97 8 °F (36 6 °C)    Vitals:    18 0509 18 0513 18 0547 18 0552   BP:    151/52   BP Location:       Pulse:    94   Resp:    21   Temp:       TempSrc:       SpO2: 96% 96%  98%   Weight:   90 7 kg (199 lb 15 3 oz)    Height:         Arterial Line BP: 132/74       Weights:   IBW: 73 kg    Body mass index is 28 69 kg/m²  Weight (last 2 days)     Date/Time   Weight    18 0547  90 7 (199 96)            Height: 5' 10" (177 8 cm)  Hemodynamic Monitoring:  N/A     Lab Results   Component Value Date    PHART 7 238 (LL) 2018    QIT3YHC 45 5 (H) 2018    PO2ART 78 5 2018    FLY2NLR 19 0 (L) 2018    BEART -7 9 2018    SOURCE Radial, Right 2018     Intake and Outputs:  I/O        0701 -  0700  07 -  0700    P  O  120     I V  (mL/kg) 100 (1) 500 (5 5)    Blood 350     IV Piggyback  900    Total Intake(mL/kg) 570 (5 9) 1400 (15 4)    Urine (mL/kg/hr) 230 (0 1) 120 (0 1)    Emesis/NG output  750 (0 3)    Drains  1375 (0 6)    Other  2061 (0 9)    Stool 0 (0) 0 (0)    Total Output 230 4306    Net +340 -2906          Unmeasured Stool Occurrence 2 x 1 x        UOP: 0 1/hour   Nutrition:        Diet Orders            Start     Ordered    18 1724  Diet NPO  Diet effective now     Question Answer Comment   Diet Type NPO    RD to adjust diet per protocol? No        02/21/18 1726        Labs:     Results from last 7 days  Lab Units 02/22/18 0431 02/21/18  1836 02/21/18  1705 02/21/18  0533  02/20/18  0455  02/19/18  0439  02/17/18  1155   WBC Thousand/uL 16 81* 15 79*  --  13 35*  < > 7 23  --  5 63  < > 7 71   HEMOGLOBIN g/dL 7 3* 7 7*  --  7 4*  < > 6 8*  < > 7 0*  < > 8 5*   I STAT HEMOGLOBIN g/dl  --   --  7 8*  --   --   --   --   --   --   --    HEMATOCRIT % 21 2* 22 0*  --  21 2*  < > 19 0*  --  19 7*  < > 24 0*   PLATELETS Thousands/uL 73* 67*  --  104*  < > 82*  --  72*  < > 128*   NEUTROS PCT %  --   --   --   --   --  79*  --  63  --  61   MONOS PCT %  --   --   --   --   --  10  --  19*  --  18*   MONO PCT MAN %  --  9  --   --   --   --   --   --   --   --    < > = values in this interval not displayed     Results from last 7 days  Lab Units 02/22/18 0431 02/22/18  0019 02/21/18 1836 02/21/18  1705 02/21/18  1644 02/21/18  0533   SODIUM mmol/L 131* 128*  --   --  129* 126*   POTASSIUM mmol/L 4 5 4 4  --   --  4 4 4 8   CHLORIDE mmol/L 97* 95*  --   --  95* 95*   CO2 mmol/L 20* 21  --   --  22 19*   BUN mg/dL 46* 47*  --   --  43* 56*   CREATININE mg/dL 4 81* 4 75*  --   --  4 50* 5 40*   CALCIUM mg/dL 8 3 8 4  --   --  8 3 8 9   TOTAL PROTEIN g/dL 7 2  --  7 0  --   --  7 7   BILIRUBIN TOTAL mg/dL 4 56*  --  4 99*  --   --  5 57*   ALK PHOS U/L 86  --  94  --   --  98   ALT U/L 28  --  26  --   --  25   AST U/L 41  --  38  --   --  37   GLUCOSE RANDOM mg/dL 141* 138  --   --  158* 139   GLUCOSE, ISTAT mg/dl  --   --   --  163*  --   --        Results from last 7 days  Lab Units 02/22/18  0431 02/21/18  0533 02/20/18  0455   MAGNESIUM mg/dL 2 3 2 5 2 3       Results from last 7 days  Lab Units 02/22/18  0431 02/21/18  0533 02/20/18  0455   PHOSPHORUS mg/dL 8 0* 8 0* 6 0*        Results from last 7 days  Lab Units 02/22/18  0431 02/21/18  1836 02/21/18  0533 02/17/18  1217   INR  3 12* 2 70* 2 53*  < > 1 97*   PTT seconds 61*  --   --   --  45*   < > = values in this interval not displayed  Results from last 7 days  Lab Units 02/21/18  2200   LACTIC ACID mmol/L 3 7*     No results found for: TROPONINI  Imaging: I have personally reviewed pertinent reports  -CXR 2/21 pm: Increasing airspace opacities in the right perihilar region and left suprahilar region may represent aspiration and/or pneumonia  -CT head w/o contrast 2/21: Motion degraded examination  No gross intracranial abnormality  -CXR 2/21 am: 1  Right internal jugular dialysis catheter with tip in the midsuperior vena cava  2  Mild vascular congestion  Right-sided alveolar infiltrates, likely cardiogenic in nature, however superimposed pneumonia not excluded  Clinical correlation and follow-up examination recommended  -US abd 2/19: Cirrhosis with sequela of portal hypertension and ascites as detailed above  Cholelithiasis  If there is clinical concern for cholecystitis, nuclear medicine HIDA scan may be obtained  -BERNIE and waveform analysis 2/19:  CONCLUSION:  RIGHT LOWER LIMB  Ankle/Brachial Index: 1 1 which is in the normal range  PPG/PVR Tracings are normal  Triphasic Doppler waveforms at the ankle  Metatarsal Pressure 167 mmHg  Great Toe Pressure: 125 mmHg, within limits for healing potential  LEFT LOWER LIMB  Ankle/Brachial Index: 1 2 which is in the normal range  PPG/PVR Tracings are normal  Triphasic Doppler waveforms at the ankle  Metatarsal Pressure 138 mmHg  Great Toe Pressure: 110 mmHg, within limits for healing potential  EKG:   -2/17: Sinus tachycardia w/ septal infarct of undetermined age  Micro:  Blood Culture:   Lab Results   Component Value Date    BLOODCX No Growth After 4 Days  02/17/2018    BLOODCX No Growth After 4 Days   02/17/2018     Urine Culture: No results found for: URINECX  Sputum Culture: No components found for: SPUTUMCX  Wound Culure: No results found for: WOUNDCULT    Lab Results   Component Value Date    BLOODCX No Growth After 4 Days  02/17/2018    BLOODCX No Growth After 4 Days  02/17/2018     Allergies: No Known Allergies  Medications:   Scheduled Meds:  Current Facility-Administered Medications:  albumin human 25 g Intravenous 4x Daily Rip Elizalde MD    albuterol 2 5 mg Nebulization Q4H PRN Charis Max, DO    folic acid 1 mg Oral Daily Mariama Hurtado, DO    influenza vaccine 0 5 mL Intramuscular Prior to discharge Thien Sandoval MD    lactulose 20 g Oral TID Ara Brock    levalbuterol 1 25 mg Nebulization TID Charis Max, DO    methylprednisolone 32 mg Oral Daily With Breakfast Elizabeth Young PA-C    midodrine 5 mg Oral TID YAZMIN Amaya    octreotide 100 mcg Intravenous Q8H [facility] Mati Glasgow,     ondansetron 4 mg Intravenous Q8H PRN Silvina July, DO    pantoprazole 40 mg Intravenous Q24H [facility] Morales Fuetnes MD    piperacillin-tazobactam 2 25 g Intravenous Q8H Demetrius Alonzo PA-C Last Rate: Stopped (02/22/18 0601)   rifaximin 550 mg Oral Q12H [facility] Shara Luque PA-C    sodium chloride 3 mL Nebulization TID Charis Santana,     thiamine 100 mg Oral Daily Mariama Hurtado, DO      Continuous Infusions:   PRN Meds:    albuterol 2 5 mg Q4H PRN   influenza vaccine 0 5 mL Prior to discharge   ondansetron 4 mg Q8H PRN     VTE Pharmacologic Prophylaxis: Reason for no pharmacologic prophylaxis ESRL causing increased PT/INR  VTE Mechanical Prophylaxis: sequential compression device  Invasive lines and devices:   Invasive Devices     Peripheral Intravenous Line            Peripheral IV 02/21/18 Right Arm less than 1 day    Peripheral IV 02/22/18 Left;Dorsal (posterior) Arm less than 1 day          Line            HD Cath Double less than 1 day          Drain            Urethral Catheter Temperature probe 16 Fr  3 days    Open Drain Left Abdomen 1 day    NG/OG/Enteral Tube Nasogastric Right nares less than 1 day                 Code Status: Level 1 - Full Code    Portions of the record may have been created with voice recognition software  Occasional wrong word or "sound a like" substitutions may have occurred due to the inherent limitations of voice recognition software  Read the chart carefully and recognize, using context, where substitutions have occurred      Shiv Hutchins

## 2018-02-22 NOTE — PROGRESS NOTES
Progress Note - Natalia Henley 50 y o  male MRN: [id number]    Unit/Bed#: [facility] 07 Encounter: [id number]        Subjective:     Patient is s/p paracentesis yesterday  Rapid response called at [phone] yesterday due to worsening mental status  Transferred back to the MICU  Patient receiving dialysis during encounter  NGT in place  Was not able to answer person, place or time  ROS: Unable to obtain as he is encephalopathic  Objective:     Vitals: Blood pressure 140/56, pulse 94, temperature 97 8 °F (36 6 °C), temperature source Oral, resp  rate 17, height 5' 10" (1 778 m), weight 90 7 kg (199 lb 15 3 oz), SpO2 99 %  ,Body mass index is 28 69 kg/m²  Intake/Output Summary (Last 24 hours) at 02/22/18 1046  Last data filed at 02/22/18 5841   Gross per 24 hour   Intake             1620 ml   Output             4091 ml   Net            -2471 ml       Physical Exam:     General Appearance: Unable to answer person, place or time  Lethargic appearing, only arousable temporarily with touch  In mild respiratory distress  Unable to assess for asterixis secondary to mental status and need for restraints  Lungs: Limited secondary to mental status and restraints, anteriorly decreased breath sounds  Heart: Regular rate and rhythm  Abdomen: Soft, non-tender, non-distended; bowel sounds normal; no masses or no organomegaly; ostomy draining on original paracentesis site with pink tinged fluid   Extremities: Generalized anasarca noted  +1 pitting edema of the lower extremities       Invasive Devices     Peripheral Intravenous Line            Peripheral IV 02/21/18 Right Arm less than 1 day    Peripheral IV 02/22/18 Left;Dorsal (posterior) Arm less than 1 day          Line            HD Cath Double less than 1 day          Drain            Urethral Catheter Temperature probe 16 Fr  3 days    Open Drain Left Abdomen 1 day    NG/OG/Enteral Tube Nasogastric Right nares less than 1 day                Lab Results:    Results from last 7 days  Lab Units 02/22/18  0431 02/21/18  1836  02/20/18  0455   WBC Thousand/uL 16 81* 15 79*  < > 7 23   HEMOGLOBIN g/dL 7 3* 7 7*  < > 6 8*   I STAT HEMOGLOBIN   --   --   < >  --    HEMATOCRIT % 21 2* 22 0*  < > 19 0*   PLATELETS Thousands/uL 73* 67*  < > 82*   NEUTROS PCT %  --   --   --  79*   LYMPHS PCT %  --   --   --  11*   LYMPHO PCT %  --  4*  --   --    MONOS PCT %  --   --   --  10   MONO PCT MAN %  --  9  --   --    EOS PCT %  --   --   --  0   EOSINO PCT MANUAL %  --  0  --   --    < > = values in this interval not displayed  Results from last 7 days  Lab Units 02/22/18  0431   SODIUM mmol/L 131*   POTASSIUM mmol/L 4 5   CHLORIDE mmol/L 97*   CO2 mmol/L 20*   BUN mg/dL 46*   CREATININE mg/dL 4 81*   CALCIUM mg/dL 8 3   TOTAL PROTEIN g/dL 7 2   BILIRUBIN TOTAL mg/dL 4 56*   ALK PHOS U/L 86   ALT U/L 28   AST U/L 41   GLUCOSE RANDOM mg/dL 141*       Results from last 7 days  Lab Units 02/22/18  0431   INR  3 12*       Results from last 7 days  Lab Units 02/17/18  1154   LIPASE u/L 247       Imaging Studies: I have personally reviewed pertinent imaging studies  Ct Abdomen Pelvis Wo Contrast    Result Date: 2/18/2018  Impression: 1  Cirrhosis with stigmata of portal hypertension  2   Large volume of abdominopelvic ascites  There is a layering hematocrit level in the dependent pelvis  However, with the attenuation of the dependent component measuring only 15 Hounsfield units, this is most likely small volume of blood products rather than large volume hemoperitoneum  3   Postprocedural pneumoperitoneum  Needle tract in the left mid abdomen is in the vicinity of a large body wall collateral  4   Small right pleural effusion and right base atelectasis  5   Anterior compression deformities of T7 and T12  I personally discussed this study with Neeru Luong on 2/18/2018 at 2:42 PM  Workstation performed: [id number]     Xr Chest Portable    Result Date: 2/21/2018  Impression: 1    Right internal jugular dialysis catheter with tip in the midsuperior vena cava  2   Mild vascular congestion  Right-sided alveolar infiltrates, likely cardiogenic in nature, however superimposed pneumonia not excluded  Clinical correlation and follow-up examination recommended  Workstation performed: [id number]     Xr Chest Pa & Lateral    Result Date: 2/18/2018  Impression: 1  No active pulmonary disease  2   Free intraperitoneal air which may be related to reported paracentesis the prior day though clinical correlation for acute abdominal symptoms recommended  I personally discussed this study with Haile Ingram on 2/18/2018 at 10:48 AM  Workstation performed: [id number]     Xr Foot 3+ Vw Right    Result Date: 2/19/2018  Impression: Soft tissue ulceration with bony changes of the tuft of the great toe, suggesting osteomyelitis  If there is concern for osteomyelitis, consider nuclear medicine white blood cell scan or MRI with gadolinium for further evaluation  A verbal report will be called by the reading room liaison following this dictation  Workstation performed: [id number]     Ct Head Wo Contrast    Result Date: 2/21/2018  Impression: Motion degraded examination  No gross intracranial abnormality  Workstation performed: [id number]     Xr Chest Pa Only    Result Date: 2/21/2018  Impression: Increasing airspace opacities in the right perihilar region and left suprahilar region may represent aspiration and/or pneumonia  Workstation performed: [id number]     Us Abdomen Limited    Result Date: 2/20/2018  Impression: Cirrhosis with sequela of portal hypertension and ascites as detailed above  Cholelithiasis  If there is clinical concern for cholecystitis, nuclear medicine HIDA scan may be obtained   Workstation performed: [id number]         Assessment and Plan:   Principal Problem:    Decompensated hepatic cirrhosis (HCC)  Active Problems:    Ascites    Hepatic encephalopathy ([address] )    PRANAY (acute kidney injury) ([address] ) Hypoalbuminemia    Hyponatremia    Hyperammonemia (HCC)    Elevated alkaline phosphatase level    Anemia of chronic disease    Osteomyelitis of toe of right foot (HCC)    Alcohol abuse    Alcoholic hepatitis    Cough    Hypoxia    Oliguria    1) Decompensated cirrhosis secondary to alcohol abuse - MELD 38 today  Increased secondary to need for hemodialysis  Child Baker C  His current encephalopathy likely a combination of decompensated cirrhosis, new lung opacities and lactic acidosis  He is status post paracentesis yesterday  4 liters drained per Critical Care team  Ascitic fluid analysis negative for SBP  Likely not a transplant candidate due to poor family support and history of recent active drinking  Patient's prognosis is very poor  - Continue lactulose per NGT, currently at TID  Titrate to 3-4 bowel movements per day  - Continue Xifaxan 550 mg BID  - Continue folic acid and thiamine as you are doing   - Continue Protonix IV QD  - Continue to monitor mentation   - Continue methylprednisolone for a total of 28 days - 2 week taper   - Spoke to Florina Grimes on the Critical Care team, they are attempting a family meeting today to discuss goals of care with the patient's family   - Follow-up on hospice/palliative care     2) PRANAY - Cr 4 81 today  Seems to have variable trend since admission  Patient was as high as 5 40  He is on hemodialysis  Suspicion for hepatorenal syndrome vs  ATN  Appreciate nephrology recommendations  - Octreotide and midodrine per nephrology   - 2nd HD today  - Monitor hypotension     3) New opacities in the right perihilar and left suprahilar regions - Per CXR obtained yesterday, new since admission on Sunday  WBC 16k today  Suspicion for aspiration PNA  Patient started on vancomycin and zosyn     - Antibx per Critical Care team and ID   - Spoke to ID attending, Dr Brijesh Umana, who is OK with continued methylprednisolone in the setting of this possible PNA

## 2018-02-22 NOTE — NUTRITION
If unable to safely advance PO diet in next 24 hrs rec start  Nutrihep @ 10 ml/hr and incr by 10 ml q 4 hrs as joshua to goal rate of 65 ml/hr + 1 PROSOURCE to = qd: 1560 ml,2400 Total Kcal,77 gm PRO,452 gm CHO,33 gm Fat,1186 ml Free H2O,3 5 mg CHO/kg/min

## 2018-02-22 NOTE — RESPIRATORY THERAPY NOTE
RT Protocol Note  Lg Foster 50 y o  male MRN: [id number]  Unit/Bed#: [facility] 07 Encounter: [id number]    Assessment    Principal Problem:    Decompensated hepatic cirrhosis ([place] [address] )  Active Problems:    Ascites    Hepatic encephalopathy (HCC)    PRANAY (acute kidney injury) ([place] [address] )    Hypoalbuminemia    Hyponatremia    Hyperammonemia (HCC)    Elevated alkaline phosphatase level    Anemia of chronic disease    Osteomyelitis of toe of right foot (HCC)    Alcohol abuse    Alcoholic hepatitis    Cough    Hypoxia    Oliguria      Home Pulmonary Medications:  na    Past Medical History:   Diagnosis Date    Cirrhosis ([place] [address] )     Hypertension      Social History     Social History    Marital status: /Civil Union     Spouse name: N/A    Number of children: N/A    Years of education: N/A     Social History Main Topics    Smoking status: Current Every Day Smoker     Packs/day: 0 20     Years: 35 00     Types: Cigarettes    Smokeless tobacco: Never Used    Alcohol use Yes      Comment: Pt "I quite a couple of weeks ago"    Drug use: No      Comment: prior history     Sexual activity: Not Asked     Other Topics Concern    None     Social History Narrative    None       Subjective         Objective    Physical Exam:   Assessment Type: Assess only  General Appearance: Lethargic, Other (Comment) (Pt  confused)  Respiratory Pattern: Tachypneic  Chest Assessment: Chest expansion symmetrical  Bilateral Breath Sounds: Diminished, Expiratory wheezes  Cough: None  O2 Device: nasal cannula    Vitals:  Blood pressure 145/70, pulse 92, temperature 97 9 °F (36 6 °C), temperature source Oral, resp  rate 21, height 5' 10" (1 778 m), weight 96 8 kg (213 lb 6 5 oz), SpO2 96 %        Results from last 7 days  Lab Units 02/21/18  1920 02/18/18  1407   PH ART  7 238* 7 412   PCO2 ART mm Hg 45 5* 33 4*   PO2 ART mm Hg 78 5 84 0   HCO3 ART mmol/L 19 0* 20 8*   BASE EXC ART mmol/L -7 9 -3 5   O2 CONTENT ART mL/dL 10 4* 8 9*   O2 HGB, ARTERIAL % 92 5* 94 5   ABG SOURCE  Radial, Right Line, Arterial   SANDOVAL TEST  Yes Yes   NON VENT ROOM AIR %  --  room air       Imaging and other studies: I have personally reviewed pertinent reports  and I have personally reviewed pertinent films in PACS    O2 Device: nasal cannula     Plan    Respiratory Plan: Mild Distress pathway        Resp Comments: Pt  evaluated at bedside per Respiratory protocol  Pt  is confused at this time and does not have any underlying pulmonary disease according to hx  CXR shows  possible aspiration pneumonia  Breath Sounds diminshed with slight expiratory wheezes bilaterally at this time  Will order xopenex udns TID and Albuterol udns prn per Respiratory Protocol

## 2018-02-22 NOTE — PROGRESS NOTES
Progress Note - Critical Care   nAtionette Riggs 50 y o  male MRN: [id number]  Unit/Bed#: [place] [address] Encounter: [id number]    Attending Physician: Melani Hyde, DO      ______________________________________________________________________  Assessment and Plan:   Principal Problem:    Decompensated hepatic cirrhosis ([facility] )  Active Problems:    Ascites    Hepatic encephalopathy (HCC)    PRANAY (acute kidney injury) ([facility] )    Hypoalbuminemia    Hyponatremia    Hyperammonemia (HCC)    Elevated alkaline phosphatase level    Anemia of chronic disease    Osteomyelitis of toe of right foot (HCC)    Alcohol abuse    Alcoholic hepatitis    Cough    Hypoxia    Oliguria  Resolved Problems:    Abdominal pain    Hypotension    SIRS (systemic inflammatory response syndrome) (HCC)    Lactic acidosis        Neuro:    -Worsening mental status, likely metabolic encephalopathy secondary to sepsis vs liver failure   -Ammonia lower at 58 despite decreased bowel movements   -Intermittently follows commands on exam today   -CT head yesterday (concern for unequal pupils, R > L) was unremarkable   -No sedating medications at this time    CV:    -Concern for hypovolemia post paracentesis yesterday; had not gotten albumin between 0420 and 1854 yesterday, but BP has been normal-high   -MAP    -Intermittent sinus tachy which is unchanged      Pulm:    -RLL pneumonia: concern for aspiration  See ID below   -Increased work of breathing yesterday with intercostal retractions and decreased breath sounds bilat   -CXR as above   -Receiving Xopenex, TID, albuterol nebs QID, saline nebs TID   -Was on RA when he left the MICU on 2/22; now on 6L with sats ranging %  Currently around 95   -Hypoxia most likely secondary to pneumonia  ABG yesterday: pH 7 291, Co2 42 9, pO2 67, bicarb 20, base deficit deficit of 6  VBG today around at 0343 showed pH of 7 143 and base deficit of 10  Bicarb on BMP is 20   May be combined respiratory/metabolic acidosis (resp from pneumonia, metabolic from lactic acidosis)  Checking lactate  Consider repeating ABG to better assess Co2      GI:    -Acute decompensated alcoholic hepatic cirrhosis: MELD is worsening at 38  Per GI note, not a candidate for transplant    -On octreotide, midodrine per Gi  Also on Medrol but need to consider discontinuing now that we are treating an active infection   -Albumin 25% Q 6 H   -S/P paracentesis yesterday: rule out SBP as driving source of new AMS/metabolic acidosis/sepsis  Total WBC was 211 with 37% neutrophils, not indicative of SBP  Culture pending   -Lactulose and rifaximin  Ammonia down to 58 yesterday  Inadequate Bms however  Titrate lactulose to 3-4 Bms  Increased from BID to TID yesterday  Also received a lactulose enema yesterday   -GI following  Appreciate input  :    -ARF, now on Hd  Received HD yesterday  Plan for today per nephrology   -HD catheter R IJ   -Anion gap acidosis: AG 14 from 12  -VBG as above with pH of 7 143  Appears to be a combination of metabolic and respiratory acidosis (bicarb 20 on BMP)  Metabolic component could be secondary to lactic acidosis (which could be related independently to his liver failure vs sepsis secondary to pneumonia) vs uremia vs hypovolemia (though BP has been good)   -Cr 4 81 from 4 75  BUN 46  F/E/N:     Fluids: Albumin 25% Q 6 H    Electrolytes: Hyponatremia improved  131 from 128  K 4 5  Cl 97, phos 8 0, Mg 2 3  Consider phos-lo    Nutrition: NPO secondary to mental status    ID:    -New leukocytosis  16 8 from 15 7 yesterday afternoon  Likely related to aspiration PNA   On Zosyn and vanco    -Random vanc was 13, will discuss dosing with pharmacy   -Afebrile, but was hypothermic yesterday at 95 7    Heme:    -Hgb stable at 7 3 (7 7)   -Platelets stable at 73 from 67    Endo:    -POCT glucose while NPO   -Glucose was 141 this AM     Msk/Skin:    -Chronic R great toe osteomyelitis   -ID following   -Reposition to prevent ulcer   -Anasarca secondary to liver failure    Disposition: ICU  Need to discuss goals of care with family/palliative    Code Status: Level 1 - Full Code    Counseling / Coordination of Care  Total Critical Care time spent 35 minutes excluding procedures, teaching and family updates  ______________________________________________________________________    Chief Complaint: Not able to provide      24 Hour Events: Rapid response called around 1700 from AMS  Brought back to [facility] for evaluation  CXR shows new RLL PNA        ______________________________________________________________________    Physical Exam:     Constitutional: Mild respiratory distress  Confused  HENT: NCAT  Pupils around 5mm bilat and sluggishly reactive    Cv: RRR, no murmur appreciated    Pulm: Diminished breath sounds throughout with upper airway sounds secondary to phonation    Gi: distended, soft  2nd paracentesis site (R abdomen) is dressed  1st paracentesis site (L abdomen has ostomy drain with ~100 cc of brett/pink colored fluid)    Gu: Mendoza in place with ~100 cc of dark yellow urine    Ms: Anasarca  Wound of R distal great toe    Neuro:Confused  Does know he is in a hospital but responds with "I dont know" to many questions  Intermittently follows commands  GCS ranges from 13-14    Skin: Dry    ______________________________________________________________________  Vitals:    18 3398 18 0547 18 0552 18 0700   BP:   151/52 142/52   BP Location:       Pulse:   94 92   Resp:   21 18   Temp:       TempSrc:       SpO2: 96%  98% 97%   Weight:  90 7 kg (199 lb 15 3 oz)     Height:           Temperature:   Temp (24hrs), Av °F (36 1 °C), Min:95 7 °F (35 4 °C), Max:97 9 °F (36 6 °C)    Current Temperature: 97 8 °F (36 6 °C)  Weights:   IBW: 73 kg    Body mass index is 28 69 kg/m²    Weight (last 2 days)     Date/Time   Weight    18 0547  90 7 (199 96)            Hemodynamic Monitoring:  N/A Non-Invasive/Invasive Ventilation Settings:  Respiratory    Lab Data (Last 4 hours)    None         O2/Vent Data (Last 4 hours)    None              Lab Results   Component Value Date    PHART 7 238 (LL) 02/21/2018    VSM2XCA 45 5 (H) 02/21/2018    PO2ART 78 5 02/21/2018    [id number] 19 0 (L) 02/21/2018    BEART -7 9 02/21/2018    SOURCE Radial, Right 02/21/2018     SpO2: SpO2: 97 %  Intake and Outputs:  I/O       02/20 0701 - 02/21 0700 02/21 0701 - 02/22 0700 02/22 0701 - 02/23 0700    P  O  120      I V  (mL/kg) 100 (1) 500 (5 5)     Blood 350      IV Piggyback  900     Total Intake(mL/kg) 570 (5 9) 1400 (15 4)     Urine (mL/kg/hr) 230 (0 1) 120 (0 1)     Emesis/NG output  750 (0 3)     Drains  1375 (0 6)     Other  2061 (0 9)     Stool 0 (0) 0 (0)     Total Output 230 4306      Net +340 -2906             Unmeasured Stool Occurrence 2 x 1 x           Nutrition:        Diet Orders            Start     Ordered    02/21/18 1724  Diet NPO  Diet effective now     Question Answer Comment   Diet Type NPO    RD to adjust diet per protocol? No        02/21/18 1726          Labs:     Results from last 7 days  Lab Units 02/22/18  0431 02/21/18  1836 02/21/18  1705 02/21/18  0533  02/20/18  0455  02/19/18  0439  02/17/18  1155   WBC Thousand/uL 16 81* 15 79*  --  13 35*  < > 7 23  --  5 63  < > 7 71   HEMOGLOBIN g/dL 7 3* 7 7*  --  7 4*  < > 6 8*  < > 7 0*  < > 8 5*   I STAT HEMOGLOBIN g/dl  --   --  7 8*  --   --   --   --   --   --   --    HEMATOCRIT % 21 2* 22 0*  --  21 2*  < > 19 0*  --  19 7*  < > 24 0*   PLATELETS Thousands/uL 73* 67*  --  104*  < > 82*  --  72*  < > 128*   NEUTROS PCT %  --   --   --   --   --  79*  --  63  --  61   MONOS PCT %  --   --   --   --   --  10  --  19*  --  18*   MONO PCT MAN %  --  9  --   --   --   --   --   --   --   --    < > = values in this interval not displayed      Results from last 7 days  Lab Units 02/22/18  0431 02/22/18  0019 02/21/18  1836 02/21/18  1705 02/21/18  6277 02/21/18  0533   SODIUM mmol/L 131* 128*  --   --  129* 126*   POTASSIUM mmol/L 4 5 4 4  --   --  4 4 4 8   CHLORIDE mmol/L 97* 95*  --   --  95* 95*   CO2 mmol/L 20* 21  --   --  22 19*   BUN mg/dL 46* 47*  --   --  43* 56*   CREATININE mg/dL 4 81* 4 75*  --   --  4 50* 5 40*   CALCIUM mg/dL 8 3 8 4  --   --  8 3 8 9   TOTAL PROTEIN g/dL 7 2  --  7 0  --   --  7 7   BILIRUBIN TOTAL mg/dL 4 56*  --  4 99*  --   --  5 57*   ALK PHOS U/L 86  --  94  --   --  98   ALT U/L 28  --  26  --   --  25   AST U/L 41  --  38  --   --  37   GLUCOSE RANDOM mg/dL 141* 138  --   --  158* 139   GLUCOSE, ISTAT mg/dl  --   --   --  163*  --   --        Results from last 7 days  Lab Units 02/22/18  0431 02/21/18  0533 02/20/18  0455   MAGNESIUM mg/dL 2 3 2 5 2 3     Lab Results   Component Value Date    PHOS 8 0 (H) 02/22/2018    PHOS 8 0 (H) 02/21/2018    PHOS 6 0 (H) 02/20/2018        Results from last 7 days  Lab Units 02/22/18  0431 02/21/18  1836 02/21/18  0533  02/17/18  1217   INR  3 12* 2 70* 2 53*  < > 1 97*   PTT seconds 61*  --   --   --  45*   < > = values in this interval not displayed  No results found for: TROPONINI    Results from last 7 days  Lab Units 02/21/18  2200 02/21/18  1836 02/18/18  1400   LACTIC ACID mmol/L 3 7* 3 7* 1 9     ABG:  Lab Results   Component Value Date    PHART 7 238 (LL) 02/21/2018    REB0WQW 45 5 (H) 02/21/2018    PO2ART 78 5 02/21/2018    EFJ3VAV 19 0 (L) 02/21/2018    BEART -7 9 02/21/2018    SOURCE Radial, Right 02/21/2018     Imaging: CT head, CXR I have personally reviewed pertinent reports      EKG: None new  Micro:  Lab Results   Component Value Date    BLOODCX No Growth at 72 hrs  02/17/2018    BLOODCX No Growth at 72 hrs  02/17/2018     Allergies: No Known Allergies  Medications:   Scheduled Meds:  Current Facility-Administered Medications:  albumin human 25 g Intravenous 4x Daily Rip Elizalde MD    albuterol 2 5 mg Nebulization Q4H PRN Charis Santana DO    folic acid 1 mg Oral Daily Maxwell Sanchez DO    influenza vaccine 0 5 mL Intramuscular Prior to discharge Den Rodriguez MD    lactulose 20 g Oral TID Escobedo Else    levalbuterol 1 25 mg Nebulization TID Charis Santana DO    methylprednisolone 32 mg Oral Daily With Breakfast Estela Desai PA-C    midodrine 5 mg Oral TID YAZMIN Amaya    octreotide 100 mcg Intravenous Q8H [address] Mati Glasgow,     ondansetron 4 mg Intravenous Q8H PRN Maxwell Sanchez,     pantoprazole 40 mg Intravenous Q24H [address] Tristan Gudino MD    piperacillin-tazobactam 2 25 g Intravenous Q8H Natalie Eid PA-C Last Rate: Stopped (02/22/18 0601)   rifaximin 550 mg Oral Q12H [address] Shara Luque PA-C    sodium chloride 3 mL Nebulization TID Charis Santana,     thiamine 100 mg Oral Daily Mariama Hurtado DO      Continuous Infusions:   PRN Meds:    albuterol 2 5 mg Q4H PRN   influenza vaccine 0 5 mL Prior to discharge   ondansetron 4 mg Q8H PRN     VTE Pharmacologic Prophylaxis: Pharmacologic VTE Prophylaxis contraindicated due to will reevaluate  VTE Mechanical Prophylaxis: sequential compression device  Invasive lines and devices: Invasive Devices     Peripheral Intravenous Line            Peripheral IV 02/21/18 Right Arm less than 1 day    Peripheral IV 02/22/18 Left;Dorsal (posterior) Arm less than 1 day          Line            HD Cath Double less than 1 day          Drain            Urethral Catheter Temperature probe 16 Fr  3 days    Open Drain Left Abdomen 1 day    NG/OG/Enteral Tube Nasogastric Right nares less than 1 day                     Portions of the record may have been created with voice recognition software  Occasional wrong word or "sound a like" substitutions may have occurred due to the inherent limitations of voice recognition software  Read the chart carefully and recognize, using context, where substitutions have occurred      Lucho Zepeda PA-C

## 2018-02-22 NOTE — PROGRESS NOTES
NEPHROLOGY PROGRESS NOTE   Sorin Gardner 50 y o  male MRN: [id number]  [facility] En[facility]: [id number]      ASSESSMENT & PLAN:  1  Acute kidney injury, given history of decompensated cirrhosis suspicious for hepatorenal syndrome, versus ATN given hypotension  Baseline creatinine 0 5 in 2016  Patient was started on hemodialysis yesterday, will proceed with 2nd hemodialysis treatment today  Follow daily labs, monitor strict ins and outs  Temporary dialysis catheter was placed by IR yesterday  2   Decompensated alcoholic cirrhosis, history of noncompliance  GI on board, as per GI note his prognosis is extremely poor and he is not a transplant candidate  3   Encephalopathy, continue with medical treatment, continue with lactulose and titrate for 3-4 loose bowel movement daily  Multifactorial in the setting of hepatic encephalopathy as well as sepsis with a right lower lobe pneumonia  Palliative care consulted and input appreciated, given multiple comorbidities patient has a grim prognosis  4   New right-sided infiltrate on chest x-ray, started on cefepime and Vanco as per critical care team     5   Hyponatremia seems to be chronic in the setting end-stage liver disease now with a component of acute renal failure inability to excrete free mai, continue with dialysis with as sodium bath of 130     6   Lactic acidosis, noted lactic acid again increased yesterday  7   Hypotension, currently blood pressure is stable, continue with triple therapy  8   Anemia, follow H&H, transfuse as needed  Discussed with critical care team     SUBJECTIVE:  Patient was started on hemodialysis yesterday  Overnight events noted, rapid response for altered mental status and increased work of breathing yesterday afternoon patient was transferred back to intensive care unit  During my evaluation patient confused with restraints      OBJECTIVE:  Current Weight: Weight - Scale: 90 7 kg (199 lb 15 3 oz)  Vitals:    02/22/18 0700   BP: 142/52   Pulse: 92   Resp: 18   Temp:    SpO2: 97%       Intake/Output Summary (Last 24 hours) at 02/22/18 0800  Last data filed at 02/22/18 0745   Gross per 24 hour   Intake             1420 ml   Output             4366 ml   Net            -2946 ml     General:  Confused, mildly agitated  Skin:  Pale, no rash  Eyes:  No jaundice  ENT:  Mucous membranes dry  Neck:  Supple   Chest:  Decreased breath sounds at bases  CVS: Regular rate and rhythm   Abdomen:  Soft, nontender, bowel sounds  Extremities:  Positive bilateral lower extremity edema   Neuro:  Awake, confused, mildly agitated  Psych:  Unable to be assessed    Medications:    Current Facility-Administered Medications:     albuterol inhalation solution 2 5 mg, 2 5 mg, Nebulization, Q4H PRN, Charis Santana DO    folic acid (FOLVITE) tablet 1 mg, 1 mg, Oral, Daily, Mariama Hurtado DO, 1 mg at 02/21/18 3864    influenza inactivated quadrivalent vaccine (FLULAVAL) IM injection 0 5 mL, 0 5 mL, Intramuscular, Prior to discharge, Yue Cespedes MD    lactulose 20 g/30 mL oral solution 20 g, 20 g, Oral, TID, Diamond Rickrupert, 20 g at 02/21/18 1944    levalbuterol (XOPENEX) inhalation solution 1 25 mg, 1 25 mg, Nebulization, TID, Charis Santana DO    methylPREDNISolone (MEDROL) tablet 32 mg, 32 mg, Oral, Daily With Breakfast, Shanita Hollis PA-C, 32 mg at 02/22/18 0724    midodrine (PROAMATINE) tablet 5 mg, 5 mg, Oral, TID AC, Diamond Fernandez, 5 mg at 02/22/18 0723    octreotide (SandoSTATIN) injection 100 mcg, 100 mcg, Intravenous, Q8H [facility], Mati Glasgow DO, 100 mcg at 02/22/18 0545    ondansetron (ZOFRAN) injection 4 mg, 4 mg, Intravenous, Q8H PRN, Mariama Hurtado DO    pantoprazole (PROTONIX) injection 40 mg, 40 mg, Intravenous, Q24H [facility], Tereza Lott MD    piperacillin-tazobactam (ZOSYN) 2 25 g in sodium chloride 0 9 % 50 mL IVPB, 2 25 g, Intravenous, Q8H, Anitha Womack PA-C, Stopped at 02/22/18 8551   rifaximin (XIFAXAN) tablet 550 mg, 550 mg, Oral, Q12H [address], Zhen MOISÉS Richards, 550 mg at 02/21/18 2205    sodium chloride 0 9 % inhalation solution 3 mL, 3 mL, Nebulization, TID, Charis Max,     thiamine (VITAMIN B1) tablet 100 mg, 100 mg, Oral, Daily, Mariama Hurtado, , 100 mg at 02/21/18 0829    Invasive Devices:   Urethral Catheter Temperature probe 16 Fr   (Active)   Amt returned on insertion(mL) 60 mL 2/18/2018  2:01 PM   Site Assessment Clean;Skin intact 2/19/2018  7:49 AM   Collection Container Standard drainage bag 2/19/2018  7:49 AM   Securement Method Securing device (Describe) 2/19/2018  7:49 AM   Output (mL) 10 mL 2/19/2018  7:49 AM       Lab Results:     Results from last 7 days  Lab Units 02/22/18  0431 02/22/18  0019 02/21/18  1836 02/21/18  1705 02/21/18  1644 02/21/18  0533  02/20/18  0455   WBC Thousand/uL 16 81*  --  15 79*  --   --  13 35*  < > 7 23   HEMOGLOBIN g/dL 7 3*  --  7 7*  --   --  7 4*  < > 6 8*   I STAT HEMOGLOBIN g/dl  --   --   --  7 8*  --   --   --   --    HEMATOCRIT % 21 2*  --  22 0*  --   --  21 2*  < > 19 0*   PLATELETS Thousands/uL 73*  --  67*  --   --  104*  < > 82*   SODIUM mmol/L 131* 128*  --   --  129* 126*  < > 128*   POTASSIUM mmol/L 4 5 4 4  --   --  4 4 4 8  < > 4 8   CHLORIDE mmol/L 97* 95*  --   --  95* 95*  < > 97*   CO2 mmol/L 20* 21  --   --  22 19*  < > 20*   BUN mg/dL 46* 47*  --   --  43* 56*  < > 45*   CREATININE mg/dL 4 81* 4 75*  --   --  4 50* 5 40*  < > 4 75*   CALCIUM mg/dL 8 3 8 4  --   --  8 3 8 9  < > 8 4   MAGNESIUM mg/dL 2 3  --   --   --   --  2 5  --  2 3   PHOSPHORUS mg/dL 8 0*  --   --   --   --  8 0*  --  6 0*   TOTAL PROTEIN g/dL 7 2  --  7 0  --   --  7 7  --  6 7   BILIRUBIN TOTAL mg/dL 4 56*  --  4 99*  --   --  5 57*  --  3 64*   ALK PHOS U/L 86  --  94  --   --  98  --  85   ALT U/L 28  --  26  --   --  25  --  20   AST U/L 41  --  38  --   --  37  --  31   GLUCOSE RANDOM mg/dL 141* 138  --   --  158* 139  < > 145*   GLUCOSE, ISTAT mg/dl  --   --   --  163*  --   --   --   --    < > = values in this interval not displayed  Previous work up:  Urine sodium 6  Urinalysis 2+ protein, 10-20 RBCs, 4-10 WBCs      Portions of the record may have been created with voice recognition software  Occasional wrong word or "sound a like" substitutions may have occurred due to the inherent limitations of voice recognition software  Read the chart carefully and recognize, using context, where substitutions have occurred  If you have any questions, please contact the dictating provider

## 2018-02-23 ENCOUNTER — APPOINTMENT (INPATIENT)
Dept: RADIOLOGY | Facility: HOSPITAL | Age: 53
DRG: 264 | End: 2018-02-23
Attending: INTERNAL MEDICINE
Payer: COMMERCIAL

## 2018-02-23 ENCOUNTER — APPOINTMENT (INPATIENT)
Dept: DIALYSIS | Facility: HOSPITAL | Age: 53
DRG: 264 | End: 2018-02-23
Attending: INTERNAL MEDICINE
Payer: COMMERCIAL

## 2018-02-23 LAB
ALBUMIN SERPL BCP-MCNC: 3.7 G/DL (ref 3.5–5)
ALP SERPL-CCNC: 67 U/L (ref 46–116)
ALT SERPL W P-5'-P-CCNC: 31 U/L (ref 12–78)
AMMONIA PLAS-SCNC: 64 UMOL/L (ref 11–35)
ANION GAP SERPL CALCULATED.3IONS-SCNC: 9 MMOL/L (ref 4–13)
AST SERPL W P-5'-P-CCNC: 42 U/L (ref 5–45)
BACTERIA BLD CULT: NORMAL
BACTERIA BLD CULT: NORMAL
BASE EXCESS BLDA CALC-SCNC: 2 MMOL/L (ref -2–3)
BASOPHILS # BLD AUTO: 0 THOUSANDS/ΜL (ref 0–0.1)
BASOPHILS NFR BLD AUTO: 0 % (ref 0–1)
BILIRUB SERPL-MCNC: 4.28 MG/DL (ref 0.2–1)
BUN SERPL-MCNC: 40 MG/DL (ref 5–25)
CA-I BLD-SCNC: 1.2 MMOL/L (ref 1.12–1.32)
CALCIUM SERPL-MCNC: 8.3 MG/DL (ref 8.3–10.1)
CHLORIDE SERPL-SCNC: 98 MMOL/L (ref 100–108)
CO2 SERPL-SCNC: 26 MMOL/L (ref 21–32)
CREAT SERPL-MCNC: 4 MG/DL (ref 0.6–1.3)
DS:DELIVERY SYSTEM: ABNORMAL
EOSINOPHIL # BLD AUTO: 0 THOUSAND/ΜL (ref 0–0.61)
EOSINOPHIL NFR BLD AUTO: 0 % (ref 0–6)
ERYTHROCYTE [DISTWIDTH] IN BLOOD BY AUTOMATED COUNT: 22.1 % (ref 11.6–15.1)
FIO2 GAS DIL.REBREATH: 100 L
GFR SERPL CREATININE-BSD FRML MDRD: 16 ML/MIN/1.73SQ M
GLUCOSE SERPL-MCNC: 134 MG/DL (ref 65–140)
GLUCOSE SERPL-MCNC: 144 MG/DL (ref 65–140)
HCO3 BLDA-SCNC: 28.8 MMOL/L (ref 22–28)
HCT VFR BLD AUTO: 20 % (ref 36.5–49.3)
HCT VFR BLD CALC: 24 % (ref 36.5–49.3)
HGB BLD-MCNC: 7.2 G/DL (ref 12–17)
HGB BLDA-MCNC: 8.2 G/DL (ref 12–17)
LYMPHOCYTES # BLD AUTO: 0.64 THOUSANDS/ΜL (ref 0.6–4.47)
LYMPHOCYTES NFR BLD AUTO: 8 % (ref 14–44)
MAGNESIUM SERPL-MCNC: 2.3 MG/DL (ref 1.6–2.6)
MCH RBC QN AUTO: 33 PG (ref 26.8–34.3)
MCHC RBC AUTO-ENTMCNC: 36 G/DL (ref 31.4–37.4)
MCV RBC AUTO: 92 FL (ref 82–98)
MONOCYTES # BLD AUTO: 0.7 THOUSAND/ΜL (ref 0.17–1.22)
MONOCYTES NFR BLD AUTO: 9 % (ref 4–12)
MRSA NOSE QL CULT: NORMAL
NEUTROPHILS # BLD AUTO: 6.3 THOUSANDS/ΜL (ref 1.85–7.62)
NEUTS SEG NFR BLD AUTO: 83 % (ref 43–75)
NRBC BLD AUTO-RTO: 0 /100 WBCS
PCO2 BLD: 31 MMOL/L (ref 21–32)
PCO2 BLD: 58.4 MM HG (ref 36–44)
PH BLD: 7.3 [PH] (ref 7.35–7.45)
PHOSPHATE SERPL-MCNC: 5.6 MG/DL (ref 2.7–4.5)
PLATELET # BLD AUTO: 47 THOUSANDS/UL (ref 149–390)
PMV BLD AUTO: 9.6 FL (ref 8.9–12.7)
PO2 BLD: 54 MM HG (ref 75–129)
POTASSIUM BLD-SCNC: 3.8 MMOL/L (ref 3.5–5.3)
POTASSIUM SERPL-SCNC: 4.1 MMOL/L (ref 3.5–5.3)
PROT SERPL-MCNC: 6.4 G/DL (ref 6.4–8.2)
RBC # BLD AUTO: 2.18 MILLION/UL (ref 3.88–5.62)
SAO2 % BLD FROM PO2: 83 % (ref 95–98)
SODIUM BLD-SCNC: 134 MMOL/L (ref 136–145)
SODIUM SERPL-SCNC: 133 MMOL/L (ref 136–145)
SPECIMEN SOURCE: ABNORMAL
VANCOMYCIN TROUGH SERPL-MCNC: 33.3 UG/ML (ref 10–20)
WBC # BLD AUTO: 7.67 THOUSAND/UL (ref 4.31–10.16)

## 2018-02-23 PROCEDURE — 84295 ASSAY OF SERUM SODIUM: CPT

## 2018-02-23 PROCEDURE — 99233 SBSQ HOSP IP/OBS HIGH 50: CPT | Performed by: INTERNAL MEDICINE

## 2018-02-23 PROCEDURE — 82330 ASSAY OF CALCIUM: CPT

## 2018-02-23 PROCEDURE — 85014 HEMATOCRIT: CPT

## 2018-02-23 PROCEDURE — 82803 BLOOD GASES ANY COMBINATION: CPT

## 2018-02-23 PROCEDURE — 5A1D70Z PERFORMANCE OF URINARY FILTRATION, INTERMITTENT, LESS THAN 6 HOURS PER DAY: ICD-10-PCS | Performed by: INTERNAL MEDICINE

## 2018-02-23 PROCEDURE — 71045 X-RAY EXAM CHEST 1 VIEW: CPT

## 2018-02-23 PROCEDURE — 83735 ASSAY OF MAGNESIUM: CPT | Performed by: PHYSICIAN ASSISTANT

## 2018-02-23 PROCEDURE — 99357 PR PROLONGED SERV,INPATIENT,EA ADD 30 MIN: CPT | Performed by: INTERNAL MEDICINE

## 2018-02-23 PROCEDURE — 84100 ASSAY OF PHOSPHORUS: CPT | Performed by: PHYSICIAN ASSISTANT

## 2018-02-23 PROCEDURE — 80053 COMPREHEN METABOLIC PANEL: CPT | Performed by: PHYSICIAN ASSISTANT

## 2018-02-23 PROCEDURE — 99232 SBSQ HOSP IP/OBS MODERATE 35: CPT | Performed by: INTERNAL MEDICINE

## 2018-02-23 PROCEDURE — 80202 ASSAY OF VANCOMYCIN: CPT | Performed by: PHYSICIAN ASSISTANT

## 2018-02-23 PROCEDURE — 94640 AIRWAY INHALATION TREATMENT: CPT

## 2018-02-23 PROCEDURE — 84132 ASSAY OF SERUM POTASSIUM: CPT

## 2018-02-23 PROCEDURE — 94760 N-INVAS EAR/PLS OXIMETRY 1: CPT

## 2018-02-23 PROCEDURE — 82140 ASSAY OF AMMONIA: CPT | Performed by: PHYSICIAN ASSISTANT

## 2018-02-23 PROCEDURE — 94660 CPAP INITIATION&MGMT: CPT

## 2018-02-23 PROCEDURE — 85025 COMPLETE CBC W/AUTO DIFF WBC: CPT | Performed by: PHYSICIAN ASSISTANT

## 2018-02-23 PROCEDURE — 82947 ASSAY GLUCOSE BLOOD QUANT: CPT

## 2018-02-23 PROCEDURE — C9113 INJ PANTOPRAZOLE SODIUM, VIA: HCPCS | Performed by: EMERGENCY MEDICINE

## 2018-02-23 RX ORDER — METHYLPREDNISOLONE SODIUM SUCCINATE 40 MG/ML
40 INJECTION, POWDER, LYOPHILIZED, FOR SOLUTION INTRAMUSCULAR; INTRAVENOUS EVERY 12 HOURS SCHEDULED
Status: DISCONTINUED | OUTPATIENT
Start: 2018-02-23 | End: 2018-02-27

## 2018-02-23 RX ADMIN — ALBUTEROL SULFATE 2.5 MG: 2.5 SOLUTION RESPIRATORY (INHALATION) at 10:59

## 2018-02-23 RX ADMIN — MIDODRINE HYDROCHLORIDE 5 MG: 5 TABLET ORAL at 12:41

## 2018-02-23 RX ADMIN — PANTOPRAZOLE SODIUM 40 MG: 40 INJECTION, POWDER, FOR SOLUTION INTRAVENOUS at 08:35

## 2018-02-23 RX ADMIN — DEXMEDETOMIDINE HYDROCHLORIDE 0.7 MCG/KG/HR: 100 INJECTION, SOLUTION INTRAVENOUS at 01:26

## 2018-02-23 RX ADMIN — LACTULOSE 20 G: 20 SOLUTION ORAL at 08:35

## 2018-02-23 RX ADMIN — MIDODRINE HYDROCHLORIDE 5 MG: 5 TABLET ORAL at 15:32

## 2018-02-23 RX ADMIN — RIFAXIMIN 550 MG: 550 TABLET ORAL at 21:47

## 2018-02-23 RX ADMIN — ISODIUM CHLORIDE 3 ML: 0.03 SOLUTION RESPIRATORY (INHALATION) at 13:35

## 2018-02-23 RX ADMIN — DEXMEDETOMIDINE HYDROCHLORIDE 0.4 MCG/KG/HR: 100 INJECTION, SOLUTION INTRAVENOUS at 17:47

## 2018-02-23 RX ADMIN — DEXMEDETOMIDINE HYDROCHLORIDE 0.5 MCG/KG/HR: 100 INJECTION, SOLUTION INTRAVENOUS at 11:02

## 2018-02-23 RX ADMIN — FOLIC ACID 1 MG: 1 TABLET ORAL at 08:35

## 2018-02-23 RX ADMIN — ISODIUM CHLORIDE 3 ML: 0.03 SOLUTION RESPIRATORY (INHALATION) at 20:08

## 2018-02-23 RX ADMIN — OCTREOTIDE ACETATE 100 MCG: 100 INJECTION, SOLUTION INTRAVENOUS; SUBCUTANEOUS at 15:31

## 2018-02-23 RX ADMIN — OCTREOTIDE ACETATE 100 MCG: 100 INJECTION, SOLUTION INTRAVENOUS; SUBCUTANEOUS at 05:17

## 2018-02-23 RX ADMIN — OCTREOTIDE ACETATE 100 MCG: 100 INJECTION, SOLUTION INTRAVENOUS; SUBCUTANEOUS at 21:47

## 2018-02-23 RX ADMIN — METHYLPREDNISOLONE 32 MG: 16 TABLET ORAL at 06:30

## 2018-02-23 RX ADMIN — LEVALBUTEROL HYDROCHLORIDE 1.25 MG: 1.25 SOLUTION, CONCENTRATE RESPIRATORY (INHALATION) at 20:08

## 2018-02-23 RX ADMIN — LEVALBUTEROL HYDROCHLORIDE 1.25 MG: 1.25 SOLUTION, CONCENTRATE RESPIRATORY (INHALATION) at 13:35

## 2018-02-23 RX ADMIN — METHYLPREDNISOLONE SODIUM SUCCINATE 40 MG: 40 INJECTION, POWDER, FOR SOLUTION INTRAMUSCULAR; INTRAVENOUS at 21:47

## 2018-02-23 RX ADMIN — LACTULOSE 20 G: 20 SOLUTION ORAL at 15:31

## 2018-02-23 RX ADMIN — MIDODRINE HYDROCHLORIDE 5 MG: 5 TABLET ORAL at 06:27

## 2018-02-23 RX ADMIN — HEPARIN SODIUM 5000 UNITS: 5000 INJECTION, SOLUTION INTRAVENOUS; SUBCUTANEOUS at 05:17

## 2018-02-23 RX ADMIN — PIPERACILLIN SODIUM,TAZOBACTAM SODIUM 2.25 G: 2; .25 INJECTION, POWDER, FOR SOLUTION INTRAVENOUS at 02:35

## 2018-02-23 RX ADMIN — PIPERACILLIN SODIUM,TAZOBACTAM SODIUM 2.25 G: 2; .25 INJECTION, POWDER, FOR SOLUTION INTRAVENOUS at 12:41

## 2018-02-23 RX ADMIN — METHYLPREDNISOLONE SODIUM SUCCINATE 40 MG: 40 INJECTION, POWDER, FOR SOLUTION INTRAMUSCULAR; INTRAVENOUS at 12:41

## 2018-02-23 RX ADMIN — LEVALBUTEROL HYDROCHLORIDE 1.25 MG: 1.25 SOLUTION, CONCENTRATE RESPIRATORY (INHALATION) at 09:36

## 2018-02-23 RX ADMIN — Medication 100 MG: at 08:34

## 2018-02-23 RX ADMIN — LACTULOSE 20 G: 20 SOLUTION ORAL at 21:47

## 2018-02-23 RX ADMIN — DEXMEDETOMIDINE HYDROCHLORIDE 0.5 MCG/KG/HR: 100 INJECTION, SOLUTION INTRAVENOUS at 04:47

## 2018-02-23 RX ADMIN — DEXMEDETOMIDINE HYDROCHLORIDE 0.5 MCG/KG/HR: 100 INJECTION, SOLUTION INTRAVENOUS at 23:38

## 2018-02-23 RX ADMIN — PIPERACILLIN SODIUM,TAZOBACTAM SODIUM 2.25 G: 2; .25 INJECTION, POWDER, FOR SOLUTION INTRAVENOUS at 20:14

## 2018-02-23 RX ADMIN — RIFAXIMIN 550 MG: 550 TABLET ORAL at 08:34

## 2018-02-23 RX ADMIN — ISODIUM CHLORIDE 3 ML: 0.03 SOLUTION RESPIRATORY (INHALATION) at 09:30

## 2018-02-23 NOTE — SOCIAL WORK
CM reviewed pt during care coordination rounds today  Pt's O2 sat dropped to 70's during HD tx today thus pt was placed on BIPAP  A family meeting was held today with pt's 2 son's and ex wife  Pt's family is  okay for intubation and dialytic support but no to trach/PEG  CM will follow

## 2018-02-23 NOTE — PROGRESS NOTES
Called by RN for agitation- patient pulled out rectal tube despite having a posey and BL wrist restraints  Will add mitts  Ordered precedex infusion for agitation  Also per report, patient bleeding from left nare  Has NGT in the left nare  Upon my arrival, patient is no longer bleeding from the right nare, only see dried blood  Septum appears irritated  Will hold on packing  If patient rebleeds, will need to reverse INR (3 12)  Monitor closely  Discussed plan with RN

## 2018-02-23 NOTE — PROGRESS NOTES
Update since progress note from this morning:    Around 1030 today, pt was receiving HD and a neb tx and desaturated to the 70s  Placed on Bipap, which he tolerated  Sats improved to 90s  Changed po medrol to Solumedrol  CXR showed worsening PNA on the R and new changes suggestive of PNA on the left  Vanc today was 33, so holding until Monday morning prior to next HD session  Pt's two sons Yoli Garcia and Zabrina, [place]) were updated; had family meeting with palliative care  Outcome of that meeting was that the pt is a level 2 code  Agreeable to intubation if needed in short term to facilitate recovery, but if not improving and moving toward needing PEG/trach, would not proceed with that course of treatment and would compassionately extubate  Will not attempt CPR in the event of cardiac arrest  Pt's sons understand the gravity of his illness and that his chances of improvement are very low

## 2018-02-23 NOTE — MEDICAL STUDENT
Progress Note - Critical Care   Reford Karlos 50 y o  male MRN: [id number]  [facility]: [id number]    Assessment:   Principal Problem:    Decompensated hepatic cirrhosis ([facility] )  Active Problems:    Ascites    Hepatic encephalopathy (HCC)    PRANAY (acute kidney injury) ([facility] [address] )    Hypoalbuminemia    Hyponatremia    Sepsis ([facility] )    Hyperammonemia (HCC)    Lactic acidosis    Elevated alkaline phosphatase level    Anemia of chronic disease    Osteomyelitis of toe of right foot (HCC)    Alcohol abuse    Alcoholic hepatitis    Cough    Hypoxia    Oliguria    Leukocytosis    Coagulopathy (HCC)    Aspiration pneumonia of right lower lobe (HCC)  Resolved Problems:    Abdominal pain    Hypotension    Problems: AMS 2/2 to hepatic encephalopathy, decompensated hepatic cirrhosis, ESLD w/ ascites, PRANAY on ESRD on HD, possible pneumonia, hyperammonemia, anemia of chronic dz, coagulapothy, lactic acidosis, hypoalbuminemia, hyponatremia, hypochloremia, elevated bilirubin, osteomyelitis of R big toe, alcohol abuse       Plan:  Neuro:   · Altered mental status 2/2 to hepatic encephalopathy vs  sepsis from RLL pneumonia  ? Alert but oriented only to person and location (hospital but not [facility]); GCS 14 w/ RASS 0 this morning; CAM ICU+  ? Ammonia level 58 @ 16:44 on 2/21 (was 104 on 2/19)  ? Stat head CT 2/21 showed motion degraded examination and no gross intracranial abnormality  ? Continue waist belt and hand restraints in addition to mitts  · On Precedex 0 5mcg/kg/hr; titrate to RASS -1 - 0  · Regulate sleep/wake cycle  · Trend neuro exam  CV:   · No active issues  · Hypotension from previous MICU stay - resolved  ? Not requiring pressors  · Can give albumin 25mg as needed  · Monitor for hypovolemia in light of paracentesis (4,100cc) on 2/21  · Rhythm: NSR currently w/ intermittent sinus tachycardia  ? Follow rhythm on telemetry  Lung:   · Possible RLL pneumonia  ?  CXR 2/21 showed increasing airspace opacities in the right perihilar region and left suprahilar region may represent aspiration and/or pneumonia; consider repeat CXR after 5d of abx to monitor progress  ? RT ordered Xopenex 1 25mg nebs TID   ? Pulmonary toileting and IS  ? Wean oxygen as able - currently satting in the 90s on 5L  § Was on RA when he left the MICU on 2/20  § SpO2 goal >88%  · Respiratory acidosis w/ superimposed metabolic acidosis   ? iStat ABG yesterday: pH 7 321, pCO2 50 3, pO2 86, HCO3 26   GI:   · Acute decompensated liver cirrhosis  ? GI is following; per them, poor transplant candidate   ? MELD score worsening at 38   ? Continue midodrine 5mg PO TID and octreotide 100mcg IV TID   ? ID and GI said okay to continue methylprednisone 32mg PO qD for alcoholic hepatitis, even with current pneumonia     ? Continue lactulose 20mg TID; continue rifaximin 550mg BID - NG tube in place to give these  ? Stat ammonia level 2/21was 58<--104<--78  ? Underwent paracentesis 2/21 w/ 4,100cc out; fluid showed , 37% neutrophils, albumin 0 7 (serum album 4 2), LDH 46, and protein <2   · Stress ulcer prophylaxis: Protonix 40mg IV  · Nausea controlled w/ ondansetron 4mg IV TID prn  · Bowel regimen: lactulose  FEN:   · Metabolic acidosis w/ AG of 14 on 2/22 - resolved  ? Lactic acid 1 9 (2/19)-->3 7 (2/21)--> 3 3 (2/22)  ? AG 9 this morning   · Hyponatremic and hypochloremic - resolving  ? Fluid/Diuretic plan: s/p 500mL bolus of NSS; can consider salt tab if acutely worsening   · Nutrition/diet plan: NPO for now w/ NG tube in place due to AMS   ? Nutrition recommends starting Nutrihep @ 10 ml/hr and incr by 10 ml q 4 hrs as joshua to goal rate of 65 ml/hr + 1 PROSOURCE to = qd: 1560 ml 2400 Total Kcal,77 gm PRO,452 gm CHO,33 gm Fat,1186 ml Free H2O,3 5 mg CHO/kg/min  · Replete electrolytes with goals: K >4 0, Mag >2 0, and Phos >3 0  ?  Phos 8-->5 6 this morning; nephrology doesn't think there's any reason to give PhosLo, since patient is NPO and will be dialyzed again today  · Alcoholism  ? Continue thiamine 920ZI PO qD and folic acid 1mg PO qD  :   · PRANAY 2/2 to possible hepatorenal syndrome given hx of decompensated cirrhosis  ? S/p dialysis 2/21 and 2/22 (IR placed temporary R IJ cath on 2/21); will receive HD today and be MWF subsequently  ? Nephrology is following  ? Cr 4 75-->4 81-->4; baseline of 0 5 in 2016  · Indwelling rivera d/c'ed  · Trend UOP and BUN/creat  · Strict I and O  ID:   · Sepsis (hypothermic, leukocytosis, AMS at time of rapid response) 2/2 to possible pneumonia  ? [facility] 2/21 NG @24hr; [facility] 2/17 NGTD (@4d)  ? Prelim peritoneal fluid cx is negative and stain shows no bacteria; fluid analysis showed , 37% neutrophils, albumin 0 7 (serum album 4 2), LDH 46, and protein <2  ? Trend temps and WBC count  § WBC 15 79-->16 81-->7 6  § Afebrile; maintain normothermia  ? Continue Zosyn 2 25g IV; will redose vancomycin based on trough this morning prior to HD; can d/c vanc if MRSA swab comes back negative  Heme:   · Trend hgb and plts  ? Transfuse as needed for goal hgb >7 and transfuse plts as needed for goal >20  Endo:   · Glycemic control plan: start SSI if sugars exceed 180  MSK/Skin:   · Unstageable pressure ulcer of L hip and moisture-associated skin damage of the sacrum  ? Continue wound care   ? Frequent turning and pressure off-loading  · R great toe osteomyelitis   Disposition:  · Possible family meeting today; time TBD, awaiting call back from second son who works in [email or web address]   · Maintain ICU level care  ______________________________________________________________________    HPI/24hr events: Yesterday afternoon, son was contacted regarding plans for possible interdisciplinary family meeting today  In the evening, patient was increasingly confused and agitated, and he pulled out his rectal tube  Precedex infusion was ordered, b/l mitts were placed, and rectal tube was replaced   There was also concern per nursing for bleeding from the patient's nares, but this resolved without intervention  O2 from NC was decreased from 6 to 5L at 1:00am  No other issues o/n  Only other complaint this morning per patient is some lower lip pain  Lines/Drains/Tubes  -Peripheral IV R arm -   -Peripheral IV L dorsal arm -   -HD cath double -   -Open drain L abdomen  -NG tube R nare  -Rectal tube w/ balloon    Infusions  -Precedex 0 5mcg/kg/hr IV  ______________________________________________________________________  Physical Exam:  Howard Agitation Sedation Scale (RASS): Drowsy  Physical Exam   Constitutional: No distress  Appears chronically ill  HENT:   Head: Normocephalic and atraumatic  NG tube in R nare; dried blood around nares  Eyes:   Pupils 4mm b/l equal, round, and reactive  Neck: Normal range of motion  Neck supple  Cardiovascular: Normal rate, regular rhythm and normal heart sounds  Pulmonary/Chest: Effort normal    Midline expiratory wheezing w/ decreased breath sounds in the b/l lung bases  Abdominal: Soft  Bowel sounds are normal  He exhibits distension  There is no tenderness  There is no rebound and no guarding  Genitourinary: Penis normal    Genitourinary Comments: Mendoza no longer in place  Musculoskeletal: Normal range of motion  He exhibits no edema  Osteomyelitis of R great toe w/ overlying skin changes  Neurological: He is alert  Oriented to person and place (hospital but not [facility])  RASS 0, CGS 14 (eye 4, verbal 4, motor 6)  Skin: Skin is warm and dry  He is not diaphoretic  Vitals reviewed      ______________________________________________________________________  Temperature:   Temp (24hrs), Av 5 °F (36 4 °C), Min:96 6 °F (35 9 °C), Max:98 2 °F (36 8 °C)    Current Temperature: 98 2 °F (36 8 °C)    Vitals:    18 0432 18 0500 18 0541 18 0600   BP: 128/71 142/82  135/80   Pulse: 78 78  78   Resp: 15 19  14   Temp:       TempSrc:       SpO2: 94% 93% 92%   Weight:   91 4 kg (201 lb 8 oz)    Height:         Arterial Line BP: 132/74    Weights:   IBW: 73 kg    Body mass index is 28 91 kg/m²  Weight (last 2 days)     Date/Time   Weight    02/23/18 0541  91 4 (201 5)    02/22/18 0547  90 7 (199 96)            Height: 5' 10" (177 8 cm)  Hemodynamic Monitoring:  N/A     Ventilator Settings: n/a     No results found for: PHART, IFC1FMQ, PO2ART, KXC5RVD, N6PFDLRL, BEART, SOURCE    Intake and Outputs:  I/O       02/21 0701 - 02/22 0700 02/22 0701 - 02/23 0700 02/23 0701 - 02/24 0700    P  O        I V  (mL/kg) 500 (5 5) 613 8 (6 7)     Blood       NG/GT  300     IV Piggyback 900 300     Total Intake(mL/kg) 1400 (15 4) 1213 8 (13 3)     Urine (mL/kg/hr) 120 (0 1) 50 (0)     Emesis/NG output 750 (0 3) 400 (0 2)     Drains 1375 (0 6) 180 (0 1)     Other 2061 (0 9) 1500 (0 7)     Stool 0 (0) 200 (0 1)     Total Output 4306 2330      Net -2906 -1116 2             Unmeasured Stool Occurrence 1 x        UOP: 0/hour, but on hemodialysis     Nutrition:        Diet Orders            Start     Ordered    02/22/18 1151  Diet NPO  Diet effective now     Comments:  Except meds via NG   Question Answer Comment   Diet Type NPO    RD to adjust diet per protocol? No        02/22/18 1151        Labs:     Results from last 7 days  Lab Units 02/23/18  0443 02/22/18  1159 02/22/18  0431 02/21/18  1836  02/20/18  0455  02/19/18  0439   WBC Thousand/uL 7 67  --  16 81* 15 79*  < > 7 23  --  5 63   HEMOGLOBIN g/dL 7 2*  --  7 3* 7 7*  < > 6 8*  < > 7 0*   I STAT HEMOGLOBIN g/dl  --  7 5*  --   --   < >  --   --   --    HEMATOCRIT % 20 0*  --  21 2* 22 0*  < > 19 0*  --  19 7*   PLATELETS Thousands/uL 47*  --  73* 67*  < > 82*  --  72*   NEUTROS PCT % 83*  --   --   --   --  79*  --  63   MONOS PCT % 9  --   --   --   --  10  --  19*   MONO PCT MAN %  --   --   --  9  --   --   --   --    < > = values in this interval not displayed     Results from last 7 days  Lab Units 02/23/18  0447 02/22/18  1159 02/22/18  0431 02/22/18  0019 02/21/18  1836   SODIUM mmol/L 133*  --  131* 128*  --    POTASSIUM mmol/L 4 1  --  4 5 4 4  --    CHLORIDE mmol/L 98*  --  97* 95*  --    CO2 mmol/L 26  --  20* 21  --    BUN mg/dL 40*  --  46* 47*  --    CREATININE mg/dL 4 00*  --  4 81* 4 75*  --    CALCIUM mg/dL 8 3  --  8 3 8 4  --    TOTAL PROTEIN g/dL 6 4  --  7 2  --  7 0   BILIRUBIN TOTAL mg/dL 4 28*  --  4 56*  --  4 99*   ALK PHOS U/L 67  --  86  --  94   ALT U/L 31  --  28  --  26   AST U/L 42  --  41  --  38   GLUCOSE RANDOM mg/dL 144*  --  141* 138  --    GLUCOSE, ISTAT mg/dl  --  145*  --   --   --        Results from last 7 days  Lab Units 02/23/18  0443 02/22/18  0431 02/21/18  0533   MAGNESIUM mg/dL 2 3 2 3 2 5       Results from last 7 days  Lab Units 02/23/18  0443 02/22/18  0431 02/21/18  0533   PHOSPHORUS mg/dL 5 6* 8 0* 8 0*        Results from last 7 days  Lab Units 02/22/18  0431 02/21/18  1836 02/21/18  0533  02/17/18  1217   INR  3 12* 2 70* 2 53*  < > 1 97*   PTT seconds 61*  --   --   --  45*   < > = values in this interval not displayed  Results from last 7 days  Lab Units 02/22/18  0817   LACTIC ACID mmol/L 3 3*     No results found for: TROPONINI  Imaging: I have personally reviewed pertinent reports  -CXR 2/21 pm: Increasing airspace opacities in the right perihilar region and left suprahilar region may represent aspiration and/or pneumonia  -CT head w/o contrast 2/21: Motion degraded examination  No gross intracranial abnormality  -CXR 2/21 am: 1  Right internal jugular dialysis catheter with tip in the midsuperior vena cava  2  Mild vascular congestion  Right-sided alveolar infiltrates, likely cardiogenic in nature, however superimposed pneumonia not excluded  Clinical correlation and follow-up examination recommended  -US abd 2/19: Cirrhosis with sequela of portal hypertension and ascites as detailed above  Cholelithiasis   If there is clinical concern for cholecystitis, nuclear medicine HIDA scan may be obtained  -BERNIE and waveform analysis 2/19:  CONCLUSION:  RIGHT LOWER LIMB  Ankle/Brachial Index: 1 1 which is in the normal range  PPG/PVR Tracings are normal  Triphasic Doppler waveforms at the ankle  Metatarsal Pressure 167 mmHg  Great Toe Pressure: 125 mmHg, within limits for healing potential  LEFT LOWER LIMB  Ankle/Brachial Index: 1 2 which is in the normal range  PPG/PVR Tracings are normal  Triphasic Doppler waveforms at the ankle  Metatarsal Pressure 138 mmHg  Great Toe Pressure: 110 mmHg, within limits for healing potential  EKG:   -2/17: Sinus tachycardia w/ septal infarct of undetermined age  Micro:  Micro:  Blood Culture:   Lab Results   Component Value Date    BLOODCX No Growth at 24 hrs  02/21/2018    BLOODCX No Growth at 24 hrs  02/21/2018    BLOODCX No Growth After 4 Days  02/17/2018    BLOODCX No Growth After 4 Days  02/17/2018     Urine Culture: No results found for: URINECX  Sputum Culture: No components found for: SPUTUMCX  Wound Culure: No results found for: WOUNDCULT    Lab Results   Component Value Date    BLOODCX No Growth at 24 hrs  02/21/2018    BLOODCX No Growth at 24 hrs  02/21/2018    BLOODCX No Growth After 4 Days  02/17/2018    BLOODCX No Growth After 4 Days   02/17/2018     Allergies: No Known Allergies  Medications:   Scheduled Meds:  Current Facility-Administered Medications:  albuterol 2 5 mg Nebulization Q4H PRN Charis Santana DO    dexmedetomidine 0 1-0 7 mcg/kg/hr Intravenous Titrated Palma Fontaine DO Last Rate: 0 5 mcg/kg/hr (93/86/20 9952)   folic acid 1 mg Oral Daily Mariama Hurtado DO    heparin (porcine) 5,000 Units Subcutaneous [facility] KARIME Boo-JERRICA    influenza vaccine 0 5 mL Intramuscular Prior to discharge Hodan Hernandez MD    lactulose 20 g Oral TID Avery Wang    levalbuterol 1 25 mg Nebulization TID Charis Santana DO    methylprednisolone 32 mg Oral Daily With Breakfast Jean Carlos De Leon PA-C    midodrine 5 mg Oral TID [facility]    octreotide 100 mcg Intravenous [facility] Mati Glasgow DO    ondansetron 4 mg Intravenous Q8H PRN Isak Mooney DO    pantoprazole 40 mg Intravenous Q24H [address] Celso Fairbanks MD    piperacillin-tazobactam 2 25 g Intravenous Q8H Herberth Callejas PA-C Last Rate: Stopped (02/23/18 0330)   rifaximin 550 mg Oral Q12H [address] Shara Luque PA-C    sodium chloride 3 mL Nebulization TID Charis Santana DO    thiamine 100 mg Oral Daily Mariama Hurtado DO      Continuous Infusions:  dexmedetomidine 0 1-0 7 mcg/kg/hr Last Rate: 0 5 mcg/kg/hr (02/23/18 0447)     PRN Meds:    albuterol 2 5 mg Q4H PRN   influenza vaccine 0 5 mL Prior to discharge   ondansetron 4 mg Q8H PRN     VTE Pharmacologic Prophylaxis: Reason for no pharmacologic prophylaxis increased coags due to ESLD  VTE Mechanical Prophylaxis: sequential compression device  Invasive lines and devices:   Invasive Devices     Peripheral Intravenous Line            Peripheral IV 02/21/18 Right Arm 1 day    Peripheral IV 02/22/18 Left;Dorsal (posterior) Arm 1 day          Line            HD Cath Double 1 day          Drain            Open Drain Left Abdomen 2 days    NG/OG/Enteral Tube Nasogastric Right nares 1 day    Rectal Tube With balloon less than 1 day                 Code Status: Level 1 - Full Code  [employer]

## 2018-02-23 NOTE — PALLIATIVE CARE CONFERENCE
Family meeting held today with pt's ex-wife Kailee Fisher), 2 sons (Demi Given), Dr Alver Skiff [facility]), Dr Warner Swann [facility]), Dr Allie Jalloh ([address] Attending), Simone Pinto PA-C (Critical Care), SENG Carter ( ) and this Greg Simpson  Current medical status provided to family  Pt's overall prognosis also discussed  Medical team is concerned about pt's ability to recover secondary to his multi-system organ failure  Cathie Wolf both state they want to give their dad "any chance he has" and want to continue escalating cares  Upon further discussion and education to family, it was decided that they would continue to pursue mechanical ventilation if pt's pulmonary status declines, but would not want CPR if his heart would stop  Sons state pt  Was a "homebody" and mostly sedentary, but enjoyed being at home  He was unable to work for a year due to a knee injury  They both agreed pt  Would not want to live on life support, but continue to want to try ventilator support "for a short time "  Dr Emili Ambrocio clearly explained what that may look like  Offered additional meetings with family as needed  Family was thankful of meeting and information  Palliative Care contact information provided to them  W offered pastoral care service as well, to which family stated spiritual support will be helpful to them  There is a family member who is a  and will assist as needed  Will continue supportive care

## 2018-02-23 NOTE — PROGRESS NOTES
Progress Note - Critical Care   Shauna Donovan 50 y o  male MRN: [id number]  Unit/Bed#: MICU 07 Encounter: [id number]    Attending Physician: Kirill Dill, DO      ______________________________________________________________________  Assessment and Plan:   Principal Problem:    Decompensated hepatic cirrhosis ([facility] )  Active Problems:    Ascites    Hepatic encephalopathy (HCC)    PRANAY (acute kidney injury) ([facility] )    Hypoalbuminemia    Hyponatremia    Sepsis ([facility] )    Hyperammonemia (HCC)    Lactic acidosis    Elevated alkaline phosphatase level    Anemia of chronic disease    Osteomyelitis of toe of right foot (HCC)    Alcohol abuse    Alcoholic hepatitis    Cough    Hypoxia    Oliguria    Leukocytosis    Coagulopathy ([facility] )    Aspiration pneumonia of right lower lobe (HCC)  Resolved Problems:    Abdominal pain    Hypotension        Neuro:    -Encephalopathy, worsening: secondary to liver failure vs sepsis vs renal failure   -Agitated overnight, pulled rectal tube despite restraints  Now with mitts  Precedex added, currently 0 5  Suggest holding today to get better neuro exam   -GCS currently 15, but varies greatly    HENT    -Epistaxis overnight  NG in place  Resolved without intervention but if recurrent, will need reversal of INR      CV:    -No longer scheduling albumin; bolus for hypotension  Given yesterday  MAPs sufficient at this time   -No active issues    Pulm:    -RLL pneumonia, presumably aspiration  See ID   -Rhonchi and wheeze bilat on exam  Xopenex and saline nebs scheduled with albuterol nebs PRN -- consider changing albuterol to atrovent as he is already receiving levalbuterol   -Currently on 6L via NC with sat of 95%  Range %, most likely secondary to PNA   -Respiratory acidosis and metabolic acidosis with high anion gap yesterday  AG has cleared as of today   Repeat ABG yesterday showed resolution of acidosis: 7 321/50/86/26 with BE of 0, improved    GI:    -Acute decompensated alcoholic hepatic cirrhosis  Last MELD score was worse at 38  Per Gi, not a candidate for transplant    -Last paracentesis on 2/21 with removal of 4100cc  This site was the R abdomen, which has a dressing place  His first paracentesis in the ED on 2/17 has had recurrent drainage; ostomy bag in place  180cc out in 24 H   -Lactulose and rifaximin  Lactulose increased to TID from BID for inadequate Bm  Rectal tube in place for loose stool  Not trending ammonia at this time  Last was 58 from 104   -On octreotide 100mcg daily and midodrine 5mg TID (from 10mg)   -Medrol 32mg daily for total of 28 days: day #5/28  Taper after 28 days   -albumin is 3 7 today  Bolus 25% PRN for hypotension   -Hyperbilirubinemia: 4 28 from 4 56   -Cr 4 0 from 4 85   -INR 3 12   -GI following, appreciate input  :    -ARF, no on Hd  Last session yesterday, scheduled for today, then will be MonWedFri   -Cause of ARF is hepatorenal syndrome vs ATN  Has had a mild improvement in Cr from 4 85 to 4 0  Continue to trend  Consider scheduling albumin again in case truly due to ATN from hypotension   -HD catheter R IJ   -Anion gap acidosis: resolved      F/E/N:     Fluids: Albumin 25% prn    Electrolytes: Na 133, K 4 1, Cl 98, bicarb 26, Ca 8 3, phos 5 6 (8 0 yesterday, Mg 2 3    Nutrition: NPO out of concern for aspiration  Need to address goals of care at family meeting today  Not convinced that he can protect his airway, so need to discuss how aggressive we will be with intervention  OK to keep NPO for the time being    ID:    -RLL aspiration PNA, treating with vanco and Zosyn    -Last vanc dose was after HD yesterday  Scheduled for trough today prior to HD session   -Will discuss dosing with pharmacy based around his MonWedFri HD schedule   -WBC improved from 16 to 7 6  No bands  -Afebrile   -MRSA swab pending    Heme:    -Hgb stable at 7 3 from 7 3   -Thrombocytopenia: worse at 47,000 today from 73,000   Stop heparin ppx    Endo:    -POCT glucose Q 6 H while NPO   -Glucose 144 today     Msk/Skin:    -Chronic R great toe osteomyelitis, ID following; no active infection   -Sacral and L hip ulcer, continue wound care   -Reposition to prevent ulcer    Disposition: ICU  Family discussion today    Code Status: Level 1 - Full Code    Counseling / Coordination of Care  Total Critical Care time spent 25 minutes excluding procedures, teaching and family updates  ______________________________________________________________________    Chief Complaint: Unable to provide    24 Hour Events:       ______________________________________________________________________    Physical Exam:     Constitutional: At time of second exam, pt is awake/alert, interactive, but does not know where he is or what month it is    HENT: NCAT, NG in place    Cv: RRR, no murmur    Pulm: Scattered rhonchi and wheeze    Gi: soft  NT    Gu: deferred    Ms: Anasarca    Neuro: GCS 15 at time of evaluation    Skin: Warm, dry          ______________________________________________________________________  Vitals:    18 0432 18 0500 18 0541 18 0600   BP: 128/71 142/82  135/80   Pulse: 78 78  78   Resp: 15 19  14   Temp:       TempSrc:       SpO2: 94% 93%  92%   Weight:   91 4 kg (201 lb 8 oz)    Height:           Temperature:   Temp (24hrs), Av 5 °F (36 4 °C), Min:96 6 °F (35 9 °C), Max:98 2 °F (36 8 °C)    Current Temperature: 98 2 °F (36 8 °C)  Weights:   IBW: 73 kg    Body mass index is 28 91 kg/m²    Weight (last 2 days)     Date/Time   Weight    18 0541  91 4 (201 5)    18 0547  90 7 (199 96)            Hemodynamic Monitoring:  N/A     Non-Invasive/Invasive Ventilation Settings:  Respiratory    Lab Data (Last 4 hours)    None         O2/Vent Data (Last 4 hours)    None              No results found for: PHART, QKU3CEG, PO2ART, YBA9FLR, X2IGCSJL, BEART, SOURCE  SpO2: SpO2: 92 %  Intake and Outputs:  I/O       701 -  0700  07 0701 - 02/24 0700    P  O        I V  (mL/kg) 500 (5 5) 613 8 (6 7)     Blood       NG/GT  300     IV Piggyback 900 300     Total Intake(mL/kg) 1400 (15 4) 1213 8 (13 3)     Urine (mL/kg/hr) 120 (0 1) 50 (0)     Emesis/NG output 750 (0 3) 400 (0 2)     Drains 1375 (0 6) 180 (0 1)     Other 2061 (0 9) 1500 (0 7)     Stool 0 (0) 200 (0 1)     Total Output 4306 2330      Net -2906 -1116 2             Unmeasured Stool Occurrence 1 x          Nutrition:        Diet Orders            Start     Ordered    02/22/18 1151  Diet NPO  Diet effective now     Comments:  Except meds via NG   Question Answer Comment   Diet Type NPO    RD to adjust diet per protocol? No        02/22/18 1151        Labs:     Results from last 7 days  Lab Units 02/23/18 0443 02/22/18  1159 02/22/18  0431 02/21/18  1836  02/20/18  0455  02/19/18  0439   WBC Thousand/uL 7 67  --  16 81* 15 79*  < > 7 23  --  5 63   HEMOGLOBIN g/dL 7 2*  --  7 3* 7 7*  < > 6 8*  < > 7 0*   I STAT HEMOGLOBIN g/dl  --  7 5*  --   --   < >  --   --   --    HEMATOCRIT % 20 0*  --  21 2* 22 0*  < > 19 0*  --  19 7*   PLATELETS Thousands/uL 47*  --  73* 67*  < > 82*  --  72*   NEUTROS PCT % 83*  --   --   --   --  79*  --  63   MONOS PCT % 9  --   --   --   --  10  --  19*   MONO PCT MAN %  --   --   --  9  --   --   --   --    < > = values in this interval not displayed      Results from last 7 days  Lab Units 02/23/18 0443 02/22/18  1159 02/22/18  0431 02/22/18  0019 02/21/18  1836   SODIUM mmol/L 133*  --  131* 128*  --    POTASSIUM mmol/L 4 1  --  4 5 4 4  --    CHLORIDE mmol/L 98*  --  97* 95*  --    CO2 mmol/L 26  --  20* 21  --    BUN mg/dL 40*  --  46* 47*  --    CREATININE mg/dL 4 00*  --  4 81* 4 75*  --    CALCIUM mg/dL 8 3  --  8 3 8 4  --    TOTAL PROTEIN g/dL 6 4  --  7 2  --  7 0   BILIRUBIN TOTAL mg/dL 4 28*  --  4 56*  --  4 99*   ALK PHOS U/L 67  --  86  --  94   ALT U/L 31  --  28  --  26   AST U/L 42  --  41  --  38   GLUCOSE RANDOM mg/dL 144*  --  141* 138 --    GLUCOSE, ISTAT mg/dl  --  145*  --   --   --        Results from last 7 days  Lab Units 02/23/18  0443 02/22/18  0431 02/21/18  0533   MAGNESIUM mg/dL 2 3 2 3 2 5     Lab Results   Component Value Date    PHOS 5 6 (H) 02/23/2018    PHOS 8 0 (H) 02/22/2018    PHOS 8 0 (H) 02/21/2018        Results from last 7 days  Lab Units 02/22/18  0431 02/21/18  1836 02/21/18  0533  02/17/18  1217   INR  3 12* 2 70* 2 53*  < > 1 97*   PTT seconds 61*  --   --   --  45*   < > = values in this interval not displayed  No results found for: TROPONINI    Results from last 7 days  Lab Units 02/22/18  0817 02/21/18  2200 02/21/18  1836   LACTIC ACID mmol/L 3 3* 3 7* 3 7*     ABG:  Lab Results   Component Value Date    PHART 7 238 (LL) 02/21/2018    MNY1CVV 45 5 (H) 02/21/2018    PO2ART 78 5 02/21/2018    AUS9YHW 19 0 (L) 02/21/2018    BEART -7 9 02/21/2018    SOURCE Radial, Right 02/21/2018     Imaging: None new I have personally reviewed pertinent reports  EKG: None new  Micro:  Lab Results   Component Value Date    BLOODCX No Growth at 24 hrs  02/21/2018    BLOODCX No Growth at 24 hrs  02/21/2018    BLOODCX No Growth After 4 Days  02/17/2018    BLOODCX No Growth After 4 Days   02/17/2018     Allergies: No Known Allergies  Medications:   Scheduled Meds:  Current Facility-Administered Medications:  albuterol 2 5 mg Nebulization Q4H PRN Charis Santana DO    dexmedetomidine 0 1-0 7 mcg/kg/hr Intravenous Titrated Reema Ayon DO Last Rate: 0 5 mcg/kg/hr (98/94/91 9834)   folic acid 1 mg Oral Daily Mariama Hurtado DO    heparin (porcine) 5,000 Units Subcutaneous [facility] Mariola Major PA-C    influenza vaccine 0 5 mL Intramuscular Prior to discharge Randy Ewing MD    lactulose 20 g Oral TID Shaw Gitelman    levalbuterol 1 25 mg Nebulization TID Charis Santana DO    methylprednisolone 32 mg Oral Daily With Breakfast Patrice Lindsey PA-C    midodrine 5 mg Oral TID AC Simpson Gitelman    octreotide 100 mcg Intravenous Q8H [address] Mati Nair,     ondansetron 4 mg Intravenous Q8H PRN Denis Jordan,     pantoprazole 40 mg Intravenous Q24H [address] Emerald Camara MD    piperacillin-tazobactam 2 25 g Intravenous Q8H Gino Elizondo PA-C Last Rate: Stopped (02/23/18 0330)   rifaximin 550 mg Oral Q12H [address] Shara Luque PA-C    sodium chloride 3 mL Nebulization TID Charis Santana,     thiamine 100 mg Oral Daily Mariama Hurtado DO      Continuous Infusions:  dexmedetomidine 0 1-0 7 mcg/kg/hr Last Rate: 0 5 mcg/kg/hr (02/23/18 0447)     PRN Meds:    albuterol 2 5 mg Q4H PRN   influenza vaccine 0 5 mL Prior to discharge   ondansetron 4 mg Q8H PRN     VTE Pharmacologic Prophylaxis: Heparin  VTE Mechanical Prophylaxis: sequential compression device  Invasive lines and devices: Invasive Devices     Peripheral Intravenous Line            Peripheral IV 02/21/18 Right Arm 1 day    Peripheral IV 02/22/18 Left;Dorsal (posterior) Arm 1 day          Line            HD Cath Double 1 day          Drain            Open Drain Left Abdomen 2 days    NG/OG/Enteral Tube Nasogastric Right nares 1 day    Rectal Tube With balloon less than 1 day                     Portions of the record may have been created with voice recognition software  Occasional wrong word or "sound a like" substitutions may have occurred due to the inherent limitations of voice recognition software  Read the chart carefully and recognize, using context, where substitutions have occurred      Fabrice Carson PA-C

## 2018-02-23 NOTE — PROGRESS NOTES
Progress Note - Delio Sosa 50 y o  male MRN: [id number]    Unit/Bed#: MICU 07 Encounter: [id number]      ASSESSMENT/ PLAN:   1  Decompensated alcohol cirrhosis: [facility]  Complicated by ascites and HE  MELD of 38 yesterday, there is no INR today for up to date calculation  He last received a paracentesis on 2/21 with about 4L removed and was negative for SBP, today his abdominal exam is soft and benign  His mental status has not improved and he was started on BIPAP today for decreased O2 sats  His prognosis is very guarded and family meeting today resulted in DNR but will accept intubation and ongoing dialysis  He is unlikely a transplant candidate secondary to active alcohol use    -monitor CMP and INR daily  -continue folic acid and thiamine  -continue PPI   -continue methylprednisolone for total of 28 days    2  HE: mental status not improved  Patient pulled out rectal tube overnight, he has been started on precedex  This may be multifactorial including liver failure worsened by underlying pneumonia  -continue lactulose titrating for 2-3 BMs daily  -continue xifaxan 550mg bid   -monitor mental status   -monitor stool output    3  PRNAAY: Creatinine is 4 0 today with baseline of 0 5 in 2016  Possible secondary to HRS vs ATN given patient's hypotension   -continue dialysis  -continue midodrine   -continue octreotide     Subjective:     Patient seen and examined  Family meeting today resulted in DNR but family agreeable for intubation and ongoing dialytic support  They are not interested in trach/PEG and do not want CPR done if his heart should stop    Objective:     Vitals: Blood pressure 129/75, pulse 76, temperature (!) 96 3 °F (35 7 °C), temperature source Oral, resp  rate 12, height 5' 10" (1 778 m), weight 91 4 kg (201 lb 8 oz), SpO2 100 %  ,Body mass index is 28 91 kg/m²        Intake/Output Summary (Last 24 hours) at 02/23/18 1556  Last data filed at 02/23/18 1300   Gross per 24 hour   Intake 1220 51 ml   Output             1523 ml   Net          -302 49 ml       Physical Exam:     General Appearance: A&Ox3, appears stated age and cooperative  Lungs: Clear to auscultation bilaterally, no rales or rhonchi  Heart: Regular rate and rhythm, S1, S2 normal, no murmur, click, rub or gallop  Abdomen: Soft, non-tender, non-distended; bowel sounds normal; no masses or no organomegaly  Extremities: No cyanosis, clubbing, edema    Invasive Devices     Peripheral Intravenous Line            Peripheral IV 02/21/18 Right Arm 1 day    Peripheral IV 02/22/18 Left;Dorsal (posterior) Arm 1 day          Line            HD Cath Double 2 days          Drain            Open Drain Left Abdomen 2 days    NG/OG/Enteral Tube Nasogastric Right nares 1 day    Rectal Tube With balloon less than 1 day                Lab Results:      Results from last 7 days  Lab Units 02/23/18  0443   WBC Thousand/uL 7 67   HEMOGLOBIN g/dL 7 2*   HEMATOCRIT % 20 0*   PLATELETS Thousands/uL 47*   NEUTROS PCT % 83*   LYMPHS PCT % 8*   MONOS PCT % 9   EOS PCT % 0       Results from last 7 days  Lab Units 02/23/18  0443   SODIUM mmol/L 133*   POTASSIUM mmol/L 4 1   CHLORIDE mmol/L 98*   CO2 mmol/L 26   BUN mg/dL 40*   CREATININE mg/dL 4 00*   CALCIUM mg/dL 8 3   TOTAL PROTEIN g/dL 6 4   BILIRUBIN TOTAL mg/dL 4 28*   ALK PHOS U/L 67   ALT U/L 31   AST U/L 42   GLUCOSE RANDOM mg/dL 144*       Results from last 7 days  Lab Units 02/22/18  0431   INR  3 12*       Results from last 7 days  Lab Units 02/17/18  1154   LIPASE u/L 247       Imaging Studies: I have personally reviewed pertinent imaging studies  Ct Abdomen Pelvis Wo Contrast    Result Date: 2/18/2018  Impression: 1  Cirrhosis with stigmata of portal hypertension  2   Large volume of abdominopelvic ascites  There is a layering hematocrit level in the dependent pelvis    However, with the attenuation of the dependent component measuring only 15 Hounsfield units, this is most likely small volume of blood products rather than large volume hemoperitoneum  3   Postprocedural pneumoperitoneum  Needle tract in the left mid abdomen is in the vicinity of a large body wall collateral  4   Small right pleural effusion and right base atelectasis  5   Anterior compression deformities of T7 and T12  Xr Chest Portable    Result Date: 2/21/2018  Impression: 1  Right internal jugular dialysis catheter with tip in the midsuperior vena cava  2   Mild vascular congestion  Right-sided alveolar infiltrates, likely cardiogenic in nature, however superimposed pneumonia not excluded  Clinical correlation and follow-up examination recommended  Xr Chest Pa & Lateral    Result Date: 2/18/2018  Impression: 1  No active pulmonary disease  2   Free intraperitoneal air which may be related to reported paracentesis the prior day though clinical correlation for acute abdominal symptoms recommended  Xr Foot 3+ Vw Right  Result Date: 2/19/2018  Impression: Soft tissue ulceration with bony changes of the tuft of the great toe, suggesting osteomyelitis  If there is concern for osteomyelitis, consider nuclear medicine white blood cell scan or MRI with gadolinium for further evaluation  A verbal report will be called by the reading room liaison following this dictation  Ct Head Wo Contrast    Result Date: 2/21/2018  Impression: Motion degraded examination  No gross intracranial abnormality  Ir Paracentesis  Result Date: 2/23/2018  Impression: Impression: Successful diagnostic and therapeutic paracentesis      Xr Chest Pa Only  Result Date: 2/21/2018  Impression: Increasing airspace opacities in the right perihilar region and left suprahilar region may represent aspiration and/or pneumonia  Us Abdomen Limited  Result Date: 2/20/2018  Impression: Cirrhosis with sequela of portal hypertension and ascites as detailed above  Cholelithiasis   If there is clinical concern for cholecystitis, nuclear medicine HIDA scan may be obtained       Ir Leonidas Hd Cath  Result Date: 2/23/2018  Impression: Impression: Successful placement of a temporary dialysis catheter via the internal jugular vein

## 2018-02-23 NOTE — PROGRESS NOTES
Progress Note - Infectious Disease   Marc Velazquez 50 y o  male MRN: [id number]  Unit/Bed#: MICU 07 Encounter: [id number]      Impression/Recommendations:  1   Aspiration pneumonia  Given significant comorbid illnesses and recent hospitalizations, patient is at risk for nosocomial pathogens, especially nosocomial gram negatives any MRSA  Patient is clinically improved on current antibiotics  Temperature stays down  WBC decreased  Continue vancomycin/Zosyn  Follow-up on MRSA screen      2  Acute superimposed on chronic hypoxic respiratory failure  This is most likely secondary to aspiration above  Patient appears to be more stable with O2 support  Monitor respiratory symptoms      3  Worsening encephalopathy  This is most likely secondary to aspiration pneumonia above, superimposed on baseline hepatic encephalopathy  At present, patient a little more responsive  Monitor mental status      4  Right great toe gangrenous ulcer and osteomyelitis   There is no evidence of cellulitis clinically   Patient has no fever leukocytosis   No antibiotic is necessary   However, patient needs to have this toe amputated, to avoid development of wet gangrene   Patient has been refusing toe amputation  No antibiotic needed at present time  Monitor toe  Recommend toe amputation      5   Decompensated cirrhosis   This is due to patient's noncompliance with prescribed treatment plan  Matthias Cortes is no evidence of peritonitis clinically   Patient is status post paracentesis with benign peritoneal fluid   Systemic corticosteroid started  Patient is status post repeat paracentesis, with benign parameters also  Even with active pneumonia, it should be fine to continue systemic corticosteroid  No antibiotic needed  Management per GI and primary service      6  [place] Juan Jose   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   Dehydration may contribute   Creatinine worsening  HD was started    Antibiotic dosages adjusted accordingly  Monitor creatinine      Discussed with Dr Augustus Farr from [address] [facility]      Antibiotics:  Vancomycin/Zosyn # 2     Subjective:  Patient remains in ICU  He remains lethargic/sleepy, a little more responsive  Abdomen remains distended  No pain  Temperature stays down   No chills  Objective:  Vitals:  HR:  [] 84  Resp:  [9-31] 30  BP: ()/(41-84) 140/71  SpO2:  [70 %-100 %] 100 %  Temp (24hrs), Av 8 °F (36 6 °C), Min:97 °F (36 1 °C), Max:98 2 °F (36 8 °C)  Current: Temperature: 98 2 °F (36 8 °C)    Physical Exam:     General: Awake, alert, cooperative, no distress  Lungs: Expansion symmetric, no rales, no wheezing, respirations unlabored  Heart[de-identified]  Regular rate and rhythm, S1 and S2 normal, no murmur  Abdomen: Soft, mildly distended, non-tender, bowel sounds active all four quadrants,        no masses, no organomegaly  Extremities: Stable leg edema  Stable right 1st toe necrotic ulcer  No drainage  Stable chronic changes  Nontender  Skin:  No rash  Invasive Devices     Peripheral Intravenous Line            Peripheral IV 18 Right Arm 1 day    Peripheral IV 18 Left;Dorsal (posterior) Arm 1 day          Line            HD Cath Double 2 days          Drain            Open Drain Left Abdomen 2 days    NG/OG/Enteral Tube Nasogastric Right nares 1 day    Rectal Tube With balloon less than 1 day                Labs studies:   I have personally reviewed pertinent labs      Results from last 7 days  Lab Units 18  0443 18  1159 18  0431 18  0019 18  1836   SODIUM mmol/L 133*  --  131* 128*  --    POTASSIUM mmol/L 4 1  --  4 5 4 4  --    CHLORIDE mmol/L 98*  --  97* 95*  --    CO2 mmol/L 26  --  20* 21  --    ANION GAP mmol/L 9  --  14* 12  --    BUN mg/dL 40*  --  46* 47*  --    CREATININE mg/dL 4 00*  --  4 81* 4 75*  --    EGFR ml/min/1 73sq m 16  --  13 13  --    GLUCOSE RANDOM mg/dL 144*  --  141* 138  --    GLUCOSE, ISTAT mg/dl  --  145*  --   --   --    CALCIUM mg/dL 8 3  --  8 3 8 4  --    AST U/L 42  --  41  --  38   ALT U/L 31  --  28  --  26   ALK PHOS U/L 67  --  86  --  94   TOTAL PROTEIN g/dL 6 4  --  7 2  --  7 0   BILIRUBIN TOTAL mg/dL 4 28*  --  4 56*  --  4 99*       Results from last 7 days  Lab Units 02/23/18  0443 02/22/18  1159 02/22/18  0431 02/21/18  1836   WBC Thousand/uL 7 67  --  16 81* 15 79*   HEMOGLOBIN g/dL 7 2*  --  7 3* 7 7*   I STAT HEMOGLOBIN g/dl  --  7 5*  --   --    PLATELETS Thousands/uL 47*  --  73* 67*       Results from last 7 days  Lab Units 02/21/18  1829 02/21/18  1820 02/21/18  1156 02/17/18  1929 02/17/18  1829 02/17/18  1336   BLOOD CULTURE  No Growth at 24 hrs  No Growth at 24 hrs   --  No Growth After 5 Days  No Growth After 5 Days  --   --    GRAM STAIN RESULT   --   --  Rare Polys  No bacteria seen  --   --  Rare Polys  No bacteria seen   BODY FLUID CULTURE, STERILE   --   --  No growth  --   --  No growth   INFLUENZA A PCR   --   --   --   --  None Detected  --    INFLUENZA B PCR   --   --   --   --  None Detected  --    RSV PCR   --   --   --   --  None Detected  --        Imaging Studies:   I have personally reviewed pertinent imaging study reports and images in PACS  EKG, Pathology, and Other Studies:   I have personally reviewed pertinent reports

## 2018-02-23 NOTE — PROGRESS NOTES
While speaking with patient, O2 sats dropped into 70's on 6 LNC  Pt awake  Placed on nonrebreather  Dr Clive Singh called to bedside  RT at bedside, pt placed on Bipap, prn albuterol given  Sat's improving  Will continue to monitor

## 2018-02-23 NOTE — PROGRESS NOTES
Progress note - Palliative and Supportive Care   Dyan Craig 50 y o  male [id number]    Assessment:     Patient Active Problem List   Diagnosis    Decompensated hepatic cirrhosis ([facility] )    Ascites    Hep[facility] encephalopathy ([facility] )    PRANAY (acute kidney injury) ([facility] )    Hypoalbuminemia    Hyponatremia    Sepsis ([facility] )    Hyperammonemia (HCC)    Lactic acidosis    Elevated alkaline phosphatase level    Anemia of chronic disease    Osteomyelitis of toe of right foot (HCC)    Alcohol abuse    Alcoholic hepatitis    Cough    Hypoxia    Oliguria    Leukocytosis    Coagulopathy (HCC)    Aspiration pneumonia of right lower lobe ([facility] )         Plan:  1  Symptom management - worsening  Hepatic encephalopathy    Recommend global delirium precautions including:      - Establishment of day/night cycle via lights during the day and blinds open  Please limit interruptions at night as medically appropriate  - Provide glasses/hearing aids as apprioriate  - Minimize deliriogenic meds as able  - Provide reorientation including date on board and visible clock  - Avoid restraints as able, frequent verbal reorientations or patient care sitter as appropriate  Ultimately the treatment to resolve this is going to be treating his underlying conditions  Unfortunately I do believe he has a very poor prognosis  2  Goals -family meeting held with patient's two sons, ex-wife, critical care team, and palliative care  We have provided a medical update  Patient's critical care attending provided a realistic view of patient's critical condition and his likely progression  Patient's sons appeared overwhelmed, but were able to participate medical decision making process  They were able to put limits on patient's care  They believe patient would not want to have chest compressions, expressed a given his underlying liver disease and coagulopathy  They would not want the patient to have a tracheostomy and a PEG tube  They do believe that he would want to have intubation and a trial of antibiotics for his pneumonia  I supported the family in this, and appreciate their setting limits  We offered further palliative services and will continue to follow the patient  Code Status: DNR - Level 2   Decisional apparatus:  Patient is not competent on my exam today  If competence is lost, patient's substitute decision maker would default to two adult sons [place]  Advance Directive / Living Will / POLST:  n/a      Interval history:      Patient had worsening agitation overnight  He pulled out his rectal tube  He was placed in mitts and continues to be in restraints  He has had progressive respiratory failure  He is now on BiPAP  Family meeting held with patient's two sons and ex-wife  For full results see above      MEDICATIONS / ALLERGIES:    all current active meds have been reviewed and current meds:   Current Facility-Administered Medications   Medication Dose Route Frequency    albuterol inhalation solution 2 5 mg  2 5 mg Nebulization Q4H PRN    dexmedetomidine (PRECEDEX) 200 mcg in sodium chloride 0 9 % 50 mL infusion  0 1-0 7 mcg/kg/hr Intravenous Titrated    folic acid (FOLVITE) tablet 1 mg  1 mg Oral Daily    influenza inactivated quadrivalent vaccine (FLULAVAL) IM injection 0 5 mL  0 5 mL Intramuscular Prior to discharge    lactulose 20 g/30 mL oral solution 20 g  20 g Oral TID    levalbuterol (XOPENEX) inhalation solution 1 25 mg  1 25 mg Nebulization TID    methylPREDNISolone sodium succinate (Solu-MEDROL) injection 40 mg  40 mg Intravenous Q12H [address]    midodrine (PROAMATINE) tablet 5 mg  5 mg Oral TID AC    octreotide (SandoSTATIN) injection 100 mcg  100 mcg Intravenous Q8H [address]    ondansetron (ZOFRAN) injection 4 mg  4 mg Intravenous Q8H PRN    pantoprazole (PROTONIX) injection 40 mg  40 mg Intravenous Q24H ROXIE    piperacillin-tazobactam (ZOSYN) 2 25 g in sodium chloride 0 9 % 50 mL IVPB  2 25 g Intravenous Q8H    rifaximin (XIFAXAN) tablet 550 mg  550 mg Oral Q12H [facility]    sodium chloride 0 9 % inhalation solution 3 mL  3 mL Nebulization TID    thiamine (VITAMIN B1) tablet 100 mg  100 mg Oral Daily       No Known Allergies    OBJECTIVE:    Physical Exam  Physical Exam   Constitutional:   Mild distress   HENT:   Right Ear: External ear normal    Left Ear: External ear normal    Bipap on   Eyes: Right eye exhibits no discharge  Left eye exhibits no discharge  Neck: Neck supple  Pulmonary/Chest:   Tachypnea, on BiPAP   Abdominal: He exhibits distension  Musculoskeletal: He exhibits no deformity  In restraints   Neurological:   encephalopathic   Skin: Skin is warm  He is diaphoretic  Nursing note and vitals reviewed  Lab Results:   I have personally reviewed pertinent labs  , CBC:   Lab Results   Component Value Date    WBC 7 67 02/23/2018    HGB 7 2 (L) 02/23/2018    HCT 20 0 (L) 02/23/2018    MCV 92 02/23/2018    PLT 47 (LL) 02/23/2018    MCH 33 0 02/23/2018    MCHC 36 0 02/23/2018    RDW 22 1 (H) 02/23/2018    MPV 9 6 02/23/2018    NRBC 0 02/23/2018   , CMP:   Lab Results   Component Value Date     (L) 02/23/2018    K 4 1 02/23/2018    CL 98 (L) 02/23/2018    CO2 26 02/23/2018    ANIONGAP 9 02/23/2018    BUN 40 (H) 02/23/2018    CREATININE 4 00 (H) 02/23/2018    GLUCOSE 144 (H) 02/23/2018    CALCIUM 8 3 02/23/2018    AST 42 02/23/2018    ALT 31 02/23/2018    ALKPHOS 67 02/23/2018    PROT 6 4 02/23/2018    BILITOT 4 28 (H) 02/23/2018    EGFR 16 02/23/2018     Imaging Studies:  Reviewed chest x-ray showing worsening evidence of congestive heart failure and possible bilateral pneumonia  EKG, Pathology, and Other Studies:  Review    Counseling / Coordination of Care  Total floor / unit time spent today 45+ minutes  Greater than 50% of total time was spent with the patient and / or family counseling and / or coordination of care   A description of the counseling / coordination of care: Medication review, patient assessment, goals of care advance care planning discussion with family, psychosocial support for family

## 2018-02-23 NOTE — PROGRESS NOTES
NEPHROLOGY PROGRESS NOTE   Carmen Ott 50 y o  male MRN: [id number]  Unit/Bed#: MICU 07 Encounter: [id number]      ASSESSMENT & PLAN:  1  Acute kidney injury, given history of decompensated cirrhosis suspicious for hepatorenal syndrome, versus ATN given hypotension  Baseline creatinine 0 5 in 2016  Started on hemodialysis on 02/21, will proceed with 3rd hemodialysis treatment today UF 1-2 kilos as tolerated  Follow daily labs, monitor strict ins and outs  Temporary dialysis catheter was placed by IR 02/21  For family meeting today to discuss goals of care, unfortunately patient have a poor prognosis, if he is not a transplant candidate as per GI note, then goals of care needs to be readdressed with patient's family  2   Decompensated alcoholic cirrhosis, history of noncompliance  GI on board, as per GI note his prognosis is extremely poor and he is not a transplant candidate  3   Encephalopathy, continue with medical treatment, continue with lactulose and titrate for 3-4 loose bowel movement daily  Multifactorial in the setting of hepatic encephalopathy as well as sepsis with a right lower lobe pneumonia  Palliative care consulted and input appreciated, given multiple comorbidities patient has a grim prognosis  4   New right-sided infiltrate on chest x-ray, started on cefepime and Vanco as per critical care team     5   Hyponatremia seems to be chronic in the setting end-stage liver disease now with a component of acute renal failure inability to excrete free mai, continue with dialysis with as sodium bath of 138 today  6   Lactic acidosis, noted lactic acid again increased yesterday  7   Hypotension, currently blood pressure is stable, continue with triple therapy  8   Anemia, follow H&H, transfuse as needed        Discussed with critical care team     SUBJECTIVE:  Patient developed worsening confusion and agitation yesterday evening, started on Precedex infusion, this morning patient is somnolent, denies any shortness of breath or chest pain, he is on restraints      OBJECTIVE:  Current Weight: Weight - Scale: 91 4 kg (201 lb 8 oz)  Vitals:    02/23/18 0600   BP: 135/80   Pulse: 78   Resp: 14   Temp:    SpO2: 92%       Intake/Output Summary (Last 24 hours) at 02/23/18 0850  Last data filed at 02/23/18 0600   Gross per 24 hour   Intake          1193 82 ml   Output             2270 ml   Net         -1076 18 ml     General:  In no acute distress, confused, with restraints  Skin:  Jaundice, no rash  Eyes:  Mild jaundice  ENT:  Mucous membranes dry  Neck:  Supple  Chest:  Clear to auscultation anteriorly   CVS:  Regular rate and rhythm  Abdomen:  Soft, nontender, normal bowel sounds  Extremities:  Bilateral lower extremity   Neuro:  Confused, agitated  Psych:  Unable to be assessed    Medications:    Current Facility-Administered Medications:     albuterol inhalation solution 2 5 mg, 2 5 mg, Nebulization, Q4H PRN, Charis Santana DO    dexmedetomidine (PRECEDEX) 200 mcg in sodium chloride 0 9 % 50 mL infusion, 0 1-0 7 mcg/kg/hr, Intravenous, Titrated, Lendon Aase, DO, Last Rate: 11 3 mL/hr at 02/23/18 0447, 0 5 mcg/kg/hr at 46/00/56 0710    folic acid (FOLVITE) tablet 1 mg, 1 mg, Oral, Daily, Mariama Hurtado DO, 1 mg at 02/23/18 1597    influenza inactivated quadrivalent vaccine (FLULAVAL) IM injection 0 5 mL, 0 5 mL, Intramuscular, Prior to discharge, Barbara Gorman MD    lactulose 20 g/30 mL oral solution 20 g, 20 g, Oral, TID, Karol Barbosa, 20 g at 02/23/18 7205    levalbuterol (XOPENEX) inhalation solution 1 25 mg, 1 25 mg, Nebulization, TID, Charis Santana DO, 1 25 mg at 02/22/18 2101    methylPREDNISolone (MEDROL) tablet 32 mg, 32 mg, Oral, Daily With Breakfast, Nya Mojica PA-C, 32 mg at 02/23/18 0630    midodrine (PROAMATINE) tablet 5 mg, 5 mg, Oral, TID AC, Karol Barbosa, 5 mg at 02/23/18 0627    octreotide (SandoSTATIN) injection 100 mcg, 100 mcg, Intravenous, Q8H [address], Mati Glasgow DO, 100 mcg at 02/23/18 0517    ondansetron (ZOFRAN) injection 4 mg, 4 mg, Intravenous, Q8H PRN, Mariama Hurtado DO    pantoprazole (PROTONIX) injection 40 mg, 40 mg, Intravenous, Q24H [address], Allyson Smith MD, 40 mg at 02/23/18 0835    piperacillin-tazobactam (ZOSYN) 2 25 g in sodium chloride 0 9 % 50 mL IVPB, 2 25 g, Intravenous, Q8H, Justin Rosado PA-C, Stopped at 02/23/18 0330    rifaximin (XIFAXAN) tablet 550 mg, 550 mg, Oral, Q12H [address], Shara Luque PA-C, 550 mg at 02/23/18 0834    sodium chloride 0 9 % inhalation solution 3 mL, 3 mL, Nebulization, TID, Charis Santana DO, 3 mL at 02/22/18 2101    thiamine (VITAMIN B1) tablet 100 mg, 100 mg, Oral, Daily, Mariama Hurtado DO, 100 mg at 02/23/18 0834    Invasive Devices:   Urethral Catheter Temperature probe 16 Fr   (Active)   Amt returned on insertion(mL) 60 mL 2/18/2018  2:01 PM   Site Assessment Clean;Skin intact 2/19/2018  7:49 AM   Collection Container Standard drainage bag 2/19/2018  7:49 AM   Securement Method Securing device (Describe) 2/19/2018  7:49 AM   Output (mL) 10 mL 2/19/2018  7:49 AM       Lab Results:     Results from last 7 days  Lab Units 02/23/18  0443 02/22/18  1159 02/22/18  0431 02/22/18  0019 02/21/18  1836  02/21/18  0533   WBC Thousand/uL 7 67  --  16 81*  --  15 79*  --  13 35*   HEMOGLOBIN g/dL 7 2*  --  7 3*  --  7 7*  --  7 4*   I STAT HEMOGLOBIN g/dl  --  7 5*  --   --   --   < >  --    HEMATOCRIT % 20 0*  --  21 2*  --  22 0*  --  21 2*   PLATELETS Thousands/uL 47*  --  73*  --  67*  --  104*   SODIUM mmol/L 133*  --  131* 128*  --   < > 126*   POTASSIUM mmol/L 4 1  --  4 5 4 4  --   < > 4 8   CHLORIDE mmol/L 98*  --  97* 95*  --   < > 95*   CO2 mmol/L 26  --  20* 21  --   < > 19*   BUN mg/dL 40*  --  46* 47*  --   < > 56*   CREATININE mg/dL 4 00*  --  4 81* 4 75*  --   < > 5 40*   CALCIUM mg/dL 8 3  --  8 3 8 4  --   < > 8 9   MAGNESIUM mg/dL 2 3  --  2 3  --   --   --  2 5   PHOSPHORUS mg/dL 5 6* --  8 0*  --   --   --  8 0*   TOTAL PROTEIN g/dL 6 4  --  7 2  --  7 0  --  7 7   BILIRUBIN TOTAL mg/dL 4 28*  --  4 56*  --  4 99*  --  5 57*   ALK PHOS U/L 67  --  86  --  94  --  98   ALT U/L 31  --  28  --  26  --  25   AST U/L 42  --  41  --  38  --  37   GLUCOSE RANDOM mg/dL 144*  --  141* 138  --   < > 139   GLUCOSE, ISTAT mg/dl  --  145*  --   --   --   < >  --    < > = values in this interval not displayed  Previous work up:  Urine sodium 6  Urinalysis 2+ protein, 10-20 RBCs, 4-10 WBCs      Portions of the record may have been created with voice recognition software  Occasional wrong word or "sound a like" substitutions may have occurred due to the inherent limitations of voice recognition software  Read the chart carefully and recognize, using context, where substitutions have occurred  If you have any questions, please contact the dictating provider

## 2018-02-23 NOTE — HEMODIALYSIS
Pt reinfused and hemodialysis discontinued after 1 hr of tx d/t pt desatting  Critical care team at pt bedside evaluating and treating pt  Dr Jodie isabel

## 2018-02-23 NOTE — PROGRESS NOTES
Pt ripped out previous rectal tube, replaced  Order received for b/l mitts to be placed  Pt extremely agitated and confused- Precedex ordered  Pt also bleeding from nose, has since decreased  [address] resident aware and to be bedside to evaluate pt

## 2018-02-24 ENCOUNTER — APPOINTMENT (INPATIENT)
Dept: RADIOLOGY | Facility: HOSPITAL | Age: 53
DRG: 264 | End: 2018-02-24
Payer: COMMERCIAL

## 2018-02-24 ENCOUNTER — APPOINTMENT (INPATIENT)
Dept: DIALYSIS | Facility: HOSPITAL | Age: 53
DRG: 264 | End: 2018-02-24
Payer: COMMERCIAL

## 2018-02-24 LAB
ALBUMIN SERPL BCP-MCNC: 3.3 G/DL (ref 3.5–5)
ALP SERPL-CCNC: 61 U/L (ref 46–116)
ALT SERPL W P-5'-P-CCNC: 29 U/L (ref 12–78)
ANION GAP SERPL CALCULATED.3IONS-SCNC: 8 MMOL/L (ref 4–13)
ARTERIAL PATENCY WRIST A: NO
AST SERPL W P-5'-P-CCNC: 31 U/L (ref 5–45)
BACTERIA SPEC BFLD CULT: NO GROWTH
BASE EXCESS BLDA CALC-SCNC: -3.4 MMOL/L
BASOPHILS # BLD AUTO: 0 THOUSANDS/ΜL (ref 0–0.1)
BASOPHILS NFR BLD AUTO: 0 % (ref 0–1)
BILIRUB SERPL-MCNC: 3.99 MG/DL (ref 0.2–1)
BUN SERPL-MCNC: 45 MG/DL (ref 5–25)
CALCIUM SERPL-MCNC: 8.5 MG/DL (ref 8.3–10.1)
CHLORIDE SERPL-SCNC: 101 MMOL/L (ref 100–108)
CO2 SERPL-SCNC: 26 MMOL/L (ref 21–32)
CREAT SERPL-MCNC: 4.36 MG/DL (ref 0.6–1.3)
EOSINOPHIL # BLD AUTO: 0 THOUSAND/ΜL (ref 0–0.61)
EOSINOPHIL NFR BLD AUTO: 0 % (ref 0–6)
ERYTHROCYTE [DISTWIDTH] IN BLOOD BY AUTOMATED COUNT: 22 % (ref 11.6–15.1)
GFR SERPL CREATININE-BSD FRML MDRD: 15 ML/MIN/1.73SQ M
GLUCOSE SERPL-MCNC: 157 MG/DL (ref 65–140)
GRAM STN SPEC: NORMAL
GRAM STN SPEC: NORMAL
HCO3 BLDA-SCNC: 22.9 MMOL/L (ref 22–28)
HCT VFR BLD AUTO: 21.8 % (ref 36.5–49.3)
HGB BLD-MCNC: 7.8 G/DL (ref 12–17)
LYMPHOCYTES # BLD AUTO: 0.69 THOUSANDS/ΜL (ref 0.6–4.47)
LYMPHOCYTES NFR BLD AUTO: 6 % (ref 14–44)
MAGNESIUM SERPL-MCNC: 2.4 MG/DL (ref 1.6–2.6)
MCH RBC QN AUTO: 33.2 PG (ref 26.8–34.3)
MCHC RBC AUTO-ENTMCNC: 35.8 G/DL (ref 31.4–37.4)
MCV RBC AUTO: 93 FL (ref 82–98)
MONOCYTES # BLD AUTO: 0.75 THOUSAND/ΜL (ref 0.17–1.22)
MONOCYTES NFR BLD AUTO: 7 % (ref 4–12)
NEUTROPHILS # BLD AUTO: 9.69 THOUSANDS/ΜL (ref 1.85–7.62)
NEUTS SEG NFR BLD AUTO: 87 % (ref 43–75)
NRBC BLD AUTO-RTO: 0 /100 WBCS
NT-PROBNP SERPL-MCNC: ABNORMAL PG/ML
O2 CT BLDA-SCNC: 11 ML/DL (ref 16–23)
OXYHGB MFR BLDA: 95.9 % (ref 94–97)
PCO2 BLDA: 47.6 MM HG (ref 36–44)
PH BLDA: 7.3 [PH] (ref 7.35–7.45)
PHOSPHATE SERPL-MCNC: 6 MG/DL (ref 2.7–4.5)
PLATELET # BLD AUTO: 40 THOUSANDS/UL (ref 149–390)
PMV BLD AUTO: 9.2 FL (ref 8.9–12.7)
PO2 BLDA: 104.6 MM HG (ref 75–129)
POTASSIUM SERPL-SCNC: 4.2 MMOL/L (ref 3.5–5.3)
PROT SERPL-MCNC: 6.1 G/DL (ref 6.4–8.2)
RBC # BLD AUTO: 2.35 MILLION/UL (ref 3.88–5.62)
SODIUM SERPL-SCNC: 135 MMOL/L (ref 136–145)
SPECIMEN SOURCE: ABNORMAL
VANCOMYCIN SERPL-MCNC: 25.5 UG/ML
WBC # BLD AUTO: 11.16 THOUSAND/UL (ref 4.31–10.16)

## 2018-02-24 PROCEDURE — 83735 ASSAY OF MAGNESIUM: CPT | Performed by: PHYSICIAN ASSISTANT

## 2018-02-24 PROCEDURE — 80053 COMPREHEN METABOLIC PANEL: CPT | Performed by: PHYSICIAN ASSISTANT

## 2018-02-24 PROCEDURE — 83880 ASSAY OF NATRIURETIC PEPTIDE: CPT | Performed by: PHYSICIAN ASSISTANT

## 2018-02-24 PROCEDURE — 80202 ASSAY OF VANCOMYCIN: CPT | Performed by: INTERNAL MEDICINE

## 2018-02-24 PROCEDURE — 85025 COMPLETE CBC W/AUTO DIFF WBC: CPT | Performed by: PHYSICIAN ASSISTANT

## 2018-02-24 PROCEDURE — 82805 BLOOD GASES W/O2 SATURATION: CPT | Performed by: PHYSICIAN ASSISTANT

## 2018-02-24 PROCEDURE — 99233 SBSQ HOSP IP/OBS HIGH 50: CPT | Performed by: INTERNAL MEDICINE

## 2018-02-24 PROCEDURE — 94640 AIRWAY INHALATION TREATMENT: CPT

## 2018-02-24 PROCEDURE — 5A1D70Z PERFORMANCE OF URINARY FILTRATION, INTERMITTENT, LESS THAN 6 HOURS PER DAY: ICD-10-PCS | Performed by: INTERNAL MEDICINE

## 2018-02-24 PROCEDURE — 94760 N-INVAS EAR/PLS OXIMETRY 1: CPT

## 2018-02-24 PROCEDURE — 84100 ASSAY OF PHOSPHORUS: CPT | Performed by: PHYSICIAN ASSISTANT

## 2018-02-24 PROCEDURE — 71045 X-RAY EXAM CHEST 1 VIEW: CPT

## 2018-02-24 PROCEDURE — C9113 INJ PANTOPRAZOLE SODIUM, VIA: HCPCS | Performed by: EMERGENCY MEDICINE

## 2018-02-24 RX ORDER — SODIUM CHLORIDE/ALOE VERA
1 GEL (GRAM) NASAL
Status: DISCONTINUED | OUTPATIENT
Start: 2018-02-24 | End: 2018-03-10 | Stop reason: HOSPADM

## 2018-02-24 RX ORDER — LACTULOSE 20 G/30ML
30 SOLUTION ORAL
Status: DISCONTINUED | OUTPATIENT
Start: 2018-02-24 | End: 2018-02-24

## 2018-02-24 RX ORDER — LACTULOSE 20 G/30ML
30 SOLUTION ORAL 4 TIMES DAILY
Status: DISCONTINUED | OUTPATIENT
Start: 2018-02-24 | End: 2018-02-25

## 2018-02-24 RX ADMIN — RIFAXIMIN 550 MG: 550 TABLET ORAL at 09:17

## 2018-02-24 RX ADMIN — LEVALBUTEROL HYDROCHLORIDE 1.25 MG: 1.25 SOLUTION, CONCENTRATE RESPIRATORY (INHALATION) at 13:33

## 2018-02-24 RX ADMIN — LACTULOSE 30 G: 20 SOLUTION ORAL at 12:00

## 2018-02-24 RX ADMIN — DEXMEDETOMIDINE HYDROCHLORIDE 0.3 MCG/KG/HR: 100 INJECTION, SOLUTION INTRAVENOUS at 12:00

## 2018-02-24 RX ADMIN — PIPERACILLIN SODIUM,TAZOBACTAM SODIUM 2.25 G: 2; .25 INJECTION, POWDER, FOR SOLUTION INTRAVENOUS at 18:02

## 2018-02-24 RX ADMIN — LEVALBUTEROL HYDROCHLORIDE 1.25 MG: 1.25 SOLUTION, CONCENTRATE RESPIRATORY (INHALATION) at 08:31

## 2018-02-24 RX ADMIN — PIPERACILLIN SODIUM,TAZOBACTAM SODIUM 2.25 G: 2; .25 INJECTION, POWDER, FOR SOLUTION INTRAVENOUS at 11:00

## 2018-02-24 RX ADMIN — LACTULOSE 20 G: 20 SOLUTION ORAL at 09:17

## 2018-02-24 RX ADMIN — METHYLPREDNISOLONE SODIUM SUCCINATE 40 MG: 40 INJECTION, POWDER, FOR SOLUTION INTRAMUSCULAR; INTRAVENOUS at 09:17

## 2018-02-24 RX ADMIN — FOLIC ACID 1 MG: 1 TABLET ORAL at 09:17

## 2018-02-24 RX ADMIN — ISODIUM CHLORIDE 3 ML: 0.03 SOLUTION RESPIRATORY (INHALATION) at 13:33

## 2018-02-24 RX ADMIN — MIDODRINE HYDROCHLORIDE 5 MG: 5 TABLET ORAL at 17:01

## 2018-02-24 RX ADMIN — ISODIUM CHLORIDE 3 ML: 0.03 SOLUTION RESPIRATORY (INHALATION) at 19:18

## 2018-02-24 RX ADMIN — MIDODRINE HYDROCHLORIDE 5 MG: 5 TABLET ORAL at 06:03

## 2018-02-24 RX ADMIN — DEXMEDETOMIDINE HYDROCHLORIDE 0.5 MCG/KG/HR: 100 INJECTION, SOLUTION INTRAVENOUS at 04:50

## 2018-02-24 RX ADMIN — LACTULOSE 30 G: 20 SOLUTION ORAL at 14:49

## 2018-02-24 RX ADMIN — LEVALBUTEROL HYDROCHLORIDE 1.25 MG: 1.25 SOLUTION, CONCENTRATE RESPIRATORY (INHALATION) at 19:18

## 2018-02-24 RX ADMIN — OCTREOTIDE ACETATE 100 MCG: 100 INJECTION, SOLUTION INTRAVENOUS; SUBCUTANEOUS at 22:09

## 2018-02-24 RX ADMIN — METHYLPREDNISOLONE SODIUM SUCCINATE 40 MG: 40 INJECTION, POWDER, FOR SOLUTION INTRAMUSCULAR; INTRAVENOUS at 20:48

## 2018-02-24 RX ADMIN — MIDODRINE HYDROCHLORIDE 5 MG: 5 TABLET ORAL at 12:01

## 2018-02-24 RX ADMIN — ISODIUM CHLORIDE 3 ML: 0.03 SOLUTION RESPIRATORY (INHALATION) at 08:31

## 2018-02-24 RX ADMIN — OCTREOTIDE ACETATE 100 MCG: 100 INJECTION, SOLUTION INTRAVENOUS; SUBCUTANEOUS at 06:03

## 2018-02-24 RX ADMIN — RIFAXIMIN 550 MG: 550 TABLET ORAL at 20:48

## 2018-02-24 RX ADMIN — PIPERACILLIN SODIUM,TAZOBACTAM SODIUM 2.25 G: 2; .25 INJECTION, POWDER, FOR SOLUTION INTRAVENOUS at 02:16

## 2018-02-24 RX ADMIN — PANTOPRAZOLE SODIUM 40 MG: 40 INJECTION, POWDER, FOR SOLUTION INTRAVENOUS at 09:17

## 2018-02-24 RX ADMIN — DEXMEDETOMIDINE HYDROCHLORIDE 0.3 MCG/KG/HR: 100 INJECTION, SOLUTION INTRAVENOUS at 20:49

## 2018-02-24 RX ADMIN — Medication 100 MG: at 09:17

## 2018-02-24 RX ADMIN — Medication 1 TABLET: at 14:50

## 2018-02-24 RX ADMIN — OCTREOTIDE ACETATE 100 MCG: 100 INJECTION, SOLUTION INTRAVENOUS; SUBCUTANEOUS at 14:51

## 2018-02-24 RX ADMIN — LACTULOSE 30 G: 20 SOLUTION ORAL at 16:27

## 2018-02-24 NOTE — PROGRESS NOTES
Progress Note - Infectious Disease   Sharon Gonzales 50 y o  male MRN: [id number]  Unit/Bed#: MICU 07 Encounter: [id number]      Impression/Recommendations:  1   Aspiration pneumonia   Given significant comorbid illnesses and recent hospitalizations, patient is at risk for nosocomial pathogens, especially nosocomial gram negatives and MRSA    Patient is clinically improved on current antibiotics  Temperature stays down  WBC decreased  MRSA screen negative  Continue Zosyn  With negative MRSA screen, no further need for vancomycin  Treat x7 days total, another 4 days      2   Acute superimposed on chronic hypoxic respiratory failure   This is most likely secondary to aspiration above   Patient appears to be more stable with O2 support  Monitor respiratory symptoms      3   Worsening encephalopathy   This is most likely secondary to aspiration pneumonia above, superimposed on baseline hepatic encephalopathy   Mental status slowly improving  Monitor mental status      4  Right great toe gangrenous ulcer and osteomyelitis   There is no evidence of cellulitis clinically   Patient has no fever leukocytosis   No antibiotic is necessary   However, patient needs to have this toe amputated, to avoid development of wet gangrene   Patient has been refusing toe amputation  No antibiotic needed at present time  Monitor toe  Recommend toe amputation      5   Decompensated cirrhosis   This is due to patient's noncompliance with prescribed treatment plan  Di Has is no evidence of peritonitis clinically   Patient is status post paracentesis with benign peritoneal fluid   Systemic corticosteroid started  Patient is status post repeat paracentesis, with benign parameters also   Even with active pneumonia, it should be fine to continue systemic corticosteroid  No antibiotic needed    Management per GI and primary service      6  Hoang Flood   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   Dehydration may contribute   Creatinine worsening   HD was started  Antibiotic dosages adjusted accordingly  Monitor creatinine      Discussed with Critical Care Medicine Service      Antibiotics:  Vancomycin/Zosyn # 3     Subjective:  Patient remains in ICU  He remains lethargic/sleepy, but arousable  Abdomen remains distended   Mild discomfort  Temperature stays down   No chills  Objective:  Vitals:  HR:  [64-78] 66  Resp:  [8-19] 13  BP: (126-156)/(74-87) 134/75  SpO2:  [88 %-100 %] 99 %  Temp (24hrs), Av 4 °F (36 3 °C), Min:96 8 °F (36 °C), Max:97 8 °F (36 6 °C)  Current: Temperature: 97 6 °F (36 4 °C)    Physical Exam:     General: Sleepy but arousable  Answers simple questions appropriately  Comfortable  Nontoxic  Lungs: Decreased breath sounds, stable basilar rales, no wheezing, respirations unlabored  Heart[de-identified]  Regular rate and rhythm, S1 and S2 normal, no murmur  Abdomen: Soft, stable distension, non-tender, bowel sounds active all four quadrants,        no masses, no organomegaly  Extremities: Stable edema  No erythema/warmth  No ulcer  Nontender to palpation  Skin:  No rash  Invasive Devices     Peripheral Intravenous Line            Peripheral IV 18 Right Arm 2 days    Peripheral IV 18 Left;Dorsal (posterior) Arm 2 days          Line            HD Cath Double 3 days          Drain            Open Drain Left Abdomen 3 days    NG/OG/Enteral Tube Nasogastric Right nares 2 days    Rectal Tube With balloon 1 day                Labs studies:   I have personally reviewed pertinent labs      Results from last 7 days  Lab Units 18  0449 18  1055 18  0443  18  0431   SODIUM mmol/L 135*  --  133*  --  131*   POTASSIUM mmol/L 4 2  --  4 1  --  4 5   CHLORIDE mmol/L 101  --  98*  --  97*   CO2 mmol/L 26  --  26  --  20*   ANION GAP mmol/L 8  --  9  --  14*   BUN mg/dL 45*  --  40*  --  46*   CREATININE mg/dL 4 36*  --  4 00*  --  4 81*   EGFR ml/min/1 73sq m 15  --  16  --  13   GLUCOSE RANDOM mg/dL 157*  --  144*  --  141*   GLUCOSE, ISTAT mg/dl  --  134  --   < >  --    CALCIUM mg/dL 8 5  --  8 3  --  8 3   AST U/L 31  --  42  --  41   ALT U/L 29  --  31  --  28   ALK PHOS U/L 61  --  67  --  86   TOTAL PROTEIN g/dL 6 1*  --  6 4  --  7 2   BILIRUBIN TOTAL mg/dL 3 99*  --  4 28*  --  4 56*   < > = values in this interval not displayed  Results from last 7 days  Lab Units 02/24/18  0449 02/23/18  1055 02/23/18  0443  02/22/18  0431   WBC Thousand/uL 11 16*  --  7 67  --  16 81*   HEMOGLOBIN g/dL 7 8*  --  7 2*  --  7 3*   I STAT HEMOGLOBIN g/dl  --  8 2*  --   < >  --    PLATELETS Thousands/uL 40*  --  47*  --  73*   < > = values in this interval not displayed  Results from last 7 days  Lab Units 02/22/18  1455 02/21/18  1829 02/21/18  1820 02/21/18  1156 02/17/18  1929 02/17/18  1829 02/17/18  1336   BLOOD CULTURE   --  No Growth at 48 hrs  No Growth at 48 hrs  --  No Growth After 5 Days  No Growth After 5 Days  --   --    GRAM STAIN RESULT   --   --   --  Rare Polys  No bacteria seen  --   --  Rare Polys  No bacteria seen   BODY FLUID CULTURE, STERILE   --   --   --  No growth  --   --  No growth   MRSA CULTURE ONLY  No Methicillin Resistant Staphlyococcus aureus (MRSA) isolated  --   --   --   --   --   --    INFLUENZA A PCR   --   --   --   --   --  None Detected  --    INFLUENZA B PCR   --   --   --   --   --  None Detected  --    RSV PCR   --   --   --   --   --  None Detected  --        Imaging Studies:   I have personally reviewed pertinent imaging study reports and images in PACS  EKG, Pathology, and Other Studies:   I have personally reviewed pertinent reports

## 2018-02-24 NOTE — PROGRESS NOTES
Progress Note - Critical Care   Ayad Burns 50 y o  male MRN: [id number]  Unit/Bed#: MICU 07 Encounter: [id number]    Assessment:   Patient Active Problem List   Diagnosis    Decompensated hepatic cirrhosis ([facility] )    Ascites    Hepatic encephalopathy ([facility] )    PRANAY (acute kidney injury) ([place] [address] )    Hypoalbuminemia    Hyponatremia    Sepsis ([place] [address] )    Hyperammonemia (HCC)    Lactic acidosis    Elevated alkaline phosphatase level    Anemia of chronic disease    Osteomyelitis of toe of right foot (HCC)    Alcohol abuse    Alcoholic hepatitis    Cough    Hypoxia    Oliguria    Leukocytosis    Coagulopathy ([facility] )    Aspiration pneumonia of right lower lobe (HCC)   Thrombocytopenia      Plan:           Neuro: Worsening encephalopathy likely multifactorial (decompensated hepatic failure/renal failure/sepsis)    Sedation with Precedex                 CV:     Maps have been appropriate overnight, albumin boluses p r n  for pressure support map goal greater than 65    Platelet count 40                 Lung:     RLL pneumonia, presumably aspiration  See ID               Rhoni and wheeze bilaterally worse at the bases  Xopenex and saline nebs scheduled with albuterol nebs PRN                Bipap 8/14               Respiratory acidosis and metabolic acidosis with high anion gap yesterday  AG has cleared as of today  Repeat ABG yesterday showed resolution of acidosis: 7 301/58/54/28 with BE of 2                 GI:     Acute decompensated alcoholic hepatic cirrhosis  Last MELD score was worse at 38  Per Gi, not a candidate for transplant  Last paracentesis on 2/21 with removal of 4100cc  Lactulose and rifaximin  Lactulose increased to TID from BID for inadequate Bm  Rectal tube in place for loose stool  Not trending ammonia at this time   Last was 58 from 104               On octreotide 100mcg daily and midodrine 5mg TID (from 10mg)               Medrol 32mg daily for total of 28 days: day #6/28  Taper after 28 days               albumin is 3 3 today  Bolus 25% PRN for hypotension               Hyperbilirubinemia: 3 99 from 4 28               Cr 4 36 from 4 00               INR 3 12               GI following, appreciate input  FEN:    NPO out of concern for aspiration  family meeting determined the patient would not wish to have a trach or PEG  Not convinced that he can protect his airway, so need to discuss how aggressive we will be with intervention  OK to keep NPO for the time being                 :     ARF, no on Hd  Last session yesterday, scheduled for today, then will be MonWedFri               Cause of ARF is hepatorenal syndrome vs ATN  Has had a mild improvement in Cr from 4 85 to 4 0  Continue to trend  HD catheter R IJ    Hemodialysis per Nephrology  3 hour hemodialysis today with goal to remove 1-2 kilos as tolerated               Anion gap acidosis: resolved                 ID:     Antibiotics: Zosyn day 4  Heme:     Worsening thrombocytopenia platelet count today of 40, hold DVT prophylaxis, likely secondary to liver disease                 Endo: Insulin sliding scale with goal blood sugars less than 180                            Msk/Skin:     Frequent repositioning per unit protocol    Prophylactic skin care per unit protocol                 Disposition:  Continue with current level of ICU care, given markedly elevated meld score prognosis remains extremely tenuous  Family meeting yesterday to clarify goals of care  Chief Complaint:  Decompensated hepatic cirrhosis    HPI/24hr events:  Family meeting yesterday to clarify goals of care, patient now DNR but will still except intubation for ongoing dialysis support  Of note, determination was made that the patient would not want trach/PEG  Patient's family was provided with a realistic prognosis      Physical Exam:   Physical Exam   Constitutional: He is oriented to person, place, and time  He appears well-developed and well-nourished  No distress  Middle-aged male, appearing older than stated age currently on BiPAP mask with good seal   Patient is profoundly encephalopathic   oriented times 0  Fairly somnolent, only arousable to deep forceful stimulation (sternal rub with forceful yelling of his name)  Patient unable to follow commands  Patient's responses to questions largely nonsensical    HENT:   Head: Normocephalic and atraumatic  BiPAP mask in place with good seal    Eyes: Conjunctivae are normal  Pupils are equal, round, and reactive to light  Minimal scleral icterus   Neck: Neck supple  No JVD present  Cardiovascular: Normal rate, regular rhythm and normal heart sounds  Exam reveals no gallop and no friction rub  No murmur heard  Pulmonary/Chest: Effort normal and breath sounds normal  No stridor  No respiratory distress  He has no wheezes  He has no rales  He exhibits no tenderness  Abdominal: Soft  Bowel sounds are normal  He exhibits no distension  There is no tenderness  There is no rebound and no guarding  Musculoskeletal: He exhibits edema (Plus 1 pitting edema bilateral lower extremities)  Marked area of necrosis on distal and of right hallux  Patient able to spontaneously move all 4 extremities  Posey restraint as well as soft limb upper extremity restraints as well as Mitten restraints in place bilaterally  Lymphadenopathy:     He has no cervical adenopathy  Neurological: He is alert and oriented to person, place, and time  No cranial nerve deficit  Skin: Skin is warm and dry  No rash noted  He is not diaphoretic  No erythema  No pallor     Mildly icteric appearing         Vitals:    02/24/18 0326 02/24/18 0406 02/24/18 0426 02/24/18 0526   BP: 156/83  148/83 155/84   Pulse: 66  66 64   Resp: (!) 10  14 (!) 8   Temp:       TempSrc:       SpO2: 100% 100% 100% 100%   Weight:       Height:         Arterial Line BP: 132/74  Arterial Line MAP (mmHg): 98 mmHg    Temperature:   Temp (24hrs), Av 3 °F (35 7 °C), Min:94 5 °F (34 7 °C), Max:97 5 °F (36 4 °C)    Current: Temperature: 97 5 °F (36 4 °C)    Weights:   IBW: 73 kg    Body mass index is 27 39 kg/m²  Weight (last 2 days)     Date/Time   Weight    18 0238  86 6 (190 92)    18 0541  91 4 (201 5)    18 0547  90 7 (199 96)              Hemodynamic Monitoring:  N/A     Non-Invasive/Invasive Ventilation Settings:  Respiratory    Lab Data (Last 4 hours)    None         O2/Vent Data (Last 4 hours)       0406          Non-Invasive Ventilation Mode BiPAP                 No results found for: PHART, HLU5URK, PO2ART, HHP3FPF, S0EDBKTM, BEART, SOURCE  SpO2: SpO2: 100 %    Intake and Outputs:  I/O        07 -  0700  07 -  0700  07 -  0700    I V  (mL/kg) 613 8 (6 7) 737 (8 5)     NG/ 360     IV Piggyback 300 150     Total Intake(mL/kg) 1213 8 (13 3) 1247 (14 4)     Urine (mL/kg/hr) 50 (0) 0 (0)     Emesis/NG output 400 (0 2) 300 (0 1)     Drains 180 (0 1) 100 (0)     Other 1500 (0 7) 683 (0 3)     Stool 200 (0 1) 50 (0)     Total Output 2330 1133      Net -1116 2 +114                 UOP: ~50cc/hour   Nutrition:        Diet Orders            Start     Ordered    18 1151  Diet NPO  Diet effective now     Comments:  Except meds via NG   Question Answer Comment   Diet Type NPO    RD to adjust diet per protocol?  No        18 1151             Labs:     Results from last 7 days  Lab Units 18  0449 18  1055 18  0443  18  0431 18  1836  18  0455   WBC Thousand/uL 11 16*  --  7 67  --  16 81* 15 79*  < > 7 23   HEMOGLOBIN g/dL 7 8*  --  7 2*  --  7 3* 7 7*  < > 6 8*   I STAT HEMOGLOBIN g/dl  --  8 2*  --   < >  --   --   < >  --    HEMATOCRIT % 21 8*  --  20 0*  --  21 2* 22 0*  < > 19 0*   PLATELETS Thousands/uL 40*  --  47*  --  73* 67*  < > 82*   NEUTROS PCT % 87*  --  83*  --   --   --   --  79* MONOS PCT % 7  --  9  --   --   --   --  10   MONO PCT MAN %  --   --   --   --   --  9  --   --    < > = values in this interval not displayed  Results from last 7 days  Lab Units 02/24/18  0449 02/23/18  1055 02/23/18 0443  02/22/18  0431   SODIUM mmol/L 135*  --  133*  --  131*   POTASSIUM mmol/L 4 2  --  4 1  --  4 5   CHLORIDE mmol/L 101  --  98*  --  97*   CO2 mmol/L 26  --  26  --  20*   BUN mg/dL 45*  --  40*  --  46*   CREATININE mg/dL 4 36*  --  4 00*  --  4 81*   CALCIUM mg/dL 8 5  --  8 3  --  8 3   TOTAL PROTEIN g/dL 6 1*  --  6 4  --  7 2   BILIRUBIN TOTAL mg/dL 3 99*  --  4 28*  --  4 56*   ALK PHOS U/L 61  --  67  --  86   ALT U/L 29  --  31  --  28   AST U/L 31  --  42  --  41   GLUCOSE RANDOM mg/dL 157*  --  144*  --  141*   GLUCOSE, ISTAT mg/dl  --  134  --   < >  --    < > = values in this interval not displayed  Results from last 7 days  Lab Units 02/24/18 0449 02/23/18  0443 02/22/18  0431   MAGNESIUM mg/dL 2 4 2 3 2 3       Results from last 7 days  Lab Units 02/24/18 0449 02/23/18  0443 02/22/18  0431   PHOSPHORUS mg/dL 6 0* 5 6* 8 0*        Results from last 7 days  Lab Units 02/22/18  0431 02/21/18  1836 02/21/18  0533  02/17/18  1217   INR  3 12* 2 70* 2 53*  < > 1 97*   PTT seconds 61*  --   --   --  45*   < > = values in this interval not displayed  Results from last 7 days  Lab Units 02/22/18  0817   LACTIC ACID mmol/L 3 3*     No results found for: TROPONINI    Imaging: Ct Abdomen Pelvis Wo Contrast    Result Date: 2/18/2018  Impression: 1  Cirrhosis with stigmata of portal hypertension  2   Large volume of abdominopelvic ascites  There is a layering hematocrit level in the dependent pelvis  However, with the attenuation of the dependent component measuring only 15 Hounsfield units, this is most likely small volume of blood products rather than large volume hemoperitoneum  3   Postprocedural pneumoperitoneum    Needle tract in the left mid abdomen is in the vicinity of a large body wall collateral  4   Small right pleural effusion and right base atelectasis  5   Anterior compression deformities of T7 and T12  I personally discussed this study with Esperanza Churchill on 2/18/2018 at 2:42 PM  Workstation performed: [id number]     Xr Chest Portable    Result Date: 2/21/2018  Impression: 1  Right internal jugular dialysis catheter with tip in the midsuperior vena cava  2   Mild vascular congestion  Right-sided alveolar infiltrates, likely cardiogenic in nature, however superimposed pneumonia not excluded  Clinical correlation and follow-up examination recommended  Workstation performed: [id number]     Xr Chest Pa & Lateral    Result Date: 2/18/2018  Impression: 1  No active pulmonary disease  2   Free intraperitoneal air which may be related to reported paracentesis the prior day though clinical correlation for acute abdominal symptoms recommended  I personally discussed this study with Samantha Marshall on 2/18/2018 at 10:48 AM  Workstation performed: [id number]     Xr Foot 3+ Vw Right    Result Date: 2/19/2018  Impression: Soft tissue ulceration with bony changes of the tuft of the great toe, suggesting osteomyelitis  If there is concern for osteomyelitis, consider nuclear medicine white blood cell scan or MRI with gadolinium for further evaluation  A verbal report will be called by the reading room liaison following this dictation  Workstation performed: [id number]     Ct Head Wo Contrast    Result Date: 2/21/2018  Impression: Motion degraded examination  No gross intracranial abnormality  Workstation performed: [id number]     Ir Paracentesis    Result Date: 2/23/2018  Impression: Impression: Successful diagnostic and therapeutic paracentesis Workstation performed: [id number]     Xr Chest Pa Only    Result Date: 2/21/2018  Impression: Increasing airspace opacities in the right perihilar region and left suprahilar region may represent aspiration and/or pneumonia   Workstation performed: [id number]     Us Abdomen Limited    Result Date: 2/20/2018  Impression: Cirrhosis with sequela of portal hypertension and ascites as detailed above  Cholelithiasis  If there is clinical concern for cholecystitis, nuclear medicine HIDA scan may be obtained  Workstation performed: [id number]     Ir Temp Hd Cath    Result Date: 2/23/2018  Impression: Impression: Successful placement of a temporary dialysis catheter via the internal jugular vein Workstation performed: [id number]    I have personally reviewed pertinent reports  Micro:  Lab Results   Component Value Date    BLOODCX No Growth at 48 hrs  02/21/2018    BLOODCX No Growth at 48 hrs  02/21/2018    BLOODCX No Growth After 5 Days  02/17/2018    BLOODCX No Growth After 5 Days   02/17/2018       Allergies: No Known Allergies    Medications:   Scheduled Meds:  Current Facility-Administered Medications:  albuterol 2 5 mg Nebulization Q4H PRN Charis Santana, DO    dexmedetomidine 0 1-0 7 mcg/kg/hr Intravenous Titrated Herminia Caal, DO Last Rate: 0 3 mcg/kg/hr (77/16/66 3388)   folic acid 1 mg Oral Daily Mariama Hurtado, DO    influenza vaccine 0 5 mL Intramuscular Prior to discharge Alice Lopez MD    lactulose 20 g Oral TID Maurie Salt    levalbuterol 1 25 mg Nebulization TID Charis Santana,     methylPREDNISolone sodium succinate 40 mg Intravenous Q12H [address] Mariola Major PA-C    midodrine 5 mg Oral TID YAZMIN Amaya    octreotide 100 mcg Intravenous Q8H [address] Mati Glasgow DO    ondansetron 4 mg Intravenous Q8H PRN Cathy Khalil DO    pantoprazole 40 mg Intravenous Q24H [address] Morales Waller MD    piperacillin-tazobactam 2 25 g Intravenous Q8H Rom Perales PA-C Last Rate: Stopped (02/24/18 0345)   rifaximin 550 mg Oral Q12H [address] Shara Luque PA-C    sodium chloride 3 mL Nebulization TID Charis Santana, DO    thiamine 100 mg Oral Daily Mariama Hurtado, DO      Continuous Infusions:  dexmedetomidine 0 1-0 7 mcg/kg/hr Last Rate: 0 3 mcg/kg/hr (02/24/18 0704)     PRN Meds:    albuterol 2 5 mg Q4H PRN   influenza vaccine 0 5 mL Prior to discharge   ondansetron 4 mg Q8H PRN       VTE Pharmacologic Prophylaxis: Reason for no pharmacologic prophylaxis Thrombocytopenia  VTE Mechanical Prophylaxis: sequential compression device    Invasive lines and devices: Invasive Devices     Peripheral Intravenous Line            Peripheral IV 02/21/18 Right Arm 2 days    Peripheral IV 02/22/18 Left;Dorsal (posterior) Arm 2 days          Line            HD Cath Double 2 days          Drain            Open Drain Left Abdomen 3 days    NG/OG/Enteral Tube Nasogastric Right nares 2 days    Rectal Tube With balloon 1 day                   Counseling / Coordination of Care  Total Critical Care time spent 45 minutes excluding procedures, teaching and family updates  Code Status: Level 2 - DNAR: but accepts endotracheal intubation     Portions of the record may have been created with voice recognition software  Occasional wrong word or "sound a like" substitutions may have occurred due to the inherent limitations of voice recognition software  Read the chart carefully and recognize, using context, where substitutions have occurred       Adam Acosta DO

## 2018-02-24 NOTE — PROGRESS NOTES
NEPHROLOGY PROGRESS NOTE   Carmen Ott 50 y o  male MRN: [id number]  Unit/Bed#: MICU 07 Encounter: [id number]      ASSESSMENT & PLAN:  1  Acute kidney injury, given history of decompensated cirrhosis suspicious for hepatorenal syndrome, versus ATN given hypotension  Baseline creatinine 0 5 in 2016  Started on hemodialysis on 02/21, will proceed with 3 hours hemodialysis today and try to remove 1-2 kilos as tolerated  Temporary dialysis catheter was placed by IR 02/21  Will need a permanent dialysis catheter early next week  Monitor strict ins and outs, follow daily labs    2  Decompensated alcoholic cirrhosis, history of noncompliance  GI on board, as per GI note his prognosis is extremely poor and he is not a transplant candidate  3   Encephalopathy, continue with medical treatment, continue with lactulose and titrate for 3-4 loose bowel movement daily  Multifactorial in the setting of hepatic encephalopathy as well as sepsis with a right lower lobe pneumonia  Palliative care consulted and input appreciated, given multiple comorbidities patient has a grim prognosis  4   Aspiration pneumonia, on Vanco and Zosyn as per ID    5  Hyponatremia seems to be chronic in the setting end-stage liver disease now with a component of acute renal failure inability to excrete free mai, continue with dialysis with as sodium bath of 138 today  6   Hemodynamics, currently blood pressure is stable  7   Anemia, follow H&H, transfuse as needed  Discussed with critical care team     SUBJECTIVE:  Patient remains confused, blood pressure is stable, hemodialysis was stopped yesterday and 1 hour into due to worsening hypoxemia, currently on BiPAP  Family meeting held yesterday plan to continue with dialysis for now      OBJECTIVE:  Current Weight: Weight - Scale: 86 6 kg (190 lb 14 7 oz)  Vitals:    02/24/18 0726   BP: 150/80   Pulse: 64   Resp: (!) 11   Temp:    SpO2: 94%       Intake/Output Summary (Last 24 hours) at 02/24/18 4213  Last data filed at 02/24/18 0500   Gross per 24 hour   Intake          1246 95 ml   Output             1133 ml   Net           113 95 ml     General:  Confused, open eyes to voice current answer some questions  Skin:  Warm, no rash  Eyes:  Mild jaundice  ENT:  Mucous membranes dry, on BiPAP  Neck:  Supple  Chest:  Decreased breath sounds at bases   CVS:  Regular rate and rhythm  Abdomen:  Soft, nontender, nondistended  Extremities:  Bilateral lower extremity   Neuro:  Confused, open eyes to voice, answer some questions  Psych:  Unable to be assessed    Medications:    Current Facility-Administered Medications:     albuterol inhalation solution 2 5 mg, 2 5 mg, Nebulization, Q4H PRN, Charis Santana DO, 2 5 mg at 02/23/18 1059    dexmedetomidine (PRECEDEX) 200 mcg in sodium chloride 0 9 % 50 mL infusion, 0 1-0 7 mcg/kg/hr, Intravenous, Titrated, Denia Boss DO, Last Rate: 6 8 mL/hr at 02/24/18 0704, 0 3 mcg/kg/hr at 29/61/17 1565    folic acid (FOLVITE) tablet 1 mg, 1 mg, Oral, Daily, Mairama Hurtado DO, 1 mg at 02/23/18 2204    influenza inactivated quadrivalent vaccine (FLULAVAL) IM injection 0 5 mL, 0 5 mL, Intramuscular, Prior to discharge, Cydney Dewitt MD    lactulose 20 g/30 mL oral solution 20 g, 20 g, Oral, TID, Faby Horse, 20 g at 02/23/18 2147    levalbuterol (XOPENEX) inhalation solution 1 25 mg, 1 25 mg, Nebulization, TID, Charis Santana DO, 1 25 mg at 02/23/18 2008    methylPREDNISolone sodium succinate (Solu-MEDROL) injection 40 mg, 40 mg, Intravenous, Q12H [address], Mariola Major PA-C, 40 mg at 02/23/18 2147    midodrine (PROAMATINE) tablet 5 mg, 5 mg, Oral, TID AC, Faby Horse, 5 mg at 02/24/18 0603    octreotide (SandoSTATIN) injection 100 mcg, 100 mcg, Intravenous, Q8H [address], Mati Glasgow DO, 100 mcg at 02/24/18 0603    ondansetron (ZOFRAN) injection 4 mg, 4 mg, Intravenous, Q8H PRN, Mariama Hurtado DO    pantoprazole (PROTONIX) injection 40 mg, 40 mg, Intravenous, Q24H [address], Milton Sanchez MD, 40 mg at 02/23/18 0835    piperacillin-tazobactam (ZOSYN) 2 25 g in sodium chloride 0 9 % 50 mL IVPB, 2 25 g, Intravenous, Q8H, Shayna Montero PA-C, Stopped at 02/24/18 0345    rifaximin [place]) tablet 550 mg, 550 mg, Oral, Q12H [address], Leonel Lawrence PA-C, 550 mg at 02/23/18 2147    sodium chloride 0 9 % inhalation solution 3 mL, 3 mL, Nebulization, TID, Charis Santana DO, 3 mL at 02/23/18 2008    thiamine (VITAMIN B1) tablet 100 mg, 100 mg, Oral, Daily, Mariama Hurtado DO, 100 mg at 02/23/18 0834    Invasive Devices:   Urethral Catheter Temperature probe 16 Fr   (Active)   Amt returned on insertion(mL) 60 mL 2/18/2018  2:01 PM   Site Assessment Clean;Skin intact 2/19/2018  7:49 AM   Collection Container Standard drainage bag 2/19/2018  7:49 AM   Securement Method Securing device (Describe) 2/19/2018  7:49 AM   Output (mL) 10 mL 2/19/2018  7:49 AM       Lab Results:     Results from last 7 days  Lab Units 02/24/18  0449 02/23/18  1055 02/23/18  0443  02/22/18  0431   WBC Thousand/uL 11 16*  --  7 67  --  16 81*   HEMOGLOBIN g/dL 7 8*  --  7 2*  --  7 3*   I STAT HEMOGLOBIN g/dl  --  8 2*  --   < >  --    HEMATOCRIT % 21 8*  --  20 0*  --  21 2*   PLATELETS Thousands/uL 40*  --  47*  --  73*   SODIUM mmol/L 135*  --  133*  --  131*   POTASSIUM mmol/L 4 2  --  4 1  --  4 5   CHLORIDE mmol/L 101  --  98*  --  97*   CO2 mmol/L 26  --  26  --  20*   BUN mg/dL 45*  --  40*  --  46*   CREATININE mg/dL 4 36*  --  4 00*  --  4 81*   CALCIUM mg/dL 8 5  --  8 3  --  8 3   MAGNESIUM mg/dL 2 4  --  2 3  --  2 3   PHOSPHORUS mg/dL 6 0*  --  5 6*  --  8 0*   TOTAL PROTEIN g/dL 6 1*  --  6 4  --  7 2   BILIRUBIN TOTAL mg/dL 3 99*  --  4 28*  --  4 56*   ALK PHOS U/L 61  --  67  --  86   ALT U/L 29  --  31  --  28   AST U/L 31  --  42  --  41   GLUCOSE RANDOM mg/dL 157*  --  144*  --  141*   GLUCOSE, ISTAT mg/dl  --  134  --   < >  --    < > = values in this interval not displayed  Previous work up:  Urine sodium 6  Urinalysis 2+ protein, 10-20 RBCs, 4-10 WBCs      Portions of the record may have been created with voice recognition software  Occasional wrong word or "sound a like" substitutions may have occurred due to the inherent limitations of voice recognition software  Read the chart carefully and recognize, using context, where substitutions have occurred  If you have any questions, please contact the dictating provider

## 2018-02-25 ENCOUNTER — APPOINTMENT (INPATIENT)
Dept: RADIOLOGY | Facility: HOSPITAL | Age: 53
DRG: 264 | End: 2018-02-25
Payer: COMMERCIAL

## 2018-02-25 ENCOUNTER — APPOINTMENT (INPATIENT)
Dept: NON INVASIVE DIAGNOSTICS | Facility: HOSPITAL | Age: 53
DRG: 264 | End: 2018-02-25
Payer: COMMERCIAL

## 2018-02-25 LAB
ALBUMIN SERPL BCP-MCNC: 3 G/DL (ref 3.5–5)
ALP SERPL-CCNC: 64 U/L (ref 46–116)
ALT SERPL W P-5'-P-CCNC: 31 U/L (ref 12–78)
AMMONIA PLAS-SCNC: 30 UMOL/L (ref 11–35)
ANION GAP SERPL CALCULATED.3IONS-SCNC: 8 MMOL/L (ref 4–13)
ARTERIAL PATENCY WRIST A: YES
AST SERPL W P-5'-P-CCNC: 38 U/L (ref 5–45)
BASE EXCESS BLDA CALC-SCNC: 0.2 MMOL/L
BASOPHILS # BLD AUTO: 0 THOUSANDS/ΜL (ref 0–0.1)
BASOPHILS NFR BLD AUTO: 0 % (ref 0–1)
BILIRUB SERPL-MCNC: 4.24 MG/DL (ref 0.2–1)
BUN SERPL-MCNC: 43 MG/DL (ref 5–25)
CALCIUM SERPL-MCNC: 8.4 MG/DL (ref 8.3–10.1)
CHLORIDE SERPL-SCNC: 101 MMOL/L (ref 100–108)
CO2 SERPL-SCNC: 28 MMOL/L (ref 21–32)
CREAT SERPL-MCNC: 3.88 MG/DL (ref 0.6–1.3)
EOSINOPHIL # BLD AUTO: 0 THOUSAND/ΜL (ref 0–0.61)
EOSINOPHIL NFR BLD AUTO: 0 % (ref 0–6)
ERYTHROCYTE [DISTWIDTH] IN BLOOD BY AUTOMATED COUNT: 21.6 % (ref 11.6–15.1)
GFR SERPL CREATININE-BSD FRML MDRD: 17 ML/MIN/1.73SQ M
GLUCOSE SERPL-MCNC: 145 MG/DL (ref 65–140)
GLUCOSE SERPL-MCNC: 171 MG/DL (ref 65–140)
GLUCOSE SERPL-MCNC: 201 MG/DL (ref 65–140)
GLUCOSE SERPL-MCNC: 202 MG/DL (ref 65–140)
HCO3 BLDA-SCNC: 26.3 MMOL/L (ref 22–28)
HCT VFR BLD AUTO: 23.6 % (ref 36.5–49.3)
HFNC FLOW LPM: 60
HGB BLD-MCNC: 8.1 G/DL (ref 12–17)
LYMPHOCYTES # BLD AUTO: 1 THOUSANDS/ΜL (ref 0.6–4.47)
LYMPHOCYTES NFR BLD AUTO: 5 % (ref 14–44)
MAGNESIUM SERPL-MCNC: 2.4 MG/DL (ref 1.6–2.6)
MCH RBC QN AUTO: 32.1 PG (ref 26.8–34.3)
MCHC RBC AUTO-ENTMCNC: 34.3 G/DL (ref 31.4–37.4)
MCV RBC AUTO: 94 FL (ref 82–98)
MONOCYTES # BLD AUTO: 1.18 THOUSAND/ΜL (ref 0.17–1.22)
MONOCYTES NFR BLD AUTO: 5 % (ref 4–12)
NEUTROPHILS # BLD AUTO: 19.52 THOUSANDS/ΜL (ref 1.85–7.62)
NEUTS SEG NFR BLD AUTO: 90 % (ref 43–75)
NON VENT HFNC FIO2: 100
NON VENT TYPE HFNC: ABNORMAL
NRBC BLD AUTO-RTO: 0 /100 WBCS
O2 CT BLDA-SCNC: 11.3 ML/DL (ref 16–23)
OXYHGB MFR BLDA: 97.6 % (ref 94–97)
PCO2 BLDA: 50.9 MM HG (ref 36–44)
PH BLDA: 7.33 [PH] (ref 7.35–7.45)
PHOSPHATE SERPL-MCNC: 5 MG/DL (ref 2.7–4.5)
PLATELET # BLD AUTO: 48 THOUSANDS/UL (ref 149–390)
PMV BLD AUTO: 9.5 FL (ref 8.9–12.7)
PO2 BLDA: 144.6 MM HG (ref 75–129)
POTASSIUM SERPL-SCNC: 4.2 MMOL/L (ref 3.5–5.3)
PROT SERPL-MCNC: 6.1 G/DL (ref 6.4–8.2)
RBC # BLD AUTO: 2.52 MILLION/UL (ref 3.88–5.62)
SODIUM SERPL-SCNC: 137 MMOL/L (ref 136–145)
SPECIMEN SOURCE: ABNORMAL
WBC # BLD AUTO: 21.79 THOUSAND/UL (ref 4.31–10.16)

## 2018-02-25 PROCEDURE — 82140 ASSAY OF AMMONIA: CPT | Performed by: PHYSICIAN ASSISTANT

## 2018-02-25 PROCEDURE — C9113 INJ PANTOPRAZOLE SODIUM, VIA: HCPCS | Performed by: EMERGENCY MEDICINE

## 2018-02-25 PROCEDURE — 99233 SBSQ HOSP IP/OBS HIGH 50: CPT | Performed by: INTERNAL MEDICINE

## 2018-02-25 PROCEDURE — 71045 X-RAY EXAM CHEST 1 VIEW: CPT

## 2018-02-25 PROCEDURE — 94660 CPAP INITIATION&MGMT: CPT

## 2018-02-25 PROCEDURE — 82948 REAGENT STRIP/BLOOD GLUCOSE: CPT

## 2018-02-25 PROCEDURE — 84100 ASSAY OF PHOSPHORUS: CPT | Performed by: PHYSICIAN ASSISTANT

## 2018-02-25 PROCEDURE — 93321 DOPPLER ECHO F-UP/LMTD STD: CPT | Performed by: INTERNAL MEDICINE

## 2018-02-25 PROCEDURE — 80053 COMPREHEN METABOLIC PANEL: CPT | Performed by: PHYSICIAN ASSISTANT

## 2018-02-25 PROCEDURE — 93306 TTE W/DOPPLER COMPLETE: CPT

## 2018-02-25 PROCEDURE — 94760 N-INVAS EAR/PLS OXIMETRY 1: CPT

## 2018-02-25 PROCEDURE — 93325 DOPPLER ECHO COLOR FLOW MAPG: CPT | Performed by: INTERNAL MEDICINE

## 2018-02-25 PROCEDURE — 94640 AIRWAY INHALATION TREATMENT: CPT

## 2018-02-25 PROCEDURE — 83735 ASSAY OF MAGNESIUM: CPT | Performed by: PHYSICIAN ASSISTANT

## 2018-02-25 PROCEDURE — 93308 TTE F-UP OR LMTD: CPT | Performed by: INTERNAL MEDICINE

## 2018-02-25 PROCEDURE — 85025 COMPLETE CBC W/AUTO DIFF WBC: CPT | Performed by: PHYSICIAN ASSISTANT

## 2018-02-25 PROCEDURE — 36600 WITHDRAWAL OF ARTERIAL BLOOD: CPT

## 2018-02-25 PROCEDURE — 82805 BLOOD GASES W/O2 SATURATION: CPT | Performed by: PHYSICIAN ASSISTANT

## 2018-02-25 RX ORDER — LACTULOSE 20 G/30ML
30 SOLUTION ORAL 3 TIMES DAILY
Status: DISCONTINUED | OUTPATIENT
Start: 2018-02-25 | End: 2018-02-28

## 2018-02-25 RX ORDER — BUMETANIDE 0.25 MG/ML
1 INJECTION INTRAMUSCULAR; INTRAVENOUS CONTINUOUS
Status: DISCONTINUED | OUTPATIENT
Start: 2018-02-25 | End: 2018-02-26

## 2018-02-25 RX ORDER — BUMETANIDE 0.25 MG/ML
1 INJECTION, SOLUTION INTRAMUSCULAR; INTRAVENOUS ONCE
Status: COMPLETED | OUTPATIENT
Start: 2018-02-25 | End: 2018-02-25

## 2018-02-25 RX ADMIN — OCTREOTIDE ACETATE 100 MCG: 100 INJECTION, SOLUTION INTRAVENOUS; SUBCUTANEOUS at 14:53

## 2018-02-25 RX ADMIN — DEXMEDETOMIDINE HYDROCHLORIDE 0.3 MCG/KG/HR: 100 INJECTION, SOLUTION INTRAVENOUS at 16:42

## 2018-02-25 RX ADMIN — Medication 1 TABLET: at 08:13

## 2018-02-25 RX ADMIN — ISODIUM CHLORIDE 3 ML: 0.03 SOLUTION RESPIRATORY (INHALATION) at 13:17

## 2018-02-25 RX ADMIN — Medication 1 MG/HR: at 14:47

## 2018-02-25 RX ADMIN — LEVALBUTEROL HYDROCHLORIDE 1.25 MG: 1.25 SOLUTION, CONCENTRATE RESPIRATORY (INHALATION) at 13:17

## 2018-02-25 RX ADMIN — LACTULOSE 30 G: 20 SOLUTION ORAL at 08:13

## 2018-02-25 RX ADMIN — INSULIN LISPRO 1 UNITS: 100 INJECTION, SOLUTION INTRAVENOUS; SUBCUTANEOUS at 18:12

## 2018-02-25 RX ADMIN — RIFAXIMIN 550 MG: 550 TABLET ORAL at 21:06

## 2018-02-25 RX ADMIN — PIPERACILLIN SODIUM,TAZOBACTAM SODIUM 2.25 G: 2; .25 INJECTION, POWDER, FOR SOLUTION INTRAVENOUS at 03:14

## 2018-02-25 RX ADMIN — LEVALBUTEROL HYDROCHLORIDE 1.25 MG: 1.25 SOLUTION, CONCENTRATE RESPIRATORY (INHALATION) at 07:29

## 2018-02-25 RX ADMIN — METHYLPREDNISOLONE SODIUM SUCCINATE 40 MG: 40 INJECTION, POWDER, FOR SOLUTION INTRAMUSCULAR; INTRAVENOUS at 21:05

## 2018-02-25 RX ADMIN — OCTREOTIDE ACETATE 100 MCG: 100 INJECTION, SOLUTION INTRAVENOUS; SUBCUTANEOUS at 21:18

## 2018-02-25 RX ADMIN — Medication 100 MG: at 08:12

## 2018-02-25 RX ADMIN — OCTREOTIDE ACETATE 100 MCG: 100 INJECTION, SOLUTION INTRAVENOUS; SUBCUTANEOUS at 05:56

## 2018-02-25 RX ADMIN — PIPERACILLIN SODIUM,TAZOBACTAM SODIUM 2.25 G: 2; .25 INJECTION, POWDER, FOR SOLUTION INTRAVENOUS at 11:36

## 2018-02-25 RX ADMIN — DEXMEDETOMIDINE HYDROCHLORIDE 0.3 MCG/KG/HR: 100 INJECTION, SOLUTION INTRAVENOUS at 04:57

## 2018-02-25 RX ADMIN — LEVALBUTEROL HYDROCHLORIDE 1.25 MG: 1.25 SOLUTION, CONCENTRATE RESPIRATORY (INHALATION) at 18:55

## 2018-02-25 RX ADMIN — BUMETANIDE 1 MG: 0.25 INJECTION INTRAMUSCULAR; INTRAVENOUS at 13:29

## 2018-02-25 RX ADMIN — LACTULOSE 30 G: 20 SOLUTION ORAL at 16:47

## 2018-02-25 RX ADMIN — PIPERACILLIN SODIUM,TAZOBACTAM SODIUM 2.25 G: 2; .25 INJECTION, POWDER, FOR SOLUTION INTRAVENOUS at 19:28

## 2018-02-25 RX ADMIN — Medication 1 MG/HR: at 22:20

## 2018-02-25 RX ADMIN — MIDODRINE HYDROCHLORIDE 5 MG: 5 TABLET ORAL at 12:39

## 2018-02-25 RX ADMIN — MIDODRINE HYDROCHLORIDE 5 MG: 5 TABLET ORAL at 16:46

## 2018-02-25 RX ADMIN — FOLIC ACID 1 MG: 1 TABLET ORAL at 08:12

## 2018-02-25 RX ADMIN — ISODIUM CHLORIDE 3 ML: 0.03 SOLUTION RESPIRATORY (INHALATION) at 07:29

## 2018-02-25 RX ADMIN — MIDODRINE HYDROCHLORIDE 5 MG: 5 TABLET ORAL at 06:00

## 2018-02-25 RX ADMIN — METHYLPREDNISOLONE SODIUM SUCCINATE 40 MG: 40 INJECTION, POWDER, FOR SOLUTION INTRAMUSCULAR; INTRAVENOUS at 08:12

## 2018-02-25 RX ADMIN — PANTOPRAZOLE SODIUM 40 MG: 40 INJECTION, POWDER, FOR SOLUTION INTRAVENOUS at 08:12

## 2018-02-25 RX ADMIN — LACTULOSE 30 G: 20 SOLUTION ORAL at 21:05

## 2018-02-25 RX ADMIN — ISODIUM CHLORIDE 3 ML: 0.03 SOLUTION RESPIRATORY (INHALATION) at 18:55

## 2018-02-25 RX ADMIN — RIFAXIMIN 550 MG: 550 TABLET ORAL at 08:12

## 2018-02-25 RX ADMIN — DEXMEDETOMIDINE HYDROCHLORIDE 0.3 MCG/KG/HR: 100 INJECTION, SOLUTION INTRAVENOUS at 08:05

## 2018-02-25 NOTE — RESPIRATORY THERAPY NOTE
respiratory      02/24/18 1918   Respiratory Assessment   Suction Oral   Resp Comments Pt transitioned from bipap to HFNC today  Several times Fio2 has been increased by the RN or MD for low sats  Pt has been found to bee 100% by the RT and therefore weaned back to 60% O2  I have suctioned him several times to improve his sats which he tolerates farily well  Bipap remains in the room on standby  I plan to cont   HFNC throughout the night to encourage sputum removal     Oral Suctioning/Secretions   Suction Device Yankauer   Secretion Amount Moderate   Secretion Color Naik   Additional Assessments   SpO2 100 %

## 2018-02-25 NOTE — PROGRESS NOTES
NEPHROLOGY PROGRESS NOTE   Barry Claude 50 y o  male MRN: [id number]  Unit/Bed#: MICU 07 Encounter: [id number]      ASSESSMENT & PLAN:  1  Acute kidney injury, given history of decompensated cirrhosis suspicious for hepatorenal syndrome, versus ATN given hypotension  Baseline creatinine 0 5 in 2016  Started on hemodialysis on 02/21, last HD Rx yesterday  Temporary dialysis catheter was placed by IR 02/21  Will need a permanent dialysis catheter early next week  Plan for dialysis Tuesday 02/27  Monitor strict ins and outs, follow daily labs    2  Decompensated alcoholic cirrhosis, history of noncompliance  GI on board, as per GI note his prognosis is extremely poor and he is not a transplant candidate  3   Encephalopathy, continue with medical treatment, continue with lactulose and titrate for 3-4 loose bowel movement daily  Multifactorial in the setting of hepatic encephalopathy as well as sepsis with a right lower lobe pneumonia  Palliative care consulted and input appreciated, given multiple comorbidities patient has a grim prognosis  4   Aspiration pneumonia, on Vanco and Zosyn as per ID    5  Hyponatremia seems to be chronic in the setting end-stage liver disease now with a component of acute renal failure and inability to excrete free water, improving with UF on dialysis  6   Hemodynamics, currently blood pressure is stable  7   Anemia, follow H&H, transfuse as needed  Discussed with critical care team     SUBJECTIVE:  Remains confused, on high flow oxygen  Tolerated 3 hours of HD yesterday w/o issues      OBJECTIVE:  Current Weight: Weight - Scale: 86 6 kg (190 lb 14 7 oz)  Vitals:    02/25/18 0729   BP:    Pulse:    Resp:    Temp:    SpO2: 100%       Intake/Output Summary (Last 24 hours) at 02/25/18 0738  Last data filed at 02/25/18 0556   Gross per 24 hour   Intake          1031 81 ml   Output             4249 ml   Net         -3217 19 ml     General: confused, on high flow oxygen, in NAD  Skin: warm, jaundice  Eyes: mild jaundice  ENT: mm dry, on high flow oxygen  Neck: supple  Chest: decreased BS bases   CVS: regular  Abdomen: soft, NT, ND  Extremities: bilateral LE edema   Neuro: awake, confused  Psych: unable to be assessed    Medications:    Current Facility-Administered Medications:     albuterol inhalation solution 2 5 mg, 2 5 mg, Nebulization, Q4H PRN, Charis Santana DO, 2 5 mg at 02/23/18 1059    dexmedetomidine (PRECEDEX) 200 mcg in sodium chloride 0 9 % 50 mL infusion, 0 1-0 7 mcg/kg/hr, Intravenous, Titrated, Clayton Thompson DO, Last Rate: 6 8 mL/hr at 02/25/18 0457, 0 3 mcg/kg/hr at 35/53/06 1355    folic acid (FOLVITE) tablet 1 mg, 1 mg, Oral, Daily, Mariama Hurtado DO, 1 mg at 02/24/18 1472    influenza inactivated quadrivalent vaccine (FLULAVAL) IM injection 0 5 mL, 0 5 mL, Intramuscular, Prior to discharge, Eliana Villafana MD    lactulose 20 g/30 mL oral solution 30 g, 30 g, Oral, 4x Daily, Ramirez Swenson PA-C    levalbuterol [facility]) inhalation solution 1 25 mg, 1 25 mg, Nebulization, TID, Charis Santana DO, 1 25 mg at 02/25/18 0729    methylPREDNISolone sodium succinate (Solu-MEDROL) injection 40 mg, 40 mg, Intravenous, Q12H [address], Mariola Major PA-C, 40 mg at 02/24/18 2048    midodrine (PROAMATINE) tablet 5 mg, 5 mg, Oral, TID AC, Jordan Reil, 5 mg at 02/25/18 0600    multivitamin (FLINTSTONES) chewable tablet 1 tablet, 1 tablet, Oral, Daily, Ramirez Swenson PA-C, 1 tablet at 02/24/18 1450    octreotide (SandoSTATIN) injection 100 mcg, 100 mcg, Intravenous, Q8H [address], Mati Glasgow DO, 100 mcg at 02/25/18 0556    ondansetron (ZOFRAN) injection 4 mg, 4 mg, Intravenous, Q8H PRN, Mariama Hurtado DO    pantoprazole (PROTONIX) injection 40 mg, 40 mg, Intravenous, Q24H [address], Irma Rachel MD, 40 mg at 02/24/18 0917    piperacillin-tazobactam (ZOSYN) 2 25 g in sodium chloride 0 9 % 50 mL IVPB, 2 25 g, Intravenous, Q8H, Lesvia Peralta PA-C, Stopped at 02/25/18 0401    rifaximin (XIFAXAN) tablet 550 mg, 550 mg, Oral, Q12H [address], Eli Canseco PA-C, 550 mg at 02/24/18 2048    sodium chloride (AYR SALINE NASAL) nasal gel 1 application, 1 application, Nasal, D0A PRN, Lincoln Edmonds PA-C    sodium chloride 0 9 % inhalation solution 3 mL, 3 mL, Nebulization, TID, Charis Bratis, DO, 3 mL at 02/25/18 0729    thiamine (VITAMIN B1) tablet 100 mg, 100 mg, Oral, Daily, Mariama Bryant, DO, 100 mg at 02/24/18 0917    Invasive Devices:   Urethral Catheter Temperature probe 16 Fr   (Active)   Amt returned on insertion(mL) 60 mL 2/18/2018  2:01 PM   Site Assessment Clean;Skin intact 2/19/2018  7:49 AM   Collection Container Standard drainage bag 2/19/2018  7:49 AM   Securement Method Securing device (Describe) 2/19/2018  7:49 AM   Output (mL) 10 mL 2/19/2018  7:49 AM       Lab Results:     Results from last 7 days  Lab Units 02/25/18  0443 02/24/18  0449 02/23/18  1055 02/23/18  0443  02/22/18  0431   WBC Thousand/uL  --  11 16*  --  7 67  --  16 81*   HEMOGLOBIN g/dL  --  7 8*  --  7 2*  --  7 3*   I STAT HEMOGLOBIN g/dl  --   --  8 2*  --   < >  --    HEMATOCRIT %  --  21 8*  --  20 0*  --  21 2*   PLATELETS Thousands/uL  --  40*  --  47*  --  73*   SODIUM mmol/L 137 135*  --  133*  --  131*   POTASSIUM mmol/L 4 2 4 2  --  4 1  --  4 5   CHLORIDE mmol/L 101 101  --  98*  --  97*   CO2 mmol/L 28 26  --  26  --  20*   BUN mg/dL 43* 45*  --  40*  --  46*   CREATININE mg/dL 3 88* 4 36*  --  4 00*  --  4 81*   CALCIUM mg/dL 8 4 8 5  --  8 3  --  8 3   MAGNESIUM mg/dL 2 4 2 4  --  2 3  --  2 3   PHOSPHORUS mg/dL 5 0* 6 0*  --  5 6*  --  8 0*   TOTAL PROTEIN g/dL 6 1* 6 1*  --  6 4  --  7 2   BILIRUBIN TOTAL mg/dL 4 24* 3 99*  --  4 28*  --  4 56*   ALK PHOS U/L 64 61  --  67  --  86   ALT U/L 31 29  --  31  --  28   AST U/L 38 31  --  42  --  41   GLUCOSE RANDOM mg/dL 145* 157*  --  144*  --  141*   GLUCOSE, ISTAT mg/dl  --   --  134  --   < >  --    < > = values in this interval not displayed  Previous work up:  Urine sodium 6  Urinalysis 2+ protein, 10-20 RBCs, 4-10 WBCs      Portions of the record may have been created with voice recognition software  Occasional wrong word or "sound a like" substitutions may have occurred due to the inherent limitations of voice recognition software  Read the chart carefully and recognize, using context, where substitutions have occurred  If you have any questions, please contact the dictating provider

## 2018-02-25 NOTE — PROGRESS NOTES
Progress Note - Critical Care   Evelyn Bautista 50 y o  male MRN: [id number]  [facility]: [id number]    Assessment:   Decompensated Liver failure, Alcoholic hepatitis     Hepatic encephalopathy ([facility] )    Aspiration pneumonia of right lower lobe (HCC)     Sepsis ([facility] [address] )    PRANAY (acute kidney injury) ([facility] [address] )     Ascites    Hypoalbuminemia    Hyperammonemia (HCC)    Lactic acidosis    Anemia of chronic disease    Osteomyelitis of toe of right foot (HCC)    Alcohol abuse    Cough    Hypoxia   Leukocytosis    Coagulopathy ([facility] [address] )  Resolved Problems:    Abdominal pain    Hypotension      Plan:     Neuro:   Enchephalopathy, worsening,  likely multifactorial due to liver failure (elevated ammonia 64), sepsis and  ESRD   -Sedation with Precedex     CV:   Labile Blood Pressure, unchanged, Likely multifactorial  secondary to hypoalbuminemia secondary to liver failure and CHF (given proBNP of 38K on 2/24), however no records of Echo found, Moderate CHF on  CXR! 2/24 unchanged from previous  -Continue albumin boluses PRN  for pressure support (map goal > 65)  -Albumin is 3 3 today  Bolus 25% PRN for hypotension  -Continue Midodrine 5mg TID for orthostatic hypotension    Lung:   Pneumonia, RLL, unchanged, possibly secondary to aspiration vs CAP  -Continue Zosyn 2 25g TID  (day#5/7), s/p Ceftriaxone 1g x1 5days ago  -Xopenex and saline nebs scheduled with albuterol nebs PRN    Acute Hypoxemic Respiratory Failure, unchanged   -Patinet continues to require HFNC (60LPM, 100%FlO2) will wean off as tolerated  -Weaned off Bipap 8/14  FiO2 100% (ABGs wnl)     GI:   Acute decompensated alcoholic hepatic cirrhosis, with recurrent ascities  -Last paracentesis on 2/21 with removal of ~4L  -Continue Lactulose and Rifaximin   -Continue octreotide 100mcg daily for Esophageal varices  -Rectal tube in place for loose stool  -Medrol 32mg daily for total of 28 days: day #7/28   Taper after 28 days   -Hyperbilirubinemia  -GI onboard, appreciate their recs     FEN:  -50 ml/hr (with Precedex)  -Hypokalemia, Hyperphosphatemia  (will continue to monitor and correct PRN)  -NPO due to aspiration risk     :   ESRD (though eGFR 15 technically CKD 4, however ESRD since patient on dialysis now), worsening, accelerated ARF likely hepatorenal syndrome vs ATN  -BUN/Cr  Last session 2/24, Scheduled for [facility]  -Cause of Has had a mild improvement in Cr from 4 85 to 4 0  Continue to trend  -HD catheter R IJ  -Hemodialysis per Nephrology     ID:   Pneumonia, RLL, unchanged, possibly secondary to aspiration vs CAP  -Continue Zosyn 2 25g TID  (day#5/7), s/p Ceftriaxone 1g x1 5days ago (Vanc d/caitlyn d/t negative Cx)    Osteomyelitis, right hallux ulcer an d osteomyelitis  -Per ID no antibiotics necessary, but will require toe amputation which pt  refuses     Heme:   Thrombocytopenia, worsening, likely secondary to liver failure  - platelet count yesterday of 40, INR 3 12  -hold DVT prophylaxis     Endo:   No acute concerns (ho Hx of DM)                Msk/Skin:   Frequent position changing/turning  Routine skin care     Disposition:  Continue with current level of ICU care     ______________________________________________________________________    Chief Complaint: Decompensated hepatic cirrhosis  Chief Complaint   Patient presents with    Ascites     Pt "I need something for my belly  I got tapped for the first time two weeks ago  And its all hard and big again " Roomate "He is suppose to be on liver medication but its too expensive and the doctor wont prescribe anything" Pt c/o SOB "sometimes"      HPI/24hr events:  HPI/24hr events:    Ayad Burns is a 50 y o  male with history of recently diagnosed advanced liver cirrhosis 2/2 alcohol abuse, HTN and tobacco abuse brought in by AMS and abdominal pain, recently evaluated for liver transplant, but poor surgical candidate  Currently being treated by CAP vs Aspiration PNA    Recent family meeting re: goals of care  Pt  DNR, but accepts intubation  Refuses Trach/PEG  No acute overnight events  Periods of mild low respiratory rate 8-10, with no change in Hr, Bp, O2 Sat  On HFNC 14/8, 90%,60lpm  ______________________________________________________________________    Physical Exam:  _________________________________________________________________  Vitals:    18 9060 18 0709 18 0729 18 0809   BP: 138/76 141/79  129/70   Pulse: 64 64  66   Resp: 13 12  (!) 10   Temp:    98 1 °F (36 7 °C)   TempSrc:    Oral   SpO2: 100% 100% 100% 100%   Weight:       Height:         Arterial Line BP: 132/74  Arterial Line MAP (mmHg): 98 mmHg  Temp:  [97 6 °F (36 4 °C)-98 7 °F (37 1 °C)] 98 1 °F (36 7 °C)  HR:  [54-74] 66  Resp:  [8-27] 10  BP: ()/(61-84) 129/70  FiO2 (%):  [60] 60     Temperature:   Temp (24hrs), Av 1 °F (36 7 °C), Min:97 6 °F (36 4 °C), Max:98 7 °F (37 1 °C)    Current Temperature: 98 1 °F (36 7 °C)  Weights:   IBW: 73 kg    Body mass index is 27 39 kg/m²  Weight (last 2 days)     Date/Time   Weight    18 0238  86 6 (190 92)    18 0541  91 4 (201 5)          Physical Exam:  General:  NAD  Eyes: PERRL, EOMI, Sclera anicteric with no hemorrhages or discharge  Neck:  Supple  Lungs:  Coarse breath sounds b/l with some diffuse wheezing, no accessory muscle use  Cardiac:  RRR, no MRG, normal S1S2  Abdomen:  Soft, non-tender, distended abdomen  Extremities:  Non-tender, no edema  Skin:  Warm and dry  Neurological: Somnolent, opens eyes, oriented to person and place  Moves all four extremities      Hemodynamic Monitoring:     Non-Invasive/Invasive Ventilation Settings:  Respiratory    Lab Data (Last 4 hours)       0515            pH, Arterial       (!)7 331           pCO2, Arterial       (!)50 9           pO2, Arterial       (!)144 6           HCO3, Arterial       26 3           Base Excess, Arterial       0 2                O2/Vent Data        0425   Most Recent Non-Invasive Ventilation Mode HFNC  HFNC                Lab Results   Component Value Date    PHART 7 331 (L) 02/25/2018    XIB3NJF 50 9 (H) 02/25/2018    PO2ART 144 6 (H) 02/25/2018    QPZ3GTA 26 3 02/25/2018    BEART 0 2 02/25/2018    SOURCE Radial, Left 02/25/2018     Intake and Outputs:  I/O       02/23 0701 - 02/24 0700 02/24 0701 - 02/25 0700    I V  (mL/kg) 737 (8 5) 671 8 (7 8)    NG/ 180    IV Piggyback 150 180    Total Intake(mL/kg) 1247 (14 4) 1031 8 (11 9)    Urine (mL/kg/hr) 0 (0)     Emesis/NG output 300 (0 1)     Drains 100 (0)     Other 683 (0 3) 2349 (1 1)    Stool 50 (0) 1000 (0 5)    Total Output 1133 3349    Net +114 -2317 2              Net fluid output: Negative ~2 3L/24hrs    Nutrition:        Diet Orders            Start     Ordered    02/22/18 1151  Diet NPO  Diet effective now     Comments:  Except meds via NG   Question Answer Comment   Diet Type NPO    RD to adjust diet per protocol? No        02/22/18 1151        Labs:     Results from last 7 days  Lab Units 02/24/18  0449 02/23/18  1055 02/23/18  0443  02/22/18  0431 02/21/18  1836  02/20/18  0455   WBC Thousand/uL 11 16*  --  7 67  --  16 81* 15 79*  < > 7 23   HEMOGLOBIN g/dL 7 8*  --  7 2*  --  7 3* 7 7*  < > 6 8*   I STAT HEMOGLOBIN g/dl  --  8 2*  --   < >  --   --   < >  --    HEMATOCRIT % 21 8*  --  20 0*  --  21 2* 22 0*  < > 19 0*   PLATELETS Thousands/uL 40*  --  47*  --  73* 67*  < > 82*   NEUTROS PCT % 87*  --  83*  --   --   --   --  79*   MONOS PCT % 7  --  9  --   --   --   --  10   MONO PCT MAN %  --   --   --   --   --  9  --   --    < > = values in this interval not displayed       Results from last 7 days  Lab Units 02/25/18  0443 02/24/18  0449 02/23/18  1055 02/23/18  0443   SODIUM mmol/L 137 135*  --  133*   POTASSIUM mmol/L 4 2 4 2  --  4 1   CHLORIDE mmol/L 101 101  --  98*   CO2 mmol/L 28 26  --  26   BUN mg/dL 43* 45*  --  40*   CREATININE mg/dL 3 88* 4 36*  --  4 00*   CALCIUM mg/dL 8 4 8 5  --  8 3 TOTAL PROTEIN g/dL 6 1* 6 1*  --  6 4   BILIRUBIN TOTAL mg/dL 4 24* 3 99*  --  4 28*   ALK PHOS U/L 64 61  --  67   ALT U/L 31 29  --  31   AST U/L 38 31  --  42   GLUCOSE RANDOM mg/dL 145* 157*  --  144*   GLUCOSE, ISTAT mg/dl  --   --  134  --        Results from last 7 days  Lab Units 02/25/18  0443 02/24/18  0449 02/23/18  0443   MAGNESIUM mg/dL 2 4 2 4 2 3       Results from last 7 days  Lab Units 02/25/18  0443 02/24/18  0449 02/23/18  0443   PHOSPHORUS mg/dL 5 0* 6 0* 5 6*        Results from last 7 days  Lab Units 02/22/18  0431 02/21/18  1836 02/21/18  0533   INR  3 12* 2 70* 2 53*   PTT seconds 61*  --   --        Results from last 7 days  Lab Units 02/22/18  0817   LACTIC ACID mmol/L 3 3*     No results found for: TROPONINI  Imaging:   Procedure: Xr Chest Portable  Result Date: 2/24/2018  Narrative: CHEST INDICATION: hypoxia COMPARISON:  1 day prior EXAM PERFORMED/VIEWS:  XR CHEST PORTABLE FINDINGS:  Right IJ catheter and nasogastric tube in place  C  Impression: Moderate CHF with small bilateral pleural effusions similar to prior study    EKG: No new ECG    Micro:  Lab Results   Component Value Date    BLOODCX No Growth at 72 hrs  02/21/2018    BLOODCX No Growth at 72 hrs  02/21/2018    BLOODCX No Growth After 5 Days  02/17/2018    BLOODCX No Growth After 5 Days   02/17/2018     Allergies: No Known Allergies  Medications:   Scheduled Meds:    Current Facility-Administered Medications:  albuterol 2 5 mg Nebulization Q4H PRN Charis Santana DO    dexmedetomidine 0 1-0 7 mcg/kg/hr Intravenous Titrated Sophie Gerard DO Last Rate: 0 3 mcg/kg/hr (88/07/20 1623)   folic acid 1 mg Oral Daily Mariama Hurtado DO    influenza vaccine 0 5 mL Intramuscular Prior to discharge Ponce Holter, MD    lactulose 30 g Oral 4x Daily Yamilex Velasquez PA-C    levalbuterol 1 25 mg Nebulization TID Charis Bratis, DO    methylPREDNISolone sodium succinate 40 mg Intravenous Q12H [address] Mariola Major PA-C    midodrine 5 mg Oral TID AC Emmanuel Branch    multivitamin 1 tablet Oral Daily Brennenershelby [place], [place]    octreotide 100 mcg Intravenous [facility] Mati Glasgow,     ondansetron 4 mg Intravenous Q8H PRN Pedro Guerra DO    pantoprazole 40 mg Intravenous Q24H [address] Florencio Fofana MD    piperacillin-tazobactam 2 25 g Intravenous Q8H Johnie Fothergill, PA-C Last Rate: Stopped (02/25/18 0401)   rifaximin 550 mg Oral Q12H [address] Jeremias Romo PA-C    sodium chloride 1 application Nasal K6X PRN Sloan Nagel PA-C    sodium chloride 3 mL Nebulization TID Charis Santana,     thiamine 100 mg Oral Daily Mariama Hurtado DO      Continuous Infusions:    dexmedetomidine 0 1-0 7 mcg/kg/hr Last Rate: 0 3 mcg/kg/hr (02/25/18 0805)     PRN Meds:    albuterol 2 5 mg Q4H PRN   influenza vaccine 0 5 mL Prior to discharge   ondansetron 4 mg Q8H PRN   sodium chloride 1 application Q9A PRN     VTE Pharmacologic Prophylaxis: PPX Held d/t elevated INR  VTE Mechanical Prophylaxis: sequential compression device  Invasive lines and devices: Invasive Devices     Peripheral Intravenous Line            Peripheral IV 02/24/18 Left Antecubital less than 1 day    Peripheral IV 02/25/18 Right Arm less than 1 day          Line            HD Cath Double 3 days          Drain            NG/OG/Enteral Tube Nasogastric Right nares 3 days    Rectal Tube With balloon 2 days                   Code Status: Level 2 - DNAR: but accepts endotracheal intubation    Portions of the record may have been created with voice recognition software  Occasional wrong word or "sound a like" substitutions may have occurred due to the inherent limitations of voice recognition software  Read the chart carefully and recognize, using context, where substitutions have occurred      Nanda Simmons MD    [facility] PGY-1

## 2018-02-25 NOTE — PROGRESS NOTES
Progress Note - Infectious Disease   Dav Anngh 50 y o  male MRN: [id number]  [facility]: [id number]      Impression/Recommendations:  1   Aspiration pneumonia   Given significant comorbid illnesses and recent hospitalizations, patient is at risk for nosocomial pathogens, especially nosocomial gram negatives and MRSA    Patient is clinically improved on current antibiotics   MRSA screen negative  Continue Zosyn     With negative MRSA screen, no further need for vancomycin  Treat x7 days total, another 3 days      2   Acute superimposed on chronic hypoxic respiratory failure   This is most likely secondary to aspiration above   Patient appears to be more stable with O2 support  Monitor respiratory symptoms      3   Encephalopathy   This is most likely secondary to aspiration pneumonia above, superimposed on baseline hepatic encephalopathy   Mental status slowly improving  Monitor mental status      4   Recurrent fever with leukocytosis  Suspect recurrent aspiration  With patient clinically stable, monitor for now  Hopefully, this is pneumonitis and not recurrent pneumonia  5  Right great toe gangrenous ulcer and osteomyelitis   There is no evidence of cellulitis clinically   Patient has no fever leukocytosis   No antibiotic is necessary   However, patient needs to have this toe amputated, to avoid development of wet gangrene   Patient has been refusing toe amputation  No antibiotic needed at present time  Monitor toe  Recommend toe amputation      6   Decompensated cirrhosis   This is due to patient's noncompliance with prescribed treatment plan  Jefferson Valdes is no evidence of peritonitis clinically   Patient is status post paracentesis with benign peritoneal fluid   Systemic corticosteroid started  Patient is status post repeat paracentesis, with benign parameters also   Even with active pneumonia, it should be fine to continue systemic corticosteroid  No antibiotic needed    Management per GI and primary service      7   PRANAY   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   Dehydration may contribute   Creatinine worsening   HD was started  Antibiotic dosages adjusted accordingly  Monitor creatinine      Discussed with Critical Care Medicine Service      Antibiotics:  Zosyn # 4     Subjective:  Patient remains in ICU  He remains lethargic/sleepy, but arousable  He is able to answer questions appropriately  Abdomen remains distended   Mild discomfort  Temperature low-grade   No chills  Objective:  Vitals:  HR:  [54-74] 64  Resp:  [8-27] 15  BP: ()/(61-84) 141/79  SpO2:  [79 %-100 %] 100 %  Temp (24hrs), Av 6 °F (37 °C), Min:97 9 °F (36 6 °C), Max:100 6 °F (38 1 °C)  Current: Temperature: (!) 100 6 °F (38 1 °C)    Physical Exam:     General: Sleepy/lethargic but arousable  Answers questions appropriately  Comfortable  Nontoxic  Lungs: Decreased breath sounds, no rales, no wheezing, respirations unlabored  Heart[de-identified]  Regular rate and rhythm, S1 and S2 normal, no murmur  Abdomen: Soft, stable distension, non-tender, bowel sounds active all four quadrants,        no masses, no organomegaly  Extremities: Stable edema  Right 1st toe with stable chronic necrotic ulcer  No purulence  No tenderness  No erythema  Skin:  No rash  Invasive Devices     Peripheral Intravenous Line            Peripheral IV 18 Left Antecubital less than 1 day    Peripheral IV 18 Right Arm less than 1 day          Line            HD Cath Double 4 days          Drain            NG/OG/Enteral Tube Nasogastric Right nares 3 days    Rectal Tube With balloon 2 days                Labs studies:   I have personally reviewed pertinent labs      Results from last 7 days  Lab Units 18  0443 18  0449 18  1055 18  0443   SODIUM mmol/L 137 135*  --  133*   POTASSIUM mmol/L 4 2 4 2  --  4 1   CHLORIDE mmol/L 101 101  --  98*   CO2 mmol/L 28 26  --  26   ANION GAP mmol/L 8 8 --  9   BUN mg/dL 43* 45*  --  40*   CREATININE mg/dL 3 88* 4 36*  --  4 00*   EGFR ml/min/1 73sq m 17 15  --  16   GLUCOSE RANDOM mg/dL 145* 157*  --  144*   GLUCOSE, ISTAT mg/dl  --   --  134  --    CALCIUM mg/dL 8 4 8 5  --  8 3   AST U/L 38 31  --  42   ALT U/L 31 29  --  31   ALK PHOS U/L 64 61  --  67   TOTAL PROTEIN g/dL 6 1* 6 1*  --  6 4   BILIRUBIN TOTAL mg/dL 4 24* 3 99*  --  4 28*       Results from last 7 days  Lab Units 02/25/18  0443 02/24/18  0449 02/23/18  1055 02/23/18  0443   WBC Thousand/uL 21 79* 11 16*  --  7 67   HEMOGLOBIN g/dL 8 1* 7 8*  --  7 2*   I STAT HEMOGLOBIN g/dl  --   --  8 2*  --    PLATELETS Thousands/uL 48* 40*  --  47*       Results from last 7 days  Lab Units 02/22/18  1455 02/21/18  1829 02/21/18  1820 02/21/18  1156   BLOOD CULTURE   --  No Growth at 72 hrs  No Growth at 72 hrs   --    GRAM STAIN RESULT   --   --   --  Rare Polys  No bacteria seen   BODY FLUID CULTURE, STERILE   --   --   --  No growth   MRSA CULTURE ONLY  No Methicillin Resistant Staphlyococcus aureus (MRSA) isolated  --   --   --        Imaging Studies:   I have personally reviewed pertinent imaging study reports and images in PACS  CXR reviewed personally  Stable right-sided infiltrates  EKG, Pathology, and Other Studies:   I have personally reviewed pertinent reports

## 2018-02-25 NOTE — RESPIRATORY THERAPY NOTE
respiratory      02/25/18 1317   Respiratory Assessment   Resp Comments Pt combative with suctioning, I anable to obtain retained secretions  FiO2 wean attempted, however Pt immediatly de-sated  pt remains at 80% on HFNC      Additional Assessments   SpO2 97 %

## 2018-02-26 ENCOUNTER — APPOINTMENT (INPATIENT)
Dept: RADIOLOGY | Facility: HOSPITAL | Age: 53
DRG: 264 | End: 2018-02-26
Payer: COMMERCIAL

## 2018-02-26 ENCOUNTER — APPOINTMENT (INPATIENT)
Dept: DIALYSIS | Facility: HOSPITAL | Age: 53
DRG: 264 | End: 2018-02-26
Attending: INTERNAL MEDICINE
Payer: COMMERCIAL

## 2018-02-26 LAB
AMMONIA PLAS-SCNC: 71 UMOL/L (ref 11–35)
ANION GAP SERPL CALCULATED.3IONS-SCNC: 10 MMOL/L (ref 4–13)
BACTERIA BLD CULT: NORMAL
BACTERIA BLD CULT: NORMAL
BASOPHILS # BLD AUTO: 0.01 THOUSANDS/ΜL (ref 0–0.1)
BASOPHILS NFR BLD AUTO: 0 % (ref 0–1)
BLD SMEAR INTERP: NORMAL
BUN SERPL-MCNC: 59 MG/DL (ref 5–25)
CALCIUM SERPL-MCNC: 8.1 MG/DL (ref 8.3–10.1)
CHLORIDE SERPL-SCNC: 105 MMOL/L (ref 100–108)
CO2 SERPL-SCNC: 26 MMOL/L (ref 21–32)
CREAT SERPL-MCNC: 4.81 MG/DL (ref 0.6–1.3)
EOSINOPHIL # BLD AUTO: 0 THOUSAND/ΜL (ref 0–0.61)
EOSINOPHIL NFR BLD AUTO: 0 % (ref 0–6)
ERYTHROCYTE [DISTWIDTH] IN BLOOD BY AUTOMATED COUNT: 21.8 % (ref 11.6–15.1)
GFR SERPL CREATININE-BSD FRML MDRD: 13 ML/MIN/1.73SQ M
GLUCOSE SERPL-MCNC: 158 MG/DL (ref 65–140)
GLUCOSE SERPL-MCNC: 167 MG/DL (ref 65–140)
GLUCOSE SERPL-MCNC: 204 MG/DL (ref 65–140)
GLUCOSE SERPL-MCNC: 215 MG/DL (ref 65–140)
HCT VFR BLD AUTO: 21.2 % (ref 36.5–49.3)
HGB BLD-MCNC: 7.4 G/DL (ref 12–17)
LYMPHOCYTES # BLD AUTO: 0.9 THOUSANDS/ΜL (ref 0.6–4.47)
LYMPHOCYTES NFR BLD AUTO: 5 % (ref 14–44)
MAGNESIUM SERPL-MCNC: 2.5 MG/DL (ref 1.6–2.6)
MCH RBC QN AUTO: 32.9 PG (ref 26.8–34.3)
MCHC RBC AUTO-ENTMCNC: 34.9 G/DL (ref 31.4–37.4)
MCV RBC AUTO: 94 FL (ref 82–98)
MONOCYTES # BLD AUTO: 1.35 THOUSAND/ΜL (ref 0.17–1.22)
MONOCYTES NFR BLD AUTO: 7 % (ref 4–12)
NEUTROPHILS # BLD AUTO: 16.17 THOUSANDS/ΜL (ref 1.85–7.62)
NEUTS SEG NFR BLD AUTO: 88 % (ref 43–75)
NRBC BLD AUTO-RTO: 0 /100 WBCS
PHOSPHATE SERPL-MCNC: 5.5 MG/DL (ref 2.7–4.5)
PLATELET # BLD AUTO: 46 THOUSANDS/UL (ref 149–390)
PMV BLD AUTO: 9.7 FL (ref 8.9–12.7)
POTASSIUM SERPL-SCNC: 3.9 MMOL/L (ref 3.5–5.3)
RBC # BLD AUTO: 2.25 MILLION/UL (ref 3.88–5.62)
SODIUM SERPL-SCNC: 141 MMOL/L (ref 136–145)
VANCOMYCIN SERPL-MCNC: 19.9 UG/ML
WBC # BLD AUTO: 18.54 THOUSAND/UL (ref 4.31–10.16)

## 2018-02-26 PROCEDURE — 94760 N-INVAS EAR/PLS OXIMETRY 1: CPT

## 2018-02-26 PROCEDURE — 84100 ASSAY OF PHOSPHORUS: CPT | Performed by: PHYSICIAN ASSISTANT

## 2018-02-26 PROCEDURE — 94640 AIRWAY INHALATION TREATMENT: CPT

## 2018-02-26 PROCEDURE — 82140 ASSAY OF AMMONIA: CPT | Performed by: PHYSICIAN ASSISTANT

## 2018-02-26 PROCEDURE — 71045 X-RAY EXAM CHEST 1 VIEW: CPT

## 2018-02-26 PROCEDURE — 83735 ASSAY OF MAGNESIUM: CPT | Performed by: PHYSICIAN ASSISTANT

## 2018-02-26 PROCEDURE — 82948 REAGENT STRIP/BLOOD GLUCOSE: CPT

## 2018-02-26 PROCEDURE — C9113 INJ PANTOPRAZOLE SODIUM, VIA: HCPCS | Performed by: EMERGENCY MEDICINE

## 2018-02-26 PROCEDURE — 99233 SBSQ HOSP IP/OBS HIGH 50: CPT | Performed by: INTERNAL MEDICINE

## 2018-02-26 PROCEDURE — 94660 CPAP INITIATION&MGMT: CPT

## 2018-02-26 PROCEDURE — 99232 SBSQ HOSP IP/OBS MODERATE 35: CPT | Performed by: INTERNAL MEDICINE

## 2018-02-26 PROCEDURE — 99231 SBSQ HOSP IP/OBS SF/LOW 25: CPT | Performed by: NURSE PRACTITIONER

## 2018-02-26 PROCEDURE — 85025 COMPLETE CBC W/AUTO DIFF WBC: CPT | Performed by: PHYSICIAN ASSISTANT

## 2018-02-26 PROCEDURE — 80048 BASIC METABOLIC PNL TOTAL CA: CPT | Performed by: PHYSICIAN ASSISTANT

## 2018-02-26 PROCEDURE — 80202 ASSAY OF VANCOMYCIN: CPT | Performed by: PHYSICIAN ASSISTANT

## 2018-02-26 RX ADMIN — MIDODRINE HYDROCHLORIDE 5 MG: 5 TABLET ORAL at 11:25

## 2018-02-26 RX ADMIN — LACTULOSE 30 G: 20 SOLUTION ORAL at 08:16

## 2018-02-26 RX ADMIN — LEVALBUTEROL HYDROCHLORIDE 1.25 MG: 1.25 SOLUTION, CONCENTRATE RESPIRATORY (INHALATION) at 19:54

## 2018-02-26 RX ADMIN — MIDODRINE HYDROCHLORIDE 5 MG: 5 TABLET ORAL at 06:11

## 2018-02-26 RX ADMIN — PIPERACILLIN SODIUM,TAZOBACTAM SODIUM 2.25 G: 2; .25 INJECTION, POWDER, FOR SOLUTION INTRAVENOUS at 03:36

## 2018-02-26 RX ADMIN — FOLIC ACID 1 MG: 1 TABLET ORAL at 08:16

## 2018-02-26 RX ADMIN — METHYLPREDNISOLONE SODIUM SUCCINATE 40 MG: 40 INJECTION, POWDER, FOR SOLUTION INTRAMUSCULAR; INTRAVENOUS at 20:37

## 2018-02-26 RX ADMIN — METHYLPREDNISOLONE SODIUM SUCCINATE 40 MG: 40 INJECTION, POWDER, FOR SOLUTION INTRAMUSCULAR; INTRAVENOUS at 08:16

## 2018-02-26 RX ADMIN — Medication 1 MG/HR: at 10:25

## 2018-02-26 RX ADMIN — LEVALBUTEROL HYDROCHLORIDE 1.25 MG: 1.25 SOLUTION, CONCENTRATE RESPIRATORY (INHALATION) at 13:34

## 2018-02-26 RX ADMIN — RIFAXIMIN 550 MG: 550 TABLET ORAL at 20:37

## 2018-02-26 RX ADMIN — OCTREOTIDE ACETATE 100 MCG: 100 INJECTION, SOLUTION INTRAVENOUS; SUBCUTANEOUS at 15:51

## 2018-02-26 RX ADMIN — Medication 1 TABLET: at 08:17

## 2018-02-26 RX ADMIN — RIFAXIMIN 550 MG: 550 TABLET ORAL at 08:17

## 2018-02-26 RX ADMIN — OCTREOTIDE ACETATE 100 MCG: 100 INJECTION, SOLUTION INTRAVENOUS; SUBCUTANEOUS at 06:14

## 2018-02-26 RX ADMIN — ISODIUM CHLORIDE 3 ML: 0.03 SOLUTION RESPIRATORY (INHALATION) at 13:34

## 2018-02-26 RX ADMIN — ISODIUM CHLORIDE 3 ML: 0.03 SOLUTION RESPIRATORY (INHALATION) at 08:47

## 2018-02-26 RX ADMIN — ISODIUM CHLORIDE 3 ML: 0.03 SOLUTION RESPIRATORY (INHALATION) at 19:54

## 2018-02-26 RX ADMIN — LEVALBUTEROL HYDROCHLORIDE 1.25 MG: 1.25 SOLUTION, CONCENTRATE RESPIRATORY (INHALATION) at 08:47

## 2018-02-26 RX ADMIN — INSULIN LISPRO 2 UNITS: 100 INJECTION, SOLUTION INTRAVENOUS; SUBCUTANEOUS at 01:00

## 2018-02-26 RX ADMIN — DEXMEDETOMIDINE HYDROCHLORIDE 0.2 MCG/KG/HR: 100 INJECTION, SOLUTION INTRAVENOUS at 01:32

## 2018-02-26 RX ADMIN — INSULIN LISPRO 1 UNITS: 100 INJECTION, SOLUTION INTRAVENOUS; SUBCUTANEOUS at 13:06

## 2018-02-26 RX ADMIN — OCTREOTIDE ACETATE 100 MCG: 100 INJECTION, SOLUTION INTRAVENOUS; SUBCUTANEOUS at 22:51

## 2018-02-26 RX ADMIN — PANTOPRAZOLE SODIUM 40 MG: 40 INJECTION, POWDER, FOR SOLUTION INTRAVENOUS at 08:16

## 2018-02-26 RX ADMIN — INSULIN LISPRO 2 UNITS: 100 INJECTION, SOLUTION INTRAVENOUS; SUBCUTANEOUS at 05:59

## 2018-02-26 RX ADMIN — PIPERACILLIN SODIUM,TAZOBACTAM SODIUM 2.25 G: 2; .25 INJECTION, POWDER, FOR SOLUTION INTRAVENOUS at 18:45

## 2018-02-26 RX ADMIN — MIDODRINE HYDROCHLORIDE 5 MG: 5 TABLET ORAL at 16:16

## 2018-02-26 RX ADMIN — INSULIN LISPRO 2 UNITS: 100 INJECTION, SOLUTION INTRAVENOUS; SUBCUTANEOUS at 18:20

## 2018-02-26 RX ADMIN — LACTULOSE 30 G: 20 SOLUTION ORAL at 20:37

## 2018-02-26 RX ADMIN — PIPERACILLIN SODIUM,TAZOBACTAM SODIUM 2.25 G: 2; .25 INJECTION, POWDER, FOR SOLUTION INTRAVENOUS at 14:47

## 2018-02-26 RX ADMIN — LACTULOSE 30 G: 20 SOLUTION ORAL at 16:15

## 2018-02-26 RX ADMIN — Medication 100 MG: at 08:16

## 2018-02-26 NOTE — PROGRESS NOTES
Progress Note - Nephrology   Shauna Donovan 50 y o  male MRN: [id number]  Unit/Bed#: MICU 07 Encounter: [id number]      Assessment / Plan:  1  PRANAY in setting of decompensated cirrhosis - concern for HRS vs ATN  -b/l Cr 0 5 in , started on HD via temp HD cath   -For HD today  -plan for permacath placement by IR this week  -Next HD tentatively   -does not have significant volume overload on exam today  Ok to continue every other day dialysis  -per nursing report today, BP running low normal  UF goal even today   -will stop bumex gtt as patient anuric and unable to pull UF with HD today  2  Decompensated alcoholic cirrhosis with hx of noncompliance - GI follows, not a transplant candidate    3  Encephalopathy - on lactulose, likely multifactorial as with cirrhosis and RLL PNA    4  Aspiration PNA - on zosyn per ID    5  Mild hyperphosphatemia - likely d/t PRANAY  Continue to monitor  May need binders if phos remains high  6  Anemia with thrombocytopenia - Hgb now in 7s  Bili elevated  Doubt TMA but will check hemolysis smear  Last LDH normal  Will also check iron panel  Low plts likely d/t cirrhosis  Transfuse prn     7  Hypotension - on midodrine  Subjective:   Denies chest pain or shortness of breath  No complaints  Objective:     Vitals: Blood pressure 102/62, pulse 64, temperature (!) 97 4 °F (36 3 °C), temperature source Oral, resp  rate 17, height 5' 10" (1 778 m), weight 83 1 kg (183 lb 3 2 oz), SpO2 95 %  ,Body mass index is 26 29 kg/m²  Temp (24hrs), Av 8 °F (36 6 °C), Min:97 4 °F (36 3 °C), Max:98 7 °F (37 1 °C)      Weight (last 2 days)     Date/Time   Weight    18 0558  83 1 (183 2)    18 0238  86 6 (190 92)                Intake/Output Summary (Last 24 hours) at 18 1315  Last data filed at 18 1307   Gross per 24 hour   Intake           972 03 ml   Output              300 ml   Net           672 03 ml     I/O last 24 hours:   In: 1316 1 [I V :494 1; NG/GT:350; IV Piggyback:165; Feedings:307]  Out: 300 [Stool:300]        Physical Exam:   Physical Exam   Constitutional: He appears well-developed  No distress  HENT:   Head: Normocephalic and atraumatic  Mouth/Throat: No oropharyngeal exudate  +NGT   Eyes: Right eye exhibits no discharge  Left eye exhibits no discharge  Neck: Normal range of motion  Neck supple  Cardiovascular: Normal rate, regular rhythm and normal heart sounds  No murmur heard  Pulmonary/Chest: Effort normal and breath sounds normal  He has no wheezes  He has no rales  Abdominal: Soft  Bowel sounds are normal  He exhibits no distension  There is no tenderness  +rectal tube   Musculoskeletal: Normal range of motion  He exhibits no edema  Neurological:   Drowsy but arousable   Skin: Skin is warm and dry  No rash noted  He is not diaphoretic  Psychiatric: He has a normal mood and affect  His behavior is normal    Vitals reviewed        Invasive Devices     Peripheral Intravenous Line            Peripheral IV 02/24/18 Left Antecubital 2 days    Peripheral IV 02/25/18 Right Arm 1 day          Line            HD Cath Double 5 days          Drain            NG/OG/Enteral Tube Nasogastric Right nares 4 days    Rectal Tube With balloon 3 days                Medications:    Scheduled Meds:  Current Facility-Administered Medications:  albuterol 2 5 mg Nebulization Q4H PRN Charis Santana DO    bumetanide 1 mg/hr Intravenous Continuous Zay Hamilton PA-C Last Rate: 1 mg/hr (02/26/18 1025)   dexmedetomidine 0 1-0 7 mcg/kg/hr Intravenous Titrated Herminia Caal, DO Last Rate: Stopped (63/28/90 6432)   folic acid 1 mg Oral Daily Mariama Hurtado DO    influenza vaccine 0 5 mL Intramuscular Prior to discharge Alice Lopez MD    insulin lispro 1-6 Units Subcutaneous [facility] LamKARIME Langley-JERRICA    lactulose 30 g Oral TID Lamrayshawn Hamilton PA-C    levalbuterol 1 25 mg Nebulization TID Charis Santana DO    methylPREDNISolone sodium succinate 40 mg Intravenous Q12H [address] Mariola Major PA-C    midodrine 5 mg Oral TID AC [facility]    multivitamin 1 tablet Oral Daily Ector Meade    octreotide 100 mcg Intravenous [facility] Mati Glasgow,     ondansetron 4 mg Intravenous Q8H PRN Emilia Moffett,     pantoprazole 40 mg Intravenous Q24H [address] Fab Wood MD    piperacillin-tazobactam 2 25 g Intravenous Q8H Nelia Armstrong PA-C Last Rate: Stopped (02/26/18 0418)   rifaximin 550 mg Oral Q12H [address] Louisa Patel PA-C    sodium chloride 1 application Nasal F6J PRN Manoj Stockton PA-C    sodium chloride 3 mL Nebulization TID Charis Santana,     thiamine 100 mg Oral Daily Mariama Hurtado DO        PRN Meds:   albuterol    influenza vaccine    ondansetron    sodium chloride    Continuous Infusions:  bumetanide 1 mg/hr Last Rate: 1 mg/hr (02/26/18 1025)   dexmedetomidine 0 1-0 7 mcg/kg/hr Last Rate: Stopped (02/26/18 1025)           LAB RESULTS:        Results from last 7 days  Lab Units 02/26/18  0431 02/25/18  0443 02/24/18  0449 02/23/18  1055 02/23/18  0443 02/22/18  1159 02/22/18  0431 02/22/18  0019 02/21/18  1836  02/21/18  1644 02/21/18  0533  02/20/18  0455   WBC Thousand/uL 18 54* 21 79* 11 16*  --  7 67  --  16 81*  --  15 79*  --   --  13 35*  < > 7 23   HEMOGLOBIN g/dL 7 4* 8 1* 7 8*  --  7 2*  --  7 3*  --  7 7*  --   --  7 4*  < > 6 8*   I STAT HEMOGLOBIN g/dl  --   --   --  8 2*  --  7 5*  --   --   --   < >  --   --   --   --    HEMATOCRIT % 21 2* 23 6* 21 8*  --  20 0*  --  21 2*  --  22 0*  --   --  21 2*  < > 19 0*   PLATELETS Thousands/uL 46* 48* 40*  --  47*  --  73*  --  67*  --   --  104*  < > 82*   NEUTROS PCT % 88* 90* 87*  --  83*  --   --   --   --   --   --   --   --  79*   LYMPHS PCT % 5* 5* 6*  --  8*  --   --   --   --   --   --   --   --  11*   LYMPHO PCT %  --   --   --   --   --   --   --   --  4*  --   --   --   --   --    MONOS PCT % 7 5 7  --  9  --   --   --   --   --   --   --   --  10   MONO PCT MAN %  --   --   --   --   --   --   --   --  9  --   --   --   --   --    EOS PCT % 0 0 0  --  0  --   --   --   --   --   --   --   --  0   EOSINO PCT MANUAL %  --   --   --   --   --   --   --   --  0  --   --   --   --   --    SODIUM mmol/L 141 137 135*  --  133*  --  131* 128*  --   --  129* 126*  < > 128*   POTASSIUM mmol/L 3 9 4 2 4 2  --  4 1  --  4 5 4 4  --   --  4 4 4 8  < > 4 8   CHLORIDE mmol/L 105 101 101  --  98*  --  97* 95*  --   --  95* 95*  < > 97*   CO2 mmol/L 26 28 26  --  26  --  20* 21  --   --  22 19*  < > 20*   BUN mg/dL 59* 43* 45*  --  40*  --  46* 47*  --   --  43* 56*  < > 45*   CREATININE mg/dL 4 81* 3 88* 4 36*  --  4 00*  --  4 81* 4 75*  --   --  4 50* 5 40*  < > 4 75*   CALCIUM mg/dL 8 1* 8 4 8 5  --  8 3  --  8 3 8 4  --   --  8 3 8 9  < > 8 4   TOTAL PROTEIN g/dL  --  6 1* 6 1*  --  6 4  --  7 2  --  7 0  --   --  7 7  --  6 7   BILIRUBIN TOTAL mg/dL  --  4 24* 3 99*  --  4 28*  --  4 56*  --  4 99*  --   --  5 57*  --  3 64*   ALK PHOS U/L  --  64 61  --  67  --  86  --  94  --   --  98  --  85   ALT U/L  --  31 29  --  31  --  28  --  26  --   --  25  --  20   AST U/L  --  38 31  --  42  --  41  --  38  --   --  37  --  31   GLUCOSE RANDOM mg/dL 167* 145* 157*  --  144*  --  141* 138  --   --  158* 139  < > 145*   GLUCOSE, ISTAT mg/dl  --   --   --  134  --  145*  --   --   --   < >  --   --   --   --    MAGNESIUM mg/dL 2 5 2 4 2 4  --  2 3  --  2 3  --   --   --   --  2 5  --  2 3   PHOSPHORUS mg/dL 5 5* 5 0* 6 0*  --  5 6*  --  8 0*  --   --   --   --  8 0*  --  6 0*   < > = values in this interval not displayed  CUTURES:  Lab Results   Component Value Date    BLOODCX No Growth After 4 Days  02/21/2018    BLOODCX No Growth After 4 Days  02/21/2018    BLOODCX No Growth After 5 Days  02/17/2018    BLOODCX No Growth After 5 Days  02/17/2018                 Portions of the record may have been created with voice recognition software   Occasional wrong word or "sound a like" substitutions may have occurred due to the inherent limitations of voice recognition software  Read the chart carefully and recognize, using context, where substitutions have occurred  If you have any questions, please contact the dictating provider

## 2018-02-26 NOTE — PROGRESS NOTES
Progress Note - Palliative & Supportive Care  Juan Duenas  50 y o   male  MICU 07/MICU 07   MRN: [id number]  Encounter: [id number]     Assessment  Patient Active Problem List   Diagnosis    [facility]compens[facility] [place] [facility] )    Ascites    [facility] [place] [facility]    PRANAY (acute kidney injury) ([facility] [place] [facility] )    Hypoalbuminemia    Hyponatremia    Sepsis ([facility] [place] [facility] )    Hyperammonemia (HCC)    Lactic acidosis    Elevated alkaline phosphatase level    Anemia of chronic disease    Osteomyelitis of toe of right foot (HCC)    Alcohol abuse    Alcoholic hepatitis    Cough    Hypoxia    Oliguria    Leukocytosis    Coagulopathy (HCC)    Aspiration pneumonia of right lower lobe ([facility] [place] [facility] )     Plan:  Goals of Care: As discussed in family meeting on 2/23, ongoing treatment-focused plan of care with limits of level 2 DNR, ok for intubation, no trach/PEG, continue dialysis  Poor prognosis  Patient's two sons are surrogate medical decision-makers by default  Patient has been relatively stable over past few days  No changes to plan of care  Palliative care will continue to follow along  Symptom Management: per critical care team    History  Patient alert, watching tv  Able to answer few simple questions but remains unreliable historian  No acute distress  Receiving dialysis when seen   Briefly discussed case with critical care team      Meds  all current active meds have been reviewed and current meds:   Current Facility-Administered Medications   Medication Dose Route Frequency    albuterol inhalation solution 2 5 mg  2 5 mg Nebulization Q4H PRN    dexmedetomidine (PRECEDEX) 200 mcg in sodium chloride 0 9 % 50 mL infusion  0 1-0 7 mcg/kg/hr Intravenous Titrated    folic acid (FOLVITE) tablet 1 mg  1 mg Oral Daily    influenza inactivated quadrivalent vaccine (FLULAVAL) IM injection 0 5 mL  0 5 mL Intramuscular Prior to discharge    insulin lispro (HumaLOG) 100 units/mL subcutaneous injection 1-6 Units  1-6 Units Subcutaneous Q6H [address]    lactulose 20 g/30 mL oral solution 30 g  30 g Oral TID    levalbuterol (XOPENEX) inhalation solution 1 25 mg  1 25 mg Nebulization TID    methylPREDNISolone sodium succinate (Solu-MEDROL) injection 40 mg  40 mg Intravenous Q12H [address]    midodrine (PROAMATINE) tablet 5 mg  5 mg Oral TID AC    multivitamin (FLINTSTONES) chewable tablet 1 tablet  1 tablet Oral Daily    octreotide (SandoSTATIN) injection 100 mcg  100 mcg Intravenous Q8H [address]    ondansetron (ZOFRAN) injection 4 mg  4 mg Intravenous Q8H PRN    pantoprazole (PROTONIX) injection 40 mg  40 mg Intravenous Q24H ROXIE    piperacillin-tazobactam (ZOSYN) 2 25 g in sodium chloride 0 9 % 50 mL IVPB  2 25 g Intravenous Q8H    rifaximin (XIFAXAN) tablet 550 mg  550 mg Oral Q12H [address]    sodium chloride (AYR SALINE NASAL) nasal gel 1 application  1 application Nasal B9R PRN    sodium chloride 0 9 % inhalation solution 3 mL  3 mL Nebulization TID    thiamine (VITAMIN B1) tablet 100 mg  100 mg Oral Daily       No Known Allergies    Objective  Physical Exam   Constitutional: He appears cachectic  He is cooperative  No distress  Appears chronically ill   Pulmonary/Chest: No respiratory distress  Abdominal: He exhibits distension  Neurological: He is alert  Answers simple questions, not well-oriented to situation   Skin: Skin is warm and dry  There is pallor  jaundice   Psychiatric: Cognition and memory are impaired  Lab Results: I have personally reviewed pertinent labs        Simran Salcido, [address] [place] [address]  Office number: [phone]

## 2018-02-26 NOTE — PROGRESS NOTES
Progress Note - aHny Boyle 50 y o  male MRN: [id number]    Unit/Bed#: MICU 07 Encounter: [id number]      ASSESSMENT/ PLAN:   1  Decompensated alcoholic cirrhosis and alcoholic hepatitis: Child Baker C  Complicated by ascites and HE  His mental status has improved since Friday and he is answering questions appropriately  He continues to require HFNC  His prognosis is very guarded and family meeting Friday resulted in DNR but will accept intubation and ongoing dialysis  He is unlikely a transplant candidate secondary to active alcohol use  MELD around 38  Fortunately his AST and ALT and alk phos remain within normal limits, his tbili however increased from 3 99 to 4 24   -monitor CMP and INR daily  -continue PPI   -continue methylprednisolone for total of 28 days     2  Encephalopathy: today his mental status is improved and he is answering questions appropriately  This is most likely multifactorial including factors of HE, pneumonia and sepsis  -continue lactulose titrating for 2-3 BMs daily  -continue xifaxan 550mg bid   -monitor mental status   -monitor stool output    3  Abdominal ascites: He last received a paracentesis on 2/21 with about 4L removed and was negative for SBP, today his abdominal exam remains soft and he denies pain  Continue to monitor for need of paracentesis   -monitor abdominal exam for recurrent ascites     4  ESRD: Creatinine of 3 88 yesterday  He is on hemodialysis   -continue dialysis, midodrine and octreotide per nephrology recs    5  Aspiration pneumonia:  -continue abx per ICU team    6  Nutrition:   -continue feedings via NGT for now    Subjective:     Patient seen and examined, alert and responding to questions appropriately  Denies abdominal pain    Objective:     Vitals: Blood pressure 114/74, pulse 56, temperature (!) 97 4 °F (36 3 °C), temperature source Oral, resp  rate 12, height 5' 10" (1 778 m), weight 83 1 kg (183 lb 3 2 oz), SpO2 95 %  ,Body mass index is 26 29 kg/m²  Intake/Output Summary (Last 24 hours) at 02/26/18 1211  Last data filed at 02/26/18 1030   Gross per 24 hour   Intake           890 17 ml   Output              300 ml   Net           590 17 ml       Physical Exam:     General Appearance: A&Ox3, appears stated age and cooperative, NGT noted  Lungs: on HFNC, decreased breath sound b/l  Heart: Regular rate and rhythm, S1, S2 normal, no murmur, click, rub or gallop  Abdomen: Soft, non-tender, non-distended; bowel sounds normal; no masses or no organomegaly  Extremities: No cyanosis, clubbing, edema    Invasive Devices     Peripheral Intravenous Line            Peripheral IV 02/24/18 Left Antecubital 1 day    Peripheral IV 02/25/18 Right Arm 1 day          Line            HD Cath Double 5 days          Drain            NG/OG/Enteral Tube Nasogastric Right nares 4 days    Rectal Tube With balloon 3 days                Lab Results:      Results from last 7 days  Lab Units 02/26/18  0431   WBC Thousand/uL 18 54*   HEMOGLOBIN g/dL 7 4*   HEMATOCRIT % 21 2*   PLATELETS Thousands/uL 46*   NEUTROS PCT % 88*   LYMPHS PCT % 5*   MONOS PCT % 7   EOS PCT % 0       Results from last 7 days  Lab Units 02/26/18  0431 02/25/18  0443   SODIUM mmol/L 141 137   POTASSIUM mmol/L 3 9 4 2   CHLORIDE mmol/L 105 101   CO2 mmol/L 26 28   BUN mg/dL 59* 43*   CREATININE mg/dL 4 81* 3 88*   CALCIUM mg/dL 8 1* 8 4   TOTAL PROTEIN g/dL  --  6 1*   BILIRUBIN TOTAL mg/dL  --  4 24*   ALK PHOS U/L  --  64   ALT U/L  --  31   AST U/L  --  38   GLUCOSE RANDOM mg/dL 167* 145*       Results from last 7 days  Lab Units 02/22/18  0431   INR  3 12*           Imaging Studies: I have personally reviewed pertinent imaging studies  Ct Abdomen Pelvis Wo Contrast    Result Date: 2/18/2018  Impression: 1  Cirrhosis with stigmata of portal hypertension  2   Large volume of abdominopelvic ascites  There is a layering hematocrit level in the dependent pelvis    However, with the attenuation of the dependent component measuring only 15 Hounsfield units, this is most likely small volume of blood products rather than large volume hemoperitoneum  3   Postprocedural pneumoperitoneum  Needle tract in the left mid abdomen is in the vicinity of a large body wall collateral  4   Small right pleural effusion and right base atelectasis  5   Anterior compression deformities of T7 and T12  Xr Chest Portable    Result Date: 2/21/2018  Impression: 1  Right internal jugular dialysis catheter with tip in the midsuperior vena cava  2   Mild vascular congestion  Right-sided alveolar infiltrates, likely cardiogenic in nature, however superimposed pneumonia not excluded  Clinical correlation and follow-up examination recommended  Xr Chest Pa & Lateral    Result Date: 2/18/2018  Impression: 1  No active pulmonary disease  2   Free intraperitoneal air which may be related to reported paracentesis the prior day though clinical correlation for acute abdominal symptoms recommended  I personally discussed this study with Yolanda Maria on 2/18/2018 at 10:48 AM      Xr Foot 3+ Vw Right    Result Date: 2/19/2018  Impression: Soft tissue ulceration with bony changes of the tuft of the great toe, suggesting osteomyelitis  If there is concern for osteomyelitis, consider nuclear medicine white blood cell scan or MRI with gadolinium for further evaluation  A verbal report will be called by the reading room liaison following this dictation  Ct Head Wo Contrast    Result Date: 2/21/2018  Impression: Motion degraded examination  No gross intracranial abnormality  Ir Paracentesis    Result Date: 2/23/2018  Impression: Impression: Successful diagnostic and therapeutic paracentesis     Xr Chest Pa Only    Result Date: 2/21/2018  Impression: Increasing airspace opacities in the right perihilar region and left suprahilar region may represent aspiration and/or pneumonia        Us Abdomen Limited    Result Date: 2/20/2018  Impression: Cirrhosis with sequela of portal hypertension and ascites as detailed above  Cholelithiasis  If there is clinical concern for cholecystitis, nuclear medicine HIDA scan may be obtained       Ir Temp Hd Cath    Result Date: 2/23/2018  Impression: Impression: Successful placement of a temporary dialysis catheter via the internal jugular vein

## 2018-02-26 NOTE — RESPIRATORY THERAPY NOTE
RT Protocol Note  Shauna Donovan 50 y o  male MRN: [id number]  Unit/Bed#: MICU 07 Encounter: [id number]    Assessment    Principal Problem:    Decompensated hepatic cirrhosis ([facility] )  Active Problems:    Ascites    Hepatic encephalopathy (HCC)    PRANAY (acute kidney injury) ([place] [address] )    Hypoalbuminemia    Hyponatremia    Sepsis ([place] [address] )    Hyperammonemia (HCC)    Lactic acidosis    Elevated alkaline phosphatase level    Anemia of chronic disease    Osteomyelitis of toe of right foot (HCC)    Alcohol abuse    Alcoholic hepatitis    Cough    Hypoxia    Oliguria    Leukocytosis    Coagulopathy (HCC)    Aspiration pneumonia of right lower lobe (HCC)      Home Pulmonary Medications:    Past Medical History:   Diagnosis Date    Cirrhosis ([place] [address] )     Hypertension      Social History     Social History    Marital status: /Civil Union     Spouse name: N/A    Number of children: N/A    Years of education: N/A     Social History Main Topics    Smoking status: Current Every Day Smoker     Packs/day: 0 20     Years: 35 00     Types: Cigarettes    Smokeless tobacco: Never Used    Alcohol use Yes      Comment: Pt "I quite a couple of weeks ago"    Drug use: No      Comment: prior history     Sexual activity: Not Asked     Other Topics Concern    None     Social History Narrative    None       Subjective         Objective    Physical Exam:        Vitals:  Blood pressure 106/63, pulse 62, temperature (!) 97 4 °F (36 3 °C), temperature source Oral, resp  rate 17, height 5' 10" (1 778 m), weight 83 1 kg (183 lb 3 2 oz), SpO2 98 %  Results from last 7 days  Lab Units 02/25/18  0515   PH ART  7 331*   PCO2 ART mm Hg 50 9*   PO2 ART mm Hg 144 6*   HCO3 ART mmol/L 26 3   BASE EXC ART mmol/L 0 2   O2 CONTENT ART mL/dL 11 3*   O2 HGB, ARTERIAL % 97 6*   ABG SOURCE  Radial, Left   SANDOVAL TEST  Yes       Imaging and other studies: I have personally reviewed pertinent reports           O2 Device: nasal cannula     Plan    Respiratory Plan: Mild Distress pathway        Resp Comments: Pt tolerated the tx well at this time

## 2018-02-26 NOTE — PROGRESS NOTES
General:  Patient is awake, alert, non-toxic and in no acute respiratory distress  Neck: No JVD  CV:  Regular, +S1 and S2, No murmurs, gallops or rubs appreciated  Lungs: Clear to auscultation bilateral without wheeze, rales or rhonci  Abdomen: Soft, +BS, Non-tender, non-distended  Extremities: No clubbing, cyanosis or edema  Neuro: No focal deficits    I personally reviewed all appropriate labs and images    Additional impressions/plans:    1  Decompensated alcoholic cirrhosis/hepatitis with ascites, encephalopathy, thrombocytopenia, coagulopathy, esophageal vacrices  · Solumedrol BID per GI  · Continue lactulose and rifaximin    2  PRANAY with concern for hepatorenal syndrome vs ATN  · HD today  · Will need permacath placed this week  · Octreotide, midodrine    3  Acute hypoxic respiratory failure secondary to aspiration pneumonia and diffuse pulmonary edema  · D#7/7 zosyn    4  Right great toe gangrenous ulceration with chronic osteomyelitis  · No antibiotics at this time  · Will likely need an amputation    5   ICU Care  · Precedex prn

## 2018-02-26 NOTE — PROGRESS NOTES
Progress Note - Critical Care   Kimberly Garza 50 y o  male MRN: [id number]  Unit/Bed#: [facility] 07 Encounter: [id number]    Assessment:   Principal Problem:    Decompensated hepatic cirrhosis ([facility] 75 )  Active Problems:    Ascites    Hepatic encephalopathy (HCC)    PRANAY (acute kidney injury) ([facility] )    Hypoalbuminemia    Hyponatremia    Sepsis ([facility] 75 )    Hyperammonemia (HCC)    Lactic acidosis    Elevated alkaline phosphatase level    Anemia of chronic disease    Osteomyelitis of toe of right foot (HCC)    Alcohol abuse    Alcoholic hepatitis    Cough    Hypoxia    Oliguria    Leukocytosis    Coagulopathy (HCC)    Aspiration pneumonia of right lower lobe (HCC)  Resolved Problems:    Abdominal pain    Hypotension      Plan:     Neuro:   Enchephalopathy, improving,  likely multifactorial due to liver failure (elevated ammonia 71), sepsis and  ESRD   -Sedation with Precedex     CV:   Labile Blood Pressure, improving, Likely multifactorial  secondary to hypoalbuminemia secondary to liver failure and CHF (given proBNP of 38K on 2/24) Echo on 2/25: EF 60%, no RWMA, mild concentric hypertrophy, b/l pleural effusions   Normal diastolic function  Arterial Line BP: 132/74  Arterial Line MAP (mmHg): 98 mmHg  HR:  [56-74] 70  BP: (102-168)/(62-92) 113/68  -Continue albumin bolusrd 25% PRN for hypotension  for pressure support (map goal > 65)  -Continue Midodrine 5mg TID for orthostatic hypotension     Lung:   Pneumonia, RLL, unchanged, possibly secondary to aspiration vs HCAP  -Complete antibiotics Zosyn 2 25g TID 2/26  (antibiotics day#7/7), s/p Ceftriaxone 1g x1 5days ago  -Xopenex and saline nebs scheduled with albuterol nebs PRN  -Continue Solumedrol 40mg q12h     Acute Hypoxemic Respiratory Failure, unchanged  -Weaned off Bipap 8/14  FiO2 100% (ABGs wnl)  -Patient continues to require HFNC (60LPM, 85%FlO2) will wean off as tolerated    GI:   Acute decompensated alcoholic hepatic cirrhosis, with recurrent ascites  -Initiated tube feeds on 2/25  -Last paracentesis on 2/21 with removal of ~4L  -Continue Rifaximin   -Continue octreotide 100mcg daily for Esophageal varices   -GI onboard, appreciate their recs     FEN:  -No IVF's  -Hypokalemia, Hyperphosphatemia  (will continue to monitor and correct PRN)  -Tube feeds     :   PRANAY (though eGFR 15 and on dialysis, does not meet criteria for ESRD since no prior history of CKD), PRANAY likely hepatorenal syndrome vs ATN  -BUN/Cr /594 81, prior to hemodialysis today, Post dialysis BMP pending    -HD catheter R IJ  -Hemodialysis per Nephrology     ID:   Pneumonia, RLL, improved, per radiology findings on CXR 2/26 likely not PNA  -Continue Zosyn 2 25g TID  (antibiotics day 7/7 on 2/26),   -s/p Ceftriaxone 1g x1 5days ago, Vanc d/caitlyn 2/25  d/t negative MRSA Cx  -Osteomyelitis, right hallux ulcer an d osteomyelitis, per ID no further antibiotics, but will require toe amputation which pt  Refuses  -Complete current antibiotic course  -Appreciate ID recs     Heme:   Thrombocytopenia, worsening, likely secondary to liver failure  -platelet count 1/77 46, low however stable x 4 days, INR 3 12 (last check 2/22)  -hold DVT prophylaxis      Anemia- stable Hgb, received 2 units  -Hb stable 7 4 (2/26), baseline ~8    Endo:  -No acute concerns (no Hx of DM)  -Begin accucheck after TF                Msk/Skin:   Frequent position changing/turning  Routine skin care     Disposition:    Continue with current level of ICU care     ___________________________________________    Chief Complaint:   Chief Complaint   Patient presents with    Ascites     Pt "I need something for my belly  I got tapped for the first time two weeks ago   And its all hard and big again " Roomate "He is suppose to be on liver medication but its too expensive and the doctor wont prescribe anything" Pt c/o SOB "sometimes"        HPI/24hr events:    Febrile overnight to 101 1F, tachypneic to 30's, continues to have large amount of secretions from ET tube   No other overnight events   ____________________________________________    Physical Exam:    Vitals:    18 1230 18 1300 18 1330 18 1409   BP: 113/68 102/62 106/63 113/68   BP Location:   Left arm    Pulse: 62 64 62 70   Resp: (!) 8 17 17 18   Temp:       TempSrc:       SpO2: 96% 95% 98% 100%   Weight:       Height:         Arterial Line BP: 132/74  Arterial Line MAP (mmHg): 98 mmHg  Temp:  [97 3 °F (36 3 °C)-98 7 °F (37 1 °C)] 97 3 °F (36 3 °C)  HR:  [56-74] 70  Resp:  [8-18] 18  BP: (102-168)/(62-92) 113/68  FiO2 (%):  [50-85] 85     Temperature:   Temp (24hrs), Av 8 °F (36 6 °C), Min:97 3 °F (36 3 °C), Max:98 7 °F (37 1 °C)    Current Temperature: (!) 97 3 °F (36 3 °C)  Weights:   IBW: 73 kg    Body mass index is 26 29 kg/m²  Weight (last 2 days)     Date/Time   Weight    18 0558  83 1 (183 2)    18 0238  86 6 (190 92)            General:  NAD  Eyes: PERRL,EOMI, Anicteric Sclera with no hemorrhages or discharge  Neck:  Supple  Lungs: B/l coarse breath sounds with some diffuse wheezes, no accessory muscle use  Cardiac:  RRR, no MRG, normal S1S2  Abdomen:  Soft, non-tender, distended abdomen (more distension compared to )  Extremities:  Non-tender, no edema  Skin:  Warm and dry  Neurological: Somnolent, opens eyes, oriented to person only  Moves all four extremities   Unable to assess strength, does not cooperate with exam         Non-Invasive/Invasive Ventilation Settings:  Respiratory    Lab Data (Last 4 hours)    None         O2/Vent Data (Last 4 hours)       1207          Non-Invasive Ventilation Mode HFNC                 No results found for: PHART, TFC8XQW, PO2ART, HTW1UFR, F5EYEYGU, BEART, SOURCE    Intake and Outputs:  I/O        07    I V  (mL/kg) 671 8 (7 8) 243 8 (2 9)     NG/ 350     IV Piggyback 180 165     Feedings  107     Total Intake(mL/kg) 1031 8 (11 9) 865 8 (10 4) Urine (mL/kg/hr)  0 (0)     Emesis/NG output       Drains       Other 2349 (1 1)      Stool 1900 (0 9) 300 (0 2)     Total Output 4249 300      Net -3217 2 +565 8                 UOP: 0  Net postive +560     Nutrition:        Diet Orders            Start     Ordered    02/26/18 1239  Diet Enteral/Parenteral; Tube Feeding No Oral Diet; Nepro; 10  Diet effective now     Comments:  Please advance by 10 mL/hr every 4 hours to a goal rate of 55 mL/hr   Question Answer Comment   Diet Type Enteral/Parenteral    Enteral/Parenteral Tube Feeding No Oral Diet    Tube Feeding Formula: Nepro    Tube Feeding Continuous rate (mL/hr): 10    RD to adjust diet per protocol? No        02/26/18 1238        TF currently running at 50ml/hr at goal    Results from last 7 days  Lab Units 02/26/18 0431 02/25/18 0443 02/24/18 0449   WBC Thousand/uL 18 54* 21 79* 11 16*   HEMOGLOBIN g/dL 7 4* 8 1* 7 8*   HEMATOCRIT % 21 2* 23 6* 21 8*   PLATELETS Thousands/uL 46* 48* 40*   NEUTROS PCT % 88* 90* 87*   MONOS PCT % 7 5 7      Results from last 7 days  Lab Units 02/26/18  0431 02/25/18  0443 02/24/18 0449 02/23/18 0443   SODIUM mmol/L 141 137 135*  --  133*   POTASSIUM mmol/L 3 9 4 2 4 2  --  4 1   CHLORIDE mmol/L 105 101 101  --  98*   CO2 mmol/L 26 28 26  --  26   BUN mg/dL 59* 43* 45*  --  40*   CREATININE mg/dL 4 81* 3 88* 4 36*  --  4 00*   CALCIUM mg/dL 8 1* 8 4 8 5  --  8 3   TOTAL PROTEIN g/dL  --  6 1* 6 1*  --  6 4   BILIRUBIN TOTAL mg/dL  --  4 24* 3 99*  --  4 28*   ALK PHOS U/L  --  64 61  --  67   ALT U/L  --  31 29  --  31   AST U/L  --  38 31  --  42   GLUCOSE RANDOM mg/dL 167* 145* 157*  --  144*   GLUCOSE, ISTAT   --   --   --   < >  --    < > = values in this interval not displayed      Results from last 7 days  Lab Units 02/26/18  0431 02/25/18  0443 02/24/18  0449   MAGNESIUM mg/dL 2 5 2 4 2 4       Results from last 7 days  Lab Units 02/26/18  0431 02/25/18  0443 02/24/18  0449   PHOSPHORUS mg/dL 5 5* 5 0* 6 0* Results from last 7 days  Lab Units 02/22/18  0431 02/21/18  1836 02/21/18  0533   INR  3 12* 2 70* 2 53*   PTT seconds 61*  --   --        Results from last 7 days  Lab Units 02/22/18  0817   LACTIC ACID mmol/L 3 3*     No results found for: TROPONINI  Imaging: New CXR bilateral pulm edema, and pleural effusions, PNA less likely  EKG: No new ECG  Micro:  Lab Results   Component Value Date    BLOODCX No Growth After 4 Days  02/21/2018    BLOODCX No Growth After 4 Days  02/21/2018    BLOODCX No Growth After 5 Days  02/17/2018    BLOODCX No Growth After 5 Days   02/17/2018     Allergies: No Known Allergies  Medications:   Scheduled Meds:    Current Facility-Administered Medications:  albuterol 2 5 mg Nebulization Q4H PRN Charis Santana DO    dexmedetomidine 0 1-0 7 mcg/kg/hr Intravenous Titrated Erik García DO Last Rate: Stopped (36/06/53 6313)   folic acid 1 mg Oral Daily Mariama Hurtado DO    influenza vaccine 0 5 mL Intramuscular Prior to discharge Mary Raymond MD    insulin lispro 1-6 Units Subcutaneous Q6H [address] Dimple MOISÉS Mojica    lactulose 30 g Oral TID Dimple MOISÉS Mojica    levalbuterol 1 25 mg Nebulization TID Charis Santana DO    methylPREDNISolone sodium succinate 40 mg Intravenous Q12H [address] Mariola Major PA-C    midodrine 5 mg Oral TID YAZMIN Amaya    multivitamin 1 tablet Oral Daily Dimple MojicaMOISÉS barrios    octreotide 100 mcg Intravenous Q8H [address] Mati Glasgow DO    ondansetron 4 mg Intravenous Q8H PRN Zettwade Rivera DO    pantoprazole 40 mg Intravenous Q24H [address] Zenaida Harmon MD    piperacillin-tazobactam 2 25 g Intravenous Q8H Manoj Duron PA-C Last Rate: 2 25 g (02/26/18 1447)   rifaximin 550 mg Oral Q12H [address] Shara Luque PA-C    sodium chloride 1 application Nasal I6I PRN Dimple MojicaMOISÉS barrios    sodium chloride 3 mL Nebulization TID Charis Santana DO    thiamine 100 mg Oral Daily Mariama Hurtado DO      Continuous Infusions:    dexmedetomidine 0 1-0 7 mcg/kg/hr Last Rate: Stopped (02/26/18 1025)     PRN Meds:    albuterol 2 5 mg Q4H PRN   influenza vaccine 0 5 mL Prior to discharge   ondansetron 4 mg Q8H PRN   sodium chloride 1 application E0A PRN     VTE Pharmacologic Prophylaxis: Discontinued  VTE Mechanical Prophylaxis: sequential compression device  Invasive lines and devices: Invasive Devices     Peripheral Intravenous Line            Peripheral IV 02/24/18 Left Antecubital 2 days    Peripheral IV 02/25/18 Right Arm 1 day          Line            HD Cath Double 5 days          Drain            NG/OG/Enteral Tube Nasogastric Right nares 4 days    Rectal Tube With balloon 3 days                   Code Status: Level 2 - DNAR: but accepts endotracheal intubation    Portions of the record may have been created with voice recognition software  Occasional wrong word or "sound a like" substitutions may have occurred due to the inherent limitations of voice recognition software  Read the chart carefully and recognize, using context, where substitutions have occurred      Yesy Bennett MD

## 2018-02-26 NOTE — NUTRITION
Recommend increase Nepro by 10 ml q 4 hrs as joshua to goal rate of 55 ml/hr to provide qd: 1320 ml,2376 Kcal,107 gm PRO,212 5 gm CHO,127 gm Fat,964 ml Free H2O,1 8 mg CHO/kg/min  Adjust Phos  Monitor ARLENE, LFTs and renal parameters

## 2018-02-27 ENCOUNTER — APPOINTMENT (INPATIENT)
Dept: DIALYSIS | Facility: HOSPITAL | Age: 53
DRG: 264 | End: 2018-02-27
Attending: INTERNAL MEDICINE
Payer: COMMERCIAL

## 2018-02-27 LAB
ALBUMIN SERPL BCP-MCNC: 3 G/DL (ref 3.5–5)
ALP SERPL-CCNC: 208 U/L (ref 46–116)
ALT SERPL W P-5'-P-CCNC: 57 U/L (ref 12–78)
AMMONIA PLAS-SCNC: 43 UMOL/L (ref 11–35)
ANION GAP SERPL CALCULATED.3IONS-SCNC: 8 MMOL/L (ref 4–13)
AST SERPL W P-5'-P-CCNC: 68 U/L (ref 5–45)
ATRIAL RATE: 109 BPM
ATRIAL RATE: 110 BPM
BASOPHILS # BLD MANUAL: 0 THOUSAND/UL (ref 0–0.1)
BASOPHILS NFR MAR MANUAL: 0 % (ref 0–1)
BILIRUB SERPL-MCNC: 4.03 MG/DL (ref 0.2–1)
BUN SERPL-MCNC: 48 MG/DL (ref 5–25)
BURR CELLS BLD QL SMEAR: PRESENT
CA-I BLD-SCNC: 1.05 MMOL/L (ref 1.12–1.32)
CALCIUM SERPL-MCNC: 8.5 MG/DL (ref 8.3–10.1)
CHLORIDE SERPL-SCNC: 104 MMOL/L (ref 100–108)
CO2 SERPL-SCNC: 29 MMOL/L (ref 21–32)
CREAT SERPL-MCNC: 3.9 MG/DL (ref 0.6–1.3)
DAT C3-SP REAG RBC QL: NEGATIVE
DAT IGG-SP REAG RBCCO QL: NORMAL
DAT POLY-SP REAG RBC QL: NORMAL
EOSINOPHIL # BLD MANUAL: 0 THOUSAND/UL (ref 0–0.4)
EOSINOPHIL NFR BLD MANUAL: 0 % (ref 0–6)
ERYTHROCYTE [DISTWIDTH] IN BLOOD BY AUTOMATED COUNT: 22.6 % (ref 11.6–15.1)
FDP BLD QL AGGL: >10 <20
FERRITIN SERPL-MCNC: 903 NG/ML (ref 8–388)
FIBRINOGEN PPP-MCNC: <60 MG/DL (ref 227–495)
GFR SERPL CREATININE-BSD FRML MDRD: 17 ML/MIN/1.73SQ M
GLUCOSE SERPL-MCNC: 179 MG/DL (ref 65–140)
GLUCOSE SERPL-MCNC: 185 MG/DL (ref 65–140)
GLUCOSE SERPL-MCNC: 191 MG/DL (ref 65–140)
GLUCOSE SERPL-MCNC: 212 MG/DL (ref 65–140)
GLUCOSE SERPL-MCNC: 239 MG/DL (ref 65–140)
HCT VFR BLD AUTO: 24.7 % (ref 36.5–49.3)
HGB BLD-MCNC: 8.5 G/DL (ref 12–17)
HYPERCHROMIA BLD QL SMEAR: PRESENT
INR PPP: 2.88 (ref 0.86–1.16)
IRON SATN MFR SERPL: 86 %
IRON SERPL-MCNC: 86 UG/DL (ref 65–175)
LDH SERPL-CCNC: 279 U/L (ref 81–234)
LYMPHOCYTES # BLD AUTO: 0.24 THOUSAND/UL (ref 0.6–4.47)
LYMPHOCYTES # BLD AUTO: 1 % (ref 14–44)
MAGNESIUM SERPL-MCNC: 2.4 MG/DL (ref 1.6–2.6)
MCH RBC QN AUTO: 32.8 PG (ref 26.8–34.3)
MCHC RBC AUTO-ENTMCNC: 34.4 G/DL (ref 31.4–37.4)
MCV RBC AUTO: 95 FL (ref 82–98)
MONOCYTES # BLD AUTO: 0.98 THOUSAND/UL (ref 0–1.22)
MONOCYTES NFR BLD: 4 % (ref 4–12)
NEUTROPHILS # BLD MANUAL: 22.98 THOUSAND/UL (ref 1.85–7.62)
NEUTS SEG NFR BLD AUTO: 94 % (ref 43–75)
NRBC BLD AUTO-RTO: 0 /100 WBCS
PLATELET # BLD AUTO: 47 THOUSANDS/UL (ref 149–390)
PLATELET BLD QL SMEAR: ABNORMAL
POIKILOCYTOSIS BLD QL SMEAR: PRESENT
POTASSIUM SERPL-SCNC: 3.6 MMOL/L (ref 3.5–5.3)
PROT SERPL-MCNC: 6.2 G/DL (ref 6.4–8.2)
PROTHROMBIN TIME: 30.6 SECONDS (ref 12.1–14.4)
QRS AXIS: 33 DEGREES
QRS AXIS: 44 DEGREES
QRSD INTERVAL: 92 MS
QRSD INTERVAL: 96 MS
QT INTERVAL: 308 MS
QT INTERVAL: 333 MS
QTC INTERVAL: 417 MS
QTC INTERVAL: 449 MS
RBC # BLD AUTO: 2.59 MILLION/UL (ref 3.88–5.62)
RBC MORPH BLD: PRESENT
SODIUM SERPL-SCNC: 141 MMOL/L (ref 136–145)
T WAVE AXIS: 51 DEGREES
T WAVE AXIS: 57 DEGREES
TARGETS BLD QL SMEAR: PRESENT
TIBC SERPL-MCNC: 100 UG/DL (ref 250–450)
VARIANT LYMPHS # BLD AUTO: 1 %
VENTRICULAR RATE: 109 BPM
VENTRICULAR RATE: 110 BPM
WBC # BLD AUTO: 24.45 THOUSAND/UL (ref 4.31–10.16)

## 2018-02-27 PROCEDURE — 82948 REAGENT STRIP/BLOOD GLUCOSE: CPT

## 2018-02-27 PROCEDURE — 83735 ASSAY OF MAGNESIUM: CPT | Performed by: INTERNAL MEDICINE

## 2018-02-27 PROCEDURE — 85610 PROTHROMBIN TIME: CPT | Performed by: PHYSICIAN ASSISTANT

## 2018-02-27 PROCEDURE — 93005 ELECTROCARDIOGRAM TRACING: CPT

## 2018-02-27 PROCEDURE — C9113 INJ PANTOPRAZOLE SODIUM, VIA: HCPCS | Performed by: EMERGENCY MEDICINE

## 2018-02-27 PROCEDURE — 94660 CPAP INITIATION&MGMT: CPT

## 2018-02-27 PROCEDURE — 82140 ASSAY OF AMMONIA: CPT | Performed by: INTERNAL MEDICINE

## 2018-02-27 PROCEDURE — 99232 SBSQ HOSP IP/OBS MODERATE 35: CPT | Performed by: INTERNAL MEDICINE

## 2018-02-27 PROCEDURE — 82728 ASSAY OF FERRITIN: CPT | Performed by: INTERNAL MEDICINE

## 2018-02-27 PROCEDURE — 83010 ASSAY OF HAPTOGLOBIN QUANT: CPT | Performed by: INTERNAL MEDICINE

## 2018-02-27 PROCEDURE — 92610 EVALUATE SWALLOWING FUNCTION: CPT

## 2018-02-27 PROCEDURE — 94640 AIRWAY INHALATION TREATMENT: CPT

## 2018-02-27 PROCEDURE — 83550 IRON BINDING TEST: CPT | Performed by: INTERNAL MEDICINE

## 2018-02-27 PROCEDURE — 85007 BL SMEAR W/DIFF WBC COUNT: CPT | Performed by: INTERNAL MEDICINE

## 2018-02-27 PROCEDURE — 5A1D70Z PERFORMANCE OF URINARY FILTRATION, INTERMITTENT, LESS THAN 6 HOURS PER DAY: ICD-10-PCS | Performed by: INTERNAL MEDICINE

## 2018-02-27 PROCEDURE — 80053 COMPREHEN METABOLIC PANEL: CPT | Performed by: PHYSICIAN ASSISTANT

## 2018-02-27 PROCEDURE — 85027 COMPLETE CBC AUTOMATED: CPT | Performed by: INTERNAL MEDICINE

## 2018-02-27 PROCEDURE — 93010 ELECTROCARDIOGRAM REPORT: CPT | Performed by: INTERNAL MEDICINE

## 2018-02-27 PROCEDURE — 83615 LACTATE (LD) (LDH) ENZYME: CPT | Performed by: INTERNAL MEDICINE

## 2018-02-27 PROCEDURE — 99233 SBSQ HOSP IP/OBS HIGH 50: CPT | Performed by: INTERNAL MEDICINE

## 2018-02-27 PROCEDURE — 85384 FIBRINOGEN ACTIVITY: CPT | Performed by: INTERNAL MEDICINE

## 2018-02-27 PROCEDURE — 93005 ELECTROCARDIOGRAM TRACING: CPT | Performed by: INTERNAL MEDICINE

## 2018-02-27 PROCEDURE — 85362 FIBRIN DEGRADATION PRODUCTS: CPT | Performed by: INTERNAL MEDICINE

## 2018-02-27 PROCEDURE — 83540 ASSAY OF IRON: CPT | Performed by: INTERNAL MEDICINE

## 2018-02-27 PROCEDURE — 86880 COOMBS TEST DIRECT: CPT | Performed by: INTERNAL MEDICINE

## 2018-02-27 PROCEDURE — 82330 ASSAY OF CALCIUM: CPT | Performed by: INTERNAL MEDICINE

## 2018-02-27 PROCEDURE — 94760 N-INVAS EAR/PLS OXIMETRY 1: CPT

## 2018-02-27 RX ORDER — METHYLPREDNISOLONE SODIUM SUCCINATE 40 MG/ML
20 INJECTION, POWDER, LYOPHILIZED, FOR SOLUTION INTRAMUSCULAR; INTRAVENOUS EVERY 12 HOURS SCHEDULED
Status: DISCONTINUED | OUTPATIENT
Start: 2018-02-27 | End: 2018-02-27

## 2018-02-27 RX ORDER — DILTIAZEM HYDROCHLORIDE 5 MG/ML
10 INJECTION INTRAVENOUS ONCE
Status: COMPLETED | OUTPATIENT
Start: 2018-02-27 | End: 2018-02-27

## 2018-02-27 RX ORDER — DILTIAZEM HYDROCHLORIDE 5 MG/ML
INJECTION INTRAVENOUS
Status: COMPLETED
Start: 2018-02-27 | End: 2018-02-27

## 2018-02-27 RX ADMIN — LEVALBUTEROL HYDROCHLORIDE 1.25 MG: 1.25 SOLUTION, CONCENTRATE RESPIRATORY (INHALATION) at 21:08

## 2018-02-27 RX ADMIN — PIPERACILLIN SODIUM,TAZOBACTAM SODIUM 2.25 G: 2; .25 INJECTION, POWDER, FOR SOLUTION INTRAVENOUS at 12:32

## 2018-02-27 RX ADMIN — Medication 100 MG: at 08:21

## 2018-02-27 RX ADMIN — LEVALBUTEROL HYDROCHLORIDE 1.25 MG: 1.25 SOLUTION, CONCENTRATE RESPIRATORY (INHALATION) at 13:39

## 2018-02-27 RX ADMIN — PIPERACILLIN SODIUM,TAZOBACTAM SODIUM 2.25 G: 2; .25 INJECTION, POWDER, FOR SOLUTION INTRAVENOUS at 03:59

## 2018-02-27 RX ADMIN — CALCIUM GLUCONATE 1 G: 98 INJECTION, SOLUTION INTRAVENOUS at 05:02

## 2018-02-27 RX ADMIN — PANTOPRAZOLE SODIUM 40 MG: 40 INJECTION, POWDER, FOR SOLUTION INTRAVENOUS at 08:21

## 2018-02-27 RX ADMIN — INSULIN LISPRO 2 UNITS: 100 INJECTION, SOLUTION INTRAVENOUS; SUBCUTANEOUS at 06:22

## 2018-02-27 RX ADMIN — INSULIN LISPRO 3 UNITS: 100 INJECTION, SOLUTION INTRAVENOUS; SUBCUTANEOUS at 18:40

## 2018-02-27 RX ADMIN — LACTULOSE 30 G: 20 SOLUTION ORAL at 08:21

## 2018-02-27 RX ADMIN — ISODIUM CHLORIDE 3 ML: 0.03 SOLUTION RESPIRATORY (INHALATION) at 13:39

## 2018-02-27 RX ADMIN — INSULIN LISPRO 1 UNITS: 100 INJECTION, SOLUTION INTRAVENOUS; SUBCUTANEOUS at 00:33

## 2018-02-27 RX ADMIN — DILTIAZEM HYDROCHLORIDE 10 MG: 5 INJECTION INTRAVENOUS at 11:20

## 2018-02-27 RX ADMIN — OCTREOTIDE ACETATE 100 MCG: 100 INJECTION, SOLUTION INTRAVENOUS; SUBCUTANEOUS at 13:46

## 2018-02-27 RX ADMIN — LACTULOSE 30 G: 20 SOLUTION ORAL at 21:48

## 2018-02-27 RX ADMIN — RIFAXIMIN 550 MG: 550 TABLET ORAL at 08:21

## 2018-02-27 RX ADMIN — INSULIN LISPRO 1 UNITS: 100 INJECTION, SOLUTION INTRAVENOUS; SUBCUTANEOUS at 12:33

## 2018-02-27 RX ADMIN — RIFAXIMIN 550 MG: 550 TABLET ORAL at 21:48

## 2018-02-27 RX ADMIN — FOLIC ACID 1 MG: 1 TABLET ORAL at 08:21

## 2018-02-27 RX ADMIN — PIPERACILLIN SODIUM,TAZOBACTAM SODIUM 2.25 G: 2; .25 INJECTION, POWDER, FOR SOLUTION INTRAVENOUS at 18:44

## 2018-02-27 RX ADMIN — ISODIUM CHLORIDE 3 ML: 0.03 SOLUTION RESPIRATORY (INHALATION) at 07:30

## 2018-02-27 RX ADMIN — Medication 1 TABLET: at 08:22

## 2018-02-27 RX ADMIN — LACTULOSE 30 G: 20 SOLUTION ORAL at 16:15

## 2018-02-27 RX ADMIN — METHYLPREDNISOLONE SODIUM SUCCINATE 40 MG: 40 INJECTION, POWDER, FOR SOLUTION INTRAMUSCULAR; INTRAVENOUS at 08:21

## 2018-02-27 RX ADMIN — LEVALBUTEROL HYDROCHLORIDE 1.25 MG: 1.25 SOLUTION, CONCENTRATE RESPIRATORY (INHALATION) at 07:30

## 2018-02-27 RX ADMIN — MIDODRINE HYDROCHLORIDE 5 MG: 5 TABLET ORAL at 10:53

## 2018-02-27 RX ADMIN — DILTIAZEM HYDROCHLORIDE 5 MG/HR: 5 INJECTION INTRAVENOUS at 11:33

## 2018-02-27 RX ADMIN — OCTREOTIDE ACETATE 100 MCG: 100 INJECTION, SOLUTION INTRAVENOUS; SUBCUTANEOUS at 06:24

## 2018-02-27 RX ADMIN — PREDNISONE 25 MG: 5 TABLET ORAL at 16:15

## 2018-02-27 RX ADMIN — OCTREOTIDE ACETATE 100 MCG: 100 INJECTION, SOLUTION INTRAVENOUS; SUBCUTANEOUS at 21:48

## 2018-02-27 RX ADMIN — ISODIUM CHLORIDE 3 ML: 0.03 SOLUTION RESPIRATORY (INHALATION) at 21:07

## 2018-02-27 NOTE — PROGRESS NOTES
Progress Note - Infectious Disease   Dyan Craig 50 y o  male MRN: [id number]  Unit/Bed#: [facility] 07 Encounter: [id number]      Impression/Recommendations:  1   Aspiration pneumonia   Given significant comorbid illnesses and recent hospitalizations, patient is at risk for nosocomial pathogens, especially nosocomial gram negatives and MRSA    Patient is clinically improved on current antibiotics   MRSA screen negative  Continue Zosyn     With negative MRSA screen, no need for vancomycin  Treat x7 days total, another 1 day  Discontinue after tomorrow a m  dose      2   Acute superimposed on chronic hypoxic respiratory failure   This is most likely secondary to aspiration above   Patient is clinically much improved  Monitor respiratory symptoms      3   Encephalopathy   This is most likely secondary to aspiration pneumonia above, superimposed on baseline hepatic encephalopathy   Mental status slowly improving  Monitor mental status      4   Recurrent fever with leukocytosis   Suspect recurrent aspiration   With patient clinically stable, monitor for now   Hopefully, this is pneumonitis and not recurrent pneumonia  Temperature is down again but WBC remains up  Leukocytosis may be secondary to cirrhosis and hypersplenism      5  Right great toe gangrenous ulcer and osteomyelitis   There is no evidence of cellulitis clinically   Patient has no fever leukocytosis   No antibiotic is necessary   However, patient needs to have this toe amputated, to avoid development of wet gangrene   Patient has been refusing toe amputation  No antibiotic needed at present time  Monitor toe  Recommend toe amputation      6   Decompensated cirrhosis   This is due to patient's noncompliance with prescribed treatment plan  D[place]ion Pa is no evidence of peritonitis clinically   Patient is status post paracentesis with benign peritoneal fluid   Systemic corticosteroid started   Patient is status post repeat paracentesis, with benign parameters also   Even with active pneumonia, it should be fine to continue systemic corticosteroid  No antibiotic needed  Management per GI and primary service      7   PRANAY   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   Dehydration may contribute   Creatinine worsening   Patient is now on HD  Antibiotic dosages adjusted accordingly  Monitor creatinine      Discussed with patient in detail regarding above plan      Antibiotics:  Zosyn # 6     Subjective:  Patient remains in ICU  He is more awake and alert, although still with some confusion  Abdomen remains distended   Mild discomfort  Temperature is down   No chills  Objective:  Vitals:  HR:  [] 96  Resp:  [8-33] 20  BP: (102-171)/(62-93) 171/85  SpO2:  [77 %-100 %] 100 %  Temp (24hrs), Av °F (36 7 °C), Min:97 2 °F (36 2 °C), Max:98 6 °F (37 °C)  Current: Temperature: 98 5 °F (36 9 °C)    Physical Exam:     General: Awake, alert, cooperative, no distress  Improving confusion  Lungs: Decreased breath sounds, no rales, no wheezing, respirations unlabored  Heart[de-identified]  Tachycardic with regular rhythm, S1 and S2 normal, no murmur  Abdomen: Soft, stable distension, mild right-sided tenderness, bowel sounds active all four quadrants,        no masses, no organomegaly  Extremities: Stable leg edema  Right 1st toe with stable necrotic ulcer  Stable chronic changes  No purulence  No tenderness  Skin:  No rash  Invasive Devices     Peripheral Intravenous Line            Peripheral IV 18 Left Antecubital 2 days    Peripheral IV 18 Right Arm 2 days          Line            HD Cath Double 5 days          Drain            NG/OG/Enteral Tube Nasogastric Right nares 5 days    Rectal Tube With balloon 4 days                Labs studies:   I have personally reviewed pertinent labs      Results from last 7 days  Lab Units 18  0415 18  0431 18  0443 18  0449   SODIUM mmol/L 141 141 137 135*   POTASSIUM mmol/L 3 6 3 9 4 2 4 2   CHLORIDE mmol/L 104 105 101 101   CO2 mmol/L 29 26 28 26   ANION GAP mmol/L 8 10 8 8   BUN mg/dL 48* 59* 43* 45*   CREATININE mg/dL 3 90* 4 81* 3 88* 4 36*   EGFR ml/min/1 73sq m 17 13 17 15   GLUCOSE RANDOM mg/dL 191* 167* 145* 157*   CALCIUM mg/dL 8 5 8 1* 8 4 8 5   AST U/L 68*  --  38 31   ALT U/L 57  --  31 29   ALK PHOS U/L 208*  --  64 61   TOTAL PROTEIN g/dL 6 2*  --  6 1* 6 1*   BILIRUBIN TOTAL mg/dL 4 03*  --  4 24* 3 99*       Results from last 7 days  Lab Units 02/27/18  0655 02/26/18  0431 02/25/18  0443   WBC Thousand/uL 24 45* 18 54* 21 79*   HEMOGLOBIN g/dL 8 5* 7 4* 8 1*   PLATELETS Thousands/uL 47* 46* 48*       Results from last 7 days  Lab Units 02/22/18  1455 02/21/18  1829 02/21/18  1820 02/21/18  1156   BLOOD CULTURE   --  No Growth After 5 Days  No Growth After 5 Days  --    GRAM STAIN RESULT   --   --   --  Rare Polys  No bacteria seen   BODY FLUID CULTURE, STERILE   --   --   --  No growth   MRSA CULTURE ONLY  No Methicillin Resistant Staphlyococcus aureus (MRSA) isolated  --   --   --        Imaging Studies:   I have personally reviewed pertinent imaging study reports and images in PACS  EKG, Pathology, and Other Studies:   I have personally reviewed pertinent reports

## 2018-02-27 NOTE — PROGRESS NOTES
Progress Note - Juan Duenas 50 y o  male MRN: [id number]    Unit/Bed#: [facility] 07 Encounter: [id number]      ASSESSMENT/ PLAN:   1  Decompensated alcoholic cirrhosis: Child Baker C  MELD of 37 today  His prognosis is very guarded and he is unlikely a transplant candidate secondary to active alcohol use  -recommend tapering and d/c steroids given his AST/ALT have been wnl and do not reflect evidence of alcoholic hepatitis     2  Encephalopathy: alert and oriented to person only  -continue lactulose titrating for 2-3 BMs daily  -continue xifaxan 550mg bid   -monitor mental status   -monitor stool output     3  Abdominal ascites: He last received a paracentesis on 2/21 with about 4L removed and was negative for SBP  His abdomen is soft but he may require paracentesis later this week  -monitor abdominal exam for recurrent ascites     4  ESRD: Creatinine of 3 90 today  He is on hemodialysis   -continue dialysis, midodrine and octreotide per nephrology recs     5  Aspiration pneumonia:  -continue abx per ICU team     6  Nutrition:   -continue feedings via NGT for now    Subjective:     Patient seen and examined  He is alert and oriented to person only  Objective:     Vitals: Blood pressure 153/78, pulse 86, temperature 97 6 °F (36 4 °C), temperature source Oral, resp  rate 20, height 5' 10" (1 778 m), weight 83 2 kg (183 lb 6 8 oz), SpO2 100 %  ,Body mass index is 26 32 kg/m²        Intake/Output Summary (Last 24 hours) at 02/27/18 1236  Last data filed at 02/27/18 1137   Gross per 24 hour   Intake          2139 33 ml   Output             2879 ml   Net          -739 67 ml       Physical Exam:     General Appearance: A&Ox1, appears stated age and cooperative, NGT in place, HFNC  Lungs: Clear to auscultation bilaterally, no rales or rhonchi  Heart: Regular rate and rhythm, S1, S2 normal, no murmur, click, rub or gallop  Abdomen: Soft, non-tender, non-distended; bowel sounds normal; no masses or no organomegaly, rectal tube noted   Extremities: No cyanosis, clubbing, edema    Invasive Devices     Peripheral Intravenous Line            Peripheral IV 02/24/18 Left Antecubital 2 days    Peripheral IV 02/25/18 Right Arm 2 days          Line            HD Cath Double 6 days          Drain            NG/OG/Enteral Tube Nasogastric Right nares 5 days    Rectal Tube With balloon 4 days                Lab Results:      Results from last 7 days  Lab Units 02/27/18  0655 02/26/18  0431   WBC Thousand/uL 24 45* 18 54*   HEMOGLOBIN g/dL 8 5* 7 4*   HEMATOCRIT % 24 7* 21 2*   PLATELETS Thousands/uL 47* 46*   NEUTROS PCT %  --  88*   LYMPHS PCT %  --  5*   LYMPHO PCT % 1*  --    MONOS PCT %  --  7   MONO PCT MAN % 4  --    EOS PCT %  --  0   EOSINO PCT MANUAL % 0  --        Results from last 7 days  Lab Units 02/27/18  0415   SODIUM mmol/L 141   POTASSIUM mmol/L 3 6   CHLORIDE mmol/L 104   CO2 mmol/L 29   BUN mg/dL 48*   CREATININE mg/dL 3 90*   CALCIUM mg/dL 8 5   TOTAL PROTEIN g/dL 6 2*   BILIRUBIN TOTAL mg/dL 4 03*   ALK PHOS U/L 208*   ALT U/L 57   AST U/L 68*   GLUCOSE RANDOM mg/dL 191*       Results from last 7 days  Lab Units 02/27/18  0444   INR  2 88*           Imaging Studies: I have personally reviewed pertinent imaging studies  Ct Abdomen Pelvis Wo Contrast    Result Date: 2/18/2018  Impression: 1  Cirrhosis with stigmata of portal hypertension  2   Large volume of abdominopelvic ascites  There is a layering hematocrit level in the dependent pelvis  However, with the attenuation of the dependent component measuring only 15 Hounsfield units, this is most likely small volume of blood products rather than large volume hemoperitoneum  3   Postprocedural pneumoperitoneum  Needle tract in the left mid abdomen is in the vicinity of a large body wall collateral  4   Small right pleural effusion and right base atelectasis  5   Anterior compression deformities of T7 and T12       Xr Chest Portable    Result Date: 2/21/2018  Impression: 1   Right internal jugular dialysis catheter with tip in the midsuperior vena cava  2   Mild vascular congestion  Right-sided alveolar infiltrates, likely cardiogenic in nature, however superimposed pneumonia not excluded  Clinical correlation and follow-up examination recommended  Xr Chest Pa & Lateral    Result Date: 2/18/2018  Impression: 1  No active pulmonary disease  2   Free intraperitoneal air which may be related to reported paracentesis the prior day though clinical correlation for acute abdominal symptoms recommended     Xr Foot 3+ Vw Right    Result Date: 2/19/2018  Impression: Soft tissue ulceration with bony changes of the tuft of the great toe, suggesting osteomyelitis  If there is concern for osteomyelitis, consider nuclear medicine white blood cell scan or MRI with gadolinium for further evaluation  A verbal report will be called by the reading room liaison following this dictation  Ct Head Wo Contrast    Result Date: 2/21/2018  Impression: Motion degraded examination  No gross intracranial abnormality  Ir Paracentesis    Result Date: 2/23/2018  Impression: Impression: Successful diagnostic and therapeutic paracentesis     Xr Chest Pa Only    Result Date: 2/21/2018  Impression: Increasing airspace opacities in the right perihilar region and left suprahilar region may represent aspiration and/or pneumonia  Us Abdomen Limited    Result Date: 2/20/2018  Impression: Cirrhosis with sequela of portal hypertension and ascites as detailed above  Cholelithiasis  If there is clinical concern for cholecystitis, nuclear medicine HIDA scan may be obtained       Ir Temp Hd Cath    Result Date: 2/23/2018  Impression: Impression: Successful placement of a temporary dialysis catheter via the internal jugular vein

## 2018-02-27 NOTE — PLAN OF CARE
GENITOURINARY - ADULT     Urinary catheter remains patent Completed          DISCHARGE PLANNING - CARE MANAGEMENT     Discharge to post-acute care or home with appropriate resources Not Progressing        GASTROINTESTINAL - ADULT     Maintains or returns to baseline bowel function Not Progressing        GENITOURINARY - ADULT     Maintains or returns to baseline urinary function Not Progressing     Absence of urinary retention Not Progressing        HEMATOLOGIC - ADULT     Maintains hematologic stability Not Progressing        METABOLIC, FLUID AND ELECTROLYTES - ADULT     Electrolytes maintained within normal limits Not Progressing     Fluid balance maintained Not Progressing     Glucose maintained within target range Not Progressing        PAIN - ADULT     Verbalizes/displays adequate comfort level or baseline comfort level Not Progressing        Prexisting or High Potential for Compromised Skin Integrity     Skin integrity is maintained or improved Not Progressing        SAFETY,RESTRAINT: NV/NON-SELF DESTRUCTIVE BEHAVIOR     Remains free of harm/injury (restraint for non violent/non self-detsructive behavior) Not Progressing     Returns to optimal restraint-free functioning Not Progressing     Remains free of harm/injury (restraint for non violent/non self-detsructive behavior) Not Progressing     Returns to optimal restraint-free functioning Not Progressing        SKIN/TISSUE INTEGRITY - ADULT     Skin integrity remains intact Not Progressing

## 2018-02-27 NOTE — CASE MANAGEMENT
Thank you,  [address] [facility] in the [facility] by Angus Lechuga for 2017  Network Utilization Review Department  Phone: [phone]; Fax [phone]  ATTENTION: The Network Utilization Review Department is now centralized for our 7 Facilities  Make a note that we have a new phone and fax numbers for our Department  Please call with any questions or concerns to [phone] and carefully follow the prompts so that you are directed to the right person  All voicemails are confidential  Fax any determinations, approvals, denials, and requests for initial or continue stay review clinical to [phone]  Due to HIGH CALL volume, it would be easier if you could please send faxed requests to expedite your requests and in part, help us provide discharge notifications faster      Continued Stay Review    Date: 2/25/18    Vital Signs:   02/25/18 0809  98 1 °F (36 7 °C)  66   10  129/70  90  100 %  --  --     Medications:   Scheduled Meds:   Current Facility-Administered Medications:  albuterol 2 5 mg Nebulization Q4H PRN Charis Santana DO    dexmedetomidine 0 1-0 7 mcg/kg/hr Intravenous Titrated Richard BaDO ming Last Rate: Stopped (02/26/18 1025)   diltiazem 1-15 mg/hr Intravenous Titrated Mariola Major PA-C Last Rate: 5 mg/hr (55/68/17 9265)   folic acid 1 mg Oral Daily Mariama Hurtado DO    influenza vaccine 0 5 mL Intramuscular Prior to discharge David Tadeo MD    insulin lispro 1-6 Units Subcutaneous Q6H [address] Philly Vivas PA-C    lactulose 30 g Oral TID Philly Vivas PA-C    levalbuterol 1 25 mg Nebulization TID Charis Santana DO    midodrine 5 mg Oral TID YAZMIN Amaya    multivitamin 1 tablet Oral Daily Philly Vivas PA-C    octreotide 100 mcg Intravenous Q8H [address] Mati Glasgow,     ondansetron 4 mg Intravenous Q8H PRN Gale Duenas, DO    pantoprazole 40 mg Intravenous Q24H [address] Morales Phipps Mt, MD    piperacillin-tazobactam 2 25 g Intravenous Q8H Elva Conrad Knight PA-C Last Rate: Stopped (02/27/18 0453)   predniSONE 25 mg Oral BID With Meals Lupe Cobb MD    rifaximin 550 mg Oral Q12H [facility]    sodium chloride 1 application Nasal W9Q PRN Syed Thompson PA-C    sodium chloride 3 mL Nebulization TID Charis Santana DO    thiamine 100 mg Oral Daily Mariama Hurtado DO      Continuous Infusions:   dexmedetomidine 0 1-0 7 mcg/kg/hr Last Rate: Stopped (02/26/18 1025)   diltiazem 1-15 mg/hr Last Rate: 5 mg/hr (02/27/18 1133)     PRN Meds:   albuterol    influenza vaccine    ondansetron    sodium chloride    Abnormal Labs/Diagnostic Results:    Ref Range & Units 02/25/18 0515   pH, Arterial 7 350 - 7 450 7 331     pCO2, Arterial 36 0 - 44 0 mm Hg 50 9     pO2, Arterial 75 0 - 129 0 mm Hg 144 6     HCO3, Arterial 22 0 - 28 0 mmol/L 26 3    Base Excess, Arterial mmol/L 0 2    O2 Content, Arterial 16 0 - 23 0 mL/dL 11 3     O2 HGB,Arterial  94 0 - 97 0 % 97 6        Ref Range & Units 02/25/18 0443   BUN 5 - 25 mg/dL 43     Creatinine 0 60 - 1 30 mg/dL 3 88     Glucose 65 - 140 mg/dL 145     Total Protein 6 4 - 8 2 g/dL 6 1     Albumin 3 5 - 5 0 g/dL 3 0     Total Bilirubin 0 20 - 1 00 mg/dL 4 24     Phosphorus 5 0   Ref Range & Units 02/25/18 0443   WBC 4 31 - 10 16 Thousand/uL 21 79     RBC 3 88 - 5 62 Million/uL 2 52     Hemoglobin 12 0 - 17 0 g/dL 8 1     Hematocrit 36 5 - 49 3 % 23 6     RDW 11 6 - 15 1 % 21 6     Platelets 502 - 744 Thousands/uL 48       2/25 CXR - Diffuse airspace opacities with slight increase in the right upper lobe  2/25 Cardiac Echo - EF 60%  LV - There was mild concentric hypertrophy  PERICARDIUM:  There was a right pleural effusion  There was a left pleural effusion  Mv, Tv, Pv, trace regurg    Age/Sex: 50 y o  male     Assessment/Plan:   2/25 Nephrology Progress Note  1  Acute kidney injury, given history of decompensated cirrhosis suspicious for hepatorenal syndrome, versus ATN given hypotension    Baseline creatinine 0 5 in 2016  Started on hemodialysis on 02/21, last HD Rx yesterday  Temporary dialysis catheter was placed by IR 02/21  Will need a permanent dialysis catheter early next week  Plan for dialysis Tuesday 02/27  Monitor strict ins and outs, follow daily labs     2  Decompensated alcoholic cirrhosis, history of noncompliance  GI on board, as per GI note his prognosis is extremely poor and he is not a transplant candidate   ____________________  2/25 Critical Care Progress Note   Pt had no events overnight on hiflo  VS: afebrile 125/70 64 100% on hiflo 100% -3 5liters  General: somnolent but oriented x 2  CV reagulr  Lungs coarse with wheeze  Abd soft NT ND  Ext; no edema  BMP: Cr 3 88, WBC 21, Hg 8 1 plts 45, tbili 4  A/P Acute hypoxic respiratory failure/ aspiration pneumonia/ ESLD/ ESRD/ toxic metabolic encephalopathy/ chronic ETOH cirrhosis/coagulopathy/ protein calorie malnutrition/ anemia/ chronic osteomyelitis  Neuro: decrease lactulose today, continue rifaxamin, ammonia normal today  CV: needs further diuresis, check echocardiogram due to elevated BNP  Lungs: Hiflo wean oxygen, patient may need intubation but unlikely to recover, solumedrol 40mg Q12, continue nebs  GI: continue midodrine, octreotide, initiate TF today slowly  : encourage HD today  ID: continue Zosyn, stop vancomycin with normal MRSA swab  Heme: trend CBC and transfuse with parameters  Endocrine: accucheck once TF on  Goals: dismal prognosis, continue futile supportive care, attempt bumex will need HD in AM, deescalate antibiotics and lactulose     3  Encephalopathy, continue with medical treatment, continue with lactulose and titrate for 3-4 loose bowel movement daily  Multifactorial in the setting of hepatic encephalopathy as well as sepsis with a right lower lobe pneumonia  Palliative care consulted and input appreciated, given multiple comorbidities patient has a grim prognosis      4    Aspiration pneumonia, on Vanco and Zosyn as per ID     5  Hyponatremia seems to be chronic in the setting end-stage liver disease now with a component of acute renal failure and inability to excrete free water, improving with UF on dialysis      6   Hemodynamics, currently blood pressure is stable      7  Anemia, follow H&H, transfuse as needed      ____________________________  2/25 ID Progress Note  1   Aspiration pneumonia   Given significant comorbid illnesses and recent hospitalizations, patient is at risk for nosocomial pathogens, especially nosocomial gram negatives and MRSA    Patient is clinically improved on current antibiotics   MRSA screen negative  Continue Zosyn     With negative MRSA screen, no further need for vancomycin  Treat x7 days total, another 3 days      2   Acute superimposed on chronic hypoxic respiratory failure   This is most likely secondary to aspiration above   Patient appears to be more stable with O2 support  Monitor respiratory symptoms      3   Encephalopathy   This is most likely secondary to aspiration pneumonia above, superimposed on baseline hepatic encephalopathy   Mental status slowly improving  Monitor mental status      4   Recurrent fever with leukocytosis  Suspect recurrent aspiration  With patient clinically stable, monitor for now  Hopefully, this is pneumonitis and not recurrent pneumonia      5  Right great toe gangrenous ulcer and osteomyelitis   There is no evidence of cellulitis clinically   Patient has no fever leukocytosis   No antibiotic is necessary   However, patient needs to have this toe amputated, to avoid development of wet gangrene   Patient has been refusing toe amputation  No antibiotic needed at present time  Monitor toe    Recommend toe amputation      6   Decompensated cirrhosis   This is due to patient's noncompliance with prescribed treatment plan  Eduardo Best is no evidence of peritonitis clinically   Patient is status post paracentesis with benign peritoneal fluid   Systemic corticosteroid started  Patient is status post repeat paracentesis, with benign parameters also   Even with active pneumonia, it should be fine to continue systemic corticosteroid  No antibiotic needed  Management per GI and primary service      7   PRANAY   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   Dehydration may contribute   Creatinine worsening   HD was started  Antibiotic dosages adjusted accordingly    Monitor creatinine      Antibiotics:  Zosyn # 4    Discharge Plan: TBD

## 2018-02-27 NOTE — PROGRESS NOTES
Progress Note - Nephrology   Evelyn Bautista 50 y o  male MRN: [id number]  Unit/Bed#: MICU 07 Encounter: [id number]      Assessment / Plan:  1  PRANAY in setting of decompensated cirrhosis - concern for HRS vs ATN  -b/l Cr 0 5 in 2016, started on HD via temp HD cath 2/21  -For HD today for fluid removal in light of +pleural effusion/pulmonary edema and higher BP but aborted due to catheter malfunction  BP also dropped during HD once on cardizem gtt   -monitor renal indices for signs of renal recovery  -plan for HD tomorrow pending goals of care discussion     2  Decompensated alcoholic cirrhosis with hx of noncompliance - GI follows, not a transplant candidate     3  Encephalopathy - on lactulose, likely multifactorial as with cirrhosis and RLL PNA     4  Aspiration PNA - on zosyn per ID, +leukocytosis-concern for recurrent aspiration per ID     5  Mild hyperphosphatemia - likely d/t PRANAY  Continue to monitor  May need binders if phos remains high      6  Anemia of chronic disease with thrombocytopenia - Hgb now in 8s  Bili elevated  +schistocytes and helmet cells noted on hemolysis smear  Will order LDH, haptoglobin, Jimena test, fibrin split products, fibrinogen  Iron normal, ferritin high, iron sat ok, with low TIBC  Low plts likely d/t cirrhosis vs TMA  Transfuse prn  Have d/w MICU attending      7  Hypotension - on midodrine 5mg TID but BP higher today  Dropped again due to cardizem gtt initiation    8  Hypokalemia - monitor K  Did not receive full HD session due to catheter malfunction    Other issues: right great toe ulcer and osteomyelitis, alcoholic hepatitis    Goals of care being addressed  The patient has a poor prognosis  Will plan for HD tomorrow if patient's family wants aggressive care to continue  Currently, he has a nonfunctional HD catheter  He will likely need new HD catheter placement   Permacath would be difficult to place by IR in light of his inability to lay flat and being on Hi [place]  Per conversation with ID, the patient would be ok to have permacath placed if patient can tolerate as he is not bacteremic  Of note, would need plts > 50 and INR 1 5 or lower prior to permacath placement per IR  Subjective:   He denies any chest pain but does complain of shortness of breath  He does not stay focused on conversation  He begins talking about a blue powder that he wants for pain  He asks for ibuprofen pain  No other complaints  Objective:     Vitals: Blood pressure 169/87, pulse (!) 110, temperature 98 5 °F (36 9 °C), temperature source Oral, resp  rate 19, height 5' 10" (1 778 m), weight 83 2 kg (183 lb 6 8 oz), SpO2 100 %  ,Body mass index is 26 32 kg/m²  Temp (24hrs), Av °F (36 7 °C), Min:97 2 °F (36 2 °C), Max:98 6 °F (37 °C)      Weight (last 2 days)     Date/Time   Weight    18 0600  83 2 (183 42)    18 0558  83 1 (183 2)                Intake/Output Summary (Last 24 hours) at 18 1158  Last data filed at 18 1137   Gross per 24 hour   Intake          1939 33 ml   Output             2879 ml   Net          -939 67 ml     I/O last 24 hours: In: 2209 7 [I V :613 7; IV Piggyback:200; Feedings:1396]  Out: 6093 [Urine:390; GOOOF:8645; Stool:1250]        Physical Exam:   Physical Exam   Constitutional: He appears well-developed  No distress  thin   HENT:   Head: Normocephalic and atraumatic  Mouth/Throat: No oropharyngeal exudate  +NGT   Eyes: Right eye exhibits no discharge  Left eye exhibits no discharge  No scleral icterus  Neck: Normal range of motion  Neck supple  No thyromegaly present  Cardiovascular: Normal rate and normal heart sounds  irregular   Pulmonary/Chest: Effort normal  He has no wheezes  He has no rales  +coarse BS anteriorly, on HiFlo O2   Abdominal: Soft  Bowel sounds are normal  He exhibits no distension  There is no tenderness  Musculoskeletal: Normal range of motion  He exhibits edema (+pedal edema)     Neurological: He is alert    Confused, awake   Skin: Skin is warm and dry  No rash noted  He is not diaphoretic  Psychiatric:   Flat affect, a bit tangential in conversation   Vitals reviewed        Invasive Devices     Peripheral Intravenous Line            Peripheral IV 02/24/18 Left Antecubital 2 days    Peripheral IV 02/25/18 Right Arm 2 days          Line            HD Cath Double 6 days          Drain            NG/OG/Enteral Tube Nasogastric Right nares 5 days    Rectal Tube With balloon 4 days                Medications:    Scheduled Meds:  Current Facility-Administered Medications:  albuterol 2 5 mg Nebulization Q4H PRN Charis Santana DO    dexmedetomidine 0 1-0 7 mcg/kg/hr Intravenous Titrated Cathalene Estee, DO Last Rate: Stopped (02/26/18 1025)   diltiazem 1-15 mg/hr Intravenous Titrated Mariola Major PA-C Last Rate: 5 mg/hr (22/25/75 5700)   folic acid 1 mg Oral Daily Mariama Hurtado, DO    influenza vaccine 0 5 mL Intramuscular Prior to discharge Dre Bustos MD    insulin lispro 1-6 Units Subcutaneous Q6H [address] Jessenia Kwan PA-C    lactulose 30 g Oral TID Jessenia Cea, PA-C    levalbuterol 1 25 mg Nebulization TID Charis Santana DO    midodrine 5 mg Oral TID AC Emmanuel Branch    multivitamin 1 tablet Oral Daily Jessenia Kwan, PA-C    octreotide 100 mcg Intravenous Q8H [address] Mati Glasgow DO    ondansetron 4 mg Intravenous Q8H PRN Garrick Hamilton DO    pantoprazole 40 mg Intravenous Q24H [address] Sulaiman Lamas MD    piperacillin-tazobactam 2 25 g Intravenous Q8H Rere Campos PA-C Last Rate: Stopped (02/27/18 0453)   predniSONE 25 mg Oral BID With Meals Sulaiman Lamas MD    rifaximin 550 mg Oral Q12H [address] Reyes Porter, PA-C    sodium chloride 1 application Nasal C3M PRN Jessenia Cea, PA-C    sodium chloride 3 mL Nebulization TID Charis Santana, DO    thiamine 100 mg Oral Daily Mariama Lawtoni, DO        PRN Meds:   albuterol    influenza vaccine    ondansetron    sodium chloride    Continuous Infusions:  dexmedetomidine 0 1-0 7 mcg/kg/hr Last Rate: Stopped (02/26/18 1025)   diltiazem 1-15 mg/hr Last Rate: 5 mg/hr (02/27/18 1133)           LAB RESULTS:        Results from last 7 days  Lab Units 02/27/18  0655 02/27/18  0415 02/26/18  0431 02/25/18  0443 02/24/18  0449 02/23/18  1055 02/23/18  0443 02/22/18  1159 02/22/18  0431 02/22/18  0019 02/21/18  1836  02/21/18  0533   WBC Thousand/uL 24 45*  --  18 54* 21 79* 11 16*  --  7 67  --  16 81*  --  15 79*  --  13 35*   HEMOGLOBIN g/dL 8 5*  --  7 4* 8 1* 7 8*  --  7 2*  --  7 3*  --  7 7*  --  7 4*   I STAT HEMOGLOBIN g/dl  --   --   --   --   --  8 2*  --  7 5*  --   --   --   < >  --    HEMATOCRIT % 24 7*  --  21 2* 23 6* 21 8*  --  20 0*  --  21 2*  --  22 0*  --  21 2*   PLATELETS Thousands/uL 47*  --  46* 48* 40*  --  47*  --  73*  --  67*  --  104*   NEUTROS PCT %  --   --  88* 90* 87*  --  83*  --   --   --   --   --   --    LYMPHS PCT %  --   --  5* 5* 6*  --  8*  --   --   --   --   --   --    LYMPHO PCT % 1*  --   --   --   --   --   --   --   --   --  4*  --   --    MONOS PCT %  --   --  7 5 7  --  9  --   --   --   --   --   --    MONO PCT MAN % 4  --   --   --   --   --   --   --   --   --  9  --   --    EOS PCT %  --   --  0 0 0  --  0  --   --   --   --   --   --    EOSINO PCT MANUAL % 0  --   --   --   --   --   --   --   --   --  0  --   --    SODIUM mmol/L  --  141 141 137 135*  --  133*  --  131* 128*  --   < > 126*   POTASSIUM mmol/L  --  3 6 3 9 4 2 4 2  --  4 1  --  4 5 4 4  --   < > 4 8   CHLORIDE mmol/L  --  104 105 101 101  --  98*  --  97* 95*  --   < > 95*   CO2 mmol/L  --  29 26 28 26  --  26  --  20* 21  --   < > 19*   BUN mg/dL  --  48* 59* 43* 45*  --  40*  --  46* 47*  --   < > 56*   CREATININE mg/dL  --  3 90* 4 81* 3 88* 4 36*  --  4 00*  --  4 81* 4 75*  --   < > 5 40*   CALCIUM mg/dL  --  8 5 8 1* 8 4 8 5  --  8 3  --  8 3 8 4  --   < > 8 9   TOTAL PROTEIN g/dL  --  6 2*  --  6 1* 6 1*  --  6 4  --  7 2  --  7 0  -- 7  7   BILIRUBIN TOTAL mg/dL  --  4 03*  --  4 24* 3 99*  --  4 28*  --  4 56*  --  4 99*  --  5 57*   ALK PHOS U/L  --  208*  --  64 61  --  67  --  86  --  94  --  98   ALT U/L  --  57  --  31 29  --  31  --  28  --  26  --  25   AST U/L  --  68*  --  38 31  --  42  --  41  --  38  --  37   GLUCOSE RANDOM mg/dL  --  191* 167* 145* 157*  --  144*  --  141* 138  --   < > 139   GLUCOSE, ISTAT mg/dl  --   --   --   --   --  134  --  145*  --   --   --   < >  --    MAGNESIUM mg/dL  --  2 4 2 5 2 4 2 4  --  2 3  --  2 3  --   --   --  2 5   PHOSPHORUS mg/dL  --   --  5 5* 5 0* 6 0*  --  5 6*  --  8 0*  --   --   --  8 0*   < > = values in this interval not displayed  CUTURES:  Lab Results   Component Value Date    BLOODCX No Growth After 5 Days  02/21/2018    BLOODCX No Growth After 5 Days  02/21/2018    BLOODCX No Growth After 5 Days  02/17/2018    BLOODCX No Growth After 5 Days  02/17/2018                 Portions of the record may have been created with voice recognition software  Occasional wrong word or "sound a like" substitutions may have occurred due to the inherent limitations of voice recognition software  Read the chart carefully and recognize, using context, where substitutions have occurred  If you have any questions, please contact the dictating provider

## 2018-02-27 NOTE — SPEECH THERAPY NOTE
Speech-Language Pathology Bedside Swallow Evaluation        Patient Name: Sharon Gonzales    RZQML'L Date: 2/27/2018     Problem List  Patient Active Problem List   Diagnosis    Decompensated [facility] cirrhosis ([facility] [facility] )    Ascites    Hepatic encephalopathy ([facility] [facility] )    PRANAY (acute kidney injury) ([facility] [address] )    Hypoalbuminemia    Hyponatremia    [facility]    Hyperammonemia (HCC)    Lactic acidosis    Elevated alkaline phosphatase level    Anemia of chronic disease    Osteomyelitis of toe of right foot (HCC)    Alcohol abuse    Alcoholic hepatitis    Cough    Hypoxia    Oliguria    Leukocytosis    Co[facility]    Aspiration pneumonia of right lower lobe [facility])       Past Medical History  Past Medical History:   Diagnosis Date    Cirrhosis ([facility] )     Hypertension        Past Surgical History  History reviewed  No pertinent surgical history  Current Medical Status  Pt is a 50 y o  male who presented to [facility]/17[facility]18 with decompensated hepatic cirrhosis  Pt was brought to ER earlier today by his girlfriend for slight confusion and worsening abdominal pain  He states his drink of choice is beer, but he has not drank alcohol in 3 weeks and has never experienced withdrawal symptoms such as seizures or tremors  Of note, he was recently admitted to [facility] from 1/30/17- 2/7/18 (all records are Care Everywhere tab) for hyponatremia abdominal distention and it was during that admission he was found to have extensive ascites (had a therapeutic paracentesis of 12 L) and was newly diagnosed with cirrhosis  Past medical history:   Please see H&P for details      Special Studies:      Social/Education/Vocational Hx:  Pt lives with girlfriend      Swallow Information   Current Risks for Dysphagia & Aspiration: AMS     Current Symptoms/Concerns: ? aspiration pneumonia      Current Diet: NPO with tube feeds      Baseline Diet: regular diet and thin liquids      Baseline Assessment Behavior/Cognition: alert    Speech/Language Status: able to participate in conversation and able to follow commands- confused speech, inappropriate conversation  Patient Positioning: upright in bed       Swallow Mechanism Exam   Facial: symmetrical  Labial: WFL  Lingual: WFL  Mandible: adequate ROM  Dentition: adequate  Vocal quality:clear/adequate   Volitional Cough: strong/productive   Respiratory: HFNC      Consistencies Assessed and Performance   Consistencies Administered: thin liquids, nectar thick, puree and hard solids    Oral Stage: pt able to bite cookie, suck from straw due to presence of NGT  Pt w/ adequate mastication/manipulation and transfer  Appeared w/ good oral control of liquids  No oral residue  Pharyngeal Stage: Swallow initiation was timely and complete  occas delayed cough suspect unrelated to swallowing/aspiration  Pt w/ 1 more immed cough at end of thin liquid serving w/ air in straw  Pt then took more straw sips of water w/o difficulty  Esophageal Concerns: none reported        Summary   Swallow Summary: oral and pharyngeal stages of swallowing appear WNL  Pt does not appear a high risk for  Aspiration         Recommendations: regular diet and thin liquids     Recommended Form of Meds: as tolerated     Aspiration precautions and compensatory swallowing strategies: upright posture, only feed when fully alert and small bites/sips    Results Reviewed with: patient, RN and PA     Treatment Recommendations: will follow up for diet tolerance

## 2018-02-27 NOTE — PROGRESS NOTES
Progress Note - Infectious Disease   Ada Cure 50 y o  male MRN: [id number]  Unit/Bed#: MICU 07 Encounter: [id number]      Impression/Recommendations:  1   Aspiration pneumonia   Given significant comorbid illnesses and recent hospitalizations, patient is at risk for nosocomial pathogens, especially nosocomial gram negatives and MRSA    Patient is clinically improved on current antibiotics   MRSA screen negative  Continue Zosyn     With negative MRSA screen, no need for vancomycin  Treat x7 days total, another 2 days      2   Acute superimposed on chronic hypoxic respiratory failure   This is most likely secondary to aspiration above   Patient appears to be more stable with O2 support  Monitor respiratory symptoms      3   Encephalopathy   This is most likely secondary to aspiration pneumonia above, superimposed on baseline hepatic encephalopathy   Mental status slowly improving  Monitor mental status      4   Recurrent fever with leukocytosis  Suspect recurrent aspiration  With patient clinically stable, monitor for now  Hopefully, this is pneumonitis and not recurrent pneumonia  Temperature is down again  WBC decreasing again      5  Right great toe gangrenous ulcer and osteomyelitis   There is no evidence of cellulitis clinically   Patient has no fever leukocytosis   No antibiotic is necessary   However, patient needs to have this toe amputated, to avoid development of wet gangrene   Patient has been refusing toe amputation  No antibiotic needed at present time  Monitor toe  Recommend toe amputation      6   Decompensated cirrhosis   This is due to patient's noncompliance with prescribed treatment plan  Chester Revels is no evidence of peritonitis clinically   Patient is status post paracentesis with benign peritoneal fluid   Systemic corticosteroid started   Patient is status post repeat paracentesis, with benign parameters also   Even with active pneumonia, it should be fine to continue systemic corticosteroid  No antibiotic needed  Management per GI and primary service      7   PRANAY   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   Dehydration may contribute   Creatinine worsening   Patient is now on HD  Antibiotic dosages adjusted accordingly  Monitor creatinine      Discussed with patient in detail regarding above plan      Antibiotics:  Zosyn # 5     Subjective:  Patient remains in ICU  He is awake and alert, with stable confusion  Abdomen remains distended   Mild discomfort  Temperature is down   No chills  Objective:  Vitals:  HR:  [56-82] 82  Resp:  [8-33] 21  BP: (102-168)/(62-92) 161/75  SpO2:  [77 %-100 %] 100 %  Temp (24hrs), Av 6 °F (36 4 °C), Min:97 2 °F (36 2 °C), Max:98 7 °F (37 1 °C)  Current: Temperature: (!) 97 2 °F (36 2 °C)    Physical Exam:     General: Awake, alert, no distress  Stable confusion  Lungs: Decreased breath sounds, no rales, no wheezing, respirations unlabored  Heart[de-identified]  Regular rate and rhythm, S1 and S2 normal, no murmur  Abdomen: Soft, stable distension, non-tender, bowel sounds active all four quadrants,        no masses, no organomegaly  Extremities: Stable leg edema  Right 1st toe with stable necrotic ulcer  Stable chronic changes  No fluctuance  No tenderness  Skin:  No rash  Invasive Devices     Peripheral Intravenous Line            Peripheral IV 18 Left Antecubital 2 days    Peripheral IV 18 Right Arm 1 day          Line            HD Cath Double 5 days          Drain            NG/OG/Enteral Tube Nasogastric Right nares 5 days    Rectal Tube With balloon 3 days                Labs studies:   I have personally reviewed pertinent labs      Results from last 7 days  Lab Units 18  0431 18  0443 18  0449  18  0443   SODIUM mmol/L 141 137 135*  --  133*   POTASSIUM mmol/L 3 9 4 2 4 2  --  4 1   CHLORIDE mmol/L 105 101 101  --  98*   CO2 mmol/L 26 28 26  --  26   ANION GAP mmol/L 10 8 8  --  9   BUN mg/dL 59* 43* 45*  --  40*   CREATININE mg/dL 4 81* 3 88* 4 36*  --  4 00*   EGFR ml/min/1 73sq m 13 17 15  --  16   GLUCOSE RANDOM mg/dL 167* 145* 157*  --  144*   GLUCOSE, ISTAT   --   --   --   < >  --    CALCIUM mg/dL 8 1* 8 4 8 5  --  8 3   AST U/L  --  38 31  --  42   ALT U/L  --  31 29  --  31   ALK PHOS U/L  --  64 61  --  67   TOTAL PROTEIN g/dL  --  6 1* 6 1*  --  6 4   BILIRUBIN TOTAL mg/dL  --  4 24* 3 99*  --  4 28*   < > = values in this interval not displayed  Results from last 7 days  Lab Units 02/26/18  0431 02/25/18  0443 02/24/18  0449   WBC Thousand/uL 18 54* 21 79* 11 16*   HEMOGLOBIN g/dL 7 4* 8 1* 7 8*   PLATELETS Thousands/uL 46* 48* 40*       Results from last 7 days  Lab Units 02/22/18  1455 02/21/18  1829 02/21/18  1820 02/21/18  1156   BLOOD CULTURE   --  No Growth After 4 Days  No Growth After 4 Days  --    GRAM STAIN RESULT   --   --   --  Rare Polys  No bacteria seen   BODY FLUID CULTURE, STERILE   --   --   --  No growth   MRSA CULTURE ONLY  No Methicillin Resistant Staphlyococcus aureus (MRSA) isolated  --   --   --        Imaging Studies:   I have personally reviewed pertinent imaging study reports and images in PACS  EKG, Pathology, and Other Studies:   I have personally reviewed pertinent reports

## 2018-02-27 NOTE — PROGRESS NOTES
Progress Note - Critical Care   Evelyn Bautista 50 y o  male MRN: [id number]  Unit/Bed#: MICU 07 Encounter: [id number]    Assessment:   Principal Problem:    Decompensated hepatic cirrhosis ([facility] )  Active Problems:    Ascites    Hepatic encephalopathy (HCC)    PRANAY (acute kidney injury) ([facility] 75 )    Hypoalbuminemia    Hyponatremia    Sepsis ([facility] )    Hyperammonemia (HCC)    Lactic acidosis    Elevated alkaline phosphatase level    Anemia of chronic disease    Osteomyelitis of toe of right foot (HCC)    Alcohol abuse    Alcoholic hepatitis    Cough    Hypoxia    Oliguria    Leukocytosis    Coagulopathy (HCC)    Aspiration pneumonia of right lower lobe (HCC)  Resolved Problems:    Abdominal pain    Hypotension       Plan:      Neuro:   Enchephalopathy, improving,  likely multifactorial due to liver failure (elevated ammonia 71), sepsis and  ESRD   -Has not received Precedex since ~ 10:00 am  (2/26)     CV:   Labile Blood Pressure, improving, Likely multifactorial  secondary to hypoalbuminemia secondary to liver failure and CHF (given proBNP of 38K on 2/24) Echo on 2/25: EF 60%, no RWMA, mild concentric hypertrophy, b/l pleural effusions   Normal diastolic function   -Arterial Line BP: 132/74    -Arterial Line MAP (mmHg): 98 mmHg    -HR:  [] 90   -BP: (102-170)/(62-93) 168/86  -Continue albumin bolusrd 25% PRN for hypotension  for pressure support (map goal > 65)  -Continue Midodrine 5mg TID for orthostatic hypotension     Lung:   Pneumonia, RLL, unchanged, possibly secondary to aspiration vs HCAP  -Continue antibiotics Zosyn 2 25g TID, Zosyn first day was technically 2/22 (since patient only received 1 dose of TID on 2/21) therefore day 7 is 2/28 (s/p Ceftriaxone 2/21)  -Continue Xopenex and saline nebs scheduled with albuterol nebs PRN     Acute Hypoxemic Respiratory Failure, unchanged  -Weaned off Bipap 8/14  FiO2 100% (ABGs wnl)  -Patient continues to require HFNC (60LPM, 80%FlO2, saturating ~95%) will wean off as tolerated     GI:   Acute decompensated alcoholic hepatic cirrhosis, with recurrent ascites  -Initiated tube feeds on 2/25  -Last paracentesis on 2/21 with removal of ~4L  -GI onboard, appreciate their recs  -Continue Rifaximin  -Patient was initially started on steroids for possible hepatitis, however per GI more likely hepatic cirrhosis  However, on 2/23 patient suddenly became hemodynamically unstable and hypotensive, and the ICU attending changed to higher dose and frequency of Solumedrol 40mg q12h for hypotension  Per GI if there is no other indication for Steroids at this point (except the original indication of hepatitis) consider taper off and discontinuing Solumederol   -Continue octreotide 100 mcg daily for Esophageal varices        FEN:  -No IVF's  -Hypokalemia, Hyperphosphatemia  (will continue to monitor and correct PRN)  -Tube feeds     :   PRANAY (though eGFR 15 and on dialysis, does not meet criteria for ESRD since no prior history of CKD), PRANAY likely hepatorenal syndrome vs ATN  -BUN/Cr /594 81, prior to hemodialysis today, Post dialysis BMP pending    -HD catheter R IJ  -Hemodialysis per Nephrology, Bumex discontinued by nephrology d/t interference with HD  -Dialysis secheduled qOD, next session 2/28     ID:   Pneumonia, RLL, improved, per radiology findings on CXR 2/26 likely not PNA  -Patient afebrile overnight however WBC continues to trend up 24 45 (2/27) up from 18 54 (2/26)  -Continue Zosyn 2 25g TID last day 2/28 (since pt  Did not receive 3 doses of TID med on day1, 2/21)  -s/p Ceftriaxone 1g x1 5days ago, Vanc d/caitlyn 2/25  d/t negative MRSA Cx  -Osteomyelitis, right hallux ulcer an d osteomyelitis, per ID no further antibiotics, but will require toe amputation which pt   Refuses  -Complete current antibiotic course  -Appreciate ID recs     Heme:   Thrombocytopenia, worsening, likely secondary to liver failure  -platelet count 3/18, 47, low however stable x 4 days, INR 2 88 (last check 2/22 was 3 12)  -hold DVT prophylaxis      Anemia- stable Hgb, received 2 units  -Hb stable 8 5 (), baseline ~8     Endo:  -No acute concerns (no Hx of DM)  -Begin accucheck after TF                Msk/Skin:   Frequent position changing/turning  Routine skin care     Disposition:    Continue with current level of ICU care      ___________________________________________     Chief Complaint:        Chief Complaint   Patient presents with    Ascites       Pt "I need something for my belly  I got tapped for the first time two weeks ago  And its all hard and big again " Roomate "He is suppose to be on liver medication but its too expensive and the doctor wont prescribe anything" Pt c/o SOB "sometimes"          HPI/24hr events:       Per night team, patient had an episode of tachycardia overnight  Stat ECG showed A Fib, which self resolved  SBP remains high, scheduled Midodrine being held d/t adverse reaction of causing HTN  Patient denies any pain  Continues to ask for water c/o thirst         Physical Exam:  ______________________________________________________________________  Vitals:    18 0336 18 0400 18 0500 18 0600   BP:  165/93 170/87 168/86   Pulse:  (!) 118 90 90   Resp:  20 12 16   Temp:  98 6 °F (37 °C)     TempSrc:  Oral     SpO2: 94% 100% 100% 100%   Weight:    83 2 kg (183 lb 6 8 oz)   Height:         Arterial Line BP: 132/74  Arterial Line MAP (mmHg): 98 mmHg  Temp:  [97 2 °F (36 2 °C)-98 6 °F (37 °C)] 98 6 °F (37 °C)  HR:  [] 90  Resp:  [8-33] 16  BP: (102-170)/(62-93) 168/86  FiO2 (%):  [] 90     Temperature:   Temp (24hrs), Av 8 °F (36 6 °C), Min:97 2 °F (36 2 °C), Max:98 6 °F (37 °C)    Current Temperature: 98 6 °F (37 °C)  Weights:   IBW: 73 kg    Body mass index is 26 32 kg/m²    Weight (last 2 days)     Date/Time   Weight    18 0600  83 2 (183 42)    18 0558  83 1 (183 2)            Physical Exam:  General:  NAD  Eyes: PERRL,EOMI, Anicteric Sclera with no hemorrhages or discharge  Neck:  Supple  Lungs: Breath sounds continue to remain coarse, no accessory muscle use, breathing through mouth  Cardiac:  RRR, normal S1S2  Abdomen:  Soft, non-tender, distended abdomen  Extremities:  Non-tender, no edema  Skin:  Warm and dry  Neurological: Awake, alert, continues to confabulate, oriented to person only  Moves all four extremities  Unable to assess strength, does not cooperate with exam         Non-Invasive/Invasive Ventilation Settings:  Respiratory    Lab Data (Last 4 hours)    None         O2/Vent Data (Last 4 hours)      02/27 0336          Non-Invasive Ventilation Mode HFNC                 No results found for: PHART, EXQ0LDC, PO2ART, WPO3DFE, P9VCSQJX, BEART, SOURCE    Intake and Outputs:  I/O       02/25 0701 - 02/26 0700 02/26 0701 - 02/27 0700 02/27 0701 - 02/28 0700    I V  (mL/kg) 243 8 (2 9) 613 7 (7 4)     NG/      IV Piggyback 165 200     Feedings 107 1101     Total Intake(mL/kg) 865 8 (10 4) 1914 7 (23)     Urine (mL/kg/hr) 0 (0) 390 (0 2)     Other  1239 (0 6)     Stool 300 (0 2) 1250 (0 6)     Total Output 300 2879      Net +565 8 -964 3             Unmeasured Urine Occurrence  2 x         UOP: ~400mL/24 hrs, Dialysis 1 24L  Net minus: ~1L     Nutrition:        Diet Orders            Start     Ordered    02/26/18 1239  Diet Enteral/Parenteral; Tube Feeding No Oral Diet; Nepro; 10  Diet effective now     Comments:  Please advance by 10 mL/hr every 4 hours to a goal rate of 55 mL/hr   Question Answer Comment   Diet Type Enteral/Parenteral    Enteral/Parenteral Tube Feeding No Oral Diet    Tube Feeding Formula: Nepro    Tube Feeding Continuous rate (mL/hr): 10    RD to adjust diet per protocol?  No        02/26/18 1238        TF Nepro: 10 ml/hr  Labs:     Results from last 7 days  Lab Units 02/27/18  0655 02/26/18  0431 02/25/18  0443 02/24/18  0449   WBC Thousand/uL 24 45* 18 54* 21 79* 11 16*   HEMOGLOBIN g/dL 8 5* 7 4* 8 1* 7 8*   HEMATOCRIT % 24 7* 21 2* 23 6* 21 8*   PLATELETS Thousands/uL 47* 46* 48* 40*   NEUTROS PCT %  --  88* 90* 87*   MONOS PCT %  --  7 5 7   MONO PCT MAN % 4  --   --   --       Results from last 7 days  Lab Units 18  0415 18  0431 18  0443 18  0449   SODIUM mmol/L 141 141 137 135*   POTASSIUM mmol/L 3 6 3 9 4 2 4 2   CHLORIDE mmol/L 104 105 101 101   CO2 mmol/L 29 26 28 26   BUN mg/dL 48* 59* 43* 45*   CREATININE mg/dL 3 90* 4 81* 3 88* 4 36*   CALCIUM mg/dL 8 5 8 1* 8 4 8 5   TOTAL PROTEIN g/dL 6 2*  --  6 1* 6 1*   BILIRUBIN TOTAL mg/dL 4 03*  --  4 24* 3 99*   ALK PHOS U/L 208*  --  64 61   ALT U/L 57  --  31 29   AST U/L 68*  --  38 31   GLUCOSE RANDOM mg/dL 191* 167* 145* 157*       Results from last 7 days  Lab Units 18  0431 18  0443 18  0449   MAGNESIUM mg/dL 2 5 2 4 2 4       Results from last 7 days  Lab Units 18  0431 18  0443 18  0449   PHOSPHORUS mg/dL 5 5* 5 0* 6 0*        Results from last 7 days  Lab Units 18  0444 18  0431 18  1836   INR  2 88* 3 12* 2 70*   PTT seconds  --  61*  --        Results from last 7 days  Lab Units 18  0817   LACTIC ACID mmol/L 3 3*     No results found for: TROPONINI  Imaging: CXR , unchanged bilateral leural effusions and pulmonary edema  EK/27 stat, showed Afib (self resolved)  Micro:  Lab Results   Component Value Date    BLOODCX No Growth After 5 Days  2018    BLOODCX No Growth After 5 Days  2018    BLOODCX No Growth After 5 Days  2018    BLOODCX No Growth After 5 Days   2018     Allergies: No Known Allergies  Medications:   Scheduled Meds:  Current Facility-Administered Medications:  albuterol 2 5 mg Nebulization Q4H PRN Charis Santana DO    dexmedetomidine 0 1-0 7 mcg/kg/hr Intravenous Titrated Lisa Roach DO Last Rate: Stopped ( 9879)   folic acid 1 mg Oral Daily Mariama Hurtado DO    influenza vaccine 0 5 mL Intramuscular Prior to discharge Dennis Diana Acuña MD    insulin lispro 1-6 Units Subcutaneous [facility] [place] [facility] Amari Shirley PA-C    lactulose 30 g Oral TID Amari Shirley PA-C    levalbuterol 1 25 mg Nebulization TID Charis Santana DO    methylPREDNISolone sodium succinate 40 mg Intravenous Q12H [facility] Mariola Major PA-C    midodrine 5 mg Oral TID AC [facility]    multivitamin 1 tablet Oral Daily Amari Shirley PA-C    octreotide 100 mcg Intravenous Q8H [facility] Mati Glasgow DO    ondansetron 4 mg Intravenous Q8H PRN Keyana Hinojosa DO    pantoprazole 40 mg Intravenous Q24H [facility] Tomás Vela MD    piperacillin-tazobactam 2 25 g Intravenous Q8H Letha Barr PA-C Last Rate: Stopped (02/27/18 0751)   rifaximin 550 mg Oral Q12H [facility] Theo Bence, PA-C    sodium chloride 1 application Nasal D6J PRN Amari Shirley PA-C    sodium chloride 3 mL Nebulization TID Charis Santana DO    thiamine 100 mg Oral Daily Mariama Hurtado DO      Continuous Infusions:  dexmedetomidine 0 1-0 7 mcg/kg/hr Last Rate: Stopped (02/26/18 1025)     PRN Meds:    albuterol 2 5 mg Q4H PRN   influenza vaccine 0 5 mL Prior to discharge   ondansetron 4 mg Q8H PRN   sodium chloride 1 application T4F PRN     VTE Pharmacologic Prophylaxis: DVT PPX discontinued d/t Thrombocytopenia  VTE Mechanical Prophylaxis: sequential compression device  Invasive lines and devices: Invasive Devices     Peripheral Intravenous Line            Peripheral IV 02/24/18 Left Antecubital 2 days    Peripheral IV 02/25/18 Right Arm 2 days          Line            HD Cath Double 5 days          Drain            NG/OG/Enteral Tube Nasogastric Right nares 5 days    Rectal Tube With balloon 4 days                   Code Status: Level 2 - DNAR: but accepts endotracheal intubation    Portions of the record may have been created with voice recognition software  Occasional wrong word or "sound a like" substitutions may have occurred due to the inherent limitations of voice recognition software    Read the chart carefully and recognize, using context, where substitutions have occurred      Deborah Swan MD    [facility] PGY-1

## 2018-02-27 NOTE — RESTORATIVE TECHNICIAN NOTE
Restorative Specialist Mobility Note       Activity: Turn                         Range of Motion: Right leg, Left leg (prom/aarom X 10 reps)

## 2018-02-27 NOTE — PROGRESS NOTES
Medication update note    Spoke with Dr Dharmesh Ogden who does not believe patient's picture is due to alcoholic hepatitis and recommends tapering and discontinuing Solumedrol dosing  Patient was initially put on IV at an increased dose because of hypotension  Will change order to 25mg bid prednisone for 2 days, then 25mg qd for 3 days then off  Discussed with [facility] attending Dr Martina Matthews who is in agreement with this plan  MANJULA Polo

## 2018-02-27 NOTE — SOCIAL WORK
CM spoke with pt son - Edna Magana to set up family meeting for tomorrow  As per Edna Magana the earliest they are available is 5 pm tomorrow  Cm confirmed with Dr Melvin Anna team would be available at this time  Cm spoke with FERNANDO Bates and their team will be available at this time

## 2018-02-28 ENCOUNTER — APPOINTMENT (INPATIENT)
Dept: DIALYSIS | Facility: HOSPITAL | Age: 53
DRG: 264 | End: 2018-02-28
Payer: COMMERCIAL

## 2018-02-28 LAB
ALBUMIN SERPL BCP-MCNC: 3.2 G/DL (ref 3.5–5)
ALP SERPL-CCNC: 268 U/L (ref 46–116)
ALT SERPL W P-5'-P-CCNC: 78 U/L (ref 12–78)
ANION GAP SERPL CALCULATED.3IONS-SCNC: 10 MMOL/L (ref 4–13)
AST SERPL W P-5'-P-CCNC: 73 U/L (ref 5–45)
BACTERIA UR QL AUTO: ABNORMAL /HPF
BASOPHILS # BLD MANUAL: 0 THOUSAND/UL (ref 0–0.1)
BASOPHILS NFR MAR MANUAL: 0 % (ref 0–1)
BILIRUB SERPL-MCNC: 4.47 MG/DL (ref 0.2–1)
BILIRUB UR QL STRIP: NEGATIVE
BUN SERPL-MCNC: 65 MG/DL (ref 5–25)
CALCIUM SERPL-MCNC: 8.7 MG/DL (ref 8.3–10.1)
CHLORIDE SERPL-SCNC: 105 MMOL/L (ref 100–108)
CLARITY UR: ABNORMAL
CO2 SERPL-SCNC: 27 MMOL/L (ref 21–32)
COLOR UR: YELLOW
CREAT SERPL-MCNC: 4.67 MG/DL (ref 0.6–1.3)
EOSINOPHIL # BLD MANUAL: 0 THOUSAND/UL (ref 0–0.4)
EOSINOPHIL NFR BLD MANUAL: 0 % (ref 0–6)
ERYTHROCYTE [DISTWIDTH] IN BLOOD BY AUTOMATED COUNT: 23.3 % (ref 11.6–15.1)
FIBRINOGEN PPP-MCNC: 148 MG/DL (ref 227–495)
GFR SERPL CREATININE-BSD FRML MDRD: 14 ML/MIN/1.73SQ M
GLUCOSE SERPL-MCNC: 162 MG/DL (ref 65–140)
GLUCOSE SERPL-MCNC: 164 MG/DL (ref 65–140)
GLUCOSE SERPL-MCNC: 182 MG/DL (ref 65–140)
GLUCOSE SERPL-MCNC: 188 MG/DL (ref 65–140)
GLUCOSE SERPL-MCNC: 207 MG/DL (ref 65–140)
GLUCOSE UR STRIP-MCNC: NEGATIVE MG/DL
HAPTOGLOB SERPL-MCNC: <10 MG/DL (ref 34–200)
HCT VFR BLD AUTO: 25.8 % (ref 36.5–49.3)
HGB BLD-MCNC: 8.7 G/DL (ref 12–17)
HGB UR QL STRIP.AUTO: ABNORMAL
KETONES UR STRIP-MCNC: NEGATIVE MG/DL
LEUKOCYTE ESTERASE UR QL STRIP: ABNORMAL
LYMPHOCYTES # BLD AUTO: 0.99 THOUSAND/UL (ref 0.6–4.47)
LYMPHOCYTES # BLD AUTO: 4 % (ref 14–44)
MAGNESIUM SERPL-MCNC: 2.5 MG/DL (ref 1.6–2.6)
MCH RBC QN AUTO: 32.2 PG (ref 26.8–34.3)
MCHC RBC AUTO-ENTMCNC: 33.7 G/DL (ref 31.4–37.4)
MCV RBC AUTO: 96 FL (ref 82–98)
METAMYELOCYTES NFR BLD MANUAL: 3 % (ref 0–1)
MONOCYTES # BLD AUTO: 1.74 THOUSAND/UL (ref 0–1.22)
MONOCYTES NFR BLD: 7 % (ref 4–12)
MYELOCYTES NFR BLD MANUAL: 1 % (ref 0–1)
NEUTROPHILS # BLD MANUAL: 21.11 THOUSAND/UL (ref 1.85–7.62)
NEUTS BAND NFR BLD MANUAL: 1 % (ref 0–8)
NEUTS SEG NFR BLD AUTO: 84 % (ref 43–75)
NITRITE UR QL STRIP: NEGATIVE
NON-SQ EPI CELLS URNS QL MICRO: ABNORMAL /HPF
NRBC BLD AUTO-RTO: 0 /100 WBCS
OTHER STN SPEC: ABNORMAL
PH UR STRIP.AUTO: 5 [PH] (ref 4.5–8)
PHOSPHATE SERPL-MCNC: 3.5 MG/DL (ref 2.7–4.5)
PLATELET # BLD AUTO: 34 THOUSANDS/UL (ref 149–390)
PLATELET BLD QL SMEAR: ABNORMAL
POIKILOCYTOSIS BLD QL SMEAR: PRESENT
POLYCHROMASIA BLD QL SMEAR: PRESENT
POTASSIUM SERPL-SCNC: 3.4 MMOL/L (ref 3.5–5.3)
PROT SERPL-MCNC: 6.9 G/DL (ref 6.4–8.2)
PROT UR STRIP-MCNC: ABNORMAL MG/DL
RBC # BLD AUTO: 2.7 MILLION/UL (ref 3.88–5.62)
RBC #/AREA URNS AUTO: ABNORMAL /HPF
RBC MORPH BLD: PRESENT
SODIUM SERPL-SCNC: 142 MMOL/L (ref 136–145)
SP GR UR STRIP.AUTO: 1.01 (ref 1–1.03)
UROBILINOGEN UR QL STRIP.AUTO: 0.2 E.U./DL
WBC # BLD AUTO: 24.84 THOUSAND/UL (ref 4.31–10.16)
WBC #/AREA URNS AUTO: ABNORMAL /HPF

## 2018-02-28 PROCEDURE — 94760 N-INVAS EAR/PLS OXIMETRY 1: CPT

## 2018-02-28 PROCEDURE — 84100 ASSAY OF PHOSPHORUS: CPT | Performed by: INTERNAL MEDICINE

## 2018-02-28 PROCEDURE — 94640 AIRWAY INHALATION TREATMENT: CPT

## 2018-02-28 PROCEDURE — 5A1D70Z PERFORMANCE OF URINARY FILTRATION, INTERMITTENT, LESS THAN 6 HOURS PER DAY: ICD-10-PCS | Performed by: INTERNAL MEDICINE

## 2018-02-28 PROCEDURE — 80053 COMPREHEN METABOLIC PANEL: CPT | Performed by: INTERNAL MEDICINE

## 2018-02-28 PROCEDURE — 94660 CPAP INITIATION&MGMT: CPT

## 2018-02-28 PROCEDURE — P9012 CRYOPRECIPITATE EACH UNIT: HCPCS

## 2018-02-28 PROCEDURE — C9113 INJ PANTOPRAZOLE SODIUM, VIA: HCPCS | Performed by: EMERGENCY MEDICINE

## 2018-02-28 PROCEDURE — 83735 ASSAY OF MAGNESIUM: CPT | Performed by: INTERNAL MEDICINE

## 2018-02-28 PROCEDURE — 85007 BL SMEAR W/DIFF WBC COUNT: CPT | Performed by: INTERNAL MEDICINE

## 2018-02-28 PROCEDURE — 85027 COMPLETE CBC AUTOMATED: CPT | Performed by: INTERNAL MEDICINE

## 2018-02-28 PROCEDURE — 81001 URINALYSIS AUTO W/SCOPE: CPT | Performed by: INTERNAL MEDICINE

## 2018-02-28 PROCEDURE — 82948 REAGENT STRIP/BLOOD GLUCOSE: CPT

## 2018-02-28 PROCEDURE — 99232 SBSQ HOSP IP/OBS MODERATE 35: CPT | Performed by: INTERNAL MEDICINE

## 2018-02-28 PROCEDURE — 99233 SBSQ HOSP IP/OBS HIGH 50: CPT | Performed by: INTERNAL MEDICINE

## 2018-02-28 PROCEDURE — 85384 FIBRINOGEN ACTIVITY: CPT | Performed by: INTERNAL MEDICINE

## 2018-02-28 RX ORDER — PANTOPRAZOLE SODIUM 40 MG/1
40 TABLET, DELAYED RELEASE ORAL
Status: DISCONTINUED | OUTPATIENT
Start: 2018-02-28 | End: 2018-03-09

## 2018-02-28 RX ORDER — LORAZEPAM 2 MG/ML
INJECTION INTRAMUSCULAR
Status: COMPLETED
Start: 2018-02-28 | End: 2018-02-28

## 2018-02-28 RX ORDER — POTASSIUM CHLORIDE 20MEQ/15ML
40 LIQUID (ML) ORAL ONCE
Status: COMPLETED | OUTPATIENT
Start: 2018-02-28 | End: 2018-02-28

## 2018-02-28 RX ORDER — LACTULOSE 20 G/30ML
20 SOLUTION ORAL 3 TIMES DAILY
Status: DISCONTINUED | OUTPATIENT
Start: 2018-02-28 | End: 2018-03-10 | Stop reason: HOSPADM

## 2018-02-28 RX ORDER — LORAZEPAM 2 MG/ML
0.5 INJECTION INTRAMUSCULAR ONCE
Status: COMPLETED | OUTPATIENT
Start: 2018-02-28 | End: 2018-02-28

## 2018-02-28 RX ADMIN — OCTREOTIDE ACETATE 100 MCG: 100 INJECTION, SOLUTION INTRAVENOUS; SUBCUTANEOUS at 21:12

## 2018-02-28 RX ADMIN — LACTULOSE 20 G: 20 SOLUTION ORAL at 21:13

## 2018-02-28 RX ADMIN — Medication 100 MG: at 08:31

## 2018-02-28 RX ADMIN — LACTULOSE 30 G: 20 SOLUTION ORAL at 08:29

## 2018-02-28 RX ADMIN — INSULIN LISPRO 1 UNITS: 100 INJECTION, SOLUTION INTRAVENOUS; SUBCUTANEOUS at 06:06

## 2018-02-28 RX ADMIN — LEVALBUTEROL HYDROCHLORIDE 1.25 MG: 1.25 SOLUTION, CONCENTRATE RESPIRATORY (INHALATION) at 13:48

## 2018-02-28 RX ADMIN — INSULIN LISPRO 1 UNITS: 100 INJECTION, SOLUTION INTRAVENOUS; SUBCUTANEOUS at 12:26

## 2018-02-28 RX ADMIN — OCTREOTIDE ACETATE 100 MCG: 100 INJECTION, SOLUTION INTRAVENOUS; SUBCUTANEOUS at 13:34

## 2018-02-28 RX ADMIN — ISODIUM CHLORIDE 3 ML: 0.03 SOLUTION RESPIRATORY (INHALATION) at 13:48

## 2018-02-28 RX ADMIN — ISODIUM CHLORIDE 3 ML: 0.03 SOLUTION RESPIRATORY (INHALATION) at 19:59

## 2018-02-28 RX ADMIN — OCTREOTIDE ACETATE 100 MCG: 100 INJECTION, SOLUTION INTRAVENOUS; SUBCUTANEOUS at 06:05

## 2018-02-28 RX ADMIN — LEVALBUTEROL HYDROCHLORIDE 1.25 MG: 1.25 SOLUTION, CONCENTRATE RESPIRATORY (INHALATION) at 19:59

## 2018-02-28 RX ADMIN — Medication 1 TABLET: at 13:26

## 2018-02-28 RX ADMIN — MIDODRINE HYDROCHLORIDE 5 MG: 5 TABLET ORAL at 06:05

## 2018-02-28 RX ADMIN — INSULIN LISPRO 2 UNITS: 100 INJECTION, SOLUTION INTRAVENOUS; SUBCUTANEOUS at 00:01

## 2018-02-28 RX ADMIN — INSULIN LISPRO 1 UNITS: 100 INJECTION, SOLUTION INTRAVENOUS; SUBCUTANEOUS at 17:23

## 2018-02-28 RX ADMIN — PREDNISONE 25 MG: 5 TABLET ORAL at 08:30

## 2018-02-28 RX ADMIN — LACTULOSE 20 G: 20 SOLUTION ORAL at 17:22

## 2018-02-28 RX ADMIN — POTASSIUM CHLORIDE 40 MEQ: 20 SOLUTION ORAL at 07:37

## 2018-02-28 RX ADMIN — FOLIC ACID 1 MG: 1 TABLET ORAL at 13:26

## 2018-02-28 RX ADMIN — LORAZEPAM 0.5 MG: 2 INJECTION INTRAMUSCULAR; INTRAVENOUS at 08:28

## 2018-02-28 RX ADMIN — PANTOPRAZOLE SODIUM 40 MG: 40 INJECTION, POWDER, FOR SOLUTION INTRAVENOUS at 08:31

## 2018-02-28 RX ADMIN — LORAZEPAM 0.5 MG: 2 INJECTION INTRAMUSCULAR at 08:28

## 2018-02-28 RX ADMIN — PIPERACILLIN SODIUM,TAZOBACTAM SODIUM 2.25 G: 2; .25 INJECTION, POWDER, FOR SOLUTION INTRAVENOUS at 12:31

## 2018-02-28 RX ADMIN — PREDNISONE 25 MG: 5 TABLET ORAL at 17:23

## 2018-02-28 RX ADMIN — RIFAXIMIN 550 MG: 550 TABLET ORAL at 21:13

## 2018-02-28 RX ADMIN — LEVALBUTEROL HYDROCHLORIDE 1.25 MG: 1.25 SOLUTION, CONCENTRATE RESPIRATORY (INHALATION) at 07:28

## 2018-02-28 RX ADMIN — RIFAXIMIN 550 MG: 550 TABLET ORAL at 08:30

## 2018-02-28 RX ADMIN — ISODIUM CHLORIDE 3 ML: 0.03 SOLUTION RESPIRATORY (INHALATION) at 07:28

## 2018-02-28 RX ADMIN — PIPERACILLIN SODIUM,TAZOBACTAM SODIUM 2.25 G: 2; .25 INJECTION, POWDER, FOR SOLUTION INTRAVENOUS at 03:27

## 2018-02-28 NOTE — PROGRESS NOTES
Progress Note - Nephrology   Manoj Ramirez 50 y o  male MRN: [id number]  Unit/Bed#: MICU 07 Encounter: [id number]      Assessment / Plan:  1  PRANAY in setting of decompensated cirrhosis - concern for HRS vs ATN  -b/l Cr 0 5 in 2016, started on HD via temp HD cath 2/21  -patient receiving HD today  HD aborted 2/27 due to catheter malfunction   -may change to permacath once patient able to lay flat for procedure  ID ok with this as no bacteremia  Of note, INR should be 1 5 and platelets > 50 prior to permacath placement   -monitor renal indices for signs of renal recovery  -plan for HD tomorrow pending goals of care discussion  -repeat UA     2  Decompensated alcoholic cirrhosis with hx of noncompliance - GI follows, not a transplant candidate     3  Encephalopathy - on lactulose, likely multifactorial as with cirrhosis and RLL PNA     4  Aspiration PNA - on zosyn per ID(last dose today), +leukocytosis-concern for recurrent aspiration per ID     5  Mild hyperphosphatemia - likely d/t PRANAY  Continue to monitor  May need binders if phos remains high      6  Anemia of chronic disease with thrombocytopenia - Hgb now in 8s  Bili elevated  Platelets lower  -+schistocytes and helmet cells noted on hemolysis smear  ? Warm antibody AIHA  Consider heme consult   -mild LDH elevation noted with low haptoglobin  -VERNELL c3 negative, VERNELL Igg + as well as direct Jimena test+  -fibrin split products, fibrinogen low  - Iron normal, ferritin high, iron sat ok, with low TIBC  Low plts likely d/t cirrhosis but above findings supsicious for hemolysis       7  Hypotension - on midodrine 5mg TID but BP higher today while patient awake       8  Hypokalemia - monitor K  Correct on dialysis      Other issues: right great toe ulcer and osteomyelitis, alcoholic hepatitis        Subjective:   He denies any chest pain but does admit to shortness of breath when asked  He is tangential and talks about unrelated topics    He talks about "going to school" and does not always seem to understand the questions I am asking  Objective:     Vitals: Blood pressure 90/64, pulse 94, temperature 98 3 °F (36 8 °C), temperature source Oral, resp  rate (!) 11, height 5' 10" (1 778 m), weight 83 5 kg (184 lb 1 4 oz), SpO2 100 %  ,Body mass index is 26 41 kg/m²  Temp (24hrs), Av 2 °F (36 8 °C), Min:97 6 °F (36 4 °C), Max:99 °F (37 2 °C)      Weight (last 2 days)     Date/Time   Weight    18 0600  83 5 (184 08)    18 0600  83 2 (183 42)    18 0558  83 1 (183 2)                Intake/Output Summary (Last 24 hours) at 18 1153  Last data filed at 18 0801   Gross per 24 hour   Intake          2155 33 ml   Output             1506 ml   Net           649 33 ml     I/O last 24 hours: In: 2650 3 [P O :320; I V :752 3; Other:200; NG/GT:300; IV Piggyback:230; Feedings:848]  Out: 0336 [Urine:103; Emesis/NG output:20; Other:383; Stool:1000]        Physical Exam:   Physical Exam   Constitutional: He appears well-developed  No distress  thin   HENT:   Head: Normocephalic and atraumatic  Mouth/Throat: No oropharyngeal exudate  +NGT   Eyes: Right eye exhibits no discharge  Left eye exhibits no discharge  No scleral icterus  Neck: Normal range of motion  Neck supple  No thyromegaly present  Cardiovascular: Normal rate, regular rhythm and normal heart sounds  No murmur heard  Pulmonary/Chest: Effort normal  He has no wheezes  He has no rales  On HiFlo O2 via NC  +coarse BS b/l   Abdominal: Soft  Bowel sounds are normal  He exhibits no distension  There is no tenderness  +rectal tube   Musculoskeletal: Normal range of motion  He exhibits edema (trace LE edema b/l)  Neurological: He is alert  confused   Skin: Skin is warm and dry  No rash noted  He is not diaphoretic  Psychiatric: He has a normal mood and affect  Vitals reviewed        Invasive Devices     Peripheral Intravenous Line            Peripheral IV 18 Right Arm 3 days Peripheral IV 02/28/18 Left Antecubital less than 1 day          Line            HD Cath Double 7 days          Drain            NG/OG/Enteral Tube Nasogastric Right nares 6 days    Rectal Tube With balloon 5 days                Medications:    Scheduled Meds:  Current Facility-Administered Medications:  albuterol 2 5 mg Nebulization Q4H PRN Charis Santana DO    dexmedetomidine 0 1-0 7 mcg/kg/hr Intravenous Titrated Richard Veliz DO Last Rate: Stopped (18/16/90 2595)   folic acid 1 mg Oral Daily Mariama Hurtado DO    influenza vaccine 0 5 mL Intramuscular Prior to discharge David Tadeo MD    insulin lispro 1-6 Units Subcutaneous Q6H [address] Philly Vivas PA-C    lactulose 30 g Oral TID Philly Vivas PA-C    levalbuterol 1 25 mg Nebulization TID Chrais Santana DO    midodrine 5 mg Oral TID AC Emmanuel Amaya    multivitamin 1 tablet Oral Daily Philly Vivas PA-C    octreotide 100 mcg Intravenous Q8H [address] Matikayla Glasgow DO    ondansetron 4 mg Intravenous Q8H PRN Gale Duenas DO    pantoprazole 40 mg Intravenous Q24H [address] Kellie Peña MD    piperacillin-tazobactam 2 25 g Intravenous Q8H Chapin Sutherland MD    predniSONE 25 mg Oral BID With Meals Kellie Peña MD    rifaximin 550 mg Oral Q12H [address] Mariella Babb PA-C    sodium chloride 1 application Nasal A9T PRN Philly Vivas PA-C    sodium chloride 3 mL Nebulization TID Charis Santana DO    thiamine 100 mg Oral Daily Mariama Hurtado DO        PRN Meds:   albuterol    influenza vaccine    ondansetron    sodium chloride    Continuous Infusions:  dexmedetomidine 0 1-0 7 mcg/kg/hr Last Rate: Stopped (02/26/18 1025)           LAB RESULTS:        Results from last 7 days  Lab Units 02/28/18  0523 02/27/18  0655 02/27/18  0415 02/26/18  0431 02/25/18  0443 02/24/18  0449 02/23/18  1055 02/23/18  0443  02/22/18  0431  02/21/18  1836   WBC Thousand/uL 24 84* 24 45*  --  18 54* 21 79* 11 16*  --  7 67  --  16 81*  --  15 79*   HEMOGLOBIN g/dL 8 7* 8 5*  --  7 4* 8 1* 7 8*  --  7 2*  --  7 3*  --  7 7*   I STAT HEMOGLOBIN g/dl  --   --   --   --   --   --  8 2*  --   < >  --   --   --    HEMATOCRIT % 25 8* 24 7*  --  21 2* 23 6* 21 8*  --  20 0*  --  21 2*  --  22 0*   PLATELETS Thousands/uL 34* 47*  --  46* 48* 40*  --  47*  --  73*  --  67*   NEUTROS PCT %  --   --   --  88* 90* 87*  --  83*  --   --   --   --    LYMPHS PCT %  --   --   --  5* 5* 6*  --  8*  --   --   --   --    LYMPHO PCT % 4* 1*  --   --   --   --   --   --   --   --   --  4*   MONOS PCT %  --   --   --  7 5 7  --  9  --   --   --   --    MONO PCT MAN % 7 4  --   --   --   --   --   --   --   --   --  9   EOS PCT %  --   --   --  0 0 0  --  0  --   --   --   --    EOSINO PCT MANUAL % 0 0  --   --   --   --   --   --   --   --   --  0   SODIUM mmol/L 142  --  141 141 137 135*  --  133*  --  131*  < >  --    POTASSIUM mmol/L 3 4*  --  3 6 3 9 4 2 4 2  --  4 1  --  4 5  < >  --    CHLORIDE mmol/L 105  --  104 105 101 101  --  98*  --  97*  < >  --    CO2 mmol/L 27  --  29 26 28 26  --  26  --  20*  < >  --    BUN mg/dL 65*  --  48* 59* 43* 45*  --  40*  --  46*  < >  --    CREATININE mg/dL 4 67*  --  3 90* 4 81* 3 88* 4 36*  --  4 00*  --  4 81*  < >  --    CALCIUM mg/dL 8 7  --  8 5 8 1* 8 4 8 5  --  8 3  --  8 3  < >  --    TOTAL PROTEIN g/dL 6 9  --  6 2*  --  6 1* 6 1*  --  6 4  --  7 2  --  7 0   BILIRUBIN TOTAL mg/dL 4 47*  --  4 03*  --  4 24* 3 99*  --  4 28*  --  4 56*  --  4 99*   ALK PHOS U/L 268*  --  208*  --  64 61  --  67  --  86  --  94   ALT U/L 78  --  57  --  31 29  --  31  --  28  --  26   AST U/L 73*  --  68*  --  38 31  --  42  --  41  --  38   GLUCOSE RANDOM mg/dL 164*  --  191* 167* 145* 157*  --  144*  --  141*  < >  --    GLUCOSE, ISTAT mg/dl  --   --   --   --   --   --  134  --   < >  --   --   --    MAGNESIUM mg/dL 2 5  --  2 4 2 5 2 4 2 4  --  2 3  --  2 3  --   --    PHOSPHORUS mg/dL 3 5  --   --  5 5* 5 0* 6 0*  --  5 6*  --  8 0*  --   --    < > = values in this interval not displayed  CUTURES:  Lab Results   Component Value Date    BLOODCX No Growth After 5 Days  02/21/2018    BLOODCX No Growth After 5 Days  02/21/2018    BLOODCX No Growth After 5 Days  02/17/2018    BLOODCX No Growth After 5 Days  02/17/2018                 Portions of the record may have been created with voice recognition software  Occasional wrong word or "sound a like" substitutions may have occurred due to the inherent limitations of voice recognition software  Read the chart carefully and recognize, using context, where substitutions have occurred  If you have any questions, please contact the dictating provider

## 2018-02-28 NOTE — PROGRESS NOTES
Progress Note - Infectious Disease   Dav Anngh 50 y o  male MRN: [id number]  Unit/Bed#: MICU 07 Encounter: [id number]      Impression/Recommendations:  1   Aspiration pneumonia   Given significant comorbid illnesses and recent hospitalizations, patient is at risk for nosocomial pathogens, especially nosocomial gram negatives and MRSA    Patient is clinically improved on current antibiotics   MRSA screen negative  Complete Zosyn after noon dose today     With negative MRSA screen, no need for vancomycin      2   Acute superimposed on chronic hypoxic respiratory failure   This is most likely secondary to aspiration above   Patient is clinically much improved  Monitor respiratory symptoms      3   Encephalopathy   This is most likely secondary to aspiration pneumonia above, superimposed on baseline hepatic encephalopathy   Mental status slowly improving  Monitor mental status      4   Recurrent fever with leukocytosis   Suspect recurrent aspiration   With patient clinically stable, monitor for now   Hopefully, this is pneumonitis and not recurrent pneumonia   Temperature is down again but WBC remains up  Leukocytosis most likely secondary to cirrhosis and hypersplenism and steroid effect      5  Right great toe gangrenous ulcer and osteomyelitis   There is no evidence of cellulitis clinically   Patient has no fever leukocytosis   No antibiotic is necessary   However, patient needs to have this toe amputated, to avoid development of wet gangrene   Patient has been refusing toe amputation  No antibiotic needed at present time  Monitor toe  Recommend toe amputation      6   Decompensated cirrhosis   This is due to patient's noncompliance with prescribed treatment plan  Jefferson Valdes is no evidence of peritonitis clinically   Patient is status post paracentesis with benign peritoneal fluid   Systemic corticosteroid started   Patient is status post repeat paracentesis, with benign parameters also   Even with active pneumonia, it should be fine to continue systemic corticosteroid  No antibiotic needed  Management per GI and primary service      7   PRANAY   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   Dehydration may contribute   Creatinine worsening   Patient is now on HD  Antibiotic dosages adjusted accordingly  Monitor creatinine      Discussed with patient in detail regarding above plan      Antibiotics:  Zosyn # 7     Subjective:  Patient remains in ICU  Mental status stable  Awake and alert with stable confusion  Abdomen remains distended   Mild discomfort  Temperature is down   No chills  Objective:  Vitals:  HR:  [] 100  Resp:  [12-36] 16  BP: ()/(47-89) 102/69  SpO2:  [95 %-100 %] 98 %  Temp (24hrs), Av 2 °F (36 8 °C), Min:97 6 °F (36 4 °C), Max:99 °F (37 2 °C)  Current: Temperature: 98 3 °F (36 8 °C)    Physical Exam:     General: Awake, alert, cooperative, no distress  Stable confusion  Lungs: Decreased breath sounds, trace basilar rales, no wheezing, respirations unlabored  Heart[de-identified]  Tachycardic with regular rhythm, S1 and S2 normal, no murmur  Abdomen: Soft, stable distension, nontender, bowel sounds active all four quadrants,        no masses, no organomegaly  Extremities: Stable edema  Right big toe with stable necrotic ulcer  No drainage  No fluctuance  Nontender  Skin:  No rash  Invasive Devices     Peripheral Intravenous Line            Peripheral IV 18 Right Arm 3 days    Peripheral IV 18 Left Antecubital less than 1 day          Line            HD Cath Double 6 days          Drain            NG/OG/Enteral Tube Nasogastric Right nares 6 days    Rectal Tube With balloon 5 days                Labs studies:   I have personally reviewed pertinent labs      Results from last 7 days  Lab Units 18  0523 18  0415 18  0431 18  0443   SODIUM mmol/L 142 141 141 137   POTASSIUM mmol/L 3 4* 3 6 3 9 4 2   CHLORIDE mmol/L 105 104 105 101   CO2 mmol/L 27 29 26 28   ANION GAP mmol/L 10 8 10 8   BUN mg/dL 65* 48* 59* 43*   CREATININE mg/dL 4 67* 3 90* 4 81* 3 88*   EGFR ml/min/1 73sq m 14 17 13 17   GLUCOSE RANDOM mg/dL 164* 191* 167* 145*   CALCIUM mg/dL 8 7 8 5 8 1* 8 4   AST U/L 73* 68*  --  38   ALT U/L 78 57  --  31   ALK PHOS U/L 268* 208*  --  64   TOTAL PROTEIN g/dL 6 9 6 2*  --  6 1*   BILIRUBIN TOTAL mg/dL 4 47* 4 03*  --  4 24*       Results from last 7 days  Lab Units 02/28/18  0523 02/27/18  0655 02/26/18  0431   WBC Thousand/uL 24 84* 24 45* 18 54*   HEMOGLOBIN g/dL 8 7* 8 5* 7 4*   PLATELETS Thousands/uL 34* 47* 46*       Results from last 7 days  Lab Units 02/22/18  1455 02/21/18  1829 02/21/18  1820 02/21/18  1156   BLOOD CULTURE   --  No Growth After 5 Days  No Growth After 5 Days  --    GRAM STAIN RESULT   --   --   --  Rare Polys  No bacteria seen   BODY FLUID CULTURE, STERILE   --   --   --  No growth   MRSA CULTURE ONLY  No Methicillin Resistant Staphlyococcus aureus (MRSA) isolated  --   --   --        Imaging Studies:   I have personally reviewed pertinent imaging study reports and images in PACS  EKG, Pathology, and Other Studies:   I have personally reviewed pertinent reports

## 2018-02-28 NOTE — PROGRESS NOTES
Progress Note - Critical Care   Natalia Henley 50 y o  male MRN: [id number]  Unit/Bed#: MICU 07 Encounter: [id number]    Assessment:   Principal Problem:    Decompensated hepatic cirrhosis ([email or web address] )  Active Problems:    Ascites    Hepatic encephalopathy (HCC)    PRANAY (acute kidney injury) ([facility] )    Hypoalbuminemia    Hyponatremia    Sepsis ([facility] )    Hyperammonemia (HCC)    Lactic acidosis    Elevated alkaline phosphatase level    Anemia of chronic disease    Osteomyelitis of toe of right foot (HCC)    Alcohol abuse    Alcoholic hepatitis    Cough    Hypoxia    Oliguria    Leukocytosis    Coagulopathy (HCC)    Aspiration pneumonia of right lower lobe (HCC)    Paroxysmal Atrial Fibrillation  Resolved Problems:    Abdominal pain    Hypotension       Plan:      Neuro:     Enchephalopathy: Improving   -likely multifactorial due to liver failure, sepsis and  ESRD   -Patient continues to display confabulation, Mentation has improved however according to family he is not at baseline  -Has not received Precedex x 2 day   -Will continue to monitor     Cv:     Labile Blood Pressure, improving, Likely multifactorial  secondary to hypoalbuminemia secondary to liver failure (CHF r/o with recent echo)   Arterial Line BP: 132/74   Arterial Line MAP (mmHg): 98 mmHg   HR:  [] 88   BP: ()/(47-88) 177/88  -Continue albumin boluses 25% PRN for hypotension  for pressure support (map goal > 65)  -Midodrine 5mg TID currently held d/t elevated SBP ~170's  -Continue Cardizem gtt PRN, Obtain repeat ECG if persistent (see P  Afib below)    Paroxysmal Afib: New onset (no prior recorded history)     -ECG obtained 2/27 showing irregulary irregular rhythm with no discernable Pwaves  -No events on telemetry overnight   -s/p Cardizem gtt 2/27 d/t secondepisodes of Afib, asymptomatic (first episode self resolved)  -Will continue to monitor patient closely  -Continue Cardizem gtt PRN, Obtain repeat ECG if persistent     Lung:     Pneumonia, RLL, Improving, possibly secondary to aspiration vs HCAP  -Continue antibiotics Zosyn 2 25g TID, Zosyn first day was technically 2/22 (since patient only received 1 dose of TID on 2/21) therefore day 7 is 2/28 (s/p Ceftriaxone 2/21)  -Continue Xopenex and saline nebs scheduled with albuterol nebs PRN     Acute Hypoxemic Respiratory Failure, Slight improvement x 24hrs  -Weaned off Bipap 8/14  FiO2 100% (ABGs wnl)  -Patient continues to require HFNC (55LPM, 60%FlO2, saturating 100%) will wean off as tolerated     Gi:     Acute decompensated alcoholic hepatic cirrhosis, with recurrent ascites  -Initiated tube feeds on 2/25  -Last paracentesis on 2/21 with removal of ~4L  -GI onboard, appreciate their recs  -Continue Rifaximin  -Currently on Prednisone via Tf, 25 mg PO BID, will continue to taper daily  -Continue octreotide 100 mcg daily for Esophageal varices        FEN:    -No IVF's  -Hypokalemia 3 4, repleted with 40meq Kcl oral solution, Mag 2 5, Phos 3 5   Will continue to monitor BMP and correct lytes PRN  -Patient passed speech/swallow eval and will be transitioned to PO renal dialyis diet today     :     PRANAY (though eGFR 15 and on dialysis, does not meet criteria for ESRD since no prior history of CKD), PRANAY likely hepatorenal syndrome vs ATN  -BUN/Cr on 2/28: 65/4 67   -HD catheter R IJ  -Hemodialysis per Nephrology  -Dialysis secheduled qOD, next session 2/28     ID:     Pneumonia, RLL, improved, per radiology findings on CXR 2/26 likely not PNA  -Patient continues to remain afebrile, however WBC's trending up to 24 from 24 from 18  -Continue Zosyn 2 25g TID last day 2/28  -s/p Ceftriaxone 1g x1 5days ago, Vanc d/caitlyn 2/25  d/t negative MRSA Cx  -Osteomyelitis, right hallux ulcer an d osteomyelitis, per ID no further antibiotics, but will require toe amputation which pt   Refuses  -Complete current antibiotic course  -ID on board, appreciate their recs     Heme:     Thrombocytopenia, worsening, likely secondary to liver failure  -platelet count 34 on  from  47 on  47, low however stable x 4 days, INR 2 88 on  (last check  was 3 12)  -hold DVT prophylaxis      Anemia- stable Hgb, received 2 units  -Hb stable 8 7 (), baseline ~8     Endo:  -No acute concerns (no Hx of DM)  -Glucose on BMP , 164  -Will continue to monitor glucose daily                Msk/Skin:     Frequent position changing/turning  Routine skin care     Disposition:      Continue with current level of ICU care, till family meeting on  with Palliative team      ___________________________________________     Chief Complaint:        Chief Complaint   Patient presents with    Ascites       Pt "I need something for my belly  I got tapped for the first time two weeks ago  And its all hard and big again " Roomate "He is suppose to be on liver medication but its too expensive and the doctor wont prescribe anything" Pt c/o SOB "sometimes"          HPI/24hr events:       No acute overnight events  No repeat episodes of A  Fib on tele   ____________________________________________  Physical Exam:   ____________________________________________  Vitals:    18 0414 18 0435 18 0535 18 0600   BP:  (!) 177/82 (!) 177/88    Pulse:  82 88    Resp:  (!) 29 (!) 36    Temp:       TempSrc:       SpO2: 100% 100% 100%    Weight:    83 5 kg (184 lb 1 4 oz)   Height:         Arterial Line BP: 132/74  Arterial Line MAP (mmHg): 98 mmHg  Temp:  [97 6 °F (36 4 °C)-99 °F (37 2 °C)] 98 5 °F (36 9 °C)  HR:  [] 88  Resp:  [11-36] 36  BP: ()/(47-88) 177/88  FiO2 (%):  [60-97] 60     Temperature:   Temp (24hrs), Av 3 °F (36 8 °C), Min:97 6 °F (36 4 °C), Max:99 °F (37 2 °C)    Current Temperature: 98 5 °F (36 9 °C)  Weights:   IBW: 73 kg    Body mass index is 26 41 kg/m²    Weight (last 2 days)     Date/Time   Weight    18 0600  83 5 (184 08)    18 0600  83 2 (183 42)    18 0558  83 1 (183 2)            Physical Exam:  General:  NAD  Eyes: PERRL,EOMI, Anicteric Sclera with no hemorrhages or discharge  Neck:  supple, FROM  Lungs: Breath sounds continue to remain coarse  Cardiac:  RRR, normal S1S2  Abdomen:  Soft, non-tender, distended abdomen  Extremities:  Non-tender, no edema  Skin:  Warm and dry  Neurological: Awake, alert, continues to confabulate, oriented to person only  Moves all four extremities  Hemodynamic Monitoring:    Non-Invasive/Invasive Ventilation Settings:  Respiratory    Lab Data (Last 4 hours)    None         O2/Vent Data (Last 4 hours)      02/28 0414          Non-Invasive Ventilation Mode HFNC                 Intake and Outputs:  I/O       02/26 0701 - 02/27 0700 02/27 0701 - 02/28 0700 02/28 0701 - 03/01 0700    P  O   120     I V  (mL/kg) 613 7 (7 4) 547 3 (6 6)     Other  200     NG/GT  300     IV Piggyback 200 170     Feedings 1101 848     Total Intake(mL/kg) 1914 7 (23) 2185 3 (26 2)     Urine (mL/kg/hr) 390 (0 2) 0 (0)     Emesis/NG output  20 (0)     Other 1239 (0 6) 383 (0 2)     Stool 1250 (0 6) 1000 (0 5)     Total Output 2879 1403      Net -964 3 +782 3             Unmeasured Urine Occurrence 2 x 2 x         UOP:0/24hrs  Pt  Net positive ~728 mL/24hrs (HD failed d/t access issues)  HD expected 2/28    Nutrition:        Diet Orders            Start     Ordered    02/27/18 1724  Diet Renal; Renal (Dialysis); Sodium 2 Gm, Hi Pro/Hi Chase  Diet effective now     Question Answer Comment   Diet Type Renal    Renal Renal (Dialysis)    Other Restriction(s): Sodium 2 Gm    Other Restriction(s): Hi Pro/Hi Chase    RD to adjust diet per protocol?  Yes        02/27/18 1730        Labs:     Results from last 7 days  Lab Units 02/28/18  0523 02/27/18  0655 02/26/18  0431 02/25/18  0443 02/24/18  0449   WBC Thousand/uL 24 84* 24 45* 18 54* 21 79* 11 16*   HEMOGLOBIN g/dL 8 7* 8 5* 7 4* 8 1* 7 8*   HEMATOCRIT % 25 8* 24 7* 21 2* 23 6* 21 8*   PLATELETS Thousands/uL 34* 47* 46* 48* 40*   NEUTROS PCT %  --   -- 88* 90* 87*   MONOS PCT %  --   --  7 5 7   MONO PCT MAN %  --  4  --   --   --       Results from last 7 days  Lab Units 02/28/18  0523 02/27/18  0415 02/26/18  0431 02/25/18  0443   SODIUM mmol/L 142 141 141 137   POTASSIUM mmol/L 3 4* 3 6 3 9 4 2   CHLORIDE mmol/L 105 104 105 101   CO2 mmol/L 27 29 26 28   BUN mg/dL 65* 48* 59* 43*   CREATININE mg/dL 4 67* 3 90* 4 81* 3 88*   CALCIUM mg/dL 8 7 8 5 8 1* 8 4   TOTAL PROTEIN g/dL 6 9 6 2*  --  6 1*   BILIRUBIN TOTAL mg/dL 4 47* 4 03*  --  4 24*   ALK PHOS U/L 268* 208*  --  64   ALT U/L 78 57  --  31   AST U/L 73* 68*  --  38   GLUCOSE RANDOM mg/dL 164* 191* 167* 145*       Results from last 7 days  Lab Units 02/28/18  0523 02/27/18  0415 02/26/18  0431   MAGNESIUM mg/dL 2 5 2 4 2 5       Results from last 7 days  Lab Units 02/28/18  0523 02/26/18  0431 02/25/18  0443   PHOSPHORUS mg/dL 3 5 5 5* 5 0*        Results from last 7 days  Lab Units 02/27/18  0444 02/22/18  0431 02/21/18  1836   INR  2 88* 3 12* 2 70*   PTT seconds  --  61*  --        Results from last 7 days  Lab Units 02/22/18  0817   LACTIC ACID mmol/L 3 3*     No results found for: TROPONINI  Imaging: None  EKG: None  Micro:  Lab Results   Component Value Date    BLOODCX No Growth After 5 Days  02/21/2018    BLOODCX No Growth After 5 Days  02/21/2018    BLOODCX No Growth After 5 Days  02/17/2018    BLOODCX No Growth After 5 Days   02/17/2018     Allergies: No Known Allergies  Medications:   Scheduled Meds:  Current Facility-Administered Medications:  albuterol 2 5 mg Nebulization Q4H PRN Charis Santana DO    dexmedetomidine 0 1-0 7 mcg/kg/hr Intravenous Titrated Jhon Calles DO Last Rate: Stopped (02/26/18 1025)   diltiazem 1-15 mg/hr Intravenous Titrated Mariola Major PA-C Last Rate: 2 5 mg/hr (99/23/66 5337)   folic acid 1 mg Oral Daily Mariama Hurtado DO    influenza vaccine 0 5 mL Intramuscular Prior to discharge Xiao Waterman MD    insulin lispro 1-6 Units Subcutaneous [address] Cheryl Jordan MOISÉS Marroquin    lactulose 30 g Oral TID Syed Thompson PA-C    levalbuterol 1 25 mg Nebulization TID Charis Santana DO    midodrine 5 mg Oral TID AC Emmanuel Branch    multivitamin 1 tablet Oral Daily Syed Thompson PA-C    octreotide 100 mcg Intravenous Q8H [address] Matikayla Glasgow,     ondansetron 4 mg Intravenous Q8H PRN Deborrwade Bennett,     pantoprazole 40 mg Intravenous Q24H [address] Lupe Cobb MD    piperacillin-tazobactam 2 25 g Intravenous Q8H Cony Rodrigues PA-C Last Rate: 2 25 g (02/28/18 0327)   predniSONE 25 mg Oral BID With Meals Lupe Cobb MD    rifaximin 550 mg Oral Q12H [address] Patrice Lindsey PA-C    sodium chloride 1 application Nasal I5K PRN Syed Thompson PA-C    sodium chloride 3 mL Nebulization TID Charis Santana DO    thiamine 100 mg Oral Daily Mariama Hurtado DO      Continuous Infusions:  dexmedetomidine 0 1-0 7 mcg/kg/hr Last Rate: Stopped (02/26/18 1025)   diltiazem 1-15 mg/hr Last Rate: 2 5 mg/hr (02/27/18 1600)     PRN Meds:    albuterol 2 5 mg Q4H PRN   influenza vaccine 0 5 mL Prior to discharge   ondansetron 4 mg Q8H PRN   sodium chloride 1 application N3O PRN     VTE Pharmacologic Prophylaxis: Held d/t thrombocytopenia  VTE Mechanical Prophylaxis: sequential compression device  Invasive lines and devices: Invasive Devices     Peripheral Intravenous Line            Peripheral IV 02/25/18 Right Arm 3 days    Peripheral IV 02/28/18 Left Antecubital less than 1 day          Line            HD Cath Double 6 days          Drain            NG/OG/Enteral Tube Nasogastric Right nares 6 days    Rectal Tube With balloon 5 days                   Code Status: Level 2 - DNAR: but accepts endotracheal intubation    Portions of the record may have been created with voice recognition software  Occasional wrong word or "sound a like" substitutions may have occurred due to the inherent limitations of voice recognition software    Read the chart carefully and recognize, using context, where substitutions have occurred      Daren Franks MD    [facility] PGY-1

## 2018-02-28 NOTE — WOUND OSTOMY CARE
Progress Note - Wound   Louis Guzman 50 y o  male MRN: [id number]  Unit/Bed#: MICU 07 Encounter: [id number]      Assessment: Patient is seen for skin / wound care weekly assessment   The patient is alert and in bed for the assessment   Bilateral heels are dry and intact   Right great toe with dry stable eschar in place   Left hip has a unstagable are with noted improvement in the wound bed with 90 % yellow slough and note slight pink buds in the wound bed  Moisture associated skin damage is resolved   The patient has a fecal collection system in place   Noted on the posterior aspect of the penile shaft a indentation related to the urinal with the noted edema of the are   Removal of the propped urinal   Discussed plan of care with the RN   Plan:   1  Cleanse the left hip wound with NSS , skin prep to toni wound then cover with hydrocolloid change every 3 days   2  Calazime paste to sacral / buttocks bid and prn   3  Right great toe paint with betadine daily   4  Soft care cushion when OOB in the chair   5  Apply skin nourishing cream to the skin daily   6  Elevate heels off of the bed with pillows   7  Hydraguard to b/ l heels bid and prn         Objective:    Vitals: Blood pressure 166/86, pulse 84, temperature 98 °F (36 7 °C), temperature source Oral, resp  rate 20, height 5' 10" (1 778 m), weight 83 2 kg (183 lb 6 8 oz), SpO2 96 %  ,Body mass index is 26 32 kg/m²  Pressure Ulcer 02/18/18 Hip Outer;Left      L hip unstageable Pressure injury with dried brown skin to periwoud (Active)   Staging Unstagable 2/27/2018  5:00 PM   Wound Description Clean;Fragile;[place] 2/27/2018  5:00 PM   Toni-wound Assessment Clean;Dry; Intact 2/27/2018  5:00 PM   Shape round 2/27/2018  5:00 PM   Wound Length (cm) 0 5 cm 2/27/2018  5:00 PM   Wound Width (cm) 0 6 cm 2/27/2018  5:00 PM   Calculated Wound Area (cm^2) 0 3 cm^2 2/27/2018  5:00 PM   Tunneling 0 cm 2/20/2018 11:00 AM   Undermining 0 2/20/2018 11:00 AM Drainage Amount Small 2/27/2018  5:00 PM   Drainage Description Serosanguineous 2/27/2018  5:00 PM   Treatment Cleansed; Offload; Turn & reposition 2/27/2018  5:00 PM   Dressing Hydrocolloid 2/27/2018  5:00 PM   Dressing Changed New dressing applied 2/27/2018  5:00 PM   Patient Tolerance Tolerated well 2/27/2018  5:00 PM   Dressing Status Intact 2/26/2018 12:09 PM       Wound 02/20/18 Other (Comment) Toe (Comment  which one) Right      Tip or R great toe with partial thickness wound-  (Active)   Wound Description Clean;Dry;Brown 2/27/2018  5:00 PM   Mary-wound Assessment Clean;Dry; Intact 2/27/2018  5:00 PM   Shape round 2/27/2018  5:00 PM   Wound Length (cm) 0 6 cm 2/27/2018  5:00 PM   Wound Width (cm) 0 6 cm 2/27/2018  5:00 PM   Wound Depth (cm) 0 1 2/20/2018 11:00 AM   Calculated Wound Volume (cm^3) 0 05 cm^3 2/20/2018 11:00 AM   Drainage Amount None 2/27/2018  5:00 PM   Non-staged Wound Description Not applicable 7/04/6827  7:56 PM   Treatments Site care 2/27/2018  5:00 PM   Dressing Other (Comment) 2/27/2018  5:00 PM   Patient Tolerance Tolerated well 2/27/2018  5:00 PM   Dressing Status Open to air 2/27/2018  4:00 PM         Wound care will follow weekly - call with questions [phone] , Shaina Rodríguez RN BSN [facility] [employer] [facility]

## 2018-02-28 NOTE — PROGRESS NOTES
Progress Note - Podiatry  Cindy Garcia 50 y o  male MRN: [id number]  Unit/Bed#: MICU 07 Encounter: [id number]    Assessment/Plan:  1  Right distal hallux eschar with underlying chronic osteomyelitis  2  PRANAY     - Right hallux continues to be stable with no acute clinical signs of infection noted  No ascending cellulitis, no purulent drainage, no crepitus felt  - patient currently on Zosyn per Infectious Disease   - patient continues to refuse right hallux amputation  Podiatry will continue local wound care while in-house with Betadine paint  - podiatry will see qweekly and have nursing apply Betadine paint Tuesday Thursday Saturday    Subjective/Objective   Chief Complaint:   Chief Complaint   Patient presents with    Ascites     Pt "I need something for my belly  I got tapped for the first time two weeks ago  And its all hard and big again " Roomate "He is suppose to be on liver medication but its too expensive and the doctor wont prescribe anything" Pt c/o SOB "sometimes"        Subjective: 50 y o  y/o male was seen and evaluated at bedside in no acute distress, nontoxic in appearance  Patient denies any recent nausea, vomiting, fever, chills, shortness of breath, chest pains       Blood pressure 102/69, pulse 100, temperature 98 3 °F (36 8 °C), temperature source Oral, resp  rate 16, height 5' 10" (1 778 m), weight 83 5 kg (184 lb 1 4 oz), SpO2 98 %  ,Body mass index is 26 41 kg/m²  Invasive Devices     Peripheral Intravenous Line            Peripheral IV 02/25/18 Right Arm 3 days    Peripheral IV 02/28/18 Left Antecubital less than 1 day          Line            HD Cath Double 6 days          Drain            NG/OG/Enteral Tube Nasogastric Right nares 6 days    Rectal Tube With balloon 5 days                Physical Exam:   General: Alert, cooperative and no distress  Lungs: Non labored breathing  Heart: Positive S1, S2  Abdomen: Soft, non-tender  Extremity:     NVS at baseline B/l  MSK function at B/l  No calf tenderness noted B/l     RLE:  Right hallux has stable dry eschar at distal aspect  No acute clinical signs of infection noted  No ascending cellulitis, no purulent drainage, no crepitus felt  LLE: No pathology noted  Right hallux      Lab, Imaging and other studies:   I have personally reviewed pertinent lab results  , CBC:   Lab Results   Component Value Date    WBC 24 84 (H) 02/28/2018    HGB 8 7 (L) 02/28/2018    HCT 25 8 (L) 02/28/2018    MCV 96 02/28/2018    PLT 34 (LL) 02/28/2018    MCH 32 2 02/28/2018    MCHC 33 7 02/28/2018    RDW 23 3 (H) 02/28/2018    NRBC 0 02/28/2018   , CMP:   Lab Results   Component Value Date     02/28/2018    K 3 4 (L) 02/28/2018     02/28/2018    CO2 27 02/28/2018    ANIONGAP 10 02/28/2018    BUN 65 (H) 02/28/2018    CREATININE 4 67 (H) 02/28/2018    GLUCOSE 164 (H) 02/28/2018    CALCIUM 8 7 02/28/2018    AST 73 (H) 02/28/2018    ALT 78 02/28/2018    ALKPHOS 268 (H) 02/28/2018    PROT 6 9 02/28/2018    BILITOT 4 47 (H) 02/28/2018    EGFR 14 02/28/2018       Imaging: I have personally reviewed pertinent films in PACS  EKG, Pathology, and Other Studies: I have personally reviewed pertinent reports

## 2018-02-28 NOTE — PROGRESS NOTES
We had a family meeting involving the patient's 2 sons, Rosendo Frank and Beth Ace, and his mother  Also present at the meeting were our palliative care advance practitioner and , our medical critical care , Dr Karina Draper are MICU intern and 1 of our medical students  We discussed the patient's current course of therapy and that while he has not made significant improvements from a neurologic standpoint he has also not had worsening of his status  We discussed that he is very sick, there is no cure for his liver disease and that we are concerned he will need prolonged dialysis  There are discussions amongst the family that they feel the patient might not want long-term dialysis  Both of his children however believe that we should continue things in till he is able to be more awake, interactive and help us make decisions  His youngest son did state that the patient would want to die at home however he believes that we should continue treating the patient as he is hope is for him to live a much longer life  His mother is a former palliative care/hospice nurse  We had discussions of hospice  His sons are not at the point for him to be enrolled in hospice  We discussed his code status  I do not believe it is in the patient's best benefit to undergo intubation  They are quite adamant they would not want long-term ventilation  We discussed that if he progresses and needs mechanical ventilation it would likely be long-term  They are agreeable to transition to a level 3 code status  We will plan to Re group in 4 days for a family update      Total meeting time/critical care time 45 minutes

## 2018-03-01 ENCOUNTER — APPOINTMENT (INPATIENT)
Dept: RADIOLOGY | Facility: HOSPITAL | Age: 53
DRG: 264 | End: 2018-03-01
Payer: COMMERCIAL

## 2018-03-01 ENCOUNTER — APPOINTMENT (INPATIENT)
Dept: DIALYSIS | Facility: HOSPITAL | Age: 53
DRG: 264 | End: 2018-03-01
Attending: INTERNAL MEDICINE
Payer: COMMERCIAL

## 2018-03-01 PROBLEM — I48.0 PAROXYSMAL A-FIB (HCC): Status: ACTIVE | Noted: 2018-03-01

## 2018-03-01 LAB
ANION GAP SERPL CALCULATED.3IONS-SCNC: 10 MMOL/L (ref 4–13)
ANISOCYTOSIS BLD QL SMEAR: PRESENT
ATRIAL RATE: 89 BPM
BASOPHILS # BLD MANUAL: 0 THOUSAND/UL (ref 0–0.1)
BASOPHILS NFR MAR MANUAL: 0 % (ref 0–1)
BUN SERPL-MCNC: 54 MG/DL (ref 5–25)
BURR CELLS BLD QL SMEAR: PRESENT
CALCIUM SERPL-MCNC: 9 MG/DL (ref 8.3–10.1)
CHLORIDE SERPL-SCNC: 105 MMOL/L (ref 100–108)
CO2 SERPL-SCNC: 27 MMOL/L (ref 21–32)
CREAT SERPL-MCNC: 3.87 MG/DL (ref 0.6–1.3)
EOSINOPHIL # BLD MANUAL: 0 THOUSAND/UL (ref 0–0.4)
EOSINOPHIL NFR BLD MANUAL: 0 % (ref 0–6)
ERYTHROCYTE [DISTWIDTH] IN BLOOD BY AUTOMATED COUNT: 23.7 % (ref 11.6–15.1)
GFR SERPL CREATININE-BSD FRML MDRD: 17 ML/MIN/1.73SQ M
GLUCOSE SERPL-MCNC: 142 MG/DL (ref 65–140)
GLUCOSE SERPL-MCNC: 167 MG/DL (ref 65–140)
GLUCOSE SERPL-MCNC: 170 MG/DL (ref 65–140)
GLUCOSE SERPL-MCNC: 176 MG/DL (ref 65–140)
GLUCOSE SERPL-MCNC: 232 MG/DL (ref 65–140)
HCT VFR BLD AUTO: 23.5 % (ref 36.5–49.3)
HGB BLD-MCNC: 7.9 G/DL (ref 12–17)
LYMPHOCYTES # BLD AUTO: 0.8 THOUSAND/UL (ref 0.6–4.47)
LYMPHOCYTES # BLD AUTO: 4 % (ref 14–44)
MACROCYTES BLD QL AUTO: PRESENT
MCH RBC QN AUTO: 32.5 PG (ref 26.8–34.3)
MCHC RBC AUTO-ENTMCNC: 33.6 G/DL (ref 31.4–37.4)
MCV RBC AUTO: 97 FL (ref 82–98)
MONOCYTES # BLD AUTO: 1 THOUSAND/UL (ref 0–1.22)
MONOCYTES NFR BLD: 5 % (ref 4–12)
NEUTROPHILS # BLD MANUAL: 18.15 THOUSAND/UL (ref 1.85–7.62)
NEUTS SEG NFR BLD AUTO: 91 % (ref 43–75)
NRBC BLD AUTO-RTO: 0 /100 WBCS
P AXIS: 69 DEGREES
PLATELET # BLD AUTO: 23 THOUSANDS/UL (ref 149–390)
PLATELET BLD QL SMEAR: ABNORMAL
POIKILOCYTOSIS BLD QL SMEAR: PRESENT
POTASSIUM SERPL-SCNC: 3.8 MMOL/L (ref 3.5–5.3)
PR INTERVAL: 129 MS
QRS AXIS: 76 DEGREES
QRSD INTERVAL: 96 MS
QT INTERVAL: 358 MS
QTC INTERVAL: 436 MS
RBC # BLD AUTO: 2.43 MILLION/UL (ref 3.88–5.62)
RBC MORPH BLD: PRESENT
SODIUM SERPL-SCNC: 142 MMOL/L (ref 136–145)
T WAVE AXIS: -2 DEGREES
TARGETS BLD QL SMEAR: PRESENT
VENTRICULAR RATE: 89 BPM
WBC # BLD AUTO: 19.95 THOUSAND/UL (ref 4.31–10.16)

## 2018-03-01 PROCEDURE — 71045 X-RAY EXAM CHEST 1 VIEW: CPT

## 2018-03-01 PROCEDURE — 85007 BL SMEAR W/DIFF WBC COUNT: CPT | Performed by: INTERNAL MEDICINE

## 2018-03-01 PROCEDURE — 94640 AIRWAY INHALATION TREATMENT: CPT

## 2018-03-01 PROCEDURE — 94760 N-INVAS EAR/PLS OXIMETRY 1: CPT

## 2018-03-01 PROCEDURE — 80048 BASIC METABOLIC PNL TOTAL CA: CPT | Performed by: INTERNAL MEDICINE

## 2018-03-01 PROCEDURE — 99233 SBSQ HOSP IP/OBS HIGH 50: CPT | Performed by: INTERNAL MEDICINE

## 2018-03-01 PROCEDURE — 85027 COMPLETE CBC AUTOMATED: CPT | Performed by: INTERNAL MEDICINE

## 2018-03-01 PROCEDURE — 94640 AIRWAY INHALATION TREATMENT: CPT | Performed by: SOCIAL WORKER

## 2018-03-01 PROCEDURE — 99232 SBSQ HOSP IP/OBS MODERATE 35: CPT | Performed by: INTERNAL MEDICINE

## 2018-03-01 PROCEDURE — 82948 REAGENT STRIP/BLOOD GLUCOSE: CPT

## 2018-03-01 RX ORDER — HALOPERIDOL 5 MG/ML
1 INJECTION INTRAMUSCULAR ONCE
Status: COMPLETED | OUTPATIENT
Start: 2018-03-01 | End: 2018-03-01

## 2018-03-01 RX ORDER — HALOPERIDOL 5 MG/ML
2 INJECTION INTRAMUSCULAR ONCE
Status: COMPLETED | OUTPATIENT
Start: 2018-03-01 | End: 2018-03-01

## 2018-03-01 RX ORDER — POTASSIUM CHLORIDE 20 MEQ/1
40 TABLET, EXTENDED RELEASE ORAL ONCE
Status: COMPLETED | OUTPATIENT
Start: 2018-03-01 | End: 2018-03-01

## 2018-03-01 RX ADMIN — RIFAXIMIN 550 MG: 550 TABLET ORAL at 09:01

## 2018-03-01 RX ADMIN — MIDODRINE HYDROCHLORIDE 5 MG: 5 TABLET ORAL at 16:25

## 2018-03-01 RX ADMIN — LEVALBUTEROL HYDROCHLORIDE 1.25 MG: 1.25 SOLUTION, CONCENTRATE RESPIRATORY (INHALATION) at 13:47

## 2018-03-01 RX ADMIN — ISODIUM CHLORIDE 3 ML: 0.03 SOLUTION RESPIRATORY (INHALATION) at 13:47

## 2018-03-01 RX ADMIN — LEVALBUTEROL HYDROCHLORIDE 1.25 MG: 1.25 SOLUTION, CONCENTRATE RESPIRATORY (INHALATION) at 20:23

## 2018-03-01 RX ADMIN — INSULIN LISPRO 1 UNITS: 100 INJECTION, SOLUTION INTRAVENOUS; SUBCUTANEOUS at 05:54

## 2018-03-01 RX ADMIN — Medication 1 TABLET: at 09:34

## 2018-03-01 RX ADMIN — ISODIUM CHLORIDE 3 ML: 0.03 SOLUTION RESPIRATORY (INHALATION) at 20:23

## 2018-03-01 RX ADMIN — INSULIN LISPRO 1 UNITS: 100 INJECTION, SOLUTION INTRAVENOUS; SUBCUTANEOUS at 00:33

## 2018-03-01 RX ADMIN — PREDNISONE 25 MG: 5 TABLET ORAL at 09:02

## 2018-03-01 RX ADMIN — RIFAXIMIN 550 MG: 550 TABLET ORAL at 21:31

## 2018-03-01 RX ADMIN — HALOPERIDOL LACTATE 2 MG: 5 INJECTION, SOLUTION INTRAMUSCULAR at 03:56

## 2018-03-01 RX ADMIN — OCTREOTIDE ACETATE 100 MCG: 100 INJECTION, SOLUTION INTRAVENOUS; SUBCUTANEOUS at 05:54

## 2018-03-01 RX ADMIN — LEVALBUTEROL HYDROCHLORIDE 1.25 MG: 1.25 SOLUTION, CONCENTRATE RESPIRATORY (INHALATION) at 08:49

## 2018-03-01 RX ADMIN — OCTREOTIDE ACETATE 100 MCG: 100 INJECTION, SOLUTION INTRAVENOUS; SUBCUTANEOUS at 14:14

## 2018-03-01 RX ADMIN — FOLIC ACID 1 MG: 1 TABLET ORAL at 09:02

## 2018-03-01 RX ADMIN — Medication 100 MG: at 09:01

## 2018-03-01 RX ADMIN — LACTULOSE 20 G: 20 SOLUTION ORAL at 09:01

## 2018-03-01 RX ADMIN — OCTREOTIDE ACETATE 100 MCG: 100 INJECTION, SOLUTION INTRAVENOUS; SUBCUTANEOUS at 21:35

## 2018-03-01 RX ADMIN — LACTULOSE 20 G: 20 SOLUTION ORAL at 21:31

## 2018-03-01 RX ADMIN — POTASSIUM CHLORIDE 40 MEQ: 1500 TABLET, EXTENDED RELEASE ORAL at 09:01

## 2018-03-01 RX ADMIN — INSULIN LISPRO 3 UNITS: 100 INJECTION, SOLUTION INTRAVENOUS; SUBCUTANEOUS at 14:15

## 2018-03-01 RX ADMIN — LACTULOSE 20 G: 20 SOLUTION ORAL at 16:25

## 2018-03-01 RX ADMIN — ISODIUM CHLORIDE 3 ML: 0.03 SOLUTION RESPIRATORY (INHALATION) at 08:49

## 2018-03-01 RX ADMIN — INSULIN LISPRO 1 UNITS: 100 INJECTION, SOLUTION INTRAVENOUS; SUBCUTANEOUS at 18:40

## 2018-03-01 RX ADMIN — PHYTONADIONE 10 MG: 10 INJECTION, EMULSION INTRAMUSCULAR; INTRAVENOUS; SUBCUTANEOUS at 14:14

## 2018-03-01 RX ADMIN — HALOPERIDOL LACTATE 1 MG: 5 INJECTION, SOLUTION INTRAMUSCULAR at 03:18

## 2018-03-01 NOTE — CASE MANAGEMENT
Continued Stay Review    Date: 3/1/18    Vital Signs: /88   Pulse 92   Temp 97 7 °F (36 5 °C) (Oral)   Resp 13   Ht 5' 10" (1 778 m)   Wt 78 2 kg (172 lb 6 4 oz)   SpO2 100%   BMI 24 74 kg/m²     Medications:   Scheduled Meds:   Current Facility-Administered Medications:  albuterol 2 5 mg Nebulization Q4H PRN Charis Santana DO    dexmedetomidine 0 1-0 7 mcg/kg/hr Intravenous Titrated Romana Taylor DO Last Rate: Stopped (49/54/71 0541)   folic acid 1 mg Oral Daily Mariama Hurtado, DO    influenza vaccine 0 5 mL Intramuscular Prior to discharge Mamie Jauregui MD    insulin lispro 1-6 Units Subcutaneous Q6H [address] Konrad Callahan PA-C    lactulose 20 g Oral TID Evette Regan MD    levalbuterol 1 25 mg Nebulization TID Charis Santana DO    [facility]od[facility] 5 [facility]    multivitamin 1 tablet Oral Daily Konrad Callahan PA-C    octreotide 100 mcg Intravenous Q8H [address] Matikayla Glasgow DO    ondansetron 4 mg Intravenous Q8H PRN Mariama Hurtado DO    pantoprazole 40 mg Oral Early Morning Evette Regan MD    predniSONE 25 mg Oral Daily Antonio Gutiérrez MD    rifaximin 550 mg Oral Q12H [address] Raghav Branch PA-C    sodium chloride 1 application Nasal F9C PRN Konrad Callahan PA-C    sodium chloride 3 mL Nebulization TID Charis Santana DO    thiamine 100 mg Oral Daily Mariama Hurtado, DO      Continuous Infusions:   dexmedetomidine 0 1-0 7 mcg/kg/hr Last Rate: Stopped (02/26/18 1025)     PRN Meds:   albuterol    influenza vaccine    ondansetron    sodium chloride    Abnormal Labs/Diagnostic Results:   BUN/Cr 54/3 87  Glucose 142   Ref Range & Units 03/01/18 0428   WBC 4 31 - 10 16 Thousand/uL 19 95     RBC 3 88 - 5 62 Million/uL 2 43     Hemoglobin 12 0 - 17 0 g/dL 7 9     Hematocrit 36 5 - 49 3 % 23 5     MCV 82 - 98 fL 97    MCH 26 8 - 34 3 pg 32 5    MCHC 31 4 - 37 4 g/dL 33 6    RDW 11 6 - 15 1 % 23 7     Platelets 491 - 987 Thousands/uL 23       POC glucose 176, 167, 232    Age/Sex: 50 y o  male Assessment/Plan:     Remains in MICU    Decompensated hepatic cirrhosis (HCC)  Active Problems:    Hepatic encephalopathy (HCC)    PRANAY (acute kidney injury) ([facility] [address] )    Coagulopathy (HCC)    Ascites    Hypoalbuminemia    Hyponatremia    Sepsis (HCC)    Hyperammonemia (HCC)    Lactic acidosis    Elevated alkaline phosphatase level    Anemia of chronic disease    Osteomyelitis of toe of right foot (HCC)    Alcohol abuse    Alcoholic hepatitis    Cough    Hypoxia    Oliguria    Leukocytosis    Aspiration pneumonia of right lower lobe (HCC)    Paroxysmal a-fib (HCC)  Resolved Problems:    Abdominal pain    Hypotension     Plan:      Neuro:      Enchephalopathy: Improving   -likely multifactorial due to liver failure, sepsis and  ESRD   -Patient continues to display confabulation, Mentation has improved however according to family he is not at baseline  -Received 6 mg Haldol overnight d/t agitation     Cv:      Labile Blood Pressure, improving, Likely multifactorial  secondary to hypoalbuminemia secondary to liver failure (CHF ruled out with recent echo)              Arterial Line BP: 132/74              Arterial Line MAP (mmHg): 98 mmHg              HR:  [] 90              BP: ()/(55-89) 172/85  -Continue albumin boluses 25% PRN for hypotension  for pressure support (map goal > 65)  -Midodrine 5mg TID currently held d/t elevated SBP ~170's  -Continue Cardizem gtt PRN  (given today d/t elevated SBP)     Paroxysmal Afib: New onset (no prior recorded history)     -ECG obtained 2/27 showing irregulary irregular rhythm with no discernable Pwaves  -No events on telemetry overnight   -s/p Cardizem gtt 2/27 d/t second episodes of Afib  -Continue Cardizem gtt PRN     Lung:      Pneumonia, RLL, Improving, possibly secondary to aspiration vs HCAP  -Completed 7 day course of Zosyn (2/28)  -Continue Xopenex and saline nebs scheduled with albuterol nebs PRN     Acute Hypoxemic Respiratory Failure, Significant improvement  -CXR 2/26 showed pulm edema, repeat CXR 3/1 significant improvement   -Weaned off Bipap 8/14  FiO2 100% (ABGs wnl)  -Weaned off HiFlo 2/28, now on NC 5L Saturating >95%, Will continue to wean off as tolerated     Gi:      Acute decompensated alcoholic hepatic cirrhosis, with recurrent ascites  -Last paracentesis on 2/21 with removal of ~4L  -GI onboard, appreciate their recs  -Continue Rifaximin  -Currently on Prednisone via Tf, 25 mg PO daily, will continue to taper daily  -Continue octreotide 100 mcg daily for Esophageal varices       FEN:     -No IVF's   Will continue to monitor BMP and correct lytes PRN  -Patient passed speech/swallow eval and will be transitioned to PO renal dialyis diet      :      PRANAY (though eGFR 15 and on dialysis, does not meet criteria for ESRD since no prior history of CKD), PRANAY likely hepatorenal syndrome vs ATN  -BUN/Cr on 3/1: 54/3 8   -HD catheter R IJ  -Hemodialysis per Nephrology  -Dialysis secheduled qOD, next session 3/2 (qOD)     ID:      Pneumonia, RLL, improved, per radiology findings on CXR 2/26 likely not PNA  -Patient continues to remain afebrile, however WBC's trending up to 24 from 24 from 18  -7 day course of Zosyn completed 2/28  -s/p Ceftriaxone 1g x1 5days ago, [place] d/caitlyn 2/25  d/t negative MRSA Cx     Osteomyelitis, right hallux ulcer an d osteomyelitis, per ID no further antibiotics, but will require toe amputation which pt   Refuses  -ID on board, appreciate their recs, no antibiotics indicated     Heme:      Coagulopathy:  Fibrinogen level <60 on 2/27, normalized to 148 on 2/28 s/p Cryoprecipitate infusion     Thrombocytopenia, worsening, likely secondary to liver failure  -Platelet count 23 down from 34 from  47 on 2/27 47  -INR 2 88 on 2/27 (last check 2/22 was 3 12)  -hold DVT prophylaxis      Anemia- stable Hgb, received 2 units  -Hb stable 7 9 baseline ~8     Endo:     -No acute concerns (no Hx of DM)  -Glucose on  on 3/1     Msk/Skin:      Frequent position changing/turning  Routine skin care     Disposition:       Continue with current level of ICU care, per family meeting on 2/28, pt is now DNR/DNI     Chief Complaint:           Chief Complaint   Patient presents with    Ascites       Pt "I need something for my belly  I got tapped for the first time two weeks ago  And its all hard and big again " Roomate "He is suppose to be on liver medication but its too expensive and the doctor wont prescribe anything" Pt c/o SOB "sometimes"       HPI/24hr events:     Agitated overnight  Removed NG tube  Received total of 6mg Haldol  Denies pain this morning  Willing to try Po   ___________________________  3/1 ID Progress Note  1   Aspiration pneumonia   Given significant comorbid illnesses and recent hospitalizations, patient is at risk for nosocomial pathogens, especially nosocomial gram negatives and MRSA    Patient completed antibiotic course  He remains stable  Observe off antibiotic      2   Acute superimposed on chronic hypoxic respiratory failure   This is most likely secondary to aspiration above   Patient is clinically much improved, not on any O2 support  Monitor respiratory symptoms      3   Encephalopathy   This is most likely secondary to aspiration pneumonia above, superimposed on baseline hepatic encephalopathy   Mental status slowly improving    Monitor mental status      4   Recurrent fever with leukocytosis   Suspect recurrent aspiration   With patient clinically stable, monitor for now   Hopefully, this is pneumonitis and not recurrent pneumonia   Temperature is down again but WBC remains up   Leukocytosis most likely secondary to cirrhosis, hypersplenism and steroid effect      5  Right great toe gangrenous ulcer and osteomyelitis   There is no evidence of cellulitis clinically   Patient has no fever leukocytosis   No antibiotic is necessary   However, patient needs to have this toe amputated, to avoid development of wet gangrene   Patient has been refusing toe amputation  No antibiotic needed at present time  Monitor toe  Recommend toe amputation eventually      6   Decompensated cirrhosis   This is due to patient's noncompliance with prescribed treatment plan  Verta Rosie is no evidence of peritonitis clinically   Patient is status post paracentesis with benign peritoneal fluid   Systemic corticosteroid started  Patient is status post repeat paracentesis, with benign parameters also   Even with active pneumonia, it should be fine to continue systemic corticosteroid  No antibiotic needed  Management per GI and primary service      7   PRANAY   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   Dehydration may contribute   Creatinine worsened   Patient is now on HD  Antibiotic dosages adjusted accordingly    Monitor creatinine      Discussed with patient in detail regarding above plan      Antibiotics:  Off antibiotic    Discharge Plan: Target assessment in process

## 2018-03-01 NOTE — PROGRESS NOTES
Progress Note - Critical Care   Tacho Sandoval 50 y o  male MRN: [id number]  Unit/Bed#: MICU 07 Encounter: [id number]    Assessment:   Principal Problem:    Decompensated hepatic cirrhosis ([facility] )  Active Problems:    Hepatic encephalopathy (HCC)    PRANAY (acute kidney injury) ([facility] )    Coagulopathy ([place] [id number] )    Ascites    Hypoalbuminemia    Hyponatremia    Sepsis (HCC)    Hyperammonemia (HCC)    Lactic acidosis    Elevated alkaline phosphatase level    Anemia of chronic disease    Osteomyelitis of toe of right foot (HCC)    Alcohol abuse    Alcoholic hepatitis    Cough    Hypoxia    Oliguria    Leukocytosis    Aspiration pneumonia of right lower lobe (HCC)    Paroxysmal a-fib (HCC)  Resolved Problems:    Abdominal pain    Hypotension      Plan:      Neuro:     Enchephalopathy: Improving   -likely multifactorial due to liver failure, sepsis and  ESRD   -Patient continues to display confabulation, Mentation has improved however according to family he is not at baseline  -Received 6 mg Haldol overnight d/t agitation     Cv:     Labile Blood Pressure, improving, Likely multifactorial  secondary to hypoalbuminemia secondary to liver failure (CHF ruled out with recent echo)   Arterial Line BP: 132/74   Arterial Line MAP (mmHg): 98 mmHg   HR:  [] 90   BP: ()/(55-89) 172/85  -Continue albumin boluses 25% PRN for hypotension  for pressure support (map goal > 65)  -Midodrine 5mg TID currently held d/t elevated SBP ~170's  -Continue Cardizem gtt PRN  (given today d/t elevated SBP)    Paroxysmal Afib: New onset (no prior recorded history)     -ECG obtained 2/27 showing irregulary irregular rhythm with no discernable Pwaves  -No events on telemetry overnight   -s/p Cardizem gtt 2/27 d/t second episodes of Afib  -Continue Cardizem gtt PRN       Lung:     Pneumonia, RLL, Improving, possibly secondary to aspiration vs HCAP  -Completed 7 day course of Zosyn (2/28)  -Continue Xopenex and saline nebs scheduled with albuterol nebs PRN     Acute Hypoxemic Respiratory Failure, Significant improvement  -CXR 2/26 showed pulm edema, repeat CXR 3/1 significant improvement   -Weaned off Bipap 8/14  FiO2 100% (ABGs wnl)  -Weaned off HiFlo 2/28, now on NC 5L Saturating >95%, Will continue to wean off as tolerated    Gi:     Acute decompensated alcoholic hepatic cirrhosis, with recurrent ascites  -Last paracentesis on 2/21 with removal of ~4L  -GI onboard, appreciate their recs  -Continue Rifaximin  -Currently on Prednisone via Tf, 25 mg PO daily, will continue to taper daily  -Continue octreotide 100 mcg daily for Esophageal varices        FEN:    -No IVF's   Will continue to monitor BMP and correct lytes PRN  -Patient passed speech/swallow eval and will be transitioned to PO renal dialyis diet      :     PRANAY (though eGFR 15 and on dialysis, does not meet criteria for ESRD since no prior history of CKD), PRANAY likely hepatorenal syndrome vs ATN  -BUN/Cr on 3/1: 54/3 8   -HD catheter R IJ  -Hemodialysis per Nephrology  -Dialysis secheduled qOD, next session 3/2 (qOD)     ID:     Pneumonia, RLL, improved, per radiology findings on CXR 2/26 likely not PNA  -Patient continues to remain afebrile, however WBC's trending up to 24 from 24 from 18  -7 day course of Zosyn completed 2/28  -s/p Ceftriaxone 1g x1 5days ago, [place] d/caitlyn 2/25  d/t negative MRSA Cx    Osteomyelitis, right hallux ulcer an d osteomyelitis, per ID no further antibiotics, but will require toe amputation which pt   Refuses  -ID on board, appreciate their recs, no antibiotics indicated     Heme:     Coagulopathy:  Fibrinogen level <60 on 2/27, normalized to 148 on 2/28 s/p Cryoprecipitate infusion    Thrombocytopenia, worsening, likely secondary to liver failure  -Platelet count 23 down from 34 from  47 on 2/27 47  -INR 2 88 on 2/27 (last check 2/22 was 3 12)  -hold DVT prophylaxis      Anemia- stable Hgb, received 2 units  -Hb stable 7 9 baseline ~8     Endo:    -No acute concerns (no Hx of DM)  -Glucose on  on 3/1                Msk/Skin:     Frequent position changing/turning  Routine skin care     Disposition:      Continue with current level of ICU care, per family meeting on , pt is now DNR/DNI     ___________________________________________     Chief Complaint:        Chief Complaint   Patient presents with    Ascites       Pt "I need something for my belly  I got tapped for the first time two weeks ago  And its all hard and big again " Roomate "He is suppose to be on liver medication but its too expensive and the doctor wont prescribe anything" Pt c/o SOB "sometimes"          HPI/24hr events:     Agitated overnight  Removed NG tube  Received total of 6mg Haldol  Denies pain this morning  Willing to try PO  Physical Exam:  ______________________________________________________________________  Vitals:    18 0500 18 0600 18 0653 18 0723   BP: (!) 178/84 (!) 172/85 (!) 174/81 (!) 174/94   BP Location:       Pulse: 86 90 92 86   Resp: (!) 11 13 17 19   Temp:       TempSrc:       SpO2: 100% 100% 98% 96%   Weight:       Height:         Arterial Line BP: 132/74  Arterial Line MAP (mmHg): 98 mmHg  Temp:  [97 7 °F (36 5 °C)-98 4 °F (36 9 °C)] 98 4 °F (36 9 °C)  HR:  [] 86  Resp:  [10-30] 19  BP: ()/(55-94) 174/94  FiO2 (%):  [40-50] 40     Temperature:   Temp (24hrs), Av 2 °F (36 8 °C), Min:97 7 °F (36 5 °C), Max:98 4 °F (36 9 °C)    Current Temperature: 98 4 °F (36 9 °C)     Physical Exam:  General:  NAD, Agitated  Eyes: PERRL,EOMI, Sclera anicteric, no icterus, no d/c  Neck:  supple, FROM  Lungs: Breath sounds improving   Crackles in b/l low lung bases and diffuse ronchi persist  Cardiac:  RRR, normal S1S2  Abdomen:  Soft, non-tender, distended abdomen  Extremities:  Non-tender, no edema, gangrenuos right hallux (stabl from previously)  Skin:  Warm and dry  Neurological: Awake, alert, continues to confabulate, oriented to person only  Moves all four extremities  Mental status has improved significantly compared to 2/28  Weights:   IBW: 73 kg    Body mass index is 24 74 kg/m²  Weight (last 2 days)     Date/Time   Weight    03/01/18 0400  78 2 (172 4)    02/28/18 0600  83 5 (184 08)    02/27/18 0600  83 2 (183 42)               Non-Invasive/Invasive Ventilation Settings:  Respiratory    Lab Data (Last 4 hours)    None         O2/Vent Data (Last 4 hours)    None              No results found for: PHART, KUW8SWK, PO2ART, ZVR5BGR, E7EGBMAJ, BEART, SOURCE    Intake and Outputs:  I/O       02/27 0701 - 02/28 0700 02/28 0701 - 03/01 0700    P  O  120 440    I V  (mL/kg) 547 3 (6 6) 516 3 (6 6)    Blood  244    Other 200     NG/ 220    IV Piggyback 230 60    Feedings 848     Total Intake(mL/kg) 2245 3 (26 9) 1480 3 (18 9)    Urine (mL/kg/hr) 0 (0) 448 (0 2)    Emesis/NG output 20 (0) 0 (0)    Other 383 (0 2) 3500 (1 9)    Stool 1000 (0 5) 1100 (0 6)    Total Output 1403 5048    Net +842 3 -3567 8          Unmeasured Urine Occurrence 2 x         UOP: ~0 5L/24hrs  Net Minus: 3 5L/24hrs (3 5 dialyzed)    Nutrition:        Diet Orders            Start     Ordered    02/27/18 1724  Diet Renal; Renal (Dialysis); Sodium 2 Gm, Hi Pro/Hi Chase  Diet effective now     Question Answer Comment   Diet Type Renal    Renal Renal (Dialysis)    Other Restriction(s): Sodium 2 Gm    Other Restriction(s): Hi Pro/Hi Chase    RD to adjust diet per protocol?  Yes        02/27/18 1730          Labs:     Results from last 7 days  Lab Units 03/01/18  0428 02/28/18  0523 02/27/18  0655 02/26/18  0431 02/25/18  0443 02/24/18  0449   WBC Thousand/uL 19 95* 24 84* 24 45* 18 54* 21 79* 11 16*   HEMOGLOBIN g/dL 7 9* 8 7* 8 5* 7 4* 8 1* 7 8*   HEMATOCRIT % 23 5* 25 8* 24 7* 21 2* 23 6* 21 8*   PLATELETS Thousands/uL 23* 34* 47* 46* 48* 40*   NEUTROS PCT %  --   --   --  88* 90* 87*   MONOS PCT %  --   --   --  7 5 7   MONO PCT MAN % 5 7 4  --   --   --       Results from last 7 days  Lab Units 03/01/18  0428 02/28/18  0523 02/27/18  0415  02/25/18  0443   SODIUM mmol/L 142 142 141  < > 137   POTASSIUM mmol/L 3 8 3 4* 3 6  < > 4 2   CHLORIDE mmol/L 105 105 104  < > 101   CO2 mmol/L 27 27 29  < > 28   BUN mg/dL 54* 65* 48*  < > 43*   CREATININE mg/dL 3 87* 4 67* 3 90*  < > 3 88*   CALCIUM mg/dL 9 0 8 7 8 5  < > 8 4   TOTAL PROTEIN g/dL  --  6 9 6 2*  --  6 1*   BILIRUBIN TOTAL mg/dL  --  4 47* 4 03*  --  4 24*   ALK PHOS U/L  --  268* 208*  --  64   ALT U/L  --  78 57  --  31   AST U/L  --  73* 68*  --  38   GLUCOSE RANDOM mg/dL 142* 164* 191*  < > 145*   < > = values in this interval not displayed  Results from last 7 days  Lab Units 02/28/18  0523 02/27/18  0415 02/26/18  0431   MAGNESIUM mg/dL 2 5 2 4 2 5       Results from last 7 days  Lab Units 02/28/18  0523 02/26/18  0431 02/25/18  0443   PHOSPHORUS mg/dL 3 5 5 5* 5 0*        Results from last 7 days  Lab Units 02/27/18  0444   INR  2 88*       Results from last 7 days  Lab Units 02/22/18  0817   LACTIC ACID mmol/L 3 3*     No results found for: TROPONINI  Imaging: CXR 3/1, improved pulm edema  EKG: No new ECG  Micro:  Lab Results   Component Value Date    BLOODCX No Growth After 5 Days  02/21/2018    BLOODCX No Growth After 5 Days  02/21/2018    BLOODCX No Growth After 5 Days  02/17/2018    BLOODCX No Growth After 5 Days   02/17/2018     Allergies: No Known Allergies  Medications:   Scheduled Meds:    Current Facility-Administered Medications:  albuterol 2 5 mg Nebulization Q4H PRN Charis Santana DO    dexmedetomidine 0 1-0 7 mcg/kg/hr Intravenous Titrated Clayton Thompson DO Last Rate: Stopped (26/33/08 1445)   folic acid 1 mg Oral Daily Mariama Hurtado DO    influenza vaccine 0 5 mL Intramuscular Prior to discharge Eliana Villafana MD    insulin lispro 1-6 Units Subcutaneous Q6H [address] Ramirez Swenson PA-C    lactulose 20 g Oral TID Irma Rachel MD    levalbuterol 1 25 mg Nebulization TID Charis Santana DO    midodrine 5 mg Oral TID AC Lio Bower    multivitamin 1 tablet Oral Daily Ector Meade    octreotide 100 mcg Intravenous [facility] Mati Glasgow,     ondansetron 4 mg Intravenous Q8H PRN Emilia Moffett,     pantoprazole 40 mg Oral Early Morning Fab Wood MD    potassium chloride 40 mEq Oral Once Marita Danielle MD    predniSONE 25 mg Oral Daily Marita Danielle MD    rifaximin 550 mg Oral Q12H [facility] Louisa Patel PA-C    sodium chloride 1 application Nasal U2N PRN Manoj Stockton PA-C    sodium chloride 3 mL Nebulization TID Charis Santana,     thiamine 100 mg Oral Daily Mariama Hurtado DO      Continuous Infusions:    dexmedetomidine 0 1-0 7 mcg/kg/hr Last Rate: Stopped (02/26/18 1025)     PRN Meds:    albuterol 2 5 mg Q4H PRN   influenza vaccine 0 5 mL Prior to discharge   ondansetron 4 mg Q8H PRN   sodium chloride 1 application [id number] PRN     VTE Pharmacologic Prophylaxis:   VTE Mechanical Prophylaxis: sequential compression device  Invasive lines and devices: Invasive Devices     Peripheral Intravenous Line            Peripheral IV 02/28/18 Left Antecubital 1 day          Line            HD Cath Double 7 days          Drain            Rectal Tube With balloon 6 days                   Code Status: Level 3 - DNAR and DNI    Portions of the record may have been created with voice recognition software  Occasional wrong word or "sound a like" substitutions may have occurred due to the inherent limitations of voice recognition software  Read the chart carefully and recognize, using context, where substitutions have occurred      Marita Danielle MD     [facility] PGY-1

## 2018-03-01 NOTE — PROGRESS NOTES
Progress Note - Infectious Disease   Natalia Henley 50 y o  male MRN: [id number]  Unit/Bed#: MICU 07 Encounter: [id number]      Impression/Recommendations:  1   Aspiration pneumonia   Given significant comorbid illnesses and recent hospitalizations, patient is at risk for nosocomial pathogens, especially nosocomial gram negatives and MRSA    Patient completed antibiotic course  He remains stable  Observe off antibiotic      2   Acute superimposed on chronic hypoxic respiratory failure   This is most likely secondary to aspiration above   Patient is clinically much improved, not on any O2 support  Monitor respiratory symptoms      3   Encephalopathy   This is most likely secondary to aspiration pneumonia above, superimposed on baseline hepatic encephalopathy   Mental status slowly improving  Monitor mental status      4   Recurrent fever with leukocytosis   Suspect recurrent aspiration   With patient clinically stable, monitor for now   Hopefully, this is pneumonitis and not recurrent pneumonia   Temperature is down again but WBC remains up   Leukocytosis most likely secondary to cirrhosis, hypersplenism and steroid effect      5  Right great toe gangrenous ulcer and osteomyelitis   There is no evidence of cellulitis clinically   Patient has no fever leukocytosis   No antibiotic is necessary   However, patient needs to have this toe amputated, to avoid development of wet gangrene   Patient has been refusing toe amputation  No antibiotic needed at present time  Monitor toe  Recommend toe amputation eventually      6   Decompensated cirrhosis   This is due to patient's noncompliance with prescribed treatment plan   Caodaism is no evidence of peritonitis clinically   Patient is status post paracentesis with benign peritoneal fluid   Systemic corticosteroid started   Patient is status post repeat paracentesis, with benign parameters also   Even with active pneumonia, it should be fine to continue systemic corticosteroid  No antibiotic needed  Management per GI and primary service      7   PRANAY   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   Dehydration may contribute   Creatinine worsened   Patient is now on HD  Antibiotic dosages adjusted accordingly  Monitor creatinine      Discussed with patient in detail regarding above plan      Antibiotics:  Off antibiotic     Subjective:  Patient remains in ICU  Mental status stable  Awake and alert with stable confusion  Abdomen remains distended  No discomfort  Temperature stays down   No chills  Objective:  Vitals:  HR:  [] 100  Resp:  [10-30] 14  BP: ()/(57-94) 168/77  SpO2:  [95 %-100 %] 100 %  Temp (24hrs), Av 1 °F (36 7 °C), Min:97 7 °F (36 5 °C), Max:98 4 °F (36 9 °C)  Current: Temperature: 97 7 °F (36 5 °C)    Physical Exam:     General: Awake, alert, cooperative, no distress  Lungs: Expansion symmetric, no rales, no wheezing, respirations unlabored  Heart[de-identified]  Tachycardic with regular rhythm, S1 and S2 normal, no murmur  Abdomen: Soft, stable distension, non-tender, bowel sounds active all four quadrants,        no masses, no organomegaly  Extremities: Stable leg edema  Right 1st toe with stable chronic changes  Stable necrotic ulcer  No drainage/purulence  Nontender  Skin:  No rash  Invasive Devices     Peripheral Intravenous Line            Peripheral IV 18 Left Antecubital 1 day          Line            HD Cath Double 7 days          Drain            Rectal Tube With balloon 6 days                Labs studies:   I have personally reviewed pertinent labs      Results from last 7 days  Lab Units 18  0428 18  0523 18  0415  18  0443   SODIUM mmol/L 142 142 141  < > 137   POTASSIUM mmol/L 3 8 3 4* 3 6  < > 4 2   CHLORIDE mmol/L 105 105 104  < > 101   CO2 mmol/L 27 27 29  < > 28   ANION GAP mmol/L 10 10 8  < > 8   BUN mg/dL 54* 65* 48*  < > 43*   CREATININE mg/dL 3 87* 4 67* 3 90*  < > 3 88*   EGFR ml/min/1 73sq m 17 14 17  < > 17   GLUCOSE RANDOM mg/dL 142* 164* 191*  < > 145*   CALCIUM mg/dL 9 0 8 7 8 5  < > 8 4   AST U/L  --  73* 68*  --  38   ALT U/L  --  78 57  --  31   ALK PHOS U/L  --  268* 208*  --  64   TOTAL PROTEIN g/dL  --  6 9 6 2*  --  6 1*   BILIRUBIN TOTAL mg/dL  --  4 47* 4 03*  --  4 24*   < > = values in this interval not displayed  Results from last 7 days  Lab Units 03/01/18  0428 02/28/18  0523 02/27/18  0655   WBC Thousand/uL 19 95* 24 84* 24 45*   HEMOGLOBIN g/dL 7 9* 8 7* 8 5*   PLATELETS Thousands/uL 23* 34* 47*       Results from last 7 days  Lab Units 02/22/18  1455   MRSA CULTURE ONLY  No Methicillin Resistant Staphlyococcus aureus (MRSA) isolated       Imaging Studies:   I have personally reviewed pertinent imaging study reports and images in PACS  EKG, Pathology, and Other Studies:   I have personally reviewed pertinent reports

## 2018-03-01 NOTE — PROGRESS NOTES
Progress Note - Nephrology   Manoj Ramirez 50 y o  male MRN: [id number]  Unit/Bed#: MICU 07 Encounter: [id number]    LATE NOTE  Assessment / Plan:  1  PRANAY in setting of decompensated cirrhosis - concern for HRS vs ATN  -b/l Cr 0 5 in 2016, started on HD via temp HD cath 2/21  -patient receiving HD today    -may change to permacath once patient able to lay flat for procedure  ID ok with this as no bacteremia  Of note, INR should be 1 5 and platelets > 50 prior to permacath placement  Plan for this tomorrow, 3/2   -monitor renal indices for signs of renal recovery     2  Decompensated alcoholic cirrhosis with hx of noncompliance - GI follows, not a transplant candidate     3  Encephalopathy - improving on lactulose, likely multifactorial as with cirrhosis and RLL PNA     4  Aspiration PNA - s/p zosyn per ID, +leukocytosis-concern for recurrent aspiration per ID     5  Mild hyperphosphatemia - likely d/t PRANAY  Continue to monitor  Resolved on RRT      6  Anemia of chronic disease with thrombocytopenia - Hgb now in high 7s-low 8s  Bili elevated  Platelets lower  Concern for DIC - per primary team   -+schistocytes and helmet cells noted on hemolysis smear    -mild LDH elevation noted with low haptoglobin  -VERNELL c3 negative, VERNELL Igg + as well as direct Jimena test+  -fibrin split products, fibrinogen low  - Iron normal, ferritin high, iron sat ok, with low TIBC  Low plts likely d/t cirrhosis but above findings supsicious for hemolysis/DIC       7  Hypotension - on midodrine 5mg TID but BP higher today while patient awake  Goal UF as tolerated      8  Hypokalemia - corrected on HD  9  Volume overload/pulmonary edema - improving with UF on HD  UOP improving      Other issues: right great toe ulcer and osteomyelitis, alcoholic hepatitis  Code status DNR/DNI (level 3)    Have discussed the case with Dr Avery Bernal of critical care        Subjective:   He denies any chest pain but does complain of ongoing shortness of breath  He denies any nausea or vomiting  Objective:     Vitals: Blood pressure 134/71, pulse 102, temperature 98 1 °F (36 7 °C), temperature source Oral, resp  rate 21, height 5' 10" (1 778 m), weight 78 2 kg (172 lb 6 4 oz), SpO2 96 %  ,Body mass index is 24 74 kg/m²  Temp (24hrs), Av 1 °F (36 7 °C), Min:97 7 °F (36 5 °C), Max:98 4 °F (36 9 °C)      Weight (last 2 days)     Date/Time   Weight    18 0400  78 2 (172 4)    18 0600  83 5 (184 08)    18 0600  83 2 (183 42)                Intake/Output Summary (Last 24 hours) at 18 1620  Last data filed at 18 1535   Gross per 24 hour   Intake             1684 ml   Output             2900 ml   Net            -1216 ml     I/O last 24 hours: In: 2460 3 [P O :920; I V :1016 3; Blood:244; NG/GT:220; IV Piggyback:60]  Out: 1620 [Urine:448; Other:5500; Stool:1500]        Physical Exam:   Physical Exam   Constitutional: He appears well-developed  No distress  thin   HENT:   Head: Normocephalic and atraumatic  Mouth/Throat: No oropharyngeal exudate  Eyes: Right eye exhibits no discharge  Left eye exhibits no discharge  No scleral icterus  Neck: Normal range of motion  Neck supple  No thyromegaly present  Cardiovascular: Normal rate, regular rhythm and normal heart sounds  Pulmonary/Chest: Effort normal  He has no wheezes  He has no rales  +coarse BS b/l anteriorly, on Hi Jason NC   Abdominal: Soft  Bowel sounds are normal  He exhibits no distension  There is no tenderness  +rectal tube   Musculoskeletal: Normal range of motion  He exhibits edema (trace LE edema b/l)  Neurological: He is alert  Skin: Skin is warm and dry  No rash noted  He is not diaphoretic  Psychiatric: He has a normal mood and affect  His behavior is normal    Vitals reviewed        Invasive Devices     Peripheral Intravenous Line            Peripheral IV 18 Left Antecubital 1 day          Line            HD Cath Double 8 days          Drain Rectal Tube With balloon 6 days                Medications:    Scheduled Meds:  Current Facility-Administered Medications:  albuterol 2 5 mg Nebulization Q4H PRN Charis Santana DO    dexmedetomidine 0 1-0 7 mcg/kg/hr Intravenous Titrated Stacey Jackson DO Last Rate: Stopped (22/55/80 7797)   folic acid 1 mg Oral Daily Mariama Hurtado, DO    influenza vaccine 0 5 mL Intramuscular Prior to discharge Mohini Dillon MD    insulin lispro 1-6 Units Subcutaneous Q6H [address] Nati Human, PA-C    lactulose 20 g Oral TID Ladan Torres MD    levalbuterol 1 25 mg Nebulization TID Charis Santana DO    midodrine 5 mg Oral TID AC Emmanuel Branch    multivitamin 1 tablet Oral Daily Nati Human, PA-C    octreotide 100 mcg Intravenous Q8H [address] Mati Glasgow, DO    ondansetron 4 mg Intravenous Q8H PRN Mariama Hurtado DO    pantoprazole 40 mg Oral Early Morning Ladan Torres MD    predniSONE 25 mg Oral Daily Alonso Homme, DO    rifaximin 550 mg Oral Q12H [address] Recardo Iliana, PA-C    sodium chloride 1 application Nasal Y9M PRN Damian Human, PA-C    sodium chloride 3 mL Nebulization TID Charis Santana,     thiamine 100 mg Oral Daily Mariama Hurtado, DO        PRN Meds:   albuterol    influenza vaccine    ondansetron    sodium chloride    Continuous Infusions:  dexmedetomidine 0 1-0 7 mcg/kg/hr Last Rate: Stopped (02/26/18 1025)           LAB RESULTS:        Results from last 7 days  Lab Units 03/01/18  0428 02/28/18  0523 02/27/18  0655 02/27/18  0415 02/26/18  0431 02/25/18  0443 02/24/18  0449 02/23/18  1055 02/23/18  0443   WBC Thousand/uL 19 95* 24 84* 24 45*  --  18 54* 21 79* 11 16*  --  7 67   HEMOGLOBIN g/dL 7 9* 8 7* 8 5*  --  7 4* 8 1* 7 8*  --  7 2*   I STAT HEMOGLOBIN g/dl  --   --   --   --   --   --   --  8 2*  --    HEMATOCRIT % 23 5* 25 8* 24 7*  --  21 2* 23 6* 21 8*  --  20 0*   PLATELETS Thousands/uL 23* 34* 47*  --  46* 48* 40*  --  47*   NEUTROS PCT %  --   --   --   --  88* 90* 87*  --  83*   LYMPHS PCT %  --   --   --   --  5* 5* 6*  --  8*   LYMPHO PCT % 4* 4* 1*  --   --   --   --   --   --    MONOS PCT %  --   --   --   --  7 5 7  --  9   MONO PCT MAN % 5 7 4  --   --   --   --   --   --    EOS PCT %  --   --   --   --  0 0 0  --  0   EOSINO PCT MANUAL % 0 0 0  --   --   --   --   --   --    SODIUM mmol/L 142 142  --  141 141 137 135*  --  133*   POTASSIUM mmol/L 3 8 3 4*  --  3 6 3 9 4 2 4 2  --  4 1   CHLORIDE mmol/L 105 105  --  104 105 101 101  --  98*   CO2 mmol/L 27 27  --  29 26 28 26  --  26   BUN mg/dL 54* 65*  --  48* 59* 43* 45*  --  40*   CREATININE mg/dL 3 87* 4 67*  --  3 90* 4 81* 3 88* 4 36*  --  4 00*   CALCIUM mg/dL 9 0 8 7  --  8 5 8 1* 8 4 8 5  --  8 3   TOTAL PROTEIN g/dL  --  6 9  --  6 2*  --  6 1* 6 1*  --  6 4   BILIRUBIN TOTAL mg/dL  --  4 47*  --  4 03*  --  4 24* 3 99*  --  4 28*   ALK PHOS U/L  --  268*  --  208*  --  64 61  --  67   ALT U/L  --  78  --  57  --  31 29  --  31   AST U/L  --  73*  --  68*  --  38 31  --  42   GLUCOSE RANDOM mg/dL 142* 164*  --  191* 167* 145* 157*  --  144*   GLUCOSE, ISTAT mg/dl  --   --   --   --   --   --   --  134  --    MAGNESIUM mg/dL  --  2 5  --  2 4 2 5 2 4 2 4  --  2 3   PHOSPHORUS mg/dL  --  3 5  --   --  5 5* 5 0* 6 0*  --  5 6*       CUTURES:  Lab Results   Component Value Date    BLOODCX No Growth After 5 Days  02/21/2018    BLOODCX No Growth After 5 Days  02/21/2018    BLOODCX No Growth After 5 Days  02/17/2018    BLOODCX No Growth After 5 Days  02/17/2018                 Portions of the record may have been created with voice recognition software  Occasional wrong word or "sound a like" substitutions may have occurred due to the inherent limitations of voice recognition software  Read the chart carefully and recognize, using context, where substitutions have occurred  If you have any questions, please contact the dictating provider

## 2018-03-01 NOTE — SOCIAL WORK
[facility] contacted [address] [place] [address] on aging regarding MA-51 and PASRR that was faxed to their office 2/22  As per aging they have received the fax  Shruthi is the  with aging  CM left  requesting call back as she has not been out to the hospital to assess pt

## 2018-03-02 ENCOUNTER — APPOINTMENT (INPATIENT)
Dept: DIALYSIS | Facility: HOSPITAL | Age: 53
DRG: 264 | End: 2018-03-02
Attending: INTERNAL MEDICINE
Payer: COMMERCIAL

## 2018-03-02 ENCOUNTER — APPOINTMENT (INPATIENT)
Dept: RADIOLOGY | Facility: HOSPITAL | Age: 53
DRG: 264 | End: 2018-03-02
Payer: COMMERCIAL

## 2018-03-02 LAB
ANION GAP SERPL CALCULATED.3IONS-SCNC: 6 MMOL/L (ref 4–13)
ANISOCYTOSIS BLD QL SMEAR: PRESENT
BASOPHILS # BLD MANUAL: 0 THOUSAND/UL (ref 0–0.1)
BASOPHILS NFR MAR MANUAL: 0 % (ref 0–1)
BUN SERPL-MCNC: 32 MG/DL (ref 5–25)
CALCIUM SERPL-MCNC: 9.1 MG/DL (ref 8.3–10.1)
CHLORIDE SERPL-SCNC: 103 MMOL/L (ref 100–108)
CO2 SERPL-SCNC: 30 MMOL/L (ref 21–32)
CREAT SERPL-MCNC: 3.11 MG/DL (ref 0.6–1.3)
EOSINOPHIL # BLD MANUAL: 0 THOUSAND/UL (ref 0–0.4)
EOSINOPHIL NFR BLD MANUAL: 0 % (ref 0–6)
ERYTHROCYTE [DISTWIDTH] IN BLOOD BY AUTOMATED COUNT: 23.8 % (ref 11.6–15.1)
FIBRINOGEN PPP-MCNC: 107 MG/DL (ref 227–495)
FIBRINOGEN PPP-MCNC: 181 MG/DL (ref 227–495)
GFR SERPL CREATININE-BSD FRML MDRD: 22 ML/MIN/1.73SQ M
GLUCOSE SERPL-MCNC: 139 MG/DL (ref 65–140)
GLUCOSE SERPL-MCNC: 149 MG/DL (ref 65–140)
GLUCOSE SERPL-MCNC: 159 MG/DL (ref 65–140)
GLUCOSE SERPL-MCNC: 171 MG/DL (ref 65–140)
GLUCOSE SERPL-MCNC: 174 MG/DL (ref 65–140)
HCT VFR BLD AUTO: 23.7 % (ref 36.5–49.3)
HGB BLD-MCNC: 8 G/DL (ref 12–17)
HYPERCHROMIA BLD QL SMEAR: PRESENT
INR PPP: 1.74 (ref 0.86–1.16)
INR PPP: 2.33 (ref 0.86–1.16)
LYMPHOCYTES # BLD AUTO: 0.49 THOUSAND/UL (ref 0.6–4.47)
LYMPHOCYTES # BLD AUTO: 3 % (ref 14–44)
MAGNESIUM SERPL-MCNC: 2.1 MG/DL (ref 1.6–2.6)
MCH RBC QN AUTO: 33.1 PG (ref 26.8–34.3)
MCHC RBC AUTO-ENTMCNC: 33.8 G/DL (ref 31.4–37.4)
MCV RBC AUTO: 98 FL (ref 82–98)
MONOCYTES # BLD AUTO: 1.31 THOUSAND/UL (ref 0–1.22)
MONOCYTES NFR BLD: 8 % (ref 4–12)
MYELOCYTES NFR BLD MANUAL: 1 % (ref 0–1)
NEUTROPHILS # BLD MANUAL: 14.41 THOUSAND/UL (ref 1.85–7.62)
NEUTS SEG NFR BLD AUTO: 88 % (ref 43–75)
NRBC BLD AUTO-RTO: 0 /100 WBCS
PHOSPHATE SERPL-MCNC: 2.8 MG/DL (ref 2.7–4.5)
PLATELET # BLD AUTO: 31 THOUSANDS/UL (ref 149–390)
PLATELET # BLD AUTO: 47 THOUSANDS/UL (ref 149–390)
PLATELET BLD QL SMEAR: ABNORMAL
PMV BLD AUTO: 9.5 FL (ref 8.9–12.7)
POIKILOCYTOSIS BLD QL SMEAR: PRESENT
POTASSIUM SERPL-SCNC: 3.9 MMOL/L (ref 3.5–5.3)
PROTHROMBIN TIME: 20.5 SECONDS (ref 12.1–14.4)
PROTHROMBIN TIME: 25.8 SECONDS (ref 12.1–14.4)
RBC # BLD AUTO: 2.42 MILLION/UL (ref 3.88–5.62)
RBC MORPH BLD: PRESENT
SCHISTOCYTES BLD QL SMEAR: PRESENT
SODIUM SERPL-SCNC: 139 MMOL/L (ref 136–145)
TARGETS BLD QL SMEAR: PRESENT
WBC # BLD AUTO: 16.38 THOUSAND/UL (ref 4.31–10.16)

## 2018-03-02 PROCEDURE — P9012 CRYOPRECIPITATE EACH UNIT: HCPCS

## 2018-03-02 PROCEDURE — 85610 PROTHROMBIN TIME: CPT | Performed by: FAMILY MEDICINE

## 2018-03-02 PROCEDURE — 94640 AIRWAY INHALATION TREATMENT: CPT

## 2018-03-02 PROCEDURE — 99233 SBSQ HOSP IP/OBS HIGH 50: CPT | Performed by: INTERNAL MEDICINE

## 2018-03-02 PROCEDURE — 99153 MOD SED SAME PHYS/QHP EA: CPT

## 2018-03-02 PROCEDURE — 5A1D70Z PERFORMANCE OF URINARY FILTRATION, INTERMITTENT, LESS THAN 6 HOURS PER DAY: ICD-10-PCS | Performed by: INTERNAL MEDICINE

## 2018-03-02 PROCEDURE — 36558 INSERT TUNNELED CV CATH: CPT | Performed by: RADIOLOGY

## 2018-03-02 PROCEDURE — 0JH63XZ INSERTION OF TUNNELED VASCULAR ACCESS DEVICE INTO CHEST SUBCUTANEOUS TISSUE AND FASCIA, PERCUTANEOUS APPROACH: ICD-10-PCS | Performed by: RADIOLOGY

## 2018-03-02 PROCEDURE — 77001 FLUOROGUIDE FOR VEIN DEVICE: CPT

## 2018-03-02 PROCEDURE — 76937 US GUIDE VASCULAR ACCESS: CPT

## 2018-03-02 PROCEDURE — 94760 N-INVAS EAR/PLS OXIMETRY 1: CPT

## 2018-03-02 PROCEDURE — 82948 REAGENT STRIP/BLOOD GLUCOSE: CPT

## 2018-03-02 PROCEDURE — 80048 BASIC METABOLIC PNL TOTAL CA: CPT | Performed by: FAMILY MEDICINE

## 2018-03-02 PROCEDURE — 85384 FIBRINOGEN ACTIVITY: CPT | Performed by: INTERNAL MEDICINE

## 2018-03-02 PROCEDURE — 85384 FIBRINOGEN ACTIVITY: CPT | Performed by: FAMILY MEDICINE

## 2018-03-02 PROCEDURE — P9035 PLATELET PHERES LEUKOREDUCED: HCPCS

## 2018-03-02 PROCEDURE — 84100 ASSAY OF PHOSPHORUS: CPT | Performed by: FAMILY MEDICINE

## 2018-03-02 PROCEDURE — 77001 FLUOROGUIDE FOR VEIN DEVICE: CPT | Performed by: RADIOLOGY

## 2018-03-02 PROCEDURE — 85007 BL SMEAR W/DIFF WBC COUNT: CPT | Performed by: FAMILY MEDICINE

## 2018-03-02 PROCEDURE — 76937 US GUIDE VASCULAR ACCESS: CPT | Performed by: RADIOLOGY

## 2018-03-02 PROCEDURE — C1894 INTRO/SHEATH, NON-LASER: HCPCS

## 2018-03-02 PROCEDURE — 99152 MOD SED SAME PHYS/QHP 5/>YRS: CPT

## 2018-03-02 PROCEDURE — P9017 PLASMA 1 DONOR FRZ W/IN 8 HR: HCPCS

## 2018-03-02 PROCEDURE — 85027 COMPLETE CBC AUTOMATED: CPT | Performed by: FAMILY MEDICINE

## 2018-03-02 PROCEDURE — 85049 AUTOMATED PLATELET COUNT: CPT | Performed by: INTERNAL MEDICINE

## 2018-03-02 PROCEDURE — 30233M1 TRANSFUSION OF NONAUTOLOGOUS PLASMA CRYOPRECIPITATE INTO PERIPHERAL VEIN, PERCUTANEOUS APPROACH: ICD-10-PCS | Performed by: INTERNAL MEDICINE

## 2018-03-02 PROCEDURE — 99232 SBSQ HOSP IP/OBS MODERATE 35: CPT | Performed by: INTERNAL MEDICINE

## 2018-03-02 PROCEDURE — 83735 ASSAY OF MAGNESIUM: CPT | Performed by: FAMILY MEDICINE

## 2018-03-02 PROCEDURE — 86927 PLASMA FRESH FROZEN: CPT

## 2018-03-02 PROCEDURE — 85610 PROTHROMBIN TIME: CPT | Performed by: INTERNAL MEDICINE

## 2018-03-02 PROCEDURE — C1750 CATH, HEMODIALYSIS,LONG-TERM: HCPCS

## 2018-03-02 PROCEDURE — 02H633Z INSERTION OF INFUSION DEVICE INTO RIGHT ATRIUM, PERCUTANEOUS APPROACH: ICD-10-PCS | Performed by: RADIOLOGY

## 2018-03-02 PROCEDURE — 36558 INSERT TUNNELED CV CATH: CPT

## 2018-03-02 RX ORDER — MIDODRINE HYDROCHLORIDE 5 MG/1
5 TABLET ORAL
Status: DISCONTINUED | OUTPATIENT
Start: 2018-03-03 | End: 2018-03-03

## 2018-03-02 RX ORDER — FENTANYL CITRATE 50 UG/ML
INJECTION, SOLUTION INTRAMUSCULAR; INTRAVENOUS CODE/TRAUMA/SEDATION MEDICATION
Status: COMPLETED | OUTPATIENT
Start: 2018-03-02 | End: 2018-03-02

## 2018-03-02 RX ADMIN — ISODIUM CHLORIDE 3 ML: 0.03 SOLUTION RESPIRATORY (INHALATION) at 14:14

## 2018-03-02 RX ADMIN — LEVALBUTEROL HYDROCHLORIDE 1.25 MG: 1.25 SOLUTION, CONCENTRATE RESPIRATORY (INHALATION) at 14:14

## 2018-03-02 RX ADMIN — LACTULOSE 20 G: 20 SOLUTION ORAL at 20:55

## 2018-03-02 RX ADMIN — LACTULOSE 20 G: 20 SOLUTION ORAL at 16:47

## 2018-03-02 RX ADMIN — OCTREOTIDE ACETATE 100 MCG: 100 INJECTION, SOLUTION INTRAVENOUS; SUBCUTANEOUS at 14:20

## 2018-03-02 RX ADMIN — LEVALBUTEROL HYDROCHLORIDE 1.25 MG: 1.25 SOLUTION, CONCENTRATE RESPIRATORY (INHALATION) at 07:34

## 2018-03-02 RX ADMIN — Medication 1 TABLET: at 08:24

## 2018-03-02 RX ADMIN — RIFAXIMIN 550 MG: 550 TABLET ORAL at 20:55

## 2018-03-02 RX ADMIN — PREDNISONE 25 MG: 5 TABLET ORAL at 08:22

## 2018-03-02 RX ADMIN — LACTULOSE 20 G: 20 SOLUTION ORAL at 08:22

## 2018-03-02 RX ADMIN — FOLIC ACID 1 MG: 1 TABLET ORAL at 08:22

## 2018-03-02 RX ADMIN — OCTREOTIDE ACETATE 100 MCG: 100 INJECTION, SOLUTION INTRAVENOUS; SUBCUTANEOUS at 22:37

## 2018-03-02 RX ADMIN — PANTOPRAZOLE SODIUM 40 MG: 40 TABLET, DELAYED RELEASE ORAL at 05:07

## 2018-03-02 RX ADMIN — ISODIUM CHLORIDE 3 ML: 0.03 SOLUTION RESPIRATORY (INHALATION) at 19:45

## 2018-03-02 RX ADMIN — PHYTONADIONE 10 MG: 10 INJECTION, EMULSION INTRAMUSCULAR; INTRAVENOUS; SUBCUTANEOUS at 06:18

## 2018-03-02 RX ADMIN — OCTREOTIDE ACETATE 100 MCG: 100 INJECTION, SOLUTION INTRAVENOUS; SUBCUTANEOUS at 05:07

## 2018-03-02 RX ADMIN — INSULIN LISPRO 1 UNITS: 100 INJECTION, SOLUTION INTRAVENOUS; SUBCUTANEOUS at 12:55

## 2018-03-02 RX ADMIN — FENTANYL CITRATE 50 MCG: 50 INJECTION INTRAMUSCULAR; INTRAVENOUS at 11:34

## 2018-03-02 RX ADMIN — DIBASIC SODIUM PHOSPHATE, MONOBASIC POTASSIUM PHOSPHATE AND MONOBASIC SODIUM PHOSPHATE 2 TABLET: 852; 155; 130 TABLET ORAL at 08:22

## 2018-03-02 RX ADMIN — Medication 100 MG: at 08:22

## 2018-03-02 RX ADMIN — INSULIN LISPRO 1 UNITS: 100 INJECTION, SOLUTION INTRAVENOUS; SUBCUTANEOUS at 05:06

## 2018-03-02 RX ADMIN — LEVALBUTEROL HYDROCHLORIDE 1.25 MG: 1.25 SOLUTION, CONCENTRATE RESPIRATORY (INHALATION) at 19:45

## 2018-03-02 RX ADMIN — RIFAXIMIN 550 MG: 550 TABLET ORAL at 08:21

## 2018-03-02 RX ADMIN — ISODIUM CHLORIDE 3 ML: 0.03 SOLUTION RESPIRATORY (INHALATION) at 07:34

## 2018-03-02 NOTE — RESPIRATORY THERAPY NOTE
RT Protocol Note  Sharon Blind 50 y o  male MRN: [id number]  Unit/Bed#: MICU 07 Encounter: [id number]    Assessment    Principal Problem:    Decompensated hepatic cirrhosis ([place] [address] )  Active Problems:    Ascites    Hepatic encephalopathy (HCC)    PRANAY (acute kidney injury) ([place] [address] )    Hypoalbuminemia    Hyponatremia    Sepsis ([place] [address] )    Hyperammonemia (HCC)    Lactic acidosis    Elevated alkaline phosphatase level    Anemia of chronic disease    Osteomyelitis of toe of right foot (HCC)    Alcohol abuse    Alcoholic hepatitis    Cough    Hypoxia    Oliguria    Leukocytosis    Coagulopathy (HCC)    Aspiration pneumonia of right lower lobe (HCC)    Paroxysmal a-fib ([place] [address] )      Home Pulmonary Medications:    Past Medical History:   Diagnosis Date    Cirrhosis ([place] [address] )     Hypertension      Social History     Social History    Marital status: /Civil Union     Spouse name: N/A    Number of children: N/A    Years of education: N/A     Social History Main Topics    Smoking status: Current Every Day Smoker     Packs/day: 0 20     Years: 35 00     Types: Cigarettes    Smokeless tobacco: Never Used    Alcohol use Yes      Comment: Pt "I quite a couple of weeks ago"    Drug use: No      Comment: prior history     Sexual activity: Not Asked     Other Topics Concern    None     Social History Narrative    None       Subjective         Objective    Physical Exam:        Vitals:  Blood pressure 136/81, pulse 80, temperature 98 °F (36 7 °C), temperature source Oral, resp  rate 14, height 5' 10" (1 778 m), weight 77 8 kg (171 lb 8 3 oz), SpO2 95 %  Results from last 7 days  Lab Units 02/25/18  0515   PH ART  7 331*   PCO2 ART mm Hg 50 9*   PO2 ART mm Hg 144 6*   HCO3 ART mmol/L 26 3   BASE EXC ART mmol/L 0 2   O2 CONTENT ART mL/dL 11 3*   O2 HGB, ARTERIAL % 97 6*   ABG SOURCE  Radial, Left   SANDOVAL TEST  Yes       Imaging and other studies: I have personally reviewed pertinent reports        O2 Device: HFNC     Plan    Respiratory Plan: Mild Distress pathway        Resp Comments: Pt received Breathing tx

## 2018-03-02 NOTE — PROGRESS NOTES
Progress Note - Critical Care   Kimberly Garza 50 y o  male MRN: [id number]  Unit/Bed#: MICU 07 Encounter: [id number]    Assessment:   Principal Problem:    Decompensated hepatic cirrhosis ([facility] 75 )  Active Problems:    Hepatic encephalopathy ([facility] )    PRANAY (acute kidney injury) ([facility] )    Coagulopathy ([facility] 75 )    Ascites    Hypoalbuminemia    Hyponatremia    Sepsis ([place] [address] )    Hyperammonemia (HCC)    Lactic acidosis    Elevated alkaline phosphatase level    Anemia of chronic disease    Osteomyelitis of toe of right foot (HCC)    Alcohol abuse    Alcoholic hepatitis    Cough    Hypoxia    Oliguria    Leukocytosis    Aspiration pneumonia of right lower lobe (HCC)    Paroxysmal a-fib (HCC)  Resolved Problems:    Abdominal pain    Hypotension        Plan:        Neuro:      Enchephalopathy: Improving   -likely multifactorial due to liver failure, sepsis and Acute Kidney Injury   -Patient continues to display confabulation, orientation to person only   -Will update family and wait for further clarification on goals of care    CV:      Labile Blood Pressure, improving, Likely multifactorial  secondary to hypoalbuminemia secondary to decompensated liver failure (CHF ruled out with recent echo)            -Continue albumin boluses 25% PRN for hypotension for pressure support (map goal > 65)  -Midodrine 5mg TID currently held d/t elevated SBP ~170's [most recent: BP range: (105-176)/(62-90) 170/82]  -Continue Cardizem gtt PRN (given today d/t elevated SBP)     Paroxysmal Afib: New onset (no prior recorded hx)  -ECG obtained 2/27 showing irregulary irregular rhythm with no discernable Pwaves especially  -No events on telemetry overnight   -s/p Cardizem gtt 2/27 d/t second episodes of Afib  -Continue Cardizem gtt PRN        Lung:      Pneumonia, RLL, Improving, possibly secondary to aspiration vs HCAP  -Completed 7 day course of Zosyn (2/28)  -Continue Xopenex and saline nebs scheduled with albuterol nebs PRN     Acute Hypoxemic Respiratory Failure, Significant improvement  -CXR 2/26 showed pulm edema, repeat CXR 3/1 significant improvement   -Weaned off Bipap and HiFlo, now on NC 5L Saturating >95%, has tolerated extended (hours) of Ra  Desaturation mainly while eating   -Will continue to wean off as tolerated       Gi:      Acute decompensated alcoholic hepatic cirrhosis, with recurrent ascites  -Last paracentesis on 2/21 with removal of ~4L  -GI onboard, appreciate their recs  -Continue Rifaximin  -Currently on Prednisone via Tf, 25 mg PO daily, will continue to taper, Wean down to 10 mg PO on 3/2  -Continue octreotide 100 mcg daily for Esophageal varices         FEN:     -No IVF's   -Soduim-K-Phos given on 3/2  Will continue to monitor BMP and correct lytes PRN  -Patient passed speech/swallow eval and is tolerating PO renal diet well       :      PRANAY (though eGFR improving from 15 to 22 and on dialysis, does not meet criteria for ESRD since no prior history of CKD), PRANAY likely hepatorenal syndrome vs ATN  -BUN/Cr on 3/2: 32/3 11   -HD catheter R IJ, plan for permacath placement (3/2)  -Hemodialysis per Nephrology  -Dialysis scheduled every other day, next session 3/2      ID:      Pneumonia, RLL, improved, per radiology findings on CXR 2/26 likely not PNA  -Mild leukocytosis persists, however patient continues to remain afebrile (CBC results pending)  -7 day course of Zosyn completed 2/28  -s/p Ceftriaxone 1g x1 5days ago, Vanc d/caitlyn 2/25  d/t negative MRSA Cx     Osteomyelitis, right hallux ulcer an d osteomyelitis, per ID no further antibiotics, but will require toe amputation which pt   refuses  -ID on board, appreciate their recs       Heme:      Coagulopathy:  Fibrinogen level <60 on 2/27, normalized to 148 on 2/28 s/p Cryoprecipitate infusion, 107 on (3/2/2018)  -Thrombocytopenia persists likely secondary to liver failure, will give 2U of platelets today 3/2 in preparation for IR permacath placement  -INR 2 88 on 2/27 (last check  was 3 12), goal <1 5, received Vit K on 3/1  -hold DVT prophylaxis      Anemia- stable Hgb, received 2 units  -Hb stable 7 9 baseline ~8 as of 3/1 (CBC results for today pending)       Endo:     -No acute concerns (no Hx of DM)  -Glucose on  on 3/2       Msk/Skin:      Frequent position changing/turning  Routine skin care       Disposition:       Continue with current level of ICU care, per family meeting on , pt is now DNR/DNI  ______________________________________________________________________    Chief Complaint: AMS      HPI/24hr events:  No acute overnight events    ______________________________________________________________________    Physical Exam:  _____________________________________________________________________  Vitals:    18 8827 18 0826 18 0830 18 0843   BP: 154/90 156/79 156/79 170/82   Pulse: 94 100 97 96   Resp: 19 17 14 16   Temp: 98 1 °F (36 7 °C)  98 3 °F (36 8 °C) 98 3 °F (36 8 °C)   TempSrc:   Oral Oral   SpO2: 97% 100%  98%   Weight:       Height:         Arterial Line BP: 132/74  Arterial Line MAP (mmHg): 98 mmHg  Temp:  [98 1 °F (36 7 °C)-98 8 °F (37 1 °C)] 98 3 °F (36 8 °C)  HR:  [] 96  Resp:  [11-30] 16  BP: (105-176)/(62-90) 170/82     Temperature:   Temp (24hrs), Av 3 °F (36 8 °C), Min:98 1 °F (36 7 °C), Max:98 8 °F (37 1 °C)    Current Temperature: 98 3 °F (36 8 °C)  Weights:   IBW: 73 kg    Body mass index is 24 61 kg/m²  Weight (last 2 days)     Date/Time   Weight    18 0600  77 8 (171 52)    18 0400  78 2 (172 4)    18 0600  83 5 (184 08)            Physical Exam:  General:  NAD  Eyes: PERRL,EOMI  Neck:  supple, FROM  Lungs: Breath sounds continue to improve  Lungs b/l ronchi and rales present    Cardiac:  RRR, normal S1S2  Abdomen:  distended abdomen, no peritoneal signs, BS+  Extremities:  Non-tender, no edema, gangrenuos right hallux (stable from previous exam)  Skin:  Warm and dry  Neurological: Awake, alert, oriented to person only continues to confabulate and answers are inconsistent  No focal deficits  Non-Invasive/Invasive Ventilation Settings:  Respiratory    Lab Data (Last 4 hours)    None         O2/Vent Data (Last 4 hours)    None              Intake and Outputs:  I/O       02/28 0701 - 03/01 0700 03/01 0701 - 03/02 0700    P  O  440 960    I V  (mL/kg) 516 3 (6 6) 500 (6 4)    Blood 244     NG/     IV Piggyback 60 70    Total Intake(mL/kg) 1480 3 (18 9) 1530 (19 7)    Urine (mL/kg/hr) 448 (0 2) 160 (0 1)    Emesis/NG output 0 (0)     Other 3500 (1 9) 2000 (1 1)    Stool 1500 (0 8) 500 (0 3)    Total Output 5448 2660    Net -3967 8 -1130          Unmeasured Urine Occurrence  1 x        UOP: 160mL/24hrs  Net minus: ~1 1L/24hrs     Nutrition:        Diet Orders            Start     Ordered    03/03/18 0000  Diet NPO; Sips with meds  Diet effective midnight     Question Answer Comment   Diet Type NPO    NPO Except: Sips with meds    RD to adjust diet per protocol? Yes        03/02/18 0656    03/01/18 1842  Dietary nutrition supplements  Once     Question Answer Comment   Select Supplement: Nepro-Vanilla    Frequency Lunch, Dinner        03/01/18 1842    02/27/18 1724  Diet Renal; Renal (Dialysis); Sodium 2 Gm, Hi Pro/Hi Chase  Diet effective now     Question Answer Comment   Diet Type Renal    Renal Renal (Dialysis)    Other Restriction(s): Sodium 2 Gm    Other Restriction(s): Hi Pro/Hi Chase    RD to adjust diet per protocol?  Yes        02/27/18 1730        Labs:     Results from last 7 days  Lab Units 03/02/18  0456 03/01/18  0428 02/28/18  0523 02/27/18  0655 02/26/18  0431 02/25/18  0443 02/24/18  0449   WBC Thousand/uL 16 38* 19 95* 24 84* 24 45* 18 54* 21 79* 11 16*   HEMOGLOBIN g/dL 8 0* 7 9* 8 7* 8 5* 7 4* 8 1* 7 8*   HEMATOCRIT % 23 7* 23 5* 25 8* 24 7* 21 2* 23 6* 21 8*   PLATELETS Thousands/uL 31* 23* 34* 47* 46* 48* 40*   NEUTROS PCT %  --   --   --   --  88* 90* 87*   MONOS PCT %  --   -- --   --  7 5 7   MONO PCT MAN %  --  5 7 4  --   --   --       Results from last 7 days  Lab Units 03/02/18  0456 03/01/18  0428 02/28/18  0523 02/27/18  0415  02/25/18  0443   SODIUM mmol/L 139 142 142 141  < > 137   POTASSIUM mmol/L 3 9 3 8 3 4* 3 6  < > 4 2   CHLORIDE mmol/L 103 105 105 104  < > 101   CO2 mmol/L 30 27 27 29  < > 28   BUN mg/dL 32* 54* 65* 48*  < > 43*   CREATININE mg/dL 3 11* 3 87* 4 67* 3 90*  < > 3 88*   CALCIUM mg/dL 9 1 9 0 8 7 8 5  < > 8 4   TOTAL PROTEIN g/dL  --   --  6 9 6 2*  --  6 1*   BILIRUBIN TOTAL mg/dL  --   --  4 47* 4 03*  --  4 24*   ALK PHOS U/L  --   --  268* 208*  --  64   ALT U/L  --   --  78 57  --  31   AST U/L  --   --  73* 68*  --  38   GLUCOSE RANDOM mg/dL 139 142* 164* 191*  < > 145*   < > = values in this interval not displayed  Results from last 7 days  Lab Units 03/02/18  0456 02/28/18  0523 02/27/18  0415   MAGNESIUM mg/dL 2 1 2 5 2 4       Results from last 7 days  Lab Units 03/02/18  0456 02/28/18  0523 02/26/18  0431   PHOSPHORUS mg/dL 2 8 3 5 5 5*        Results from last 7 days  Lab Units 03/02/18  0456 02/27/18  0444   INR  2 33* 2 88*         No results found for: TROPONINI  Imaging: No new imaging  EKG: No new ECG  Micro:  Lab Results   Component Value Date    BLOODCX No Growth After 5 Days  02/21/2018    BLOODCX No Growth After 5 Days  02/21/2018    BLOODCX No Growth After 5 Days  02/17/2018    BLOODCX No Growth After 5 Days   02/17/2018     Allergies: No Known Allergies  Medications:   Scheduled Meds:    Current Facility-Administered Medications:  albuterol 2 5 mg Nebulization Q4H PRN Charis Santana,     dexmedetomidine 0 1-0 7 mcg/kg/hr Intravenous Titrated Shharzad English, DO Last Rate: Stopped (77/83/44 1286)   folic acid 1 mg Oral Daily Mariama Hurtado DO    influenza vaccine 0 5 mL Intramuscular Prior to discharge Sandrine Brooks MD    insulin lispro 1-6 Units Subcutaneous [facility] Phill Garza PA-C    lactulose 20 g Oral TID Ravinder Flores, MD    levalbuterol 1 25 mg Nebulization TID Charis Santana DO    midodrine 5 mg Oral TID AC Emmanuel Branch    multivitamin 1 tablet Oral Daily Konrad Callahan PA-C    octreotide 100 mcg Intravenous Q8H [address] Mati Glasgow,     ondansetron 4 mg Intravenous Q8H PRN Damian Li,     pantoprazole 40 mg Oral Early Morning Evette Regan MD    potassium-sodium phosphateS 2 tablet Oral 4x Daily (with meals and at bedtime) Antonio Gutiérrez MD    rifaximin 550 mg Oral Q12H [address] Raghav Branch PA-C    sodium chloride 1 application Nasal A8I PRN Konrad Callahan PA-C    sodium chloride 3 mL Nebulization TID Charis Santana DO    thiamine 100 mg Oral Daily Mariama Hurtado DO      Continuous Infusions:    dexmedetomidine 0 1-0 7 mcg/kg/hr Last Rate: Stopped (02/26/18 1025)     PRN Meds:    albuterol 2 5 mg Q4H PRN   influenza vaccine 0 5 mL Prior to discharge   ondansetron 4 mg Q8H PRN   sodium chloride 1 application S0X PRN     VTE Pharmacologic Prophylaxis:   VTE Mechanical Prophylaxis: sequential compression device  Invasive lines and devices: Invasive Devices     Peripheral Intravenous Line            Peripheral IV 02/28/18 Left Antecubital 2 days          Line            HD Cath Double 8 days          Drain            Rectal Tube With balloon 7 days                   Code Status: Level 3 - DNAR and DNI    Portions of the record may have been created with voice recognition software  Occasional wrong word or "sound a like" substitutions may have occurred due to the inherent limitations of voice recognition software  Read the chart carefully and recognize, using context, where substitutions have occurred      Antonio Gutiérrez MD    [facility] PGY-1

## 2018-03-02 NOTE — DISCHARGE INSTRUCTIONS
Perma-cath Placement   WHAT YOU NEED TO KNOW:   A perma-cath is a catheter placed through a vein into or near your right atrium  Your right atrium is the right upper chamber of your heart  A perma-cath is used for dialysis in an emergency or until a long-term device is ready to use  After your procedure, you will have some pain and swelling on your chest and neck  You may have some bruises on your chest and neck  You may also have 2 dressings, one on your chest and one on your neck  DISCHARGE INSTRUCTIONS:   Call 911 for any of the following:   · You feel lightheaded, short of breath, and have chest pain  · Your catheter comes out   Contact Interventional Radiology at [phone] [place] PATIENTS: Contact Interventional Radiology at [phone]) [place]asta [place]den PATIENTS: Contact Interventional Radiology at [phone]) if:  · Blood soaks through your bandage  · You have new swelling in your arm, neck, face, or chest on your right side  · Your catheter gets wet  · Your bruises or pain get worse  · You have a fever or chills  · Persistent nausea or vomiting  · Your incision is red, swollen, or draining pus  · You have questions or concerns about your condition or care  Self-care:       · Resume your normal diet  · Keep your dressings dry  Do not take a shower or swim  You may take a tub bath, but do not get your dressings wet  Water in your wound can cause bacteria to grow and cause an infection  If your dressing gets wet, dry it off and cover it with dry sterile gauze  Call your healthcare provider  Do not use soaps or ointments  · Do not change your dressings  Your healthcare provider or dialysis nurse will change your dressings  Your dressings should stay in place until your healthcare provider removes them  The dressing on your chest will stay as long as you have the catheter in place  The dressing prevents infection  · Do not remove the red and blue caps from the end of your catheter   The caps prevent air from getting into your catheter    Follow up with your healthcare provider as directed: Write down your questions so you remember to ask them during your visits

## 2018-03-02 NOTE — PROGRESS NOTES
Right jugular perma cath  Site bleedingmanual pressured applied, dr Kelsi Moffett notified with orders, placed pt upright position,  drsg changed   Specimen sent to lab  v/ss noted

## 2018-03-02 NOTE — SOCIAL WORK
CM reviewed pt during care coordination rounds  Pt still encephalopathic, on HFNC and need HD tx  Pt went to IR this AM for permanent HD catheter  CM will follow

## 2018-03-02 NOTE — PROGRESS NOTES
Progress Note - Nephrology   Sharon Gonzales 50 y o  male MRN: [id number]  Unit/Bed#: MICU 07 Encounter: [id number]      Assessment / Plan:  1  PRANAY in setting of decompensated cirrhosis - concern for HRS vs ATN  -b/l Cr 0 5 in 2016, started on HD via temp HD cath 2/21  -Plan for HD for UF after permacath placement today  -monitor renal indices for signs of renal recovery  Is making urine  Plan for 2L UF as tolerated today but would hold on HD tomorrow      2  Decompensated alcoholic cirrhosis with hx of noncompliance - GI follows, not a transplant candidate     3  Encephalopathy -  on lactulose, likely multifactorial as with cirrhosis and RLL PNA     4  Aspiration PNA - s/p zosyn per ID, +leukocytosis-concern for recurrent aspiration per ID     5  Mild hyperphosphatemia - likely d/t PRANAY  Continue to monitor  Resolved      6  Anemia of chronic disease with thrombocytopenia - Hgb now in high 7s-low 8s  Bili elevated  Platelets lower  Concern for DIC - per primary team   -+schistocytes and helmet cells noted on hemolysis smear    -mild LDH elevation noted with low haptoglobin  -VERNELL c3 negative, VERNELL Igg + as well as direct Jimena test+  -fibrin split products, fibrinogen low  - Iron normal, ferritin high, iron sat ok, with low TIBC  Low plts likely d/t cirrhosis but above findings supsicious for hemolysis/DIC  Receiving FFP this am      7  Hypotension - resolved on midodrine  BP much higher now  Tends to drop on HD  Will monitor this today  Agree with providing albumin prn for hypotension      8  Hypokalemia - corrected on HD      9  Volume overload/pulmonary edema - improving with UF on HD  UOP improving      Other issues: right great toe ulcer and osteomyelitis, alcoholic hepatitis  Code status DNR/DNI (level 3)     Have discussed the case with Dr Gerhardt Leriche of critical care  Subjective:   Patient denies any chest pain or shortness of Breath    He is confused and states that he wants to go down the hill " No other complaints  Objective:     Vitals: Blood pressure 149/84, pulse 94, temperature 98 3 °F (36 8 °C), temperature source Oral, resp  rate 15, height 5' 10" (1 778 m), weight 77 8 kg (171 lb 8 3 oz), SpO2 100 %  ,Body mass index is 24 61 kg/m²  Temp (24hrs), Av 3 °F (36 8 °C), Min:98 1 °F (36 7 °C), Max:98 8 °F (37 1 °C)      Weight (last 2 days)     Date/Time   Weight    18 0600  77 8 (171 52)    18 0400  78 2 (172 4)    18 0600  83 5 (184 08)                Intake/Output Summary (Last 24 hours) at 18 09  Last data filed at 18 8626   Gross per 24 hour   Intake             2120 ml   Output             2760 ml   Net             -640 ml     I/O last 24 hours: In: 6388 [P O :1500; I V :500; Blood:280; IV Piggyback:140]  Out: 8756 [Urine:160; Other:2000; Stool:600]        Physical Exam:   Physical Exam   Constitutional: He appears well-developed  No distress  thin   HENT:   Head: Normocephalic and atraumatic  Mouth/Throat: No oropharyngeal exudate  Eyes: Right eye exhibits no discharge  Left eye exhibits no discharge  No scleral icterus  Neck: Normal range of motion  Neck supple  Cardiovascular: Normal rate, regular rhythm and normal heart sounds  Pulmonary/Chest: Effort normal  He has no wheezes  He has no rales  +coarse BS b/l anteriorly   Abdominal: Soft  Bowel sounds are normal  He exhibits no distension  There is no tenderness  Rectal tube   Musculoskeletal: Normal range of motion  He exhibits no edema  Neurological: He is alert  Awake but confused   Skin: Skin is warm and dry  No rash noted  He is not diaphoretic  Psychiatric: He has a normal mood and affect  Patient confused   Vitals reviewed        Invasive Devices     Peripheral Intravenous Line            Peripheral IV 18 Left Antecubital 2 days          Line            HD Cath Double 8 days          Drain            Rectal Tube With balloon 7 days                Medications:    Scheduled Meds:  Current Facility-Administered Medications:  albuterol 2 5 mg Nebulization Q4H PRN Charis Santana DO    dexmedetomidine 0 1-0 7 mcg/kg/hr Intravenous Titrated Jimmy Zuñiga DO Last Rate: Stopped (30/14/90 0578)   folic acid 1 mg Oral Daily Mariama Hurtado DO    influenza vaccine 0 5 mL Intramuscular Prior to discharge Yue Cespedes MD    insulin lispro 1-6 Units Subcutaneous Q6H [facility] Jujutte MarchMOISÉS    lactulose 20 g Oral TID Tereza Lott MD    levalbuterol 1 25 mg Nebulization TID Charis Santana DO    midodrine 5 mg Oral TID  Emmanuel Amaya    multivitamin 1 tablet Oral Daily Linette MarchMOISÉS    octreotide 100 mcg Intravenous Q8H [facility] Mati Glasgow DO    ondansetron 4 mg Intravenous Q8H PRN Bailey Daniels DO    pantoprazole 40 mg Oral Early Morning Tereza Lott MD    potassium-sodium phosphateS 2 tablet Oral 4x Daily (with meals and at bedtime) Adam Parr MD    rifaximin 550 mg Oral Q12H [facility] Shanita Hollis PA-C    sodium chloride 1 application Nasal F9J PRN Linette MarchMOISÉS    sodium chloride 3 mL Nebulization TID Charis Santana DO    thiamine 100 mg Oral Daily Mariama Hurtado,         PRN Meds:   albuterol    influenza vaccine    ondansetron    sodium chloride    Continuous Infusions:  dexmedetomidine 0 1-0 7 mcg/kg/hr Last Rate: Stopped (02/26/18 1025)           LAB RESULTS:        Results from last 7 days  Lab Units 03/02/18  0456 03/01/18  0428 02/28/18  0523 02/27/18  0655 02/27/18  0415 02/26/18  0431 02/25/18  0443 02/24/18  0449   WBC Thousand/uL 16 38* 19 95* 24 84* 24 45*  --  18 54* 21 79* 11 16*   HEMOGLOBIN g/dL 8 0* 7 9* 8 7* 8 5*  --  7 4* 8 1* 7 8*   HEMATOCRIT % 23 7* 23 5* 25 8* 24 7*  --  21 2* 23 6* 21 8*   PLATELETS Thousands/uL 31* 23* 34* 47*  --  46* 48* 40*   NEUTROS PCT %  --   --   --   --   --  88* 90* 87*   LYMPHS PCT %  --   --   --   --   --  5* 5* 6*   LYMPHO PCT %  --  4* 4* 1*  --   --   --   --    MONOS PCT %  --   --   --   --   --  7 5 7 MONO PCT MAN %  --  5 7 4  --   --   --   --    EOS PCT %  --   --   --   --   --  0 0 0   EOSINO PCT MANUAL %  --  0 0 0  --   --   --   --    SODIUM mmol/L 139 142 142  --  141 141 137 135*   POTASSIUM mmol/L 3 9 3 8 3 4*  --  3 6 3 9 4 2 4 2   CHLORIDE mmol/L 103 105 105  --  104 105 101 101   CO2 mmol/L 30 27 27  --  29 26 28 26   BUN mg/dL 32* 54* 65*  --  48* 59* 43* 45*   CREATININE mg/dL 3 11* 3 87* 4 67*  --  3 90* 4 81* 3 88* 4 36*   CALCIUM mg/dL 9 1 9 0 8 7  --  8 5 8 1* 8 4 8 5   TOTAL PROTEIN g/dL  --   --  6 9  --  6 2*  --  6 1* 6 1*   BILIRUBIN TOTAL mg/dL  --   --  4 47*  --  4 03*  --  4 24* 3 99*   ALK PHOS U/L  --   --  268*  --  208*  --  64 61   ALT U/L  --   --  78  --  57  --  31 29   AST U/L  --   --  73*  --  68*  --  38 31   GLUCOSE RANDOM mg/dL 139 142* 164*  --  191* 167* 145* 157*   MAGNESIUM mg/dL 2 1  --  2 5  --  2 4 2 5 2 4 2 4   PHOSPHORUS mg/dL 2 8  --  3 5  --   --  5 5* 5 0* 6 0*       CUTURES:  Lab Results   Component Value Date    BLOODCX No Growth After 5 Days  02/21/2018    BLOODCX No Growth After 5 Days  02/21/2018    BLOODCX No Growth After 5 Days  02/17/2018    BLOODCX No Growth After 5 Days  02/17/2018                 Portions of the record may have been created with voice recognition software  Occasional wrong word or "sound a like" substitutions may have occurred due to the inherent limitations of voice recognition software  Read the chart carefully and recognize, using context, where substitutions have occurred  If you have any questions, please contact the dictating provider

## 2018-03-02 NOTE — PROCEDURES
Central Line Insertion  Date/Time: 3/2/2018 11:58 AM  Performed by: Chang Nicholson  Authorized by: Chang Nicholson     Patient location:  IR  Universal protocol:     Procedure explained and questions answered to patient or proxy's satisfaction: yes      Relevant documents present and verified: yes      Immediately prior to procedure, a time out was called: yes      Patient identity confirmed:  Verbally with patient, arm band and provided demographic data  Pre-procedure details:     Hand hygiene: Hand hygiene performed prior to insertion      Sterile barrier technique: All elements of maximal sterile technique followed      Skin preparation:  2% chlorhexidine    Skin preparation agent: Skin preparation agent completely dried prior to procedure    Sedation:     Sedation type: Moderate (conscious) sedation  Anesthesia (see MAR for exact dosages): Anesthesia method:  Local infiltration    Local anesthetic:  Lidocaine 1% WITH epi  Procedure details:     Location:  Right internal jugular    Vessel type: vein      Approach: percutaneous technique used      Catheter type:  Double lumen    Catheter size:  14 Fr    Ultrasound guidance: yes      Sterile ultrasound techniques: Sterile gel and sterile probe covers were used      Successful placement: yes      Vessel of catheter tip end:  RA  Post-procedure details:     Post-procedure:  Dressing applied and line sutured    Assessment:  Blood return through all ports    Post-procedure complications: none      Patient tolerance of procedure:   Tolerated well, no immediate complications

## 2018-03-02 NOTE — PROGRESS NOTES
H and P from admission reviewed  Patient still requires hemodialysis  Has existing temporary line which will be removed  Plan to place tunneled hemodialysis catheter using new access  Prior imaging was reviewed  Patient has decompensated cirrhosis and hepatic encephalopathy  Consent was obtained by patient's son, Elida Qureshi, by telephone  All questions were answered        Loli Peralta MD  03/02/18  11:12 AM

## 2018-03-02 NOTE — SPEECH THERAPY NOTE
Speech Language/Pathology    Speech/Language Pathology Progress Note    Patient Name: Vani Escobar Date: 3/2/2018       Attempted to see pt for follow up of swallowing, pt is NPO for perma-cath placement later today  Per nsg, pt has been doing well w/ meals  Will follow up as able

## 2018-03-02 NOTE — PROGRESS NOTES
Progress Note - Infectious Disease   Sydney Pappas 50 y o  male MRN: [id number]  Unit/Bed#: MICU 07 Encounter: [id number]      Impression/Recommendations:  1   Aspiration pneumonia   Given significant comorbid illnesses and recent hospitalizations, patient is at risk for nosocomial pathogens, especially nosocomial gram negatives and MRSA    Patient completed antibiotic course  He remains stable  Observe off antibiotic      2   Acute superimposed on chronic hypoxic respiratory failure   This is most likely secondary to aspiration above   Patient is clinically much improved, not on any O2 support  Monitor respiratory symptoms      3   Encephalopathy   This is most likely secondary to aspiration pneumonia above, superimposed on baseline hepatic encephalopathy   Mental status slowly improving  Monitor mental status      4   Recurrent fever with leukocytosis   Suspect recurrent aspiration   With patient clinically stable, monitor for now   Hopefully, this is pneumonitis and not recurrent pneumonia   Temperature is down again but WBC remains up   Leukocytosis most likely secondary to cirrhosis, hypersplenism and steroid effect  WBC decreasing of steroid      5  Right great toe gangrenous ulcer and osteomyelitis   There is no evidence of cellulitis clinically   Patient has no fever leukocytosis   No antibiotic is necessary   However, patient needs to have this toe amputated, to avoid development of wet gangrene   Patient has been refusing toe amputation  No antibiotic needed at present time  Monitor toe  Recommend toe amputation eventually      6   Decompensated cirrhosis   This is due to patient's noncompliance with prescribed treatment plan  Juan Somers is no evidence of peritonitis clinically   Patient is status post paracentesis with benign peritoneal fluid   Systemic corticosteroid started  Patient is status post repeat paracentesis, with benign parameters also     No antibiotic needed    Management per GI and primary service      7   PRANAY   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   Dehydration may contribute   Creatinine worsened   Patient is now on HD  Antibiotic dosages adjusted accordingly  Monitor creatinine      Discussed with patient in detail regarding above plan  At this point, with no active infection, I will sign off  Thank you for the consultation  Please do not hesitate to reconsult us with any questions or issues      Antibiotics:  Off antibiotic     Subjective:  Patient remains in ICU  Mental status stable   Awake and alert with stable confusion  Abdomen remains distended  No discomfort  Temperature stays down   No chills  Objective:  Vitals:  HR:  [] 88  Resp:  [11-30] 13  BP: (105-176)/(62-90) 145/79  SpO2:  [92 %-100 %] 98 %  Temp (24hrs), Av 2 °F (36 8 °C), Min:97 9 °F (36 6 °C), Max:98 8 °F (37 1 °C)  Current: Temperature: 98 1 °F (36 7 °C)    Physical Exam:     General: Awake, alert, cooperative, no distress  Lungs: Decreased breath sounds, no rales, no wheezing, respirations unlabored  Heart[de-identified]  Regular rate and rhythm, S1 and S2 normal, no murmur  Abdomen: Soft, stable distension, non-tender, bowel sounds active all four quadrants,        no masses, no organomegaly  Extremities: Stable leg edema  No erythema/warmth  No ulcer  Nontender to palpation  Skin:  No rash  Invasive Devices     Peripheral Intravenous Line            Peripheral IV 18 Left Antecubital 2 days          Line            HD Cath Double 8 days          Drain            Rectal Tube With balloon 7 days                Labs studies:   I have personally reviewed pertinent labs      Results from last 7 days  Lab Units 18  0456 18  0428 18  0523 18  0415  18  0443   SODIUM mmol/L 139 142 142 141  < > 137   POTASSIUM mmol/L 3 9 3 8 3 4* 3 6  < > 4 2   CHLORIDE mmol/L 103 105 105 104  < > 101   CO2 mmol/L 30 27 27 29  < > 28   ANION GAP mmol/L 6 10 10 8  < > 8   BUN mg/dL 32* 54* 65* 48*  < > 43*   CREATININE mg/dL 3 11* 3 87* 4 67* 3 90*  < > 3 88*   EGFR ml/min/1 73sq m 22 17 14 17  < > 17   GLUCOSE RANDOM mg/dL 139 142* 164* 191*  < > 145*   CALCIUM mg/dL 9 1 9 0 8 7 8 5  < > 8 4   AST U/L  --   --  73* 68*  --  38   ALT U/L  --   --  78 57  --  31   ALK PHOS U/L  --   --  268* 208*  --  64   TOTAL PROTEIN g/dL  --   --  6 9 6 2*  --  6 1*   BILIRUBIN TOTAL mg/dL  --   --  4 47* 4 03*  --  4 24*   < > = values in this interval not displayed  Results from last 7 days  Lab Units 03/02/18  0456 03/01/18  0428 02/28/18  0523   WBC Thousand/uL 16 38* 19 95* 24 84*   HEMOGLOBIN g/dL 8 0* 7 9* 8 7*   PLATELETS Thousands/uL 31* 23* 34*           Imaging Studies:   I have personally reviewed pertinent imaging study reports and images in PACS  EKG, Pathology, and Other Studies:   I have personally reviewed pertinent reports

## 2018-03-03 ENCOUNTER — APPOINTMENT (INPATIENT)
Dept: RADIOLOGY | Facility: HOSPITAL | Age: 53
DRG: 264 | End: 2018-03-03
Payer: COMMERCIAL

## 2018-03-03 ENCOUNTER — APPOINTMENT (INPATIENT)
Dept: RADIOLOGY | Facility: HOSPITAL | Age: 53
DRG: 264 | End: 2018-03-03
Attending: INTERNAL MEDICINE
Payer: COMMERCIAL

## 2018-03-03 PROBLEM — K70.10 ALCOHOLIC HEPATITIS: Chronic | Status: ACTIVE | Noted: 2018-02-17

## 2018-03-03 PROBLEM — E87.2 LACTIC ACIDOSIS: Status: RESOLVED | Noted: 2018-02-17 | Resolved: 2018-03-03

## 2018-03-03 PROBLEM — R09.02 HYPOXIA: Status: RESOLVED | Noted: 2018-02-17 | Resolved: 2018-03-03

## 2018-03-03 LAB
ABO GROUP BLD BPU: NORMAL
ABO GROUP BLD: NORMAL
ANION GAP SERPL CALCULATED.3IONS-SCNC: 7 MMOL/L (ref 4–13)
ATRIAL RATE: 139 BPM
ATRIAL RATE: 162 BPM
BASOPHILS # BLD AUTO: 0 THOUSANDS/ΜL (ref 0–0.1)
BASOPHILS NFR BLD AUTO: 0 % (ref 0–1)
BLD GP AB SCN SERPL QL: NEGATIVE
BPU ID: NORMAL
BUN SERPL-MCNC: 28 MG/DL (ref 5–25)
CALCIUM SERPL-MCNC: 8.5 MG/DL (ref 8.3–10.1)
CHLORIDE SERPL-SCNC: 103 MMOL/L (ref 100–108)
CO2 SERPL-SCNC: 29 MMOL/L (ref 21–32)
CREAT SERPL-MCNC: 2.4 MG/DL (ref 0.6–1.3)
EOSINOPHIL # BLD AUTO: 0.08 THOUSAND/ΜL (ref 0–0.61)
EOSINOPHIL NFR BLD AUTO: 1 % (ref 0–6)
ERYTHROCYTE [DISTWIDTH] IN BLOOD BY AUTOMATED COUNT: 24.5 % (ref 11.6–15.1)
FIBRINOGEN PPP-MCNC: 181 MG/DL (ref 227–495)
GFR SERPL CREATININE-BSD FRML MDRD: 30 ML/MIN/1.73SQ M
GLUCOSE SERPL-MCNC: 129 MG/DL (ref 65–140)
GLUCOSE SERPL-MCNC: 132 MG/DL (ref 65–140)
GLUCOSE SERPL-MCNC: 163 MG/DL (ref 65–140)
GLUCOSE SERPL-MCNC: 183 MG/DL (ref 65–140)
GLUCOSE SERPL-MCNC: 80 MG/DL (ref 65–140)
HCT VFR BLD AUTO: 20 % (ref 36.5–49.3)
HGB BLD-MCNC: 6.8 G/DL (ref 12–17)
HGB BLD-MCNC: 8.3 G/DL (ref 12–17)
INR PPP: 1.88 (ref 0.86–1.16)
LYMPHOCYTES # BLD AUTO: 1.1 THOUSANDS/ΜL (ref 0.6–4.47)
LYMPHOCYTES NFR BLD AUTO: 8 % (ref 14–44)
MCH RBC QN AUTO: 33.7 PG (ref 26.8–34.3)
MCHC RBC AUTO-ENTMCNC: 34 G/DL (ref 31.4–37.4)
MCV RBC AUTO: 99 FL (ref 82–98)
MONOCYTES # BLD AUTO: 2.09 THOUSAND/ΜL (ref 0.17–1.22)
MONOCYTES NFR BLD AUTO: 16 % (ref 4–12)
NEUTROPHILS # BLD AUTO: 10.01 THOUSANDS/ΜL (ref 1.85–7.62)
NEUTS SEG NFR BLD AUTO: 75 % (ref 43–75)
NRBC BLD AUTO-RTO: 0 /100 WBCS
P AXIS: 101 DEGREES
PHOSPHATE SERPL-MCNC: 2 MG/DL (ref 2.7–4.5)
PLATELET # BLD AUTO: 52 THOUSANDS/UL (ref 149–390)
POTASSIUM SERPL-SCNC: 3.8 MMOL/L (ref 3.5–5.3)
PR INTERVAL: 196 MS
PROTHROMBIN TIME: 21.8 SECONDS (ref 12.1–14.4)
QRS AXIS: 101 DEGREES
QRS AXIS: 94 DEGREES
QRSD INTERVAL: 83 MS
QRSD INTERVAL: 88 MS
QT INTERVAL: 292 MS
QT INTERVAL: 308 MS
QTC INTERVAL: 469 MS
QTC INTERVAL: 480 MS
RBC # BLD AUTO: 2.02 MILLION/UL (ref 3.88–5.62)
RH BLD: POSITIVE
SODIUM SERPL-SCNC: 139 MMOL/L (ref 136–145)
SPECIMEN EXPIRATION DATE: NORMAL
T WAVE AXIS: -71 DEGREES
T WAVE AXIS: -83 DEGREES
UNIT DISPENSE STATUS: NORMAL
UNIT PRODUCT CODE: NORMAL
UNIT RH: NORMAL
VENTRICULAR RATE: 139 BPM
VENTRICULAR RATE: 162 BPM
WBC # BLD AUTO: 13.39 THOUSAND/UL (ref 4.31–10.16)

## 2018-03-03 PROCEDURE — 85018 HEMOGLOBIN: CPT | Performed by: NURSE PRACTITIONER

## 2018-03-03 PROCEDURE — 86923 COMPATIBILITY TEST ELECTRIC: CPT

## 2018-03-03 PROCEDURE — 99232 SBSQ HOSP IP/OBS MODERATE 35: CPT | Performed by: INTERNAL MEDICINE

## 2018-03-03 PROCEDURE — 94640 AIRWAY INHALATION TREATMENT: CPT

## 2018-03-03 PROCEDURE — 85384 FIBRINOGEN ACTIVITY: CPT | Performed by: EMERGENCY MEDICINE

## 2018-03-03 PROCEDURE — 82948 REAGENT STRIP/BLOOD GLUCOSE: CPT

## 2018-03-03 PROCEDURE — 71045 X-RAY EXAM CHEST 1 VIEW: CPT

## 2018-03-03 PROCEDURE — 99233 SBSQ HOSP IP/OBS HIGH 50: CPT | Performed by: INTERNAL MEDICINE

## 2018-03-03 PROCEDURE — 85610 PROTHROMBIN TIME: CPT | Performed by: EMERGENCY MEDICINE

## 2018-03-03 PROCEDURE — 84100 ASSAY OF PHOSPHORUS: CPT | Performed by: INTERNAL MEDICINE

## 2018-03-03 PROCEDURE — 85025 COMPLETE CBC W/AUTO DIFF WBC: CPT | Performed by: INTERNAL MEDICINE

## 2018-03-03 PROCEDURE — 94760 N-INVAS EAR/PLS OXIMETRY 1: CPT

## 2018-03-03 PROCEDURE — 80048 BASIC METABOLIC PNL TOTAL CA: CPT | Performed by: INTERNAL MEDICINE

## 2018-03-03 PROCEDURE — 86901 BLOOD TYPING SEROLOGIC RH(D): CPT | Performed by: INTERNAL MEDICINE

## 2018-03-03 PROCEDURE — 86900 BLOOD TYPING SEROLOGIC ABO: CPT | Performed by: INTERNAL MEDICINE

## 2018-03-03 PROCEDURE — 86850 RBC ANTIBODY SCREEN: CPT | Performed by: INTERNAL MEDICINE

## 2018-03-03 PROCEDURE — 93005 ELECTROCARDIOGRAM TRACING: CPT

## 2018-03-03 PROCEDURE — P9021 RED BLOOD CELLS UNIT: HCPCS

## 2018-03-03 RX ORDER — MAGNESIUM SULFATE HEPTAHYDRATE 40 MG/ML
2 INJECTION, SOLUTION INTRAVENOUS ONCE
Status: COMPLETED | OUTPATIENT
Start: 2018-03-03 | End: 2018-03-03

## 2018-03-03 RX ORDER — DILTIAZEM HYDROCHLORIDE 5 MG/ML
INJECTION INTRAVENOUS
Status: COMPLETED
Start: 2018-03-03 | End: 2018-03-03

## 2018-03-03 RX ORDER — DILTIAZEM HYDROCHLORIDE 5 MG/ML
10 INJECTION INTRAVENOUS ONCE
Status: COMPLETED | OUTPATIENT
Start: 2018-03-03 | End: 2018-03-03

## 2018-03-03 RX ADMIN — LEVALBUTEROL HYDROCHLORIDE 1.25 MG: 1.25 SOLUTION, CONCENTRATE RESPIRATORY (INHALATION) at 14:45

## 2018-03-03 RX ADMIN — FOLIC ACID 1 MG: 1 TABLET ORAL at 10:37

## 2018-03-03 RX ADMIN — MIDODRINE HYDROCHLORIDE 5 MG: 5 TABLET ORAL at 10:37

## 2018-03-03 RX ADMIN — DILTIAZEM HYDROCHLORIDE 10 MG: 5 INJECTION INTRAVENOUS at 07:42

## 2018-03-03 RX ADMIN — ISODIUM CHLORIDE 3 ML: 0.03 SOLUTION RESPIRATORY (INHALATION) at 07:57

## 2018-03-03 RX ADMIN — LACTULOSE 20 G: 20 SOLUTION ORAL at 16:07

## 2018-03-03 RX ADMIN — LACTULOSE 20 G: 20 SOLUTION ORAL at 22:37

## 2018-03-03 RX ADMIN — Medication 100 MG: at 10:37

## 2018-03-03 RX ADMIN — LACTULOSE 20 G: 20 SOLUTION ORAL at 10:37

## 2018-03-03 RX ADMIN — OCTREOTIDE ACETATE 100 MCG: 100 INJECTION, SOLUTION INTRAVENOUS; SUBCUTANEOUS at 22:37

## 2018-03-03 RX ADMIN — ISODIUM CHLORIDE 3 ML: 0.03 SOLUTION RESPIRATORY (INHALATION) at 14:45

## 2018-03-03 RX ADMIN — SODIUM CHLORIDE 1000 ML: 0.9 INJECTION, SOLUTION INTRAVENOUS at 07:52

## 2018-03-03 RX ADMIN — DIBASIC SODIUM PHOSPHATE, MONOBASIC POTASSIUM PHOSPHATE AND MONOBASIC SODIUM PHOSPHATE 2 TABLET: 852; 155; 130 TABLET ORAL at 16:07

## 2018-03-03 RX ADMIN — MAGNESIUM SULFATE HEPTAHYDRATE 2 G: 40 INJECTION, SOLUTION INTRAVENOUS at 07:55

## 2018-03-03 RX ADMIN — Medication 1 TABLET: at 10:38

## 2018-03-03 RX ADMIN — RIFAXIMIN 550 MG: 550 TABLET ORAL at 22:37

## 2018-03-03 RX ADMIN — DIBASIC SODIUM PHOSPHATE, MONOBASIC POTASSIUM PHOSPHATE AND MONOBASIC SODIUM PHOSPHATE 2 TABLET: 852; 155; 130 TABLET ORAL at 13:28

## 2018-03-03 RX ADMIN — LEVALBUTEROL HYDROCHLORIDE 1.25 MG: 1.25 SOLUTION, CONCENTRATE RESPIRATORY (INHALATION) at 07:57

## 2018-03-03 RX ADMIN — CALCIUM GLUCONATE 2 G: 98 INJECTION, SOLUTION INTRAVENOUS at 16:30

## 2018-03-03 RX ADMIN — OCTREOTIDE ACETATE 100 MCG: 100 INJECTION, SOLUTION INTRAVENOUS; SUBCUTANEOUS at 06:09

## 2018-03-03 RX ADMIN — OCTREOTIDE ACETATE 100 MCG: 100 INJECTION, SOLUTION INTRAVENOUS; SUBCUTANEOUS at 13:44

## 2018-03-03 RX ADMIN — POTASSIUM PHOSPHATE, MONOBASIC AND POTASSIUM PHOSPHATE, DIBASIC 9 MMOL: 224; 236 INJECTION, SOLUTION INTRAVENOUS at 13:43

## 2018-03-03 RX ADMIN — PANTOPRAZOLE SODIUM 40 MG: 40 TABLET, DELAYED RELEASE ORAL at 06:09

## 2018-03-03 RX ADMIN — RIFAXIMIN 550 MG: 550 TABLET ORAL at 10:37

## 2018-03-03 RX ADMIN — INSULIN LISPRO 1 UNITS: 100 INJECTION, SOLUTION INTRAVENOUS; SUBCUTANEOUS at 00:41

## 2018-03-03 RX ADMIN — INSULIN LISPRO 1 UNITS: 100 INJECTION, SOLUTION INTRAVENOUS; SUBCUTANEOUS at 13:23

## 2018-03-03 NOTE — PROGRESS NOTES
Progress Note - Critical Care   Louis Guzman 50 y o  male MRN: [id number]  Unit/Bed#: MICU 07 Encounter: [id number]    Assessment:   Principal Problem:    Decompensated hepatic cirrhosis ([facility] [address] )  Active Problems:    Ascites    Hepatic encephalopathy (HCC)    PRANAY (acute kidney injury) ([facility] [address] )    Hypoalbuminemia    Hyponatremia    Sepsis ([facility] )    Hyperammonemia (HCC)    Lactic acidosis    Elevated alkaline phosphatase level    Anemia of chronic disease    Osteomyelitis of toe of right foot (HCC)    Alcohol abuse    Alcoholic hepatitis    Cough    Hypoxia    Oliguria    Leukocytosis    Coagulopathy (HCC)    Aspiration pneumonia of right lower lobe (HCC)    Paroxysmal a-fib (HCC)  Resolved Problems:    Abdominal pain    Hypotension      Plan:      Neuro:     Enchephalopathy: Improving   -likely multifactorial due to liver failure, sepsis and  ESRD   -Patient continues to display confabulation, Mentation has improved however according to family he is not at baseline     Cv:     Hemodynamically stable  Previously labile blood pressure, improved, likely multifactorial  secondary to hypoalbuminemia secondary to liver failure (CHF ruled out with recent echo)  -Continue albumin boluses 25% PRN for hypotension  for pressure support (map goal > 65)  -Midodrine 5mg bid currently held d/t elevated SBP    Paroxysmal Afib: New onset (no prior recorded history), seen on EKG 2/27   -This AM returned back into Afib with RVR, HR up to 190s  Pt asymptomatic  BP 739W-535Y systolic  Likely due to blood loss anemia  Awaiting blood from blood bank  Was previously on diltiazem drip  Given 10 mg diltiazem, rhythm changed from afib to SVT vs aflutter at HR 140s-150s  Will give 500mL NS and 2 gm mag  Continue to monitor  Lung:     Acute Hypoxemic Respiratory Failure, resolved, was due to pneumonia and pulmonary edema   -satting >95% on room air    Pneumonia, RLL, resolved s/pt 7 day course of Zosyn  Continue Xopenex and saline nebs scheduled with albuterol nebs PRN    GI:     Acute decompensated alcoholic hepatic cirrhosis, with recurrent ascites  -Last paracentesis on 2/21 with removal of ~4L  -GI onboard, appreciate their recs  -Continue Rifaximin, lactulose  -Continue octreotide 100 mcg q8h  -Completed prednisone taper yesterday on 3/2  Unlikely to benefit from steroids per GI     FEN:    -No IVF's   -Will continue to monitor correct lytes PRN  -Patient passed speech/swallow eval and will be transitioned to PO renal dialyis diet      : PRANAY with Cr 2 4 today, improved from 3 11 yesterday, baseline 0 5  Likely hepatorenal syndrome vs ATN  -HD catheter R IJ placed yesterday 3/2   -Hemodialysis per Nephrology, next session likely 3/5     ID:     Continues to remain afebrile  WBC 20-->16-->13  Pneumonia, RLL, resolved  7 day course of Zosyn completed 2/28  Osteomyelitis, right hallux ulcer/osteomyelitis: per ID no further antibiotics, recommend toe amputation but pt refuses     Heme:     Anemia- Hgb 6 8 this AM from 8 0 yesterday  Likely due to continued bleeding/oozing at R IJ permacath site placed yesterday  Receiving 1 unit pRBC transfusion  Will repeat fibrinogen, PT/INR this AM     Coagulopathy:  Last fibrinogen level 181, improved from 107 after 10 units cryo given yesterday    Thrombocytopenia, worsening, likely secondary to liver failure  -Platelet 52 increased from 31 yesterday after 1 unit platelets given yesterday  -last INR 1 74 yesterday, decreased from  2 33 after 4 units FFP and vitamin K given yesterday  -hold DVT prophylaxis          Endo:    -steroid induced hyperglycemia, off steroids as of yesterday 3/2   Continue to monitor   -Glucose 140s-180s yesterday                Msk/Skin:     Frequent position changing/turning  Routine skin care     Disposition:      Level 2 stepdown transfer orders placed yesterday, awaiting bed availability  per family meeting on 2/28, pt DNR/DNI     ___________________________________________     Chief Complaint:        Chief Complaint   Patient presents with    Ascites       Pt "I need something for my belly  I got tapped for the first time two weeks ago  And its all hard and big again " Roomate "He is suppose to be on liver medication but its too expensive and the doctor wont prescribe anything" Pt c/o SOB "sometimes"          HPI/24hr events  R IJ portacath placed yesterday by IR  Bleeding/oozing from site overnight  This AM ordered 1 unit pRBC for Hgb 6 8  This AM returned back into Afib with RVR, HR up to 190s  Pt asymptomatic  BP 352I-481Y systolic  Likely due to blood loss anemia  Awaiting blood from blood bank  Was previously on diltiazem drip  Given 10 mg diltiazem, rhythm changed from afib to SVT vs aflutter at HR 140s-150s  Will give 500mL NS and 2 gm mag  CXR ordered to assess portacath location  Physical Exam:  ______________________________________________________________________  Vitals:    18 0330 18 0430 18 0500 18 0530   BP: 149/75 161/93  159/85   Pulse: 98 94  102   Resp: 14 (!) 24  17   Temp: 98 5 °F (36 9 °C)      TempSrc: Oral      SpO2: 95% 96%  98%   Weight:   77 6 kg (171 lb 1 2 oz)    Height:         Arterial Line BP: 132/74  Arterial Line MAP (mmHg): 98 mmHg  Temp:  [97 9 °F (36 6 °C)-98 5 °F (36 9 °C)] 98 5 °F (36 9 °C)  HR:  [] 102  Resp:  [13-34] 17  BP: (114-187)/() 159/85     Temperature:   Temp (24hrs), Av 2 °F (36 8 °C), Min:97 9 °F (36 6 °C), Max:98 5 °F (36 9 °C)    Current Temperature: 98 5 °F (36 9 °C)     Physical Exam:  General:  NAD, Agitated, restless in bed  Eyes: PERRL,EOMI, Sclera anicteric, no icterus, no d/c  Neck:  supple, FROM  Lungs: Breath sounds improving   Crackles in b/l low lung bases and diffuse ronchi persist  Cardiac:  RRR, normal S1S2  Abdomen:  Soft, non-tender, distended abdomen  Extremities:  Non-tender, no edema, gangrenuos right hallux (stable from previously)  Skin:  Warm and dry  Neurological: Awake, alert, continues to confabulate, oriented to person only  Moves all four extremities  Weights:   IBW: 73 kg    Body mass index is 24 55 kg/m²  Weight (last 2 days)     Date/Time   Weight    03/03/18 0500  77 6 (171 08)    03/02/18 0600  77 8 (171 52)    03/01/18 0400  78 2 (172 4)               Non-Invasive/Invasive Ventilation Settings:  Respiratory    Lab Data (Last 4 hours)    None         O2/Vent Data (Last 4 hours)    None              No results found for: PHART, JUB3LZL, PO2ART, LDD2ZBS, P3IJDANU, BEART, SOURCE    Intake and Outputs:  I/O       02/27 0701 - 02/28 0700 02/28 0701 - 03/01 0700    P  O  120 440    I V  (mL/kg) 547 3 (6 6) 516 3 (6 6)    Blood  244    Other 200     NG/ 220    IV Piggyback 230 60    Feedings 848     Total Intake(mL/kg) 2245 3 (26 9) 1480 3 (18 9)    Urine (mL/kg/hr) 0 (0) 448 (0 2)    Emesis/NG output 20 (0) 0 (0)    Other 383 (0 2) 3500 (1 9)    Stool 1000 (0 5) 1100 (0 6)    Total Output 1403 5048    Net +842 3 -3567 8          Unmeasured Urine Occurrence 2 x           Nutrition:        Diet Orders            Start     Ordered    03/03/18 0000  Diet NPO; Sips with meds  Diet effective midnight     Question Answer Comment   Diet Type NPO    NPO Except: Sips with meds    RD to adjust diet per protocol?  Yes        03/02/18 0656    03/01/18 1842  Dietary nutrition supplements  Once     Question Answer Comment   Select Supplement: Nepro-Vanilla    Frequency Lunch, Dinner        03/01/18 1842          Labs:     Results from last 7 days  Lab Units 03/03/18  0453 03/02/18  1828 03/02/18  0456 03/01/18  0428  02/26/18  0431 02/25/18  0443   WBC Thousand/uL 13 39*  --  16 38* 19 95*  < > 18 54* 21 79*   HEMOGLOBIN g/dL 6 8*  --  8 0* 7 9*  < > 7 4* 8 1*   HEMATOCRIT % 20 0*  --  23 7* 23 5*  < > 21 2* 23 6*   PLATELETS Thousands/uL 52* 47* 31* 23*  < > 46* 48*   NEUTROS PCT % 75  --   --   --   --  88* 90*   MONOS PCT % 16*  --   --   --   --  7 5   MONO PCT MAN %  --   --  8 5 < >  --   --    < > = values in this interval not displayed  Results from last 7 days  Lab Units 03/03/18  0453 03/02/18  0456 03/01/18  0428 02/28/18  0523 02/27/18  0415  02/25/18  0443   SODIUM mmol/L 139 139 142 142 141  < > 137   POTASSIUM mmol/L 3 8 3 9 3 8 3 4* 3 6  < > 4 2   CHLORIDE mmol/L 103 103 105 105 104  < > 101   CO2 mmol/L 29 30 27 27 29  < > 28   BUN mg/dL 28* 32* 54* 65* 48*  < > 43*   CREATININE mg/dL 2 40* 3 11* 3 87* 4 67* 3 90*  < > 3 88*   CALCIUM mg/dL 8 5 9 1 9 0 8 7 8 5  < > 8 4   TOTAL PROTEIN g/dL  --   --   --  6 9 6 2*  --  6 1*   BILIRUBIN TOTAL mg/dL  --   --   --  4 47* 4 03*  --  4 24*   ALK PHOS U/L  --   --   --  268* 208*  --  64   ALT U/L  --   --   --  78 57  --  31   AST U/L  --   --   --  73* 68*  --  38   GLUCOSE RANDOM mg/dL 80 139 142* 164* 191*  < > 145*   < > = values in this interval not displayed  Results from last 7 days  Lab Units 03/02/18  0456 02/28/18  0523 02/27/18  0415   MAGNESIUM mg/dL 2 1 2 5 2 4       Results from last 7 days  Lab Units 03/03/18  0453 03/02/18  0456 02/28/18  0523   PHOSPHORUS mg/dL 2 0* 2 8 3 5        Results from last 7 days  Lab Units 03/02/18  1828 03/02/18  0456 02/27/18  0444   INR  1 74* 2 33* 2 88*         No results found for: TROPONINI    Imaging:   CXR 3/2, Successful image guided placement of tunneled central venous hemodialysis catheter  CXR 3/1, improved pulm edema    EKG: No new ECG  Micro:  Lab Results   Component Value Date    BLOODCX No Growth After 5 Days  02/21/2018    BLOODCX No Growth After 5 Days  02/21/2018    BLOODCX No Growth After 5 Days  02/17/2018    BLOODCX No Growth After 5 Days   02/17/2018     Allergies: No Known Allergies  Medications:   Scheduled Meds:    Current Facility-Administered Medications:  albuterol 2 5 mg Nebulization Q4H PRN Charis Santana DO    dexmedetomidine 0 1-0 7 mcg/kg/hr Intravenous Titrated Nelda Ochoa DO Last Rate: Stopped (20/72/15 0526)   folic acid 1 mg Oral Daily Mariama Bryant,     influenza vaccine 0 5 mL Intramuscular Prior to discharge Darius Alba MD    insulin lispro 1-6 Units [facility]cutaneous [facility] Bernerd Sink, PA-C    lactulose 20 g Oral TID Florencio Fofana MD    levalbuterol 1 25 mg Nebulization TID Charis Santana,     midodrine 5 mg Oral BID AC Philippe Salinas MD    multivitamin 1 tablet Oral Daily Bernerd Sink, PAPrabhakarC    octreotide 100 mcg Intravenous [facility] Mati Glasgow,     ondansetron 4 mg Intravenous Q8H PRN Milon Bald, DO    pantoprazole 40 mg Oral Early Morning Florencio Fofana MD    rifaximin 550 mg Oral Q12H [address] Ray [place], PA-C    sodium chloride 1 application Nasal K2A PRN Bernershelby Sink, PA-C    sodium chloride 3 mL Nebulization TID Charis Santana, DO    thiamine 100 mg Oral Daily Mariama Hurtado,       Continuous Infusions:    dexmedetomidine 0 1-0 7 mcg/kg/hr Last Rate: Stopped (02/26/18 1025)     PRN Meds:    albuterol 2 5 mg Q4H PRN   influenza vaccine 0 5 mL Prior to discharge   ondansetron 4 mg Q8H PRN   sodium chloride 1 application K8R PRN     VTE Pharmacologic Prophylaxis: none due to thrombocytopenia, elevated INR  VTE Mechanical Prophylaxis: sequential compression device  Invasive lines and devices: Invasive Devices     Central Venous Catheter Line            CVC Central Lines 03/02/18 Double less than 1 day          Peripheral Intravenous Line            Peripheral IV 03/02/18 Left;Lateral Antecubital less than 1 day          Line            HD Cath Double less than 1 day          Drain            Rectal Tube With balloon 8 days                   Code Status: Level 3 - DNAR and DNI    Portions of the record may have been created with voice recognition software  Occasional wrong word or "sound a like" substitutions may have occurred due to the inherent limitations of voice recognition software  Read the chart carefully and recognize, using context, where substitutions have occurred      Marimar Amezquita MD

## 2018-03-03 NOTE — PROGRESS NOTES
INTERVENTIONAL RADIOLOGY    He has had breakthrough oozing from his tunnel site despite stitch place at the insertion site at the time of placement  He has had significant bleeding despite multiple dressing changes and maneuvers  His dressing was taken down and cleaned and inspected  The oozing seems to be limited to the tunnel  The stitches probably inadequate and was removed   The site was prepped sterilely following ChloraPrep   2 separate pursestring side sutures were placed at the insertion site following local anesthetic with 1% lidocaine and manual compression was held for over 10 minutes  There was excellent hemostasis with no further oozing  A pressure dressing was placed over the site and instructions were given to the patient's nurse  A saline bag will be placed over the site as well for 2 hours  The dressing can be changed back to standard CHG dressing tomorrow      Chau Hansen MD

## 2018-03-03 NOTE — PROGRESS NOTES
Progress Note - Nephrology   Ethan Cea 50 y o  male MRN: [id number]  Unit/Bed#: MICU 07 Encounter: [id number]      Assessment / Plan:  1  PRANAY in setting of decompensated cirrhosis - concern for HRS vs ATN  -b/l Cr 0 5 in 2016, started on HD via temp HD cath 2/21, permacath placed 3/2    -hold on RRT for today  Assess daily  Likely next IHD 3/5/18  -monitor renal indices for signs of renal recovery  Is making urine       2  Decompensated alcoholic cirrhosis with hx of noncompliance - GI follows, not a transplant candidate     3  Encephalopathy -  on lactulose, likely multifactorial as with cirrhosis and RLL PNA     4  Aspiration PNA - s/p zosyn per ID, +leukocytosis-concern for recurrent aspiration per ID     5  Mild hyperphosphatemia - likely d/t PRANAY  Continue to monitor  Resolved now with low phos - replete and monitor      6  Anemia of chronic disease with thrombocytopenia - Hgb now in 6s  Transfuse prn   -Bili elevated  Platelets lower  Concern for DIC - per primary team   -+schistocytes and helmet cells noted on hemolysis smear    -mild LDH elevation noted with low haptoglobin  -VERNELL c3 negative, VERNELL Igg + as well as direct Jimena test+  -fibrin split products, fibrinogen low  - Iron normal, ferritin high, iron sat ok, with low TIBC  Low plts likely d/t cirrhosis but above findings supsicious for hemolysis/DIC       7  Hypotension - resolved on midodrine, changed to BID from TID  BP much higher now  Tends to drop on HD  Will monitor this today  May provide albumin prn for hypotension      8  Hypokalemia - resolved with HD     9  Volume overload/pulmonary edema - significant improvement  No need for UF today      Other issues: right great toe ulcer and osteomyelitis, alcoholic hepatitis  Code status DNR/DNI (level 3)     Have discussed the case with Dr France Pereyra of critical care  Subjective:   Patient trying to get out of bed  He is confused  He asks a wrist restraints to be removed    Denies any chest pain or shortness of Breath  Objective:     Vitals: Blood pressure 158/84, pulse (!) 162, temperature (!) 97 3 °F (36 3 °C), temperature source Tympanic, resp  rate 21, height 5' 10" (1 778 m), weight 77 6 kg (171 lb 1 2 oz), SpO2 97 %  ,Body mass index is 24 55 kg/m²  Temp (24hrs), Av 1 °F (36 7 °C), Min:97 3 °F (36 3 °C), Max:98 5 °F (36 9 °C)      Weight (last 2 days)     Date/Time   Weight    18 0500  77 6 (171 08)    18 0600  77 8 (171 52)    18 0400  78 2 (172 4)                Intake/Output Summary (Last 24 hours) at 18 0948  Last data filed at 18 0501   Gross per 24 hour   Intake             1945 ml   Output             3175 ml   Net            -1230 ml     I/O last 24 hours: In: 2935 [P O :870; I V :500; Blood:1495; IV Piggyback:70]  Out:  [Urine:220; FYUWQ:7824; Stool:500]        Physical Exam:   Physical Exam   Constitutional: He appears well-developed and well-nourished  No distress  HENT:   Head: Normocephalic and atraumatic  Mouth/Throat: No oropharyngeal exudate  Eyes: Right eye exhibits no discharge  Left eye exhibits no discharge  Scleral icterus is present  Neck: Normal range of motion  Neck supple  No thyromegaly present  Cardiovascular: Normal rate, regular rhythm and normal heart sounds  Pulmonary/Chest: Effort normal and breath sounds normal  He has no wheezes  He has no rales  Abdominal: Soft  Bowel sounds are normal  He exhibits distension  There is no tenderness  +Rectal tube   Musculoskeletal: Normal range of motion  He exhibits no edema  Neurological: He is alert  awake   Skin: Skin is warm and dry  No rash noted  He is not diaphoretic  Psychiatric: He has a normal mood and affect  confused   Vitals reviewed        Invasive Devices     Central Venous Catheter Line            CVC Central Lines 18 Double less than 1 day          Peripheral Intravenous Line            Peripheral IV 18 Left;Lateral Antecubital less than 1 day          Line            HD Cath Double less than 1 day          Drain            Rectal Tube With balloon 8 days                Medications:    Scheduled Meds:  Current Facility-Administered Medications:  albuterol 2 5 mg Nebulization Q4H PRN Charis Santana DO    dexmedetomidine 0 1-0 7 mcg/kg/hr Intravenous Titrated Romana Taylor DO Last Rate: Stopped (13/61/20 1773)   folic acid 1 mg Oral Daily Mariama Hurtado DO    influenza vaccine 0 5 mL Intramuscular Prior to discharge Mamie Jauregui MD    insulin lispro 1-6 Units Subcutaneous Q6H [address] Konrad Callahan PA-C    lactulose 20 g Oral TID Evette Regan MD    levalbuterol 1 25 mg Nebulization TID Vale Reed DO    midodrine 5 mg Oral BID AC Treasure Christensen MD    multivitamin 1 tablet Oral Daily Konrad Callahan PA-C    octreotide 100 mcg Intravenous [facility] Mati Glasgow DO    ondansetron 4 mg Intravenous Q8H PRN Mariama Hurtado DO    pantoprazole 40 mg Oral Early Morning Evette Regan MD    rifaximin 550 mg Oral Q12H [address] Raghav Branch PA-C    sodium chloride 1 application Nasal J0F PRN Konrad Callahan PA-C    sodium chloride 1,000 mL Intravenous Once Johnna Roblero MD Last Rate: 1,000 mL (03/03/18 0752)   sodium chloride 3 mL Nebulization TID Charis Santana DO    thiamine 100 mg Oral Daily Mariama Hurtado DO        PRN Meds:   albuterol    influenza vaccine    ondansetron    sodium chloride    Continuous Infusions:  dexmedetomidine 0 1-0 7 mcg/kg/hr Last Rate: Stopped (02/26/18 1025)           LAB RESULTS:        Results from last 7 days  Lab Units 03/03/18  0453 03/02/18  1828 03/02/18  0456 03/01/18  0428 02/28/18  0523 02/27/18  0655 02/27/18  0415 02/26/18  0431 02/25/18  0443   WBC Thousand/uL 13 39*  --  16 38* 19 95* 24 84* 24 45*  --  18 54* 21 79*   HEMOGLOBIN g/dL 6 8*  --  8 0* 7 9* 8 7* 8 5*  --  7 4* 8 1*   HEMATOCRIT % 20 0*  --  23 7* 23 5* 25 8* 24 7*  --  21 2* 23 6*   PLATELETS Thousands/uL 52* 47* 31* 23* 34* 47*  --  46* 48*   NEUTROS PCT % 75  --   --   --   --   --   --  88* 90*   LYMPHS PCT % 8*  --   --   --   --   --   --  5* 5*   LYMPHO PCT %  --   --  3* 4* 4* 1*  --   --   --    MONOS PCT % 16*  --   --   --   --   --   --  7 5   MONO PCT MAN %  --   --  8 5 7 4  --   --   --    EOS PCT % 1  --   --   --   --   --   --  0 0   EOSINO PCT MANUAL %  --   --  0 0 0 0  --   --   --    SODIUM mmol/L 139  --  139 142 142  --  141 141 137   POTASSIUM mmol/L 3 8  --  3 9 3 8 3 4*  --  3 6 3 9 4 2   CHLORIDE mmol/L 103  --  103 105 105  --  104 105 101   CO2 mmol/L 29  --  30 27 27  --  29 26 28   BUN mg/dL 28*  --  32* 54* 65*  --  48* 59* 43*   CREATININE mg/dL 2 40*  --  3 11* 3 87* 4 67*  --  3 90* 4 81* 3 88*   CALCIUM mg/dL 8 5  --  9 1 9 0 8 7  --  8 5 8 1* 8 4   TOTAL PROTEIN g/dL  --   --   --   --  6 9  --  6 2*  --  6 1*   BILIRUBIN TOTAL mg/dL  --   --   --   --  4 47*  --  4 03*  --  4 24*   ALK PHOS U/L  --   --   --   --  268*  --  208*  --  64   ALT U/L  --   --   --   --  78  --  57  --  31   AST U/L  --   --   --   --  73*  --  68*  --  38   GLUCOSE RANDOM mg/dL 80  --  139 142* 164*  --  191* 167* 145*   MAGNESIUM mg/dL  --   --  2 1  --  2 5  --  2 4 2 5 2 4   PHOSPHORUS mg/dL 2 0*  --  2 8  --  3 5  --   --  5 5* 5 0*       CUTURES:  Lab Results   Component Value Date    BLOODCX No Growth After 5 Days  02/21/2018    BLOODCX No Growth After 5 Days  02/21/2018    BLOODCX No Growth After 5 Days  02/17/2018    BLOODCX No Growth After 5 Days  02/17/2018                 Portions of the record may have been created with voice recognition software  Occasional wrong word or "sound a like" substitutions may have occurred due to the inherent limitations of voice recognition software  Read the chart carefully and recognize, using context, where substitutions have occurred  If you have any questions, please contact the dictating provider

## 2018-03-04 LAB
ABO GROUP BLD BPU: NORMAL
ANION GAP SERPL CALCULATED.3IONS-SCNC: 10 MMOL/L (ref 4–13)
BASOPHILS # BLD AUTO: 0.01 THOUSANDS/ΜL (ref 0–0.1)
BASOPHILS NFR BLD AUTO: 0 % (ref 0–1)
BPU ID: NORMAL
BUN SERPL-MCNC: 37 MG/DL (ref 5–25)
CA-I BLD-SCNC: 1.1 MMOL/L (ref 1.12–1.32)
CALCIUM SERPL-MCNC: 8.8 MG/DL (ref 8.3–10.1)
CHLORIDE SERPL-SCNC: 101 MMOL/L (ref 100–108)
CO2 SERPL-SCNC: 27 MMOL/L (ref 21–32)
CREAT SERPL-MCNC: 2.96 MG/DL (ref 0.6–1.3)
EOSINOPHIL # BLD AUTO: 0.14 THOUSAND/ΜL (ref 0–0.61)
EOSINOPHIL NFR BLD AUTO: 1 % (ref 0–6)
ERYTHROCYTE [DISTWIDTH] IN BLOOD BY AUTOMATED COUNT: 27.5 % (ref 11.6–15.1)
GFR SERPL CREATININE-BSD FRML MDRD: 24 ML/MIN/1.73SQ M
GLUCOSE SERPL-MCNC: 134 MG/DL (ref 65–140)
GLUCOSE SERPL-MCNC: 149 MG/DL (ref 65–140)
GLUCOSE SERPL-MCNC: 174 MG/DL (ref 65–140)
GLUCOSE SERPL-MCNC: 204 MG/DL (ref 65–140)
HCT VFR BLD AUTO: 23.9 % (ref 36.5–49.3)
HGB BLD-MCNC: 8.2 G/DL (ref 12–17)
LYMPHOCYTES # BLD AUTO: 0.95 THOUSANDS/ΜL (ref 0.6–4.47)
LYMPHOCYTES NFR BLD AUTO: 6 % (ref 14–44)
MAGNESIUM SERPL-MCNC: 2.2 MG/DL (ref 1.6–2.6)
MCH RBC QN AUTO: 32 PG (ref 26.8–34.3)
MCHC RBC AUTO-ENTMCNC: 34.3 G/DL (ref 31.4–37.4)
MCV RBC AUTO: 93 FL (ref 82–98)
MONOCYTES # BLD AUTO: 1.35 THOUSAND/ΜL (ref 0.17–1.22)
MONOCYTES NFR BLD AUTO: 8 % (ref 4–12)
NEUTROPHILS # BLD AUTO: 13.54 THOUSANDS/ΜL (ref 1.85–7.62)
NEUTS SEG NFR BLD AUTO: 85 % (ref 43–75)
NRBC BLD AUTO-RTO: 0 /100 WBCS
PHOSPHATE SERPL-MCNC: 4.7 MG/DL (ref 2.7–4.5)
PLATELET # BLD AUTO: 43 THOUSANDS/UL (ref 149–390)
POTASSIUM SERPL-SCNC: 3.6 MMOL/L (ref 3.5–5.3)
RBC # BLD AUTO: 2.56 MILLION/UL (ref 3.88–5.62)
SODIUM SERPL-SCNC: 138 MMOL/L (ref 136–145)
UNIT DISPENSE STATUS: NORMAL
UNIT PRODUCT CODE: NORMAL
UNIT RH: NORMAL
WBC # BLD AUTO: 16.07 THOUSAND/UL (ref 4.31–10.16)

## 2018-03-04 PROCEDURE — 82330 ASSAY OF CALCIUM: CPT | Performed by: NURSE PRACTITIONER

## 2018-03-04 PROCEDURE — 85025 COMPLETE CBC W/AUTO DIFF WBC: CPT | Performed by: NURSE PRACTITIONER

## 2018-03-04 PROCEDURE — 80048 BASIC METABOLIC PNL TOTAL CA: CPT | Performed by: INTERNAL MEDICINE

## 2018-03-04 PROCEDURE — 84100 ASSAY OF PHOSPHORUS: CPT | Performed by: INTERNAL MEDICINE

## 2018-03-04 PROCEDURE — 82948 REAGENT STRIP/BLOOD GLUCOSE: CPT

## 2018-03-04 PROCEDURE — 99233 SBSQ HOSP IP/OBS HIGH 50: CPT | Performed by: INTERNAL MEDICINE

## 2018-03-04 PROCEDURE — 83735 ASSAY OF MAGNESIUM: CPT | Performed by: NURSE PRACTITIONER

## 2018-03-04 PROCEDURE — 99232 SBSQ HOSP IP/OBS MODERATE 35: CPT | Performed by: INTERNAL MEDICINE

## 2018-03-04 RX ADMIN — LACTULOSE 20 G: 20 SOLUTION ORAL at 16:05

## 2018-03-04 RX ADMIN — CALCIUM GLUCONATE 2 G: 98 INJECTION, SOLUTION INTRAVENOUS at 14:18

## 2018-03-04 RX ADMIN — POTASSIUM CHLORIDE 20 MEQ: 2 INJECTION, SOLUTION, CONCENTRATE INTRAVENOUS at 08:12

## 2018-03-04 RX ADMIN — OCTREOTIDE ACETATE 100 MCG: 100 INJECTION, SOLUTION INTRAVENOUS; SUBCUTANEOUS at 16:14

## 2018-03-04 RX ADMIN — Medication 100 MG: at 08:45

## 2018-03-04 RX ADMIN — LACTULOSE 20 G: 20 SOLUTION ORAL at 22:43

## 2018-03-04 RX ADMIN — Medication 1 TABLET: at 08:44

## 2018-03-04 RX ADMIN — DIBASIC SODIUM PHOSPHATE, MONOBASIC POTASSIUM PHOSPHATE AND MONOBASIC SODIUM PHOSPHATE 2 TABLET: 852; 155; 130 TABLET ORAL at 08:43

## 2018-03-04 RX ADMIN — RIFAXIMIN 550 MG: 550 TABLET ORAL at 22:44

## 2018-03-04 RX ADMIN — PANTOPRAZOLE SODIUM 40 MG: 40 TABLET, DELAYED RELEASE ORAL at 05:45

## 2018-03-04 RX ADMIN — OCTREOTIDE ACETATE 100 MCG: 100 INJECTION, SOLUTION INTRAVENOUS; SUBCUTANEOUS at 05:45

## 2018-03-04 RX ADMIN — LACTULOSE 20 G: 20 SOLUTION ORAL at 13:07

## 2018-03-04 RX ADMIN — POTASSIUM CHLORIDE 20 MEQ: 2 INJECTION, SOLUTION, CONCENTRATE INTRAVENOUS at 11:28

## 2018-03-04 RX ADMIN — RIFAXIMIN 550 MG: 550 TABLET ORAL at 08:44

## 2018-03-04 RX ADMIN — FOLIC ACID 1 MG: 1 TABLET ORAL at 08:44

## 2018-03-04 NOTE — PROGRESS NOTES
Progress Note - ICU Transfer to Step down/med  surg  - Shauna Donovan 50 y o  male MRN: [id number]    Unit/Bed#: [facility] 07 Encounter: [id number]      Code Status: Level 3 - DNAR and DNI    Date of ICU admission: 2/17/18    Reason for ICU adm: Transferred to ICU for hypotension, oliguria, hepatic encephalopathy, blood loss anemia  Active problems:   Patient Active Problem List   Diagnosis    Decompensated hepatic cirrhosis (HCC)    Ascites    Hepatic encephalopathy ([place] [address] )    PRANAY (acute kidney injury) ([place] [address] )    Hypoalbuminemia    Hyponatremia    Sepsis ([place] [address] )    Hyperammonemia (HCC)    Elevated alkaline phosphatase level    Anemia of chronic disease    Osteomyelitis of toe of right foot (HCC)    Alcohol abuse    Alcoholic hepatitis    Cough    Oliguria    Leukocytosis    Coagulopathy (HCC)    Aspiration pneumonia of right lower lobe (HCC)    Paroxysmal a-fib ([place] [address] )       Summary of clinical course: Patient with history of alcoholic cirrhosis/ascites with paracentesis a month ago admitted with worsening shortness of breath and increasing ascites  On 2/15 started to have hypotension and had bloody drainage from paracentesis site in the collection bag which brought him to the ICU  Patient had an IJ portacath placed by IR with bleeding/ozzing noted two nights ago requiring 1 unit pRBC for Hgb 6 8  Patient also had an episode of Afib with RVR, asymptomatic, and was put on diltiazem gtt  Afib w/RVR later resolved  Patient to be placed on continual visual observation while restraints are off to ensure patient doesn't interfere with medical equipment to help with his hospital stay  Recent or scheduled procedures:3/2 Right IJ Permacath    Outstanding/pending diagnostics:   XR chest portable   Final Result by Wagner Norwood MD (03/03 0848)      1  Scattered patchy airspace disease bilaterally has partially cleared on the left, stable on the right  There is probably a small right pleural effusion     2  Interval right sided dialysis catheter placement  Workstation performed: [id number]         IR permacath placement   Final Result by She Galvez MD (03/02 1253)   Impression: Successful image guided placement of tunneled central venous hemodialysis catheter  Workstation performed: [id number]         XR chest portable ICU   Final Result by Ella Lux MD (03/01 5796)      Partial resolution of diffuse bilateral airspace disease most suggestive of pulmonary edema  Persistent pleural effusions but appearing somewhat smaller than prior            Workstation performed: [id number]         XR chest portable   Final Result by Romeo Madrid MD (02/26 8226)      No significant interval change in the bilateral pleural effusions and pulmonary edema pattern            Workstation performed: [id number]         XR chest portable   Final Result by Dayton Klinefelter, MD (02/25 1022)      Diffuse airspace opacities with slight increase in the right upper lobe  No significant change in small bilateral pleural effusions  Workstation performed: [id number]         XR chest portable   Final Result by Duane Turner DO (02/24 1455)      Moderate CHF with small bilateral pleural effusions similar to prior study  Workstation performed: [id number]         XR chest portable   Final Result by Nikki Anthony DO (02/23 1202)   Worsening congestive heart failure and bilateral pleural effusions        Workstation performed: [id number]         IR temp HD cath   Final Result by Scar Santos DO (02/23 0003)   Impression: Successful placement of a temporary dialysis catheter via the internal jugular vein         Workstation performed: [id number]         IR paracentesis   Final Result by Scar Santos DO (02/23 7311)   Impression: Successful diagnostic and therapeutic paracentesis         Workstation performed: [id number]         XR chest pa only   Final Result by Mayito Ervin MD Rach (02/21 1935)      Increasing airspace opacities in the right perihilar region and left suprahilar region may represent aspiration and/or pneumonia  Workstation performed: [id number]         CT head wo contrast   Final Result by Stephan Mei MD (02/21 1742)      Motion degraded examination  No gross intracranial abnormality  Workstation performed: [id number]         XR chest portable   Final Result by Estela Alejandre DO (02/21 1218)   1  Right internal jugular dialysis catheter with tip in the midsuperior vena cava  2   Mild vascular congestion  Right-sided alveolar infiltrates, likely cardiogenic in nature, however superimposed pneumonia not excluded  Clinical correlation and follow-up examination recommended  Workstation performed: [id number]         US abdomen limited   Final Result by Desire Clement DO (02/20 7128)      Cirrhosis with sequela of portal hypertension and ascites as detailed above  Cholelithiasis  If there is clinical concern for cholecystitis, nuclear medicine HIDA scan may be obtained  Workstation performed: [id number]         VAS BERNIE & waveform analysis, multiple levels   Final Result by Lizandro Brown MD (02/20 0028)      XR foot 3+ vw right   Final Result by Estela Alejandre DO (02/19 3645)   Soft tissue ulceration with bony changes of the tuft of the great toe, suggesting osteomyelitis  If there is concern for osteomyelitis, consider nuclear medicine white blood cell scan or MRI with gadolinium for further evaluation  A verbal report will be called by the reading room liaison following this dictation  Workstation performed: [id number]         CT abdomen pelvis wo contrast   Final Result by Latia West MD (02/18 6319)   1  Cirrhosis with stigmata of portal hypertension  2   Large volume of abdominopelvic ascites  There is a layering hematocrit level in the dependent pelvis    However, with the attenuation of the dependent component measuring only 15 Hounsfield units, this is most likely small volume of blood products    rather than large volume hemoperitoneum  3   Postprocedural pneumoperitoneum  Needle tract in the left mid abdomen is in the vicinity of a large body wall collateral    4   Small right pleural effusion and right base atelectasis  5   Anterior compression deformities of T7 and T12  I personally discussed this study with Dorian Velasco on 2/18/2018 at 2:42 PM                Workstation performed: [id number]         XR chest pa & lateral   Final Result by Petra Kaur MD (02/18 111)      1  No active pulmonary disease  2   Free intraperitoneal air which may be related to reported paracentesis the prior day though clinical correlation for acute abdominal symptoms recommended        I personally discussed this study with Chris Wynn on 2/18/2018 at 10:48 AM                Workstation performed: Blake discharge planning:  Transfer to floor

## 2018-03-04 NOTE — PROGRESS NOTES
Report was called to Araceli Reveles RN from the [address] [place] [address] Rd 8 Nursing unit  The patient will be transferred to Room 802 as a Med-Surg patient, with a Continual Observation watch once the patient is in his new room

## 2018-03-04 NOTE — PROGRESS NOTES
Progress Note - Nephrology   Tacho Sandoval 50 y o  male MRN: [id number]  Unit/Bed#: MICU 07 Encounter: [id number]      Assessment / Plan:  1  PRANAY in setting of decompensated cirrhosis - concern for HRS vs ATN  -b/l Cr 0 5 in 2016, started on HD via temp HD cath 2/21, permacath placed 3/2    -hold on RRT for today  Assess daily  Plan next IHD 3/5/18  -continue to monitor for signs of recovery     2  Decompensated alcoholic cirrhosis with hx of noncompliance - GI follows, not a transplant candidate     3  Encephalopathy -  on lactulose, likely multifactorial as with cirrhosis and RLL PNA     4  Aspiration PNA - s/p zosyn per ID, +leukocytosis-concern for recurrent aspiration per ID     5  Mild hyperphosphatemia - likely d/t PRANAY  Continue to monitor  Expect clearance with IHD tomorrow      6  Anemia of chronic disease with thrombocytopenia - Hgb now in 8s  Transfuse prn   -Bili elevated  Platelets lower  Concern for DIC - per primary team   -+schistocytes and helmet cells noted on hemolysis smear    -mild LDH elevation noted with low haptoglobin  -VERNELL c3 negative, VERNELL Igg + as well as direct Jimena test+  -fibrin split products, fibrinogen low  - Iron normal, ferritin high, iron sat ok, with low TIBC  Low plts likely d/t cirrhosis but above findings supsicious for hemolysis/DIC       7  Hypotension - resolved on midodrine, changed to BID from TID  BP much higher now  Tends to drop on HD  Will monitor this  May provide albumin prn for hypotension      8  Hypokalemia - correct on HD  Maintain K near 4 to lessen likelihood of ammoniagenesis in this patient with liver disease     9  Volume overload/pulmonary edema - significant improvement  Plan for UF tomorrow on HD      Other issues: right great toe ulcer and osteomyelitis, alcoholic hepatitis  Code status DNR/DNI (level 3)     Have discussed the case with Dr Verdugo of critical care  Subjective:   He denies any chest pain or shortness breath    No complaints  He is urinating  Objective:     Vitals: Blood pressure 156/96, pulse 102, temperature 98 6 °F (37 °C), temperature source Oral, resp  rate 16, height 5' 10" (1 778 m), weight 77 3 kg (170 lb 6 7 oz), SpO2 97 %  ,Body mass index is 24 45 kg/m²  Temp (24hrs), Av 9 °F (36 6 °C), Min:96 8 °F (36 °C), Max:98 6 °F (37 °C)      Weight (last 2 days)     Date/Time   Weight    18 0600  77 3 (170 42)    18 0500  77 6 (171 08)    18 0600  77 8 (171 52)                Intake/Output Summary (Last 24 hours) at 18 0955  Last data filed at 18 0600   Gross per 24 hour   Intake             1670 ml   Output              516 ml   Net             1154 ml     I/O last 24 hours: In: 12 [P O :1120; Blood:350; IV Piggyback:1250]  Out: 80 [Urine:241; Stool:275]        Physical Exam:   Physical Exam   Constitutional: He appears well-developed and well-nourished  No distress  HENT:   Head: Normocephalic and atraumatic  Mouth/Throat: No oropharyngeal exudate  Eyes: Right eye exhibits no discharge  Left eye exhibits no discharge  Scleral icterus is present  Neck: Normal range of motion  Neck supple  Cardiovascular: Normal rate, regular rhythm and normal heart sounds  Pulmonary/Chest: Effort normal  He has no wheezes  He has no rales  +coarse bs b/l   Abdominal: Soft  Bowel sounds are normal  He exhibits distension  There is no tenderness  +rectal tube   Musculoskeletal: Normal range of motion  He exhibits no edema  Neurological: He is alert  awake   Skin: Skin is warm and dry  No rash noted  He is not diaphoretic  Psychiatric: He has a normal mood and affect  Vitals reviewed        Invasive Devices     Peripheral Intravenous Line            Peripheral IV 18 Left;Lateral Antecubital 1 day          Line            HD Cath Double 1 day          Drain            Rectal Tube With balloon 9 days                Medications:    Scheduled Meds:  Current Facility-Administered Medications:  albuterol 2 5 mg Nebulization Q4H PRN Charis Santana,     folic acid 1 mg Oral Daily Mariama Hurtado,     influenza vaccine 0 5 mL Intramuscular Prior to discharge Ponce Holter, MD    lactulose 20 g Oral TID Luci Mirza MD    multivitamin 1 tablet Oral Daily Yamilex Velasquez PA-C    octreotide 100 mcg Intravenous [facility] Mati Glasgow,     ondansetron 4 mg Intravenous Q8H PRN Grazyna Santoyo DO    pantoprazole 40 mg Oral Early Morning Luci Mirza MD    potassium chloride 20 mEq Intravenous Q2H Rogena Cogan, MD Last Rate: 20 mEq (03/04/18 0812)   rifaximin 550 mg Oral Q12H [facility] Sandy Castillo PA-C    sodium chloride 1 application Nasal A2U PRN Yamilex Velasquez PA-C    thiamine 100 mg Oral Daily Grazyna Santoyo DO        PRN Meds:   albuterol    influenza vaccine    ondansetron    sodium chloride    Continuous Infusions:         LAB RESULTS:        Results from last 7 days  Lab Units 03/04/18  0557 03/03/18  1356 03/03/18  0453 03/02/18  1828 03/02/18  0456 03/01/18  0428 02/28/18  0523 02/27/18  0655 02/27/18  0415 02/26/18  0431   WBC Thousand/uL 16 07*  --  13 39*  --  16 38* 19 95* 24 84* 24 45*  --  18 54*   HEMOGLOBIN g/dL 8 2* 8 3* 6 8*  --  8 0* 7 9* 8 7* 8 5*  --  7 4*   HEMATOCRIT % 23 9*  --  20 0*  --  23 7* 23 5* 25 8* 24 7*  --  21 2*   PLATELETS Thousands/uL 43*  --  52* 47* 31* 23* 34* 47*  --  46*   NEUTROS PCT % 85*  --  75  --   --   --   --   --   --  88*   LYMPHS PCT % 6*  --  8*  --   --   --   --   --   --  5*   LYMPHO PCT %  --   --   --   --  3* 4* 4* 1*  --   --    MONOS PCT % 8  --  16*  --   --   --   --   --   --  7   MONO PCT MAN %  --   --   --   --  8 5 7 4  --   --    EOS PCT % 1  --  1  --   --   --   --   --   --  0   EOSINO PCT MANUAL %  --   --   --   --  0 0 0 0  --   --    SODIUM mmol/L 138  --  139  --  139 142 142  --  141 141   POTASSIUM mmol/L 3 6  --  3 8  --  3 9 3 8 3 4*  --  3 6 3 9   CHLORIDE mmol/L 101  --  103 --  103 105 105  --  104 105   CO2 mmol/L 27  --  29  --  30 27 27  --  29 26   BUN mg/dL 37*  --  28*  --  32* 54* 65*  --  48* 59*   CREATININE mg/dL 2 96*  --  2 40*  --  3 11* 3 87* 4 67*  --  3 90* 4 81*   CALCIUM mg/dL 8 8  --  8 5  --  9 1 9 0 8 7  --  8 5 8 1*   TOTAL PROTEIN g/dL  --   --   --   --   --   --  6 9  --  6 2*  --    BILIRUBIN TOTAL mg/dL  --   --   --   --   --   --  4 47*  --  4 03*  --    ALK PHOS U/L  --   --   --   --   --   --  268*  --  208*  --    ALT U/L  --   --   --   --   --   --  78  --  57  --    AST U/L  --   --   --   --   --   --  73*  --  68*  --    GLUCOSE RANDOM mg/dL 134  --  80  --  139 142* 164*  --  191* 167*   MAGNESIUM mg/dL 2 2  --   --   --  2 1  --  2 5  --  2 4 2 5   PHOSPHORUS mg/dL 4 7*  --  2 0*  --  2 8  --  3 5  --   --  5 5*       CUTURES:  Lab Results   Component Value Date    BLOODCX No Growth After 5 Days  02/21/2018    BLOODCX No Growth After 5 Days  02/21/2018    BLOODCX No Growth After 5 Days  02/17/2018    BLOODCX No Growth After 5 Days  02/17/2018                 Portions of the record may have been created with voice recognition software  Occasional wrong word or "sound a like" substitutions may have occurred due to the inherent limitations of voice recognition software  Read the chart carefully and recognize, using context, where substitutions have occurred  If you have any questions, please contact the dictating provider

## 2018-03-04 NOTE — PROGRESS NOTES
The patient is now being transferred to Room 802 via stretcher, with Kristina Johnson (MICU PCA) accompanying the patient as the newly assigned personnel for the Continual Observation

## 2018-03-04 NOTE — PROGRESS NOTES
[facility] Unit Transfer Note  Unit/Bed # @DBLINK (SHADI,60262)@ Encounter: [id number]  SOD Team C       Sydney Pappas 50 y o  male [id number]    Active Hospital Problems: Principal Problem:    Decompensated hepatic cirrhosis ([facility] )  Active Problems:    Ascites    Hepatic encephalopathy (HCC)    PRANAY (acute kidney injury) ([facility] )    Hypoalbuminemia    Hyponatremia    Sepsis (HCC)    Hyperammonemia (HCC)    Elevated alkaline phosphatase level    Anemia of chronic disease    Osteomyelitis of toe of right foot (HCC)    Alcohol abuse    Alcoholic hepatitis    Cough    Oliguria    Leukocytosis    Coagulopathy (HCC)    Aspiration pneumonia of right lower lobe (HCC)    Paroxysmal a-fib (HCC)    Decompensated Hepatic Cirrhosis 2/2 to Alcoholic Cirrhosis (with Hepatic Encephalopathy and Ascites)  Patients mentation is currently improved since admission  He is AAOx2, follows commands intermittently  Will respond appropriately intermittently  Encephalopathy likely multifactorial secondary to liver failure, sepsis, end-stage renal disease  · Continue lactulose and Rifaximin, titrate to 2-3 soft, brown bowel movements daily  · GI consulted and following  · Not a transplant candidate 2/2 alcohol abuse  · Still mild ascites, but will not tap further in setting of PRANAY  · Continue octreotide 100 mcg q 8 hours  · GI follow-up outpatient when stable  · Completed prednisone taper on 03/02  Unlikely to benefit from further steroids per GI  · Patient on continuous non violent restraints with continuous 1:1 observation because unable to follow directions to avoid self-injury    PRANAY in the setting of decompensated cirrhosis (hepatorenal syndrome versus ATN)   Current Creatinine at 2 96 which is slightly down from admission when it was 3 32  His creatinine in early February 2018 at [address] was between 0 46-0 73  Has not responded to fluid challenge and withdrawal of nephrotoxic agents  Concern for HRS vs ATN   Started on HD via temporary HD catheter on 02/21, PermCath was placed on 03/02  · Next HD planned for 03/05/2018, with Right IJ HD catheter  · Nephrology following  · Renal diet    Acute blood loss anemia secondary to PermCath site bleeding  Status post transfusion 1 unit yesterday  Hemoglobin now at 8 2  Site of bleeding currently bandaged  · Does not endorse any anemic symptoms at this time  Transfuse for Hb <7 or symptomatic  Anemia of Chronic Disease (2/2 Liver/Renal disease and/or hemolysis/DIC)  Schistocytes and helmet cells noted on hemolysis smears  Mild LVH elevation with low haptoglobin  Positive direct Jimena test   Increase fibrin split products with low fibrinogen  Iron normal, ferritin high, low TIBC  · Nephrology following:  Concern for hemolysis/DIC    Aspiration PNA of RLL  · S/p Zosyn 7 days completed per Id, finished Abx course  · Continue Xopenex and saline nebs scheduled with albuterol nebs p r n    Right Hallux ulcer/ostemyelitis  · Per ID, no further antibiotic  · Has been recommended toe amputation but patient refuses    Thrombocytopenia likely secondary to liver failure  Platelets last measured at 43  · Hold DVT prophylaxis  · Transfuse if less than 20,000 or bleeding and less than 50,000    Hypocalcemia  Last Ionized calcium slightly low at 1 10  · S/p 2g Calcium Gluconate, recheck ionized calcium pending    Hypokalemia  Last potassium measured at 3 6  Goal potassium = 4, per nephrology  Status post potassium sodium phosphate repletion  · Follow-up BMP in the a m  Paroxysmal Afib  Had an episode of AFib with RVR up to the 190s yesterday secondary to acute blood loss anemia  Currently heart rate is between   · Currently in NSR  Not on rate control or AC at this time (2/2 to high risk of bleeding)  Will defer to day team tomorrow to start rate control  Leukocytosis  Currently Afebrile  No signs of systemic infection  Last WBC at 16 (from 13)  Earlier in admission, WBC went as high as 25   Suspect it is reactive at this point  · Monitor clinically and f/u BMP's  VTE Pharmacologic Prophylaxis: Held 2/2 increase risk of bleeding  VTE Mechanical Prophylaxis: sequential compression device    Disposition: Patient requires Med/Surg with Telemetry    Hospital Course:  Patient is a 65-year-old male with a past medical history of advanced liver cirrhosis secondary to alcohol abuse, hypertension, tobacco abuse who was brought to the ED on 02/17/2018 by his girlfriend for worsening abdominal pain and slight confusion  Of note, patient was recently admitted to [facility] from 01/30/2017 to 02/07/2018 for hyponatremia, abdominal distension, where he was found to have extensive ascites that required 12 L paracentesis and he was newly diagnosed at that time with cirrhosis  His creatinine at that time was 0 5 and he was seen by Gastroenterology who completed a full workup including EGD on 02/07/2018 which showed portal gastropathy and 1 small varices  While there he was also found of right great toe gangrene and osteomyelitis does treated with a brief course of IV vancomycin by ID service however he refused further surgical management  His anemia was being worked up however he refused the colonoscopy at that time  He was ultimately discharged with both Rifaxamine and lactulose, however he is unable to afford Rifaxamine and has since admitted that he does not take his lactulose as altered because he does not like the diarrhea it induces  When he arrived to [facility], he complained of worsening abdominal pain and distention over the past few days prior to admission, he also complained of shortness of breath, chills, malaise, cough productive yellow sputum  In the ED, the patient was tachycardic, hypoxic on room air, hypotensive with a blood pressure in the 80s  He also had a lactic acid of 3 2    Ammonia levels 78, INR 1 97, creatinine was 3 32 (baseline between 0 45- 75, markedly elevated from prior), bili of 4 67, platelets of 128, sodium 127  Concern was for hepatorenal syndrome and patient was transferred to the ICU after receiving 1 L paracentesis and experiencing hypotension and bloody drainage from paracentesis site  His ICU course was quite complicated  His PRANAY did not respond to fluids/albumin or withdrawal of nephrotoxicity agents  Nephrology consulted  Concern for HRS vs ATN  Started on HD via temporary HD catheter on 02/21, PermCath was placed on 03/02  He was also found have aspiration pneumonia for which he completed 7 day course of Zosyn on 02/28  He has anemia of chronic disease, but also suffered from acute blood loss anemia secondary to bleeding at the PermCath site for which he was transfused 1 unit of packed RBCs which causes hemoglobin to appropriately rise from 6 8->8 2  He was found to have a right hallux ulcer/osteomyelitis for which he has been recommended amputation, but patient refuses  He was deemed medically stable for discharge on 03/04/2018  Vitals were stable  Patient no acute distress and his mentation has improved significantly  He did have an episode of paroxysmal AFib 2 days ago, most likely secondary to acute blood loss anemia, however that has since resolved  Subjective:  Patient seen examined  Currently no acute complaints  Denies abdominal pain, nausea, vomiting, constipation, diarrhea  Objective:   Vitals:    03/04/18 1500 03/04/18 1600 03/04/18 1615 03/04/18 1652   BP: 152/88 167/89 159/90 155/84   BP Location: Right arm Left arm Left arm Left arm   Pulse: (!) 110 104 88 97   Resp: (!) 24 19 12 18   Temp:  99 3 °F (37 4 °C)  99 °F (37 2 °C)   TempSrc:  Oral  Oral   SpO2: 100% 99% 100% 100%   Weight:    77 3 kg (170 lb 6 7 oz)   Height:    5' 10" (1 778 m)     I/O last 24 hours:   In: 9595 [P O :2016; I V :200; Blood:350; IV Piggyback:1350]  Out: 916 [Urine:541; Stool:375]    General appearance: alert and No acute distress  Neck: no JVD  Lungs: clear to auscultation bilaterally  Heart: regular rate and rhythm, S1, S2 normal, no murmur, click, rub or gallop  Abdomen: For/Distended, soft, normal bowel sounds  Extremities: extremities normal, warm and well-perfused; no cyanosis, clubbing, or edema  Neurologic: AAOx2 (does not year), follows commands, move limbs spontaneously, responds appropriately intermittently      Lois Machado MD

## 2018-03-04 NOTE — PROGRESS NOTES
Progress Note - Critical Care   Kourtney Butler 50 y o  male MRN: [id number]  Unit/Bed#: MICU 07 Encounter: [id number]    Assessment:   Principal Problem:    Decompensated hepatic cirrhosis ([facility] )  Active Problems:    Ascites    Hepatic encephalopathy (HCC)    PRANAY (acute kidney injury) ([facility] )    Hypoalbuminemia    Hyponatremia    Sepsis ([facility] )    Hyperammonemia (HCC)    Elevated alkaline phosphatase level    Anemia of chronic disease    Osteomyelitis of toe of right foot (HCC)    Alcohol abuse    Alcoholic hepatitis    Cough    Oliguria    Leukocytosis    Coagulopathy (HCC)    Aspiration pneumonia of right lower lobe (HCC)    Paroxysmal a-fib (HCC)  Resolved Problems:    Abdominal pain    Hypotension    Lactic acidosis    Hypoxia      Plan:      Neuro:     · Enchephalopathy: Improving  likely multifactorial due to liver failure, sepsis and  ESRD   · CAM ICU     Cv:     · Hemodynamically stable  Echo 2/25 EF 60%  · Continue albumin boluses 25% PRN for hypotension  for pressure support (map goal > 65)  · Hx of paroxysmal afib 2/27-resolved    Lung:     · Acute Hypoxemic Respiratory Failure, resolved, was due to pneumonia and pulmonary edema  · Pneumonia, RLL, resolved after 7 day course of Zosyn  · Continue Xopenex and saline nebs scheduled with albuterol nebs PRN    GI:     · Acute decompensated alcoholic hepatic cirrhosis, with recurrent ascites  · GI onboard, appreciate their recs  · Continue Rifaximin, lactulose  · Continue octreotide scheduled  · Completed prednisone taper on 3/2  Unlikely to benefit from steroids per GI     FEN:    · No IVF's   · monitor electrolytes  K 3 6 repleting with 40KCL  · on Renal diet      : PRANAY with Cr 2 9 today from 2 4 yesterday  Likely hepatorenal syndrome vs ATN  -HD catheter R IJ placed  3/2   -Hemodialysis per Nephrology, next session likely 3/5     ID:     · Continues to remain afebrile  WBC 16   · Pneumonia, RLL, resolved   7 day course of Zosyn completed   · Osteomyelitis, right hallux ulcer/osteomyelitis: per ID no further antibiotics, recommend toe amputation but pt refuses     Heme:   · Anemia of chronic disease with thrombocytopenia  Hgb 8 2 this morning  Transfuse if <7    Coagulopathy:  Last fibrinogen level 181, improved from 107 after 10 units cryo     Thrombocytopenia, worsening, likely secondary to liver failure  -Platelet 43 decreased from 52 yesterday   -last INR 1 88 yesterday  -hold DVT prophylaxis          Endo:  · Continue to monitor  Msk/Skin:     · Frequent position changing/turning     Disposition:      Consider transfer to floors  ___________________________________________     Chief Complaint:        Chief Complaint   Patient presents with    Ascites       Pt "I need something for my belly  I got tapped for the first time two weeks ago  And its all hard and big again " Roomate "He is suppose to be on liver medication but its too expensive and the doctor wont prescribe anything" Pt c/o SOB "sometimes"          HPI/24hr event  Overnight patient did not have any acute deteriorations  Physical Exam:  ______________________________________________________________________  Vitals:    18 1900 18 2000 18 2100 18 0600   BP:  115/70     BP Location:  Left arm     Pulse: 94 94 92    Resp: 17 19 12    Temp:  98 4 °F (36 9 °C)     TempSrc:  Oral     SpO2: 96% 97% 95%    Weight:    77 3 kg (170 lb 6 7 oz)   Height:         Arterial Line BP: 132/74  Arterial Line MAP (mmHg): 98 mmHg  Temp:  [96 8 °F (36 °C)-98 4 °F (36 9 °C)] 98 4 °F (36 9 °C)  HR:  [] 92  Resp:  [12-29] 12  BP: (115-171)/(70-97) 115/70     Temperature:   Temp (24hrs), Av 6 °F (36 4 °C), Min:96 8 °F (36 °C), Max:98 4 °F (36 9 °C)    Current Temperature: 98 4 °F (36 9 °C)     Physical Exam:  General:  NAD, Agitated, restless in bed    Eyes: PERRL,EOMI, Sclera anicteric, no icterus, no d/c  Neck:  supple, FROM  Lungs: clear to auscultation bilaterally  Cardiac:  RRR, normal S1S2  Abdomen:  Soft, non-tender, distended abdomen  Extremities:  Non-tender, no edema, gangrenuos right hallux (stable from previously)  Skin:  Warm and dry  Yellow from jaundice  Neurological: Awake, alert  oriented to person only  Moves all four extremities  Weights:   IBW: 73 kg    Body mass index is 24 45 kg/m²  Weight (last 2 days)     Date/Time   Weight    03/04/18 0600  77 3 (170 42)    03/03/18 0500  77 6 (171 08)    03/02/18 0600  77 8 (171 52)               Non-Invasive/Invasive Ventilation Settings:  Respiratory    Lab Data (Last 4 hours)    None         O2/Vent Data (Last 4 hours)    None              No results found for: PHART, XLO6UWD, PO2ART, PWK1QMI, F5UHYIZS, BEART, SOURCE    Intake and Outputs:  I/O       02/27 0701 - 02/28 0700 02/28 0701 - 03/01 0700    P  O  120 440    I V  (mL/kg) 547 3 (6 6) 516 3 (6 6)    Blood  244    Other 200     NG/ 220    IV Piggyback 230 60    Feedings 848     Total Intake(mL/kg) 2245 3 (26 9) 1480 3 (18 9)    Urine (mL/kg/hr) 0 (0) 448 (0 2)    Emesis/NG output 20 (0) 0 (0)    Other 383 (0 2) 3500 (1 9)    Stool 1000 (0 5) 1100 (0 6)    Total Output 1403 5048    Net +842 3 -3567 8          Unmeasured Urine Occurrence 2 x           Nutrition:        Diet Orders            Start     Ordered    03/03/18 1823  Diet Renal; Renal (Dialysis); Sodium 2 Gm, Hi Pro/Hi Chase  Diet effective now     Question Answer Comment   Diet Type Renal    Renal Renal (Dialysis)    Other Restriction(s): Sodium 2 Gm    Other Restriction(s): Hi Pro/Hi Chase    RD to adjust diet per protocol?  Yes        03/03/18 1823    03/01/18 1842  Dietary nutrition supplements  Once     Question Answer Comment   Select Supplement: Nepro-Vanilla    Frequency Lunch, Dinner        03/01/18 1842          Labs:     Results from last 7 days  Lab Units 03/04/18  0557 03/03/18  1356 03/03/18  0453 03/02/18  1828 03/02/18  0456  02/26/18  0431   WBC Thousand/uL 16 07*  --  13 39*  --  16 38*  < > 18 54*   HEMOGLOBIN g/dL 8 2* 8 3* 6 8*  --  8 0*  < > 7 4*   HEMATOCRIT % 23 9*  --  20 0*  --  23 7*  < > 21 2*   PLATELETS Thousands/uL 43*  --  52* 47* 31*  < > 46*   NEUTROS PCT % 85*  --  75  --   --   --  88*   MONOS PCT % 8  --  16*  --   --   --  7   MONO PCT MAN %  --   --   --   --  8  < >  --    < > = values in this interval not displayed  Results from last 7 days  Lab Units 03/04/18  0557 03/03/18  0453 03/02/18  0456  02/28/18  0523 02/27/18  0415   SODIUM mmol/L 138 139 139  < > 142 141   POTASSIUM mmol/L 3 6 3 8 3 9  < > 3 4* 3 6   CHLORIDE mmol/L 101 103 103  < > 105 104   CO2 mmol/L 27 29 30  < > 27 29   BUN mg/dL 37* 28* 32*  < > 65* 48*   CREATININE mg/dL 2 96* 2 40* 3 11*  < > 4 67* 3 90*   CALCIUM mg/dL 8 8 8 5 9 1  < > 8 7 8 5   TOTAL PROTEIN g/dL  --   --   --   --  6 9 6 2*   BILIRUBIN TOTAL mg/dL  --   --   --   --  4 47* 4 03*   ALK PHOS U/L  --   --   --   --  268* 208*   ALT U/L  --   --   --   --  78 57   AST U/L  --   --   --   --  73* 68*   GLUCOSE RANDOM mg/dL 134 80 139  < > 164* 191*   < > = values in this interval not displayed  Results from last 7 days  Lab Units 03/04/18  0557 03/02/18  0456 02/28/18  0523   MAGNESIUM mg/dL 2 2 2 1 2 5       Results from last 7 days  Lab Units 03/04/18  0557 03/03/18  0453 03/02/18  0456   PHOSPHORUS mg/dL 4 7* 2 0* 2 8        Results from last 7 days  Lab Units 03/03/18  0729 03/02/18  1828 03/02/18  0456   INR  1 88* 1 74* 2 33*         No results found for: TROPONINI    Imaging:   CXR 3/2, Successful image guided placement of tunneled central venous hemodialysis catheter  CXR 3/1, improved pulm edema    EKG: No new ECG  Micro:  Lab Results   Component Value Date    BLOODCX No Growth After 5 Days  02/21/2018    BLOODCX No Growth After 5 Days  02/21/2018    BLOODCX No Growth After 5 Days  02/17/2018    BLOODCX No Growth After 5 Days   02/17/2018     Allergies: No Known Allergies  Medications:   Scheduled Meds:    Current Facility-Administered Medications:  albuterol 2 5 mg Nebulization Q4H PRN Charis Santana,    folic acid 1 mg Oral Daily Mariama Hurtado, DO   influenza vaccine 0 5 mL Intramuscular Prior to discharge Ernest Siemens, MD   lactulose 20 g Oral TID Vonna Koyanagi, MD   multivitamin 1 tablet Oral Daily Chris Mackenzie PA-C   octreotide 100 mcg Intravenous [facility] Matiwerner Glasgow, DO   ondansetron 4 mg Intravenous Q8H PRN Edwige Mancilla,    pantoprazole 40 mg Oral Early Morning Vonna Koyanagi, MD   potassium-sodium phosphateS 2 tablet Oral TID With Meals Salomon Zacariase, DO   rifaximin 550 mg Oral Q12H [address] Kristene MOISÉS Smith   sodium chloride 1 application Nasal J0W PRN Chris Mackenzie PA-C   thiamine 100 mg Oral Daily Mariama Hurtado, DO     Continuous Infusions:     PRN Meds:    albuterol 2 5 mg Q4H PRN   influenza vaccine 0 5 mL Prior to discharge   ondansetron 4 mg Q8H PRN   sodium chloride 1 application J5A PRN     VTE Pharmacologic Prophylaxis: none due to thrombocytopenia, elevated INR  VTE Mechanical Prophylaxis: sequential compression device  Invasive lines and devices: Invasive Devices     Peripheral Intravenous Line            Peripheral IV 03/02/18 Left;Lateral Antecubital 1 day          Line            HD Cath Double 1 day          Drain            Rectal Tube With balloon 9 days                   Code Status: Level 3 - DNAR and DNI    Portions of the record may have been created with voice recognition software  Occasional wrong word or "sound a like" substitutions may have occurred due to the inherent limitations of voice recognition software  Read the chart carefully and recognize, using context, where substitutions have occurred      Neva Moreland MD

## 2018-03-05 ENCOUNTER — APPOINTMENT (INPATIENT)
Dept: DIALYSIS | Facility: HOSPITAL | Age: 53
DRG: 264 | End: 2018-03-05
Attending: INTERNAL MEDICINE
Payer: COMMERCIAL

## 2018-03-05 LAB
ALBUMIN SERPL BCP-MCNC: 2.5 G/DL (ref 3.5–5)
ALP SERPL-CCNC: 125 U/L (ref 46–116)
ALT SERPL W P-5'-P-CCNC: 41 U/L (ref 12–78)
AMMONIA PLAS-SCNC: 65 UMOL/L (ref 11–35)
ANION GAP SERPL CALCULATED.3IONS-SCNC: 9 MMOL/L (ref 4–13)
AST SERPL W P-5'-P-CCNC: 34 U/L (ref 5–45)
BASOPHILS # BLD AUTO: 0.01 THOUSANDS/ΜL (ref 0–0.1)
BASOPHILS NFR BLD AUTO: 0 % (ref 0–1)
BILIRUB SERPL-MCNC: 6.49 MG/DL (ref 0.2–1)
BUN SERPL-MCNC: 45 MG/DL (ref 5–25)
CALCIUM SERPL-MCNC: 8.4 MG/DL (ref 8.3–10.1)
CHLORIDE SERPL-SCNC: 101 MMOL/L (ref 100–108)
CO2 SERPL-SCNC: 28 MMOL/L (ref 21–32)
CREAT SERPL-MCNC: 3.39 MG/DL (ref 0.6–1.3)
EOSINOPHIL # BLD AUTO: 0.23 THOUSAND/ΜL (ref 0–0.61)
EOSINOPHIL NFR BLD AUTO: 2 % (ref 0–6)
ERYTHROCYTE [DISTWIDTH] IN BLOOD BY AUTOMATED COUNT: 27.1 % (ref 11.6–15.1)
GFR SERPL CREATININE-BSD FRML MDRD: 20 ML/MIN/1.73SQ M
GLUCOSE SERPL-MCNC: 142 MG/DL (ref 65–140)
HCT VFR BLD AUTO: 21.8 % (ref 36.5–49.3)
HGB BLD-MCNC: 7.6 G/DL (ref 12–17)
INR PPP: 2.26 (ref 0.86–1.16)
LYMPHOCYTES # BLD AUTO: 1.01 THOUSANDS/ΜL (ref 0.6–4.47)
LYMPHOCYTES NFR BLD AUTO: 7 % (ref 14–44)
MCH RBC QN AUTO: 32.5 PG (ref 26.8–34.3)
MCHC RBC AUTO-ENTMCNC: 34.9 G/DL (ref 31.4–37.4)
MCV RBC AUTO: 93 FL (ref 82–98)
MONOCYTES # BLD AUTO: 1.33 THOUSAND/ΜL (ref 0.17–1.22)
MONOCYTES NFR BLD AUTO: 9 % (ref 4–12)
NEUTROPHILS # BLD AUTO: 11.92 THOUSANDS/ΜL (ref 1.85–7.62)
NEUTS SEG NFR BLD AUTO: 82 % (ref 43–75)
NRBC BLD AUTO-RTO: 0 /100 WBCS
PLATELET # BLD AUTO: 54 THOUSANDS/UL (ref 149–390)
POTASSIUM SERPL-SCNC: 3.3 MMOL/L (ref 3.5–5.3)
PROT SERPL-MCNC: 5.5 G/DL (ref 6.4–8.2)
PROTHROMBIN TIME: 25.2 SECONDS (ref 12.1–14.4)
RBC # BLD AUTO: 2.34 MILLION/UL (ref 3.88–5.62)
SODIUM SERPL-SCNC: 138 MMOL/L (ref 136–145)
WBC # BLD AUTO: 14.56 THOUSAND/UL (ref 4.31–10.16)

## 2018-03-05 PROCEDURE — 85610 PROTHROMBIN TIME: CPT | Performed by: NURSE PRACTITIONER

## 2018-03-05 PROCEDURE — 94760 N-INVAS EAR/PLS OXIMETRY 1: CPT

## 2018-03-05 PROCEDURE — 5A1D70Z PERFORMANCE OF URINARY FILTRATION, INTERMITTENT, LESS THAN 6 HOURS PER DAY: ICD-10-PCS | Performed by: INTERNAL MEDICINE

## 2018-03-05 PROCEDURE — 99233 SBSQ HOSP IP/OBS HIGH 50: CPT | Performed by: INTERNAL MEDICINE

## 2018-03-05 PROCEDURE — 85025 COMPLETE CBC W/AUTO DIFF WBC: CPT | Performed by: NURSE PRACTITIONER

## 2018-03-05 PROCEDURE — 82140 ASSAY OF AMMONIA: CPT | Performed by: INTERNAL MEDICINE

## 2018-03-05 PROCEDURE — 90935 HEMODIALYSIS ONE EVALUATION: CPT | Performed by: PHYSICIAN ASSISTANT

## 2018-03-05 PROCEDURE — 99231 SBSQ HOSP IP/OBS SF/LOW 25: CPT | Performed by: NURSE PRACTITIONER

## 2018-03-05 PROCEDURE — 80053 COMPREHEN METABOLIC PANEL: CPT | Performed by: INTERNAL MEDICINE

## 2018-03-05 RX ORDER — MIDODRINE HYDROCHLORIDE 5 MG/1
10 TABLET ORAL
Status: DISCONTINUED | OUTPATIENT
Start: 2018-03-05 | End: 2018-03-10 | Stop reason: HOSPADM

## 2018-03-05 RX ORDER — ALBUMIN (HUMAN) 12.5 G/50ML
25 SOLUTION INTRAVENOUS ONCE
Status: COMPLETED | OUTPATIENT
Start: 2018-03-05 | End: 2018-03-07

## 2018-03-05 RX ADMIN — EPOETIN ALFA 4000 UNITS: 4000 SOLUTION INTRAVENOUS; SUBCUTANEOUS at 15:38

## 2018-03-05 RX ADMIN — OCTREOTIDE ACETATE 100 MCG: 100 INJECTION, SOLUTION INTRAVENOUS; SUBCUTANEOUS at 21:29

## 2018-03-05 RX ADMIN — RIFAXIMIN 550 MG: 550 TABLET ORAL at 21:27

## 2018-03-05 RX ADMIN — LACTULOSE 20 G: 20 SOLUTION ORAL at 21:27

## 2018-03-05 RX ADMIN — Medication 1 TABLET: at 12:04

## 2018-03-05 RX ADMIN — FOLIC ACID 1 MG: 1 TABLET ORAL at 12:04

## 2018-03-05 RX ADMIN — LACTULOSE 20 G: 20 SOLUTION ORAL at 15:51

## 2018-03-05 RX ADMIN — ALBUMIN HUMAN 25 G: 0.25 SOLUTION INTRAVENOUS at 10:10

## 2018-03-05 RX ADMIN — Medication 100 MG: at 12:04

## 2018-03-05 RX ADMIN — OCTREOTIDE ACETATE 100 MCG: 100 INJECTION, SOLUTION INTRAVENOUS; SUBCUTANEOUS at 15:38

## 2018-03-05 RX ADMIN — RIFAXIMIN 550 MG: 550 TABLET ORAL at 12:04

## 2018-03-05 NOTE — HEMODIALYSIS
Pt's BP low during dialysis tx-running 70's-80's  Saline and Albumin given, ran pt even  BP remained low,dropped into 60's  Per Dr Hardwick-D/C tx  Pt received 2 hr tx  Primary nurse aware

## 2018-03-05 NOTE — PHYSICAL THERAPY NOTE
Physical Therapy Cancellation Note-  Attempted to see pt  Pt refused PT session  attepted to re-direct as pt is confused but pt continued to refuse  Reported that tomorrow morning will be best   Will continue to follow pt    Narda Spain PT

## 2018-03-05 NOTE — PROGRESS NOTES
03/05/18 1500   Plan of Care   Comments  attempted to visit with pt       Assessment Completed by: Unit visit

## 2018-03-05 NOTE — SOCIAL WORK
MCG Guide Used for Initial Round: liver dysfunction   Optimal GLOS: 2  Hospital Day:16 days  · DC Readiness: Discharge readiness is indicated by patient meeting Recovery Milestones, including ALL of the following:  ? Hemodynamic stability  ? Tachypnea absent  ? Hypoxemia absent  ? No peritonitis, or treatment adequate  ? Ascites absent or adequately controlled  ? No bleeding  ? Volume status adequately controlled  ? Renal function at baseline or acceptable for next level of care  ? Electrolyte levels adequate for outpatient management  ? Mental status at baseline  ? Ambulatory[M]  ? Oral hydration, medications, and diet  ?  Discharge plans and education understood    Identified Barriers: new start hemodialys , 1:1 ,   Discussion Date (Time): 03/05/18 with

## 2018-03-05 NOTE — PROGRESS NOTES
IM Residency Progress Note   Unit/Bed#: [facility] 802-01 Encounter: [id number]  SOD Team C       Kimberly Garza 50 y o  male [id number]    Hospital Stay Days: 16      Assessment/Plan:    Principal Problem:    Decompensated hepatic cirrhosis ([facility] )  Active Problems:    Ascites    Hepatic encephalopathy (HCC)    PRANAY (acute kidney injury) ([facility] )    Hypoalbuminemia    Hyponatremia    Sepsis (HCC)    Hyperammonemia (HCC)    Elevated alkaline phosphatase level    Anemia of chronic disease    Osteomyelitis of toe of right foot (HCC)    Alcohol abuse    Alcoholic hepatitis    Cough    Oliguria    Leukocytosis    Coagulopathy (HCC)    Aspiration pneumonia of right lower lobe (HCC)    Paroxysmal a-fib (HCC)    Decompensated Hepatic Cirrhosis 2/2 to Alcoholic Cirrhosis (with Hepatic Encephalopathy and Ascites)  - Likely 2/2 to hepatic encephalopathy plus renal failure and PNA  - patient is very somnolent today and has been refusing his medications  Will check his ammonia level      - C/w Rifaximin and Lactulose and reinsert rectal tube  - C/w Ocreotide  - GI is on board and following, Pt is not a transplant candidate 2/2 non compliance and continued alcohol abuse  - S/p prednisone taper completed on 3/2/18  - C/w 1:1 observation because pt is unable to follow directions to avoid self injury    PRANAY in the setting of decompensated cirrhosis   - Likely  2/2 hepatorenal syndrome vs ATN  - Creatinine today is 3 39  - On HD started via temp HD cath on 2/21, next session today  - Nephrology is on board and we appreciate their continued recommendations       Acute blood loss anemia secondary to PermCath site bleeding  - S/p transfusion of one unit of PRBC and 4 units of FFP on 3/2/18  - Hb today is 7 6 down from 8 2 yesterday  - We will continue to monitor Hb and transfuse when Hb falls below 7       Anemia of Chronic Disease (2/2 Liver/Renal disease and/or hemolysis/DIC)  - LDH mildly elevated at 279 with low Haptoglobin, decreased fibrinogen and increased fibrin degradation products and positive Direct Jimena test, and positive Schistocytes with a concern for hemolysis/DIC  - Nephrology on board     Aspiration PNA of RLL  - Completed 7 days of Zosyn      Right Hallux ulcer/ostemyelitis  - Per ID, no further antibiotic  - Has been recommended toe amputation but patient refuses     Thrombocytopenia likely secondary to liver failure  - Platelets today 54  - We will hold pharmacological anticoagulation  - Transfuse if platelets are < 41,653 or <50,000 with bleeding     Hypocalcemia  - resolved   - Corrected Ca today is 9 6     Hypokalemia  - K today is 3 3  - will replete     Paroxysmal Afib  - No AC 2/2  To thrombocytopenia  - Rate controlled without beta-blockers      Leukocytosis  - WBC today is 14 56 up from 13 39 yesterday  - patient has no fever no other signs of infection   - will continue to monitor him clinically      Disposition:  For hemodialysis today  Will follow up as ammonia level  Subjective:   Patient seen and examined  Per nursing staff, has been refusing his medications and he pulled out his rectal tube  Patient denies chest pain and abdominal pain but she review of systems was very difficult as he is very somnolent  Vitals: Temp (24hrs), Av °F (37 2 °C), Min:98 6 °F (37 °C), Max:99 4 °F (37 4 °C)  Current: Temperature: 98 6 °F (37 °C)  Vitals:    18 1900 18 2300 18 0541 18 0700   BP: 112/60 154/73  93/52   BP Location: Right arm Right arm  Right arm   Pulse: 100 95  86   Resp:    Temp: 98 7 °F (37 1 °C) 98 8 °F (37 1 °C)  98 6 °F (37 °C)   TempSrc: Oral Oral  Axillary   SpO2: 97% 94%  93%   Weight:   77 1 kg (169 lb 15 6 oz)    Height:        Body mass index is 24 39 kg/m²  I/O last 24 hours: In: 3327 [P O :896; I V :200; IV Piggyback:100]  Out: 585 [Urine:485; Stool:100]      Physical Exam:   GENERAL:  Generalized Jaundice   In no obvious distress, lying comfortably in bed, afebrile to touch but very somnolent  HEENT:  Normocephalic, atraumatic  Pupils equal round and reactive to light and accommodation   Pale, icteric  Has some blood stains on his lips and nares, nasal cannula with oxygen in place  NECK:  Supple, no lymphadenopathy  CVS:  S1, S2   Regular rate and rhythm  2/6 systolic murmur in aortic valve region and lower sternal border, no rubs or gallops  RESPIRATORY:  Clear to auscultation bilaterally  No wheezes, rhonchi or rales  ABDOMEN:  soft, distended,  No masses or organomegaly  Normoactive bowel sounds  MSK:  1+ pitting pedal edema restricted to his bilateral feet    NEURO:  Very somnolent    Invasive Devices     Peripheral Intravenous Line            Peripheral IV 03/04/18 Right Forearm less than 1 day          Line            HD Cath Double 2 days          Drain            Rectal Tube With balloon 10 days                          Labs:   Recent Results (from the past 24 hour(s))   Fingerstick Glucose (POCT)    Collection Time: 03/04/18 12:33 PM   Result Value Ref Range    POC Glucose 204 (H) 65 - 140 mg/dl   CBC and differential    Collection Time: 03/05/18  5:40 AM   Result Value Ref Range    WBC 14 56 (H) 4 31 - 10 16 Thousand/uL    RBC 2 34 (L) 3 88 - 5 62 Million/uL    Hemoglobin 7 6 (L) 12 0 - 17 0 g/dL    Hematocrit 21 8 (L) 36 5 - 49 3 %    MCV 93 82 - 98 fL    MCH 32 5 26 8 - 34 3 pg    MCHC 34 9 31 4 - 37 4 g/dL    RDW 27 1 (H) 11 6 - 15 1 %    Platelets 54 (L) 357 - 390 Thousands/uL    nRBC 0 /100 WBCs    Neutrophils Relative 82 (H) 43 - 75 %    Lymphocytes Relative 7 (L) 14 - 44 %    Monocytes Relative 9 4 - 12 %    Eosinophils Relative 2 0 - 6 %    Basophils Relative 0 0 - 1 %    Neutrophils Absolute 11 92 (H) 1 85 - 7 62 Thousands/µL    Lymphocytes Absolute 1 01 0 60 - 4 47 Thousands/µL    Monocytes Absolute 1 33 (H) 0 17 - 1 22 Thousand/µL    Eosinophils Absolute 0 23 0 00 - 0 61 Thousand/µL    Basophils Absolute 0 01 0 00 - 0 10 Thousands/µL Protime-INR    Collection Time: 03/05/18  5:40 AM   Result Value Ref Range    Protime 25 2 (H) 12 1 - 14 4 seconds    INR 2 26 (H) 0 86 - 1 16   Comprehensive metabolic panel    Collection Time: 03/05/18  5:40 AM   Result Value Ref Range    Sodium 138 136 - 145 mmol/L    Potassium 3 3 (L) 3 5 - 5 3 mmol/L    Chloride 101 100 - 108 mmol/L    CO2 28 21 - 32 mmol/L    Anion Gap 9 4 - 13 mmol/L    BUN 45 (H) 5 - 25 mg/dL    Creatinine 3 39 (H) 0 60 - 1 30 mg/dL    Glucose 142 (H) 65 - 140 mg/dL    Calcium 8 4 8 3 - 10 1 mg/dL    AST 34 5 - 45 U/L    ALT 41 12 - 78 U/L    Alkaline Phosphatase 125 (H) 46 - 116 U/L    Total Protein 5 5 (L) 6 4 - 8 2 g/dL    Albumin 2 5 (L) 3 5 - 5 0 g/dL    Total Bilirubin 6 49 (H) 0 20 - 1 00 mg/dL    eGFR 20 ml/min/1 73sq m       Radiology Results: I have personally reviewed pertinent reports  Other Diagnostic Testing:   I have personally reviewed pertinent reports          Active Meds:   Current Facility-Administered Medications   Medication Dose Route Frequency    albuterol inhalation solution 2 5 mg  2 5 mg Nebulization X0F PRN    folic acid (FOLVITE) tablet 1 mg  1 mg Oral Daily    influenza inactivated quadrivalent vaccine (FLULAVAL) IM injection 0 5 mL  0 5 mL Intramuscular Prior to discharge    lactulose 20 g/30 mL oral solution 20 g  20 g Oral TID    multivitamin (FLINTSTONES) chewable tablet 1 tablet  1 tablet Oral Daily    octreotide (SandoSTATIN) injection 100 mcg  100 mcg Intravenous Q8H [address]    ondansetron (ZOFRAN) injection 4 mg  4 mg Intravenous Q8H PRN    pantoprazole (PROTONIX) EC tablet 40 mg  40 mg Oral Early Morning    rifaximin (XIFAXAN) tablet 550 mg  550 mg Oral Q12H [address]    sodium chloride (AYR SALINE NASAL) nasal gel 1 application  1 application Nasal K0R PRN    thiamine (VITAMIN B1) tablet 100 mg  100 mg Oral Daily         VTE Pharmacologic Prophylaxis: Reason for no pharmacologic prophylaxis Held for the thrombocytopenia  VTE Mechanical Prophylaxis: sequential compression device    Portia Crenshaw DO

## 2018-03-05 NOTE — PROGRESS NOTES
I called and spoke to patient's son Ivis Verduzco who is his medical decision maker about goals of care and he said that they have had at least 2 meetings with palliative care already  He wants us to restrain patient if necessary and put a nasogastric tube in order to give him his medications

## 2018-03-05 NOTE — CASE MANAGEMENT
Continued Stay Review    Date: 3/5/18    Vital Signs: BP (!) 89/44   Pulse 100   Temp 97 9 °F (36 6 °C)   Resp 18   Ht 5' 10" (1 778 m)   Wt 74 8 kg (165 lb) Comment: 3/5 post HD weight  SpO2 93%   BMI 23 68 kg/m²     Medications:   Scheduled Meds:   Current Facility-Administered Medications:  albuterol 2 5 mg Nebulization Q4H PRN   epoetin rebekah 4,000 Units Intravenous Once per day on Mon Wed Fri   folic acid 1 mg Oral Daily   influenza vaccine 0 5 mL Intramuscular Prior to discharge   lactulose 20 g Oral TID   midodrine 10 mg Oral Before Dialysis   multivitamin 1 tablet Oral Daily   octreotide 100 mcg Intravenous Q8H [facility]   ondansetron 4 mg Intravenous Q8H PRN   pantoprazole 40 mg Oral Early Morning   rifaximin 550 mg Oral Q12H [facility]   sodium chloride 1 application Nasal F9Y PRN   thiamine 100 mg Oral Daily     Continuous Infusions:    None  Right Permacath placed 3/2  Abnormal Labs:                03/05/18 0540     Sodium 136 - 145 mmol/L 138    Potassium 3 5 - 5 3 mmol/L 3 3     Chloride 100 - 108 mmol/L 101    CO2 21 - 32 mmol/L 28    Anion Gap 4 - 13 mmol/L 9    BUN 5 - 25 mg/dL 45     Creatinine 0 60 - 1 30 mg/dL 3 39     Comments: Standardized to IDMS reference method   Glucose 65 - 140 mg/dL 142     ALT 12 - 78 U/L 41    Comments:    Specimen collection should occur prior to Sulfasalazine and/or Sulfapyridine administration due to the potential for falsely depressed results      Alkaline Phosphatase 46 - 116 U/L 125     Total Protein 6 4 - 8 2 g/dL 5 5     Albumin 3 5 - 5 0 g/dL 2 5     Total Bilirubin 0 20 - 1 00 mg/dL 6 49       03/05/18 0540     WBC 4 31 - 10 16 Thousand/uL 14 56     RBC 3 88 - 5 62 Million/uL 2 34     Hemoglobin 12 0 - 17 0 g/dL 7 6     Hematocrit 36 5 - 49 3 % 21 8     RDW 11 6 - 15 1 % 27 1     Platelets 258 - 977 Thousands/uL 54     nRBC /100 WBCs 0    Neutrophils Relative 43 - 75 % 82     Lymphocytes Relative 14 - 44 % 7     Neutrophils Absolute 1 85 - 7 62 Thousands/µL 11 92     Lymphocytes Absolute 0 60 - 4 47 Thousands/µL 1 01    Monocytes Absolute 0 17 - 1 22 Thousand/µL 1 33     Eosinophils Absolute 0 00 - 0 61 Thousand/µL 0 23    Basophils Absolute 0 00 - 0 10 Thousands/µL 0 01             03/05/18 0947    Ammonia 11 - 35 umol/L 65            Diagnostic Results: No recent studies  Age/Sex: 50 y o  male       Assessment/Plan:     Principal Problem:    Decompensated hepatic cirrhosis (HCC)  Active Problems:    Ascites    Hepatic encephalopathy (HCC)    PRANAY (acute kidney injury) ([facility] )    Hypoalbuminemia    Hyponatremia    Sepsis (HCC)    Hyperammonemia (HCC)    Elevated alkaline phosphatase level    Anemia of chronic disease    Osteomyelitis of toe of right foot (HCC)    Alcohol abuse    Alcoholic hepatitis    Cough    Oliguria    Leukocytosis    Coagulopathy (HCC)    Aspiration pneumonia of right lower lobe (HCC)    Paroxysmal a-fib (HCC)     Decompensated Hepatic Cirrhosis 2/2 to Alcoholic Cirrhosis (with Hepatic Encephalopathy and Ascites)  - Likely 2/2 to hepatic encephalopathy plus renal failure and PNA  - patient is very somnolent today and has been refusing his medications  Will check his ammonia level      - C/w Rifaximin and Lactulose and reinsert rectal tube  - C/w Ocreotide  - GI is on board and following, Pt is not a transplant candidate 2/2 non compliance and continued alcohol abuse  - S/p prednisone taper completed on 3/2/18  - C/w 1:1 observation because pt is unable to follow directions to avoid self injury     PRANAY in the setting of decompensated cirrhosis   - Likely  2/2 hepatorenal syndrome vs ATN  - Creatinine today is 3 39  - On HD started via temp HD cath on 2/21, next session today  - Nephrology is on board and we appreciate their continued recommendations        Acute blood loss anemia secondary to PermCath site bleeding  - S/p transfusion of one unit of PRBC and 4 units of FFP on 3/2/18  - Hb today is 7 6 down from 8 2 yesterday  - We will continue to monitor Hb and transfuse when Hb falls below 7        Anemia of Chronic Disease (2/2 Liver/Renal disease and/or hemolysis/DIC)  - LDH mildly elevated at 279 with low Haptoglobin, decreased fibrinogen and increased fibrin degradation products and positive Direct Jimena test, and positive Schistocytes with a concern for hemolysis/DIC  - Nephrology on board     Aspiration PNA of RLL  - Completed 7 days of Zosyn      Right Hallux ulcer/ostemyelitis  - Per ID, no further antibiotic  - Has been recommended toe amputation but patient refuses     Thrombocytopenia likely secondary to liver failure  - Platelets today 54  - We will hold pharmacological anticoagulation  - Transfuse if platelets are < 07,893 or <50,000 with bleeding     Hypocalcemia  - resolved   - Corrected Ca today is 9 6     Hypokalemia  - K today is 3 3  - will replete     Paroxysmal Afib  - No AC 2/2  To thrombocytopenia  - Rate controlled without beta-blockers      Leukocytosis  - WBC today is 14 56 up from 13 39 yesterday  - patient has no fever no other signs of infection   - will continue to monitor him clinically        Disposition:  For hemodialysis today  Will follow up ammonia level      Discharge Plan: TBD  ==========================================================================    3/5/18 Nephrology Consult:     ASSESSMENT and PLAN:  1  PRANAY (POA): due to hepatorenal syndrome in the setting of decompensated cirrhosis vs ATN  Baseline creatinine <1 with last creatinine 0 7 on 2/7/18                -currently dialysis dependant               -seen and examined on hemodialysis: Na 138, 4K, bicarb 35, F160 dialyzer, ,               -goal initially 2kg UF but BP dropping so decreased goal to 1 kg              -will also decrease temp to 35 5 degrees and give albumin 25g x1 to help with hypotension              -monitor for signs of recovery              -will add back midodrine 10mg pre HD   2   Decompensated alcoholic cirrhosis: not transplant candidate   3  Encephalopathy: due to cirrhosis and pneumonia  4  Aspiration PNA: s/p zosyn per ID   5  Anemia: likely related to chronic disease but work up also suspicious for hemolysis/DIC              -transfuse prn   6  Hypertension: BP previously low so was on midodrine but then BP into 217R systolic so midodrine stopped  Will restart pre HD midodrine since BP dropping   7  Hypokalemia: on 4K bath with HD today   8  Pulmonary edema: continue UF with HD as tolerated by BP but seems to be improving based on last CXR  9   Access: R pemacath placed 3/2

## 2018-03-05 NOTE — PROGRESS NOTES
Progress Note - Palliative & Supportive Care  Antonio London  50 y o   male  [address] 802/[facility] 802-01   MRN: [id number]  Encounter: [id number]     Assessment  Patient Active Problem List   Diagnosis    Decompensated hepatic cirrhosis ([facility] )    Ascites    Hepatic encephalopathy ([facility] )    PRANAY (acute kidney injury) ([facility] )    Hypoalbuminemia    Hyponatremia    [facility]    Hyperammonemia (HCC)    Elevated alkaline phosphatase level    Anemia of chronic disease    Osteomyelitis of toe of right foot (HCC)    Alcohol abuse    Alcoholic hepatitis    Cough    Oliguria    Leukocytosis    Coagulopathy (HCC)    Aspiration pneumonia of right lower lobe (HCC)    [facility]oxysmal [facility]-fib [facility] )     Plan:  Goals of Care: Per previous family discussions, family opting for ongoing treatment-focused care and continuation of dialysis  Level 3 DNR / DNI  Would recommend regular meetings with patient's two sons to discuss plan of care and provide updates regarding patient's status  Not needed at this time as family has been updated regularly by critical care team unless change in condition  Family would likely consider changes to plan of care only if patient experienced drastic worsening of his condition  Palliative care will continue to follow along peripherally  Patient's two sons are patient's surrogate medical decision-makers by default  Symptom Management: per primary team     History  Patient alert, answering questions appropriately when seen  1:1 at bedside  Appears comfortable  States that he does not mind continuing dialysis  Developed hypotension in dialysis this morning and session stopped early      Meds  all current active meds have been reviewed and current meds:   Current Facility-Administered Medications   Medication Dose Route Frequency    albuterol inhalation solution 2 5 mg  2 5 mg Nebulization Q4H PRN    epoetin rebekah (EPOGEN,PROCRIT) injection 4,000 Units  4,000 Units Intravenous Once per day on Mon Wed Fri    folic acid (FOLVITE) tablet 1 mg  1 mg Oral Daily    influenza inactivated quadrivalent vaccine (FLULAVAL) IM injection 0 5 mL  0 5 mL Intramuscular Prior to discharge    lactulose 20 g/30 mL oral solution 20 g  20 g Oral TID    midodrine (PROAMATINE) tablet 10 mg  10 mg Oral Before Dialysis    multivitamin (FLINTSTONES) chewable tablet 1 tablet  1 tablet Oral Daily    octreotide (SandoSTATIN) injection 100 mcg  100 mcg Intravenous Q8H [facility]    ondansetron (ZOFRAN) injection 4 mg  4 mg Intravenous Q8H PRN    pantoprazole (PROTONIX) EC tablet 40 mg  40 mg Oral Early Morning    rifaximin (XIFAXAN) tablet 550 mg  550 mg Oral Q12H [facility]    sodium chloride (AYR SALINE NASAL) nasal gel 1 application  1 application Nasal B6X PRN    thiamine (VITAMIN B1) tablet 100 mg  100 mg Oral Daily       No Known Allergies    Objective  Physical Exam   Constitutional: He is cooperative  He appears ill  No distress  Pulmonary/Chest: Effort normal    Neurological: He is alert  Oriented to self, location   Skin:   jaundice   Psychiatric: He has a normal mood and affect  His speech is tangential  Cognition and memory are impaired  Lab Results: I have personally reviewed pertinent labs        Toya Fry [facility]  Office number: [phone]

## 2018-03-05 NOTE — SOCIAL WORK
Cm reviewed patient during care coordination rounds  Pt not stable for discharge today  Transfer from ICU  HD patient  Cirrhosis, wound ostomy, palliative care, encephalopathy  [address] letter  Patient on 1 to 1  Multiple family meetings have been held  Cm continues to follow for discharge needs

## 2018-03-06 ENCOUNTER — APPOINTMENT (INPATIENT)
Dept: RADIOLOGY | Facility: HOSPITAL | Age: 53
DRG: 264 | End: 2018-03-06
Payer: COMMERCIAL

## 2018-03-06 ENCOUNTER — APPOINTMENT (INPATIENT)
Dept: DIALYSIS | Facility: HOSPITAL | Age: 53
DRG: 264 | End: 2018-03-06
Payer: COMMERCIAL

## 2018-03-06 LAB
ABO GROUP BLD: NORMAL
ALBUMIN SERPL BCP-MCNC: 2.7 G/DL (ref 3.5–5)
ALP SERPL-CCNC: 148 U/L (ref 46–116)
ALT SERPL W P-5'-P-CCNC: 39 U/L (ref 12–78)
ANION GAP SERPL CALCULATED.3IONS-SCNC: 9 MMOL/L (ref 4–13)
ANISOCYTOSIS BLD QL SMEAR: PRESENT
APTT PPP: 46 SECONDS (ref 23–35)
APTT PPP: 46 SECONDS (ref 23–35)
AST SERPL W P-5'-P-CCNC: 36 U/L (ref 5–45)
BASOPHILS # BLD AUTO: 0.01 THOUSANDS/ΜL (ref 0–0.1)
BASOPHILS # BLD MANUAL: 0 THOUSAND/UL (ref 0–0.1)
BASOPHILS NFR BLD AUTO: 0 % (ref 0–1)
BASOPHILS NFR MAR MANUAL: 0 % (ref 0–1)
BILIRUB DIRECT SERPL-MCNC: 2.14 MG/DL (ref 0–0.2)
BILIRUB SERPL-MCNC: 5.48 MG/DL (ref 0.2–1)
BLD GP AB SCN SERPL QL: NEGATIVE
BUN SERPL-MCNC: 36 MG/DL (ref 5–25)
BURR CELLS BLD QL SMEAR: PRESENT
CALCIUM SERPL-MCNC: 7.9 MG/DL (ref 8.3–10.1)
CHLORIDE SERPL-SCNC: 100 MMOL/L (ref 100–108)
CO2 SERPL-SCNC: 28 MMOL/L (ref 21–32)
CREAT SERPL-MCNC: 3.01 MG/DL (ref 0.6–1.3)
DEPRECATED D DIMER PPP: 3850 NG/ML (FEU) (ref 0–424)
DEPRECATED D DIMER PPP: 3959 NG/ML (FEU) (ref 0–424)
EOSINOPHIL # BLD AUTO: 0.26 THOUSAND/ΜL (ref 0–0.61)
EOSINOPHIL # BLD MANUAL: 0.1 THOUSAND/UL (ref 0–0.4)
EOSINOPHIL NFR BLD AUTO: 3 % (ref 0–6)
EOSINOPHIL NFR BLD MANUAL: 1 % (ref 0–6)
ERYTHROCYTE [DISTWIDTH] IN BLOOD BY AUTOMATED COUNT: 27 % (ref 11.6–15.1)
FIBRINOGEN PPP-MCNC: 110 MG/DL (ref 227–495)
FIBRINOGEN PPP-MCNC: 111 MG/DL (ref 227–495)
GFR SERPL CREATININE-BSD FRML MDRD: 23 ML/MIN/1.73SQ M
GLUCOSE SERPL-MCNC: 115 MG/DL (ref 65–140)
HCT VFR BLD AUTO: 19 % (ref 36.5–49.3)
HCT VFR BLD AUTO: 19.7 % (ref 36.5–49.3)
HCT VFR BLD AUTO: 19.8 % (ref 36.5–49.3)
HGB BLD-MCNC: 6.4 G/DL (ref 12–17)
HGB BLD-MCNC: 6.7 G/DL (ref 12–17)
HGB BLD-MCNC: 6.8 G/DL (ref 12–17)
INR PPP: 2.28 (ref 0.86–1.16)
INR PPP: 2.39 (ref 0.86–1.16)
LYMPHOCYTES # BLD AUTO: 0.38 THOUSAND/UL (ref 0.6–4.47)
LYMPHOCYTES # BLD AUTO: 1.39 THOUSANDS/ΜL (ref 0.6–4.47)
LYMPHOCYTES # BLD AUTO: 4 % (ref 14–44)
LYMPHOCYTES NFR BLD AUTO: 14 % (ref 14–44)
MACROCYTES BLD QL AUTO: PRESENT
MCH RBC QN AUTO: 31.8 PG (ref 26.8–34.3)
MCH RBC QN AUTO: 32.2 PG (ref 26.8–34.3)
MCH RBC QN AUTO: 32.7 PG (ref 26.8–34.3)
MCHC RBC AUTO-ENTMCNC: 33.7 G/DL (ref 31.4–37.4)
MCHC RBC AUTO-ENTMCNC: 34 G/DL (ref 31.4–37.4)
MCHC RBC AUTO-ENTMCNC: 34.3 G/DL (ref 31.4–37.4)
MCV RBC AUTO: 95 FL (ref 82–98)
MONOCYTES # BLD AUTO: 0.76 THOUSAND/UL (ref 0–1.22)
MONOCYTES # BLD AUTO: 1.15 THOUSAND/ΜL (ref 0.17–1.22)
MONOCYTES NFR BLD AUTO: 11 % (ref 4–12)
MONOCYTES NFR BLD: 8 % (ref 4–12)
NEUTROPHILS # BLD AUTO: 7.33 THOUSANDS/ΜL (ref 1.85–7.62)
NEUTROPHILS # BLD MANUAL: 8.29 THOUSAND/UL (ref 1.85–7.62)
NEUTS SEG NFR BLD AUTO: 72 % (ref 43–75)
NEUTS SEG NFR BLD AUTO: 87 % (ref 43–75)
NRBC BLD AUTO-RTO: 0 /100 WBCS
NRBC BLD AUTO-RTO: 0 /100 WBCS
PLATELET # BLD AUTO: 32 THOUSANDS/UL (ref 149–390)
PLATELET # BLD AUTO: 34 THOUSANDS/UL (ref 149–390)
PLATELET # BLD AUTO: 35 THOUSANDS/UL (ref 149–390)
PLATELET BLD QL SMEAR: ABNORMAL
POIKILOCYTOSIS BLD QL SMEAR: PRESENT
POTASSIUM SERPL-SCNC: 3.4 MMOL/L (ref 3.5–5.3)
PROT SERPL-MCNC: 5.4 G/DL (ref 6.4–8.2)
PROTHROMBIN TIME: 25.4 SECONDS (ref 12.1–14.4)
PROTHROMBIN TIME: 26.4 SECONDS (ref 12.1–14.4)
RBC # BLD AUTO: 2.01 MILLION/UL (ref 3.88–5.62)
RBC # BLD AUTO: 2.08 MILLION/UL (ref 3.88–5.62)
RBC # BLD AUTO: 2.08 MILLION/UL (ref 3.88–5.62)
RBC MORPH BLD: PRESENT
RH BLD: POSITIVE
SODIUM SERPL-SCNC: 137 MMOL/L (ref 136–145)
SPECIMEN EXPIRATION DATE: NORMAL
WBC # BLD AUTO: 10.2 THOUSAND/UL (ref 4.31–10.16)
WBC # BLD AUTO: 11.21 THOUSAND/UL (ref 4.31–10.16)
WBC # BLD AUTO: 9.53 THOUSAND/UL (ref 4.31–10.16)

## 2018-03-06 PROCEDURE — 94762 N-INVAS EAR/PLS OXIMTRY CONT: CPT

## 2018-03-06 PROCEDURE — 80076 HEPATIC FUNCTION PANEL: CPT | Performed by: PHYSICIAN ASSISTANT

## 2018-03-06 PROCEDURE — 99233 SBSQ HOSP IP/OBS HIGH 50: CPT | Performed by: INTERNAL MEDICINE

## 2018-03-06 PROCEDURE — 99232 SBSQ HOSP IP/OBS MODERATE 35: CPT | Performed by: INTERNAL MEDICINE

## 2018-03-06 PROCEDURE — 85379 FIBRIN DEGRADATION QUANT: CPT | Performed by: INTERNAL MEDICINE

## 2018-03-06 PROCEDURE — 85730 THROMBOPLASTIN TIME PARTIAL: CPT | Performed by: INTERNAL MEDICINE

## 2018-03-06 PROCEDURE — 80048 BASIC METABOLIC PNL TOTAL CA: CPT | Performed by: INTERNAL MEDICINE

## 2018-03-06 PROCEDURE — 90935 HEMODIALYSIS ONE EVALUATION: CPT | Performed by: INTERNAL MEDICINE

## 2018-03-06 PROCEDURE — 94760 N-INVAS EAR/PLS OXIMETRY 1: CPT

## 2018-03-06 PROCEDURE — P9021 RED BLOOD CELLS UNIT: HCPCS

## 2018-03-06 PROCEDURE — 86900 BLOOD TYPING SEROLOGIC ABO: CPT | Performed by: INTERNAL MEDICINE

## 2018-03-06 PROCEDURE — 86850 RBC ANTIBODY SCREEN: CPT | Performed by: INTERNAL MEDICINE

## 2018-03-06 PROCEDURE — 85007 BL SMEAR W/DIFF WBC COUNT: CPT | Performed by: INTERNAL MEDICINE

## 2018-03-06 PROCEDURE — 85384 FIBRINOGEN ACTIVITY: CPT | Performed by: INTERNAL MEDICINE

## 2018-03-06 PROCEDURE — 86901 BLOOD TYPING SEROLOGIC RH(D): CPT | Performed by: INTERNAL MEDICINE

## 2018-03-06 PROCEDURE — 85025 COMPLETE CBC W/AUTO DIFF WBC: CPT | Performed by: INTERNAL MEDICINE

## 2018-03-06 PROCEDURE — 85610 PROTHROMBIN TIME: CPT | Performed by: INTERNAL MEDICINE

## 2018-03-06 PROCEDURE — 5A1D70Z PERFORMANCE OF URINARY FILTRATION, INTERMITTENT, LESS THAN 6 HOURS PER DAY: ICD-10-PCS | Performed by: INTERNAL MEDICINE

## 2018-03-06 PROCEDURE — 85027 COMPLETE CBC AUTOMATED: CPT | Performed by: INTERNAL MEDICINE

## 2018-03-06 RX ORDER — PHYTONADIONE 10 MG/ML
10 INJECTION, EMULSION INTRAMUSCULAR; INTRAVENOUS; SUBCUTANEOUS ONCE
Status: COMPLETED | OUTPATIENT
Start: 2018-03-06 | End: 2018-03-06

## 2018-03-06 RX ORDER — ALBUMIN (HUMAN) 12.5 G/50ML
25 SOLUTION INTRAVENOUS ONCE
Status: COMPLETED | OUTPATIENT
Start: 2018-03-06 | End: 2018-03-06

## 2018-03-06 RX ADMIN — PANTOPRAZOLE SODIUM 40 MG: 40 TABLET, DELAYED RELEASE ORAL at 05:10

## 2018-03-06 RX ADMIN — LACTULOSE 20 G: 20 SOLUTION ORAL at 08:54

## 2018-03-06 RX ADMIN — RIFAXIMIN 550 MG: 550 TABLET ORAL at 21:21

## 2018-03-06 RX ADMIN — LACTULOSE 20 G: 20 SOLUTION ORAL at 15:55

## 2018-03-06 RX ADMIN — OCTREOTIDE ACETATE 100 MCG: 100 INJECTION, SOLUTION INTRAVENOUS; SUBCUTANEOUS at 05:10

## 2018-03-06 RX ADMIN — OCTREOTIDE ACETATE 100 MCG: 100 INJECTION, SOLUTION INTRAVENOUS; SUBCUTANEOUS at 21:22

## 2018-03-06 RX ADMIN — LACTULOSE 20 G: 20 SOLUTION ORAL at 21:21

## 2018-03-06 RX ADMIN — PHYTONADIONE 10 MG: 10 INJECTION, EMULSION INTRAMUSCULAR; INTRAVENOUS; SUBCUTANEOUS at 14:39

## 2018-03-06 RX ADMIN — OCTREOTIDE ACETATE 100 MCG: 100 INJECTION, SOLUTION INTRAVENOUS; SUBCUTANEOUS at 14:39

## 2018-03-06 RX ADMIN — MIDODRINE HYDROCHLORIDE 10 MG: 5 TABLET ORAL at 10:35

## 2018-03-06 RX ADMIN — ALBUMIN HUMAN 25 G: 0.25 SOLUTION INTRAVENOUS at 11:06

## 2018-03-06 NOTE — HEMODIALYSIS
Patient's catheter bleeding again at the end of dialysis  Pressure applied to site  IR made aware and will see patient in his room  Primary RN transporting patient back to room now  Dr Danis Dominguez also aware

## 2018-03-06 NOTE — PROGRESS NOTES
Progress Note - Daya Everett 50 y o  male MRN: [id number]    Unit/Bed#: [facility] 802-01 Encounter: [id number]         Assessment/ Plan:  Decompensated cirrhosis  Ascites  HE    1  Decompensated cirrhosis - secondary to alcohol abuse, actively drinking prior to admission  Was initially treated for alcoholic hepatitis with steroids but this were weaned b/c little improvement limited benefit  He developed ESRD, possible HRS & was started on dialysis  He has had transient improvements in creatinine & TB but now slightly worse  MELD 36  Hbg & plts continue to drop  He has required multiple transfusions & will again  Concern for hemolysis/ DIC but likely secondary to his cirrhosis  -Follow H&H  -Transfuse as necessary    2  Ascites - he has moderate ascites on exam   Last paracentesis on 2/21, 4L removed  He states the fluid decreases with dialysis  -Monitor abd distention, if no improvement may need paracentesis    3  HE - he is oriented x 3 but is also simultaneously confused  Pt was refusing lactulose but after discussion with family he was agreeable  -Continue lactulose & xifaxan  Overall poor prognosis  Primary team again discussed with family who continue to want everything done  Unfortunately there is not much more we can offer  GI to sign off please call with any questions  Subjective:   Pt seen & examined  No complaints other than bleeding at PICC site  Objective:     Vitals: Blood pressure 97/50, pulse 96, temperature (!) 96 7 °F (35 9 °C), temperature source Tympanic, resp  rate 20, height 5' 10" (1 778 m), weight 74 8 kg (164 lb 14 5 oz), SpO2 99 %  ,Body mass index is 23 66 kg/m²          Physical Exam: General appearance: AAOx3 but rambling about unreal things & situations  Lungs: clear to auscultation bilaterally  Heart: regular rate and rhythm  Abdomen: +BS, soft, NT, distended  Skin: Jaundice     Invasive Devices     Peripheral Intravenous Line            Peripheral IV 03/04/18 Right Forearm 1 day          Line            HD Cath Double 4 days                Lab, Imaging and other studies: I have personally reviewed pertinent reports       CBC:   Lab Results   Component Value Date    WBC 10 20 (H) 03/06/2018    HGB 6 4 (LL) 03/06/2018    HCT 19 0 (L) 03/06/2018    MCV 95 03/06/2018    PLT 35 (LL) 03/06/2018    MCH 31 8 03/06/2018    MCHC 33 7 03/06/2018    RDW 27 0 (H) 03/06/2018    NRBC 0 03/06/2018   ,   CMP:   Lab Results   Component Value Date     03/06/2018    K 3 4 (L) 03/06/2018     03/06/2018    CO2 28 03/06/2018    ANIONGAP 9 03/06/2018    BUN 36 (H) 03/06/2018    CREATININE 3 01 (H) 03/06/2018    GLUCOSE 115 03/06/2018    CALCIUM 7 9 (L) 03/06/2018    AST 36 03/06/2018    ALT 39 03/06/2018    ALKPHOS 148 (H) 03/06/2018    PROT 5 4 (L) 03/06/2018    BILITOT 5 48 (H) 03/06/2018    EGFR 23 03/06/2018   ,   Lipase: No results found for: LIPASE,  PT/INR:   Lab Results   Component Value Date    INR 2 39 (H) 03/06/2018   ,

## 2018-03-06 NOTE — PROGRESS NOTES
Called to evaluate patient for HD cathedar bleeding concern for DIC by IM resident  vss  Confused nonfocal  Tachy no MRG  Lungs coarse rhonchi  abd distended +bs NT  +2 LE edema/dependent edema  R SC HD cath with old bloody drainage sandbag on     A/P   coagulopathy 2/2 cirrhosis not DIC--transfuse prbc for hgb >7 with HD, no plts unless <15 or <50 for active bleeding, INR 2 if active bleeding ffp/vit k, HD dsg removed and clot noted did not disrupt clot, if cont to ooze will place surgicel, dsg changed under sterile conditions, plan of care reviewed with Dr Cas Fitch and dr Samantha Raymond

## 2018-03-06 NOTE — PROGRESS NOTES
NEPHROLOGY PROGRESS NOTE   Brian Diss 50 y o  male MRN: [id number]  Unit/Bed#: [facility] 802-01 Encounter: [id number]  Reason for Consult: PRANAY    ASSESSMENT AND PLAN:  PRANAY (POA): due to hepatorenal syndrome in the setting of decompensated cirrhosis vs ATN  Baseline creatinine <1 with last creatinine 0 7 on 2/7/18   -  Remains dialysis dependent since 2/21/18  - continue to monitor for renal recovery  Urine output overall inaccurate, although no signs of renal recovery yet  Need accurate intake and output measurement  Bladder scan will be unreliable due to underlying ascites issues  - discontinue multivitamin, start Nephrocaps  - continue midodrine before dialysis  - urinalysis showed 4 to 10 RBCs, innumerable WBCs, trace proteinuria, urine sodium was six initially on admission   - follow case management for acute HD unit placement  - Overall very difficult to remove UF due to ongoing hypotension issues  - given overall non compliance issues, overall poor prognosis, less likely to have benefit for further aggressive intervention like evaluation of serological work up, renal biopsy (high risk) etc      I saw and examined patient during hemodialysis treatment at 10:29 AM on 3/6/2018  The patient was receiving hemodialysis for treatment of PRANAY  I also reviewed vital signs, intake and output, lab results and recent events, and agree with dialysis order  Tolerating HD   BP acceptable  Time: 3 hours  Qb: 400  Sodium: 138  Dialyzer: F160  Qd: 1 5 times Qb  Potassium: as per protocol  Access: Perm cath  UF goal: as BP tolerated  Bicarbonate: 35 Calcium 2 5    Decompensated alcoholic cirrhosis: not transplant candidate   Encephalopathy: due to cirrhosis and pneumonia  Aspiration PNA: s/p zosyn per ID     Anemia: likely related to chronic disease but work up also suspicious for hemolysis/DIC  - transfuse prn   - on Epogen 4000 units IV with HD   - Iron saturation 86% in February 2018   - +schistocytes and helmet cells noted on hemolysis smear  mild LDH elevation noted with low haptoglobin  -VERNELL c3 negative, VERNELL Igg + as well as direct Jimena test+; fibrin split products, fibrinogen low  - Iron normal, ferritin high, iron sat ok, with low TIBC  Low plts likely d/t cirrhosis but above findings supsicious for hemolysis/DIC  - consider hematology evaluation  Hypokalemia, four K bath with dialysis today    Overall prognosis remains poor  Will need realistic discussion with family given difficulty to continue dialysis due to ongoing severe hypotension issues and poor prognosis  I have tried to call patient's son Roxann Hanson again today and left voicemail to discuss this further  Discussed with primary team resident  SUBJECTIVE:  Patient seen and examined at bedside  Patient had an episode of bleeding/oozing from PermCath site which is better now    No chest pain, shortness of breath, nausea, vomiting, abdominal pain     OBJECTIVE:  Current Weight: Weight - Scale: 74 8 kg (164 lb 14 5 oz) (pt unsteady on feet)  Vitals:    03/06/18 1015   BP: 96/54   Pulse: (!) 54   Resp:    Temp:    SpO2:        Intake/Output Summary (Last 24 hours) at 03/06/18 1026  Last data filed at 03/06/18 1010   Gross per 24 hour   Intake             1000 ml   Output              357 ml   Net              643 ml       Physical Examination:  General:  Lying in bed, no acute distress   Eyes:  Mild conjunctival pallor present, sclerae icteric  ENT:  External examination of ears and nose unremarkable  Neck:  Supple, no JVD appreciated  Respiratory:  A bilateral air entry present, decreased breath sound at bases, no obvious wheezing present  CVS:  S1, S2 present  GI:  Mild distended, nontender  CNS:  Active alert oriented x1  Extremities:  Trace edema in lower extremities  Skin:  No new rash in both legs    Medications:    Current Facility-Administered Medications:     albuterol inhalation solution 2 5 mg, 2 5 mg, Nebulization, Q4H PRN, Charis Santana, DO, 2 5 mg at 02/23/18 1059    epoetin rebekah (EPOGEN,PROCRIT) injection 4,000 Units, 4,000 Units, Intravenous, Once per day on Mon Wed Fri, Ashley Gonzales MD, 4,000 Units at 20/80/26 1203    folic acid (FOLVITE) tablet 1 mg, 1 mg, Oral, Daily, Mariama Hurtado, DO, 1 mg at 03/05/18 1204    influenza inactivated quadrivalent vaccine (FLULAVAL) IM injection 0 5 mL, 0 5 mL, Intramuscular, Prior to discharge, Dre Bustos MD    lactulose 20 g/30 mL oral solution 20 g, 20 g, Oral, TID, Sulaiman Lamas MD, 20 g at 03/06/18 0854    midodrine (PROAMATINE) tablet 10 mg, 10 mg, Oral, Before Dialysis, Ashley Gonzlaes MD    multivitamin (FLINTSTONES) chewable tablet 1 tablet, 1 tablet, Oral, Daily, Jessenia Cea PA-C, 1 tablet at 03/05/18 1204    octreotide (SandoSTATIN) injection 100 mcg, 100 mcg, Intravenous, Q8H [facility], Mati Glasgow, , 100 mcg at 03/06/18 0510    ondansetron (ZOFRAN) injection 4 mg, 4 mg, Intravenous, Q8H PRN, Mariama Hurtado DO    pantoprazole (PROTONIX) EC tablet 40 mg, 40 mg, Oral, Early Morning, Sulaiman Lamas MD, 40 mg at 03/06/18 0510    phytonadione (AQUA-MEPHYTON) 10 mg/mL SC/IM injection 10 mg, 10 mg, Subcutaneous, Once, Portia Crenshaw, DO    rifaximin (XIFAXAN) tablet 550 mg, 550 mg, Oral, Q12H [facility], Reyes Porter, MOISÉS, 550 mg at 03/05/18 2127    sodium chloride (AYR SALINE NASAL) nasal gel 1 application, 1 application, Nasal, G5T PRN, Jessenia Cea, PA-C    thiamine (VITAMIN B1) tablet 100 mg, 100 mg, Oral, Daily, Mariama Hurtado, , 100 mg at 03/05/18 1204    Laboratory Results:    Results from last 7 days  Lab Units 03/06/18  0851 03/06/18  0534 03/05/18  0540 03/04/18  0557 03/03/18  1356 03/03/18  0453 03/02/18  1828 03/02/18  0456 03/01/18  0428 02/28/18  0523   WBC Thousand/uL 9 53 11 21* 14 56* 16 07*  --  13 39*  --  16 38* 19 95* 24 84*   HEMOGLOBIN g/dL 6 8* 6 7* 7 6* 8 2* 8 3* 6 8*  --  8 0* 7 9* 8 7*   HEMATOCRIT % 19 8* 19 7* 21 8* 23 9*  --  20 0*  --  23 7* 23 5* 25 8*   PLATELETS Thousands/uL 32* 34* 54* 43*  --  52* 47* 31* 23* 34*   SODIUM mmol/L  --  137 138 138  --  139  --  139 142 142   POTASSIUM mmol/L  --  3 4* 3 3* 3 6  --  3 8  --  3 9 3 8 3 4*   CHLORIDE mmol/L  --  100 101 101  --  103  --  103 105 105   CO2 mmol/L  --  28 28 27  --  29  --  30 27 27   BUN mg/dL  --  36* 45* 37*  --  28*  --  32* 54* 65*   CREATININE mg/dL  --  3 01* 3 39* 2 96*  --  2 40*  --  3 11* 3 87* 4 67*   CALCIUM mg/dL  --  7 9* 8 4 8 8  --  8 5  --  9 1 9 0 8 7   MAGNESIUM mg/dL  --   --   --  2 2  --   --   --  2 1  --  2 5   PHOSPHORUS mg/dL  --   --   --  4 7*  --  2 0*  --  2 8  --  3 5   TOTAL PROTEIN g/dL  --   --  5 5*  --   --   --   --   --   --  6 9   GLUCOSE RANDOM mg/dL  --  115 142* 134  --  80  --  139 142* 164*       Results for orders placed during the hospital encounter of 02/17/18   XR chest portable    Narrative CHEST     INDICATION: tachycardia, portacath placed yesterday    COMPARISON:  Chest x-ray from 3/1/2018    EXAM PERFORMED/VIEWS:  XR CHEST PORTABLE      FINDINGS:  Right-sided dialysis catheter has been placed, tip overlies the right atrium  Enteric tube has been removed  Heart size is not well evaluated on this single portable AP view  Scattered patchy airspace disease bilaterally has partially cleared on the left, stable on the right  There is probably a small right pleural effusion  No definite left pleural effusion  No pneumothorax  Osseous structures appear within normal limits for patient age  Impression 1  Scattered patchy airspace disease bilaterally has partially cleared on the left, stable on the right  There is probably a small right pleural effusion  2   Interval right sided dialysis catheter placement          Workstation performed: [id number]       Results for orders placed during the hospital encounter of 02/17/18   XR chest pa & lateral    Narrative CHEST     INDICATION:  Cough and hypoxia    COMPARISON:  None    VIEWS: Frontal and lateral projections    IMAGES:  2    FINDINGS:    Cardiomediastinal silhouette appears unremarkable  The lungs are clear  No pneumothorax or pleural effusion  Visualized osseous structures appear within normal limits for the patient's age  There is free air under the right hemidiaphragm  Impression 1  No active pulmonary disease  2   Free intraperitoneal air which may be related to reported paracentesis the prior day though clinical correlation for acute abdominal symptoms recommended  I personally discussed this study with Rolf Anton on 2/18/2018 at 10:48 AM           Workstation performed: [id number]       No results found for this or any previous visit  No results found for this or any previous visit  Results for orders placed during the hospital encounter of 02/17/18   CT abdomen pelvis wo contrast    Narrative CT ABDOMEN AND PELVIS WITHOUT IV CONTRAST    INDICATION:  19-year-old man status post paracentesis 2/17/2018  Clinical concern for abdominal hemorrhage due to decreasing hemoglobin  COMPARISON: None available at time of image interpretation       TECHNIQUE:  CT examination of the abdomen and pelvis was performed without intravenous contrast   Axial, sagittal, and coronal 2D reformatted images were created from the source data and submitted for interpretation  Radiation dose length product (DLP) for this visit:  1457 61 mGy-cm   This examination, like all CT scans performed in the [facility], was performed utilizing techniques to minimize radiation dose exposure, including the use of   iterative reconstruction and automated exposure control  Enteric contrast was administered  FINDINGS:    ABDOMEN    LOWER CHEST:  Small right pleural effusion  Right basal opacity most likely associated passive atelectasis  Trace left effusion with minimal left base atelectasis  LIVER/BILIARY TREE:  The liver is cirrhotic    Lack of contrast precludes evaluation for underlying hepatic mass  No biliary dilation on this noncontrast study  GALLBLADDER:  There are gallstone(s) within the gallbladder, without pericholecystic inflammatory changes  SPLEEN:  Not enlarged  PANCREAS:  Unremarkable  ADRENAL GLANDS:  Unremarkable  KIDNEYS/URETERS:  Unremarkable  No hydronephrosis  STOMACH AND BOWEL:  Unremarkable  APPENDIX:  A normal appendix was visualized  ABDOMINOPELVIC CAVITY:  Large volume of abdominal pelvic ascites  In the dependent pelvis, there is layering effect with the dependent ascites having attenuation 17 Hounsfield units with the nondependent ascites around having attenuation of -3 5 HU  This suggests some degree   of blood products layering in the pelvis  Trace pneumoperitoneum from recent paracentesis  No lymphadenopathy  VESSELS:  Atherosclerotic changes are present  No evidence of aneurysm  PELVIS    REPRODUCTIVE ORGANS:  Unremarkable for patient's age  URINARY BLADDER:  Class by Mendoza catheter  ABDOMINAL WALL/INGUINAL REGIONS:  Anasarca  Needle tract in the left mid abdomen is in the vicinity of a large body wall collateral (series 2 image 60)  OSSEOUS STRUCTURES:  No acute fracture or destructive osseous lesion  Anterior compression deformities of T12 and T7  Impression 1  Cirrhosis with stigmata of portal hypertension  2   Large volume of abdominopelvic ascites  There is a layering hematocrit level in the dependent pelvis  However, with the attenuation of the dependent component measuring only 15 Hounsfield units, this is most likely small volume of blood products   rather than large volume hemoperitoneum  3   Postprocedural pneumoperitoneum  Needle tract in the left mid abdomen is in the vicinity of a large body wall collateral   4   Small right pleural effusion and right base atelectasis  5   Anterior compression deformities of T7 and T12        I personally discussed this study with Chris Valerio on 2/18/2018 at 2:42 PM           Workstation performed: [id number]       No results found for this or any previous visit  Portions of the record may have been created with voice recognition software  Occasional wrong word or "sound a like" substitutions may have occurred due to the inherent limitations of voice recognition software  Read the chart carefully and recognize, using context, where substitutions have occurred

## 2018-03-06 NOTE — PROGRESS NOTES
Upon assessment, HD cath dressing noted to be bloody, as well as pt gown  Dr Roopa Pyle notified and asked to come assess pt  Will continue to monitor

## 2018-03-06 NOTE — PLAN OF CARE
CARDIOVASCULAR - ADULT     Maintains optimal cardiac output and hemodynamic stability Progressing        DISCHARGE PLANNING - CARE MANAGEMENT     Discharge to post-acute care or home with appropriate resources Progressing        GASTROINTESTINAL - ADULT     Minimal or absence of nausea and/or vomiting Progressing     Maintains or returns to baseline bowel function Progressing     Maintains adequate nutritional intake Progressing        GENITOURINARY - ADULT     Maintains or returns to baseline urinary function Progressing     Absence of urinary retention Progressing        HEMATOLOGIC - ADULT     Maintains hematologic stability Progressing        INFECTION - ADULT     Absence or prevention of progression during hospitalization Progressing     Absence of fever/infection during neutropenic period Progressing        METABOLIC, FLUID AND ELECTROLYTES - ADULT     Electrolytes maintained within normal limits Progressing     Fluid balance maintained Progressing     Glucose maintained within target range Progressing        Nutrition/Hydration-ADULT     Nutrient/Hydration intake appropriate for improving, restoring or maintaining nutritional needs Progressing        PAIN - ADULT     Verbalizes/displays adequate comfort level or baseline comfort level Progressing        Potential for Falls     Patient will remain free of falls Progressing        Prexisting or High Potential for Compromised Skin Integrity     Skin integrity is maintained or improved Progressing        SAFETY,RESTRAINT: NV/NON-SELF DESTRUCTIVE BEHAVIOR     Remains free of harm/injury (restraint for non violent/non self-detsructive behavior) Progressing     Returns to optimal restraint-free functioning Progressing     Remains free of harm/injury (restraint for non violent/non self-detsructive behavior) Progressing     Returns to optimal restraint-free functioning Progressing        SKIN/TISSUE INTEGRITY - ADULT     Skin integrity remains intact Progressing     Incision(s), wounds(s) or drain site(s) healing without S/S of infection Progressing     Oral mucous membranes remain intact Progressing

## 2018-03-06 NOTE — PROGRESS NOTES
IM Residency Progress Note   Unit/Bed#: [facility] 802-01 Encounter: [id number]  SOD Team C       Hany Boyle 50 y o  male [id number]    Hospital Stay Days: 16      Assessment/Plan:    Principal Problem:    Decompensated hepatic cirrhosis ([facility] )  Active Problems:    Ascites    Hepatic encephalopathy (HCC)    PRANAY (acute kidney injury) ([facility] )    Hypoalbuminemia    Hyponatremia    Sepsis (HCC)    Hyperammonemia (HCC)    Elevated alkaline phosphatase level    Anemia of chronic disease    Osteomyelitis of toe of right foot (HCC)    Alcohol abuse    Alcoholic hepatitis    Cough    Oliguria    Leukocytosis    Coagulopathy (HCC)    Aspiration pneumonia of right lower lobe (HCC)    Paroxysmal a-fib (HCC)    Coagulopathy - possible DIC  - from prior labs, INR is elevated at 2 26, APTT is elevated at 61, fibrinogen is low at 181, FDP >10<20, direct Jimena test is positive, after globin is low at less <10 and peripheral blood smear is positive for Schistocytes    - patient is oozing from his PermCath site and hemoglobin this a m  is low at 6 7 and platelet is 34   - will put in an order for 2 units of packed red blood cells and 2 units of fresh frozen plasma  - consult critical care and hematology  - order repeat fibrinogen level, D-dimer, APTT, INR and CBC with differential    Decompensated Hepatic Cirrhosis 2/2 to Alcoholic Cirrhosis (with Hepatic Encephalopathy and Ascites)  - Likely 2/2 to hepatic encephalopathy plus renal failure and PNA  - C/w Rifaximin and Lactulose   - C/w Ocreotide  - GI is on board and following, Pt is not a transplant candidate 2/2 non compliance and continued alcohol abuse  - S/p prednisone taper completed on 3/2/18  - C/w 1:1 observation because pt is unable to follow directions to avoid self injury   -palliative care is on board and his two sons have opted for ongoing treatment and dialysis    PRANAY in the setting of decompensated cirrhosis   - Likely  2/2 hepatorenal syndrome vs ATN  - Creatinine today is 3 01  - patient has been dialysis dependent since February 21st, 2018  Last dialysis session was yesterday  -Nephrology is on board and we appreciate their continued recommendations        Acute blood loss anemia secondary to PermCath site bleeding  - S/p transfusion of one unit of PRBC and 4 units of FFP on 3/2/18  - Hb today is 6 7 from 7 6  yesterday  - will give patient 2 units of packed red blood cells since he is actively bleeding  - continue to monitor CBC and transfuse as needed     Anemia of Chronic Disease (2/2 Liver/Renal disease and/or hemolysis/DIC)  - LDH mildly elevated at 279 with low Haptoglobin, decreased fibrinogen and increased fibrin degradation products and positive Direct Jimena test, and positive Schistocytes with a concern for hemolysis/DIC  - will consult Hematology      Aspiration PNA of RLL  -Completed 7 days of Zosyn      Right Hallux ulcer/ostemyelitis  - Per ID, no further antibiotic  - Has been recommended toe amputation but patient refuses     Thrombocytopenia likely secondary to liver failure  - Platelets today 34, will give patient 2 units of fresh frozen plasma  - We will hold pharmacological anticoagulation  - Transfuse if platelets are < 66,193 or <50,000 with bleeding     Hypocalcemia  - resolved   - Corrected Ca today is 9 1     Hypokalemia  - K today is 3 4  - will replete     Paroxysmal Afib  - No AC 2/2  To thrombocytopenia  - Rate controlled without beta-blockers      Leukocytosis  - WBC today is 11 21 down from 14 56  yesterday  - patient has no fever no other signs of infection   - will continue to monitor him clinically         Disposition:  Will transfuse patient with 2 units of packed red blood cells 2 units of fresh frozen plasma and follow up with Hematology and Critical Care recommendations      Subjective:   Patient Seen and examined  Per nursing staff, he has been bleeding from his PermCath site    He denies fever, chills, night sweats, chest pain, dizziness, palpitations, nausea, vomiting, abdominal pain  Vitals: Temp (24hrs), Av 6 °F (37 °C), Min:98 4 °F (36 9 °C), Max:99 °F (37 2 °C)  Current: Temperature: 99 °F (37 2 °C)  Vitals:    18 0546 18 0708 18 0729 18 0904   BP:  119/66     BP Location:  Left arm     Pulse:  97     Resp:  18     Temp:  99 °F (37 2 °C)     TempSrc:  Oral     SpO2:  96% 97% 99%   Weight: 74 8 kg (164 lb 14 5 oz)      Height:        Body mass index is 23 66 kg/m²  I/O last 24 hours: In: 1200 [P O :300; I V :500; Other:400]  Out: 357 [Other:357]      Physical Exam:   GENERAL:  In no obvious distress, lying comfortably in bed, afebrile to touch  HEENT:  Normocephalic, atraumatic  Pupils equal round and reactive to light and accommodation  Pale with scleral icterus  NECK:  Supple, no lymphadenopathy  Bloody is oozing from his right-sided PermCath  CVS:  S1, S2   Regular rate and rhythm  2/6 systolic murmur maximal in aortic valve region  RESPIRATORY:  Occasional wheezing  ABDOMEN:  Soft and distended  Nontender  No masses or organomegaly  Normoactive bowel sounds  MSK:  2+ pitting pedal edema bilateral feet distally  SKIN:  Extensive bruising on his bilateral upper extremities  NEURO:  Awake, alert and oriented x3        Invasive Devices     Peripheral Intravenous Line            Peripheral IV 18 Right Forearm 1 day          Line            HD Cath Double 3 days                          Labs:   Recent Results (from the past 24 hour(s))   Ammonia    Collection Time: 18  9:47 AM   Result Value Ref Range    Ammonia 65 (H) 11 - 35 umol/L   CBC    Collection Time: 18  5:34 AM   Result Value Ref Range    WBC 11 21 (H) 4 31 - 10 16 Thousand/uL    RBC 2 08 (L) 3 88 - 5 62 Million/uL    Hemoglobin 6 7 (LL) 12 0 - 17 0 g/dL    Hematocrit 19 7 (L) 36 5 - 49 3 %    MCV 95 82 - 98 fL    MCH 32 2 26 8 - 34 3 pg    MCHC 34 0 31 4 - 37 4 g/dL    RDW 27 0 (H) 11 6 - 15 1 %    Platelets 34 (LL) 149 - 390 Thousands/uL   Basic metabolic panel    Collection Time: 03/06/18  5:34 AM   Result Value Ref Range    Sodium 137 136 - 145 mmol/L    Potassium 3 4 (L) 3 5 - 5 3 mmol/L    Chloride 100 100 - 108 mmol/L    CO2 28 21 - 32 mmol/L    Anion Gap 9 4 - 13 mmol/L    BUN 36 (H) 5 - 25 mg/dL    Creatinine 3 01 (H) 0 60 - 1 30 mg/dL    Glucose 115 65 - 140 mg/dL    Calcium 7 9 (L) 8 3 - 10 1 mg/dL    eGFR 23 ml/min/1 73sq m   Prepare RBC:Has consent been obtained? Yes, 1 Units    Collection Time: 03/06/18  7:57 AM   Result Value Ref Range    Unit Product Code H6197C28     Unit Number [id number]     Unit ABO O     Unit RH POS     Unit Dispense Status Crossmatched    Prepare RBC:Has consent been obtained? Yes, 1 Units    Collection Time: 03/06/18  8:52 AM   Result Value Ref Range    Unit Product Code M8808H36     Unit Number [id number]     Unit ABO O     Unit [facility] POS     Unit Dispense Status Crossmatched    Prepare fresh frozen plasma:Transfusion Indications: Active bleeding or excessive/ diffuse microvascular bleeding in presence of INR > 2 or  aPTT > 60 sec (2X mean normal value); Has consent been obtained? Yes, 2 Units    Collection Time: 03/06/18  8:54 AM   Result Value Ref Range    Unit Product Code N8548T12     Unit Number [id number]     Unit ABO O     Unit [facility] POS     Unit Dispense Status Crossmatched     Unit Product Code I6970B22     Unit Number [id number]     Unit ABO O     Unit [facility] POS     Unit Dispense Status Crossmatched        Radiology Results: I have personally reviewed pertinent reports  Other Diagnostic Testing:   I have personally reviewed pertinent reports          Active Meds:   Current Facility-Administered Medications   Medication Dose Route Frequency    albuterol inhalation solution 2 5 mg  2 5 mg Nebulization Q4H PRN    epoetin rebekah (EPOGEN,PROCRIT) injection 4,000 Units  4,000 Units Intravenous Once per day on Mon Wed Fri    folic acid (FOLVITE) tablet 1 mg  1 mg Oral Daily    influenza inactivated quadrivalent vaccine (FLULAVAL) IM injection 0 5 mL  0 5 mL Intramuscular Prior to discharge    lactulose 20 g/30 mL oral solution 20 g  20 g Oral TID    midodrine (PROAMATINE) tablet 10 mg  10 mg Oral Before Dialysis    multivitamin (FLINTSTONES) chewable tablet 1 tablet  1 tablet Oral Daily    octreotide (SandoSTATIN) injection 100 mcg  100 mcg Intravenous Q8H [address]    ondansetron (ZOFRAN) injection 4 mg  4 mg Intravenous Q8H PRN    pantoprazole (PROTONIX) EC tablet 40 mg  40 mg Oral Early Morning    rifaximin (XIFAXAN) tablet 550 mg  550 mg Oral Q12H [address]    sodium chloride (AYR SALINE NASAL) nasal gel 1 application  1 application Nasal G1C PRN    thiamine (VITAMIN B1) tablet 100 mg  100 mg Oral Daily         VTE Pharmacologic Prophylaxis: Reason for no pharmacologic prophylaxis Held for thrombocytopenia  VTE Mechanical Prophylaxis: sequential compression device    Portia Crenshaw DO

## 2018-03-06 NOTE — SPEECH THERAPY NOTE
Speech Language/Pathology    Speech/Language Pathology Progress Note    Patient Name: Nalini Ramachandran Date: 3/6/2018      attempted to see pt for follow up dysphagia tx at lunch, pt off the floor at HD  Son present in room, stated pt will not be back until after 1300  Will follow up as able

## 2018-03-06 NOTE — PROGRESS NOTES
Evaluated patient for persistent bleeding from recently placed tunneled dialysis catheter  Upon inspection, there was a slow ooze of blood from the tract even after previous suture placement  The site was prepped with a chloraprep and draped with towels  The tract was injected with both D-stat and 1% lidocaine with epinephrine  Afterwards, hemostasis was obtained and a sterile dressing was applied  If the patient continues to bleed, will need coagulopathy corrected and consider platelet transfusion

## 2018-03-06 NOTE — PHYSICAL THERAPY NOTE
Physical Therapy Cancellation Note      PT CONSULT RECEIVED  PATIENT OFF FLOOR AT HD AT THIS TIME  PT WILL RE-ATTEMPT IF TIME AND CONTINUE TO FOLLOW ON CASELOAD TO PERFORM INITIAL EVALUATION AS APPROPRIATE      Bri Gonzalez, PT

## 2018-03-07 LAB
ABO GROUP BLD BPU: NORMAL
ALBUMIN SERPL BCP-MCNC: 2.7 G/DL (ref 3.5–5)
ALP SERPL-CCNC: 142 U/L (ref 46–116)
ALT SERPL W P-5'-P-CCNC: 35 U/L (ref 12–78)
ANION GAP SERPL CALCULATED.3IONS-SCNC: 8 MMOL/L (ref 4–13)
AST SERPL W P-5'-P-CCNC: 29 U/L (ref 5–45)
BILIRUB SERPL-MCNC: 6.29 MG/DL (ref 0.2–1)
BPU ID: NORMAL
BUN SERPL-MCNC: 22 MG/DL (ref 5–25)
CALCIUM SERPL-MCNC: 7.6 MG/DL (ref 8.3–10.1)
CHLORIDE SERPL-SCNC: 99 MMOL/L (ref 100–108)
CO2 SERPL-SCNC: 28 MMOL/L (ref 21–32)
CREAT SERPL-MCNC: 2.47 MG/DL (ref 0.6–1.3)
ERYTHROCYTE [DISTWIDTH] IN BLOOD BY AUTOMATED COUNT: 25 % (ref 11.6–15.1)
GFR SERPL CREATININE-BSD FRML MDRD: 29 ML/MIN/1.73SQ M
GLUCOSE SERPL-MCNC: 189 MG/DL (ref 65–140)
HCT VFR BLD AUTO: 21.6 % (ref 36.5–49.3)
HGB BLD-MCNC: 7.4 G/DL (ref 12–17)
MCH RBC QN AUTO: 32.6 PG (ref 26.8–34.3)
MCHC RBC AUTO-ENTMCNC: 34.3 G/DL (ref 31.4–37.4)
MCV RBC AUTO: 95 FL (ref 82–98)
PLATELET # BLD AUTO: 31 THOUSANDS/UL (ref 149–390)
POTASSIUM SERPL-SCNC: 3.6 MMOL/L (ref 3.5–5.3)
PROT SERPL-MCNC: 5.5 G/DL (ref 6.4–8.2)
RBC # BLD AUTO: 2.27 MILLION/UL (ref 3.88–5.62)
SODIUM SERPL-SCNC: 135 MMOL/L (ref 136–145)
UNIT DISPENSE STATUS: NORMAL
UNIT PRODUCT CODE: NORMAL
UNIT RH: NORMAL
WBC # BLD AUTO: 9.17 THOUSAND/UL (ref 4.31–10.16)

## 2018-03-07 PROCEDURE — 99223 1ST HOSP IP/OBS HIGH 75: CPT | Performed by: INTERNAL MEDICINE

## 2018-03-07 PROCEDURE — 99232 SBSQ HOSP IP/OBS MODERATE 35: CPT | Performed by: INTERNAL MEDICINE

## 2018-03-07 PROCEDURE — 94762 N-INVAS EAR/PLS OXIMTRY CONT: CPT

## 2018-03-07 PROCEDURE — 80053 COMPREHEN METABOLIC PANEL: CPT | Performed by: INTERNAL MEDICINE

## 2018-03-07 PROCEDURE — 99233 SBSQ HOSP IP/OBS HIGH 50: CPT | Performed by: INTERNAL MEDICINE

## 2018-03-07 PROCEDURE — 85027 COMPLETE CBC AUTOMATED: CPT | Performed by: INTERNAL MEDICINE

## 2018-03-07 RX ADMIN — Medication 100 MG: at 09:03

## 2018-03-07 RX ADMIN — LACTULOSE 20 G: 20 SOLUTION ORAL at 09:03

## 2018-03-07 RX ADMIN — OCTREOTIDE ACETATE 100 MCG: 100 INJECTION, SOLUTION INTRAVENOUS; SUBCUTANEOUS at 21:09

## 2018-03-07 RX ADMIN — FOLIC ACID 1 MG: 1 TABLET ORAL at 09:03

## 2018-03-07 RX ADMIN — LACTULOSE 20 G: 20 SOLUTION ORAL at 16:28

## 2018-03-07 RX ADMIN — RIFAXIMIN 550 MG: 550 TABLET ORAL at 21:09

## 2018-03-07 RX ADMIN — RIFAXIMIN 550 MG: 550 TABLET ORAL at 09:03

## 2018-03-07 RX ADMIN — OCTREOTIDE ACETATE 100 MCG: 100 INJECTION, SOLUTION INTRAVENOUS; SUBCUTANEOUS at 14:13

## 2018-03-07 RX ADMIN — LACTULOSE 20 G: 20 SOLUTION ORAL at 21:09

## 2018-03-07 RX ADMIN — PANTOPRAZOLE SODIUM 40 MG: 40 TABLET, DELAYED RELEASE ORAL at 05:23

## 2018-03-07 RX ADMIN — OCTREOTIDE ACETATE 100 MCG: 100 INJECTION, SOLUTION INTRAVENOUS; SUBCUTANEOUS at 05:23

## 2018-03-07 NOTE — SOCIAL WORK
CM spoke with SOD C and they requested family meeting for goals of care  md requested, md, nephrology , pallative , GI , the mother and father and pt 2 sons   Cm left voice message for patricia clinical specialist

## 2018-03-07 NOTE — PROGRESS NOTES
IM Residency Progress Note   Unit/Bed#: [facility] 802-01 Encounter: [id number]  SOD Team C       Louis Guzman 50 y o  male [id number]    Hospital Stay Days: 25      Assessment/Plan:    Principal Problem:    Decompensated hepatic cirrhosis ([facility] )  Active Problems:    Ascites    Hepatic encephalopathy (HCC)    PRANYA (acute kidney injury) ([facility] )    Hypoalbuminemia    Hyponatremia    Sepsis (HCC)    Hyperammonemia (HCC)    Elevated alkaline phosphatase level    Anemia of chronic disease    Osteomyelitis of toe of right foot (HCC)    Alcohol abuse    Alcoholic hepatitis    Cough    Oliguria    Leukocytosis    Coagulopathy (HCC)    Aspiration pneumonia of right lower lobe (HCC)    Paroxysmal a-fib (HCC)    Coagulopathy - possible DIC versus secondary to hepatic failure  - from prior labs, INR is elevated at 2 26, APTT is elevated at 61, fibrinogen is low at 181, FDP >10<20, direct Jimena test is positive, after globin is low at less <10 and peripheral blood smear is positive for Schistocytes  - repeat fibrinogen is 110, INR is 2 39, APTT is elevated at 46, D-dimer is elevated at 3850   - We will follow up with  Hematology recommendations    Decompensated Hepatic Cirrhosis 2/2 to Alcoholic Cirrhosis (with Hepatic Encephalopathy and Ascites)  - Likely 2/2 to hepatic encephalopathy plus renal failure and PNA  - C/w Rifaximin and Lactulose   - C/w Ocreotide  - GI is on board and following, Pt is not a transplant candidate 2/2 non compliance and continued alcohol abuse  - S/p prednisone taper completed on 3/2/18  - C/w 1:1 observation because pt is unable to follow directions to avoid self injury   -palliative care is on board and his two sons have opted for ongoing treatment and dialysis     PRANAY in the setting of decompensated cirrhosis   - Likely  2/2 hepatorenal syndrome vs ATN  - Creatinine today is 2 47  - patient has been dialysis dependent since February 21st, 2018  Last dialysis session was yesterday   Per Nephrology, pt has been having severe hypotension during dialysis and has been requiring midodrine   - We appreciate Nephrology's continued recommendations        Acute blood loss anemia secondary to PermCath site bleeding  - S/p transfusion of one unit of PRBC yesterday and a total of 5 units during this admission  - Hb today is 7 4 up from 6 7 yesterday after transfusion of one unit of PRBC  - continue to monitor CBC and transfuse as needed     Anemia of Chronic Disease (2/2 Liver/Renal disease and/or hemolysis/DIC)  - LDH mildly elevated at 279 with low Haptoglobin, decreased fibrinogen and increased fibrin degradation products and positive Direct Jimena test, and positive Schistocytes with a concern for hemolysis/DIC  -follow up with Hematology recommendations     Aspiration PNA of RLL  -Completed 7 days of Zosyn      Right Hallux ulcer/ostemyelitis  - Per ID, no further antibiotic  - Has been recommended toe amputation but patient refuses     Thrombocytopenia likely secondary to liver failure  - Platelets today 34, will give patient 2 units of fresh frozen plasma  - We will hold pharmacological anticoagulation  - Transfuse if platelets are < 12,935 or <50,000 with bleeding     Hypocalcemia  - resolved   - Corrected Ca today is 8 6     Hypokalemia  - resolved, K today is 3 6       Paroxysmal Afib  - No AC 2/2  To thrombocytopenia  - Rate controlled without beta-blockers      Leukocytosis  - resolved, WBC today is 9 17 down from   yesterday  - patient does have the right hallux osteomyelitis            Disposition: We will plan for a multidisciplinary meeting with patient and his family to discuss goals of care  Subjective:   Patient seen and examined  Per nursing staff, he is still confused at times  He has no complaints this morning  He  denies chest pain or abdominal pain, shortness of breath, dizziness         Vitals: Temp (24hrs), Av 7 °F (36 5 °C), Min:96 7 °F (35 9 °C), Max:98 5 °F (36 9 °C)  Current: Temperature: 98 5 °F (36 9 °C)  Vitals:    03/06/18 1501 03/06/18 1950 03/06/18 2300 03/07/18 0741   BP: 122/70  133/63    BP Location: Left arm  Right arm    Pulse: 93  93    Resp: 16  16    Temp: 98 4 °F (36 9 °C)  98 5 °F (36 9 °C)    TempSrc: Oral  Oral    SpO2: 99% 100% 93% 98%   Weight:       Height:        Body mass index is 23 66 kg/m²  I/O last 24 hours: In: 110 [P O :220; I V :200; Blood:350; Other:100]  Out: 700 [Urine:200; Other:500]      Physical Exam:   GENERAL:  In no obvious distress, lying comfortably in bed, afebrile to touch  Generalized jaundice  HEENT:  Normocephalic, atraumatic  Pupils equal round and reactive to light and accommodation  Extraocular muscles intact bilaterally  pale, + scleral icterus  No oropharyngeal erythema  NECK:  Supple, no lymphadenopathy  CVS:  S1, S2   Regular rate and rhythm  2/6 systolic murmur maximal in aortic valve region, no rubs or gallops  RESPIRATORY:  Clear to auscultation bilaterally  No wheezes, rhonchi or rales  ABDOMEN:  Distended,nontender   No masses or organomegaly  Normoactive bowel sounds  MSK:  1+ pitting pedal edema bilaterally    NEURO:  Awake, alert and oriented x2 1/2      Invasive Devices     Peripheral Intravenous Line            Peripheral IV 03/04/18 Right Forearm 2 days          Line            HD Cath Double 4 days                          Labs:   Recent Results (from the past 24 hour(s))   CBC    Collection Time: 03/07/18  5:36 AM   Result Value Ref Range    WBC 9 17 4 31 - 10 16 Thousand/uL    RBC 2 27 (L) 3 88 - 5 62 Million/uL    Hemoglobin 7 4 (L) 12 0 - 17 0 g/dL    Hematocrit 21 6 (L) 36 5 - 49 3 %    MCV 95 82 - 98 fL    MCH 32 6 26 8 - 34 3 pg    MCHC 34 3 31 4 - 37 4 g/dL    RDW 25 0 (H) 11 6 - 15 1 %    Platelets 31 (LL) 153 - 390 Thousands/uL   Comprehensive metabolic panel    Collection Time: 03/07/18  5:36 AM   Result Value Ref Range    Sodium 135 (L) 136 - 145 mmol/L    Potassium 3 6 3 5 - 5 3 mmol/L Chloride 99 (L) 100 - 108 mmol/L    CO2 28 21 - 32 mmol/L    Anion Gap 8 4 - 13 mmol/L    BUN 22 5 - 25 mg/dL    Creatinine 2 47 (H) 0 60 - 1 30 mg/dL    Glucose 189 (H) 65 - 140 mg/dL    Calcium 7 6 (L) 8 3 - 10 1 mg/dL    AST 29 5 - 45 U/L    ALT 35 12 - 78 U/L    Alkaline Phosphatase 142 (H) 46 - 116 U/L    Total Protein 5 5 (L) 6 4 - 8 2 g/dL    Albumin 2 7 (L) 3 5 - 5 0 g/dL    Total Bilirubin 6 29 (H) 0 20 - 1 00 mg/dL    eGFR 29 ml/min/1 73sq m   Prepare RBC:Has consent been obtained? Yes, 1 Units    Collection Time: 03/07/18  5:55 AM   Result Value Ref Range    Unit Product Code Y1915T72     Unit Number [id number]     Unit ABO O     Unit [facility] POS     Unit Dispense Status Presumed Trans    Prepare RBC:Has consent been obtained? Yes, 1 Units    Collection Time: 03/07/18  6:43 AM   Result Value Ref Range    Unit Product Code Z4194P10     Unit Number [id number]     Unit ABO O     Unit RH POS     Unit Dispense Status Return to Inv        Radiology Results: I have personally reviewed pertinent reports  Other Diagnostic Testing:   I have personally reviewed pertinent reports          Active Meds:   Current Facility-Administered Medications   Medication Dose Route Frequency    epoetin rebekah (EPOGEN,PROCRIT) injection 4,000 Units  4,000 Units Intravenous After Dialysis    folic acid (FOLVITE) tablet 1 mg  1 mg Oral Daily    influenza inactivated quadrivalent vaccine (FLULAVAL) IM injection 0 5 mL  0 5 mL Intramuscular Prior to discharge    lactulose 20 g/30 mL oral solution 20 g  20 g Oral TID    midodrine (PROAMATINE) tablet 10 mg  10 mg Oral Before Dialysis    octreotide (SandoSTATIN) injection 100 mcg  100 mcg Intravenous Q8H [facility]    ondansetron (ZOFRAN) injection 4 mg  4 mg Intravenous Q8H PRN    pantoprazole (PROTONIX) EC tablet 40 mg  40 mg Oral Early Morning    rifaximin (XIFAXAN) tablet 550 mg  550 mg Oral Q12H [facility]    sodium chloride (AYR SALINE NASAL) nasal gel 1 application  1 application Nasal L0G PRN    thiamine (VITAMIN B1) tablet 100 mg  100 mg Oral Daily         VTE Pharmacologic Prophylaxis: Reason for no pharmacologic prophylaxis Held for thrombocytopenia  VTE Mechanical Prophylaxis: sequential compression device    Portia Crenshaw DO

## 2018-03-07 NOTE — RESPIRATORY THERAPY NOTE
RT Protocol Note  Antonio London 50 y o  male MRN: [id number]  Unit/Bed#: [facility] 802-01 Encounter: [id number]    Assessment    Principal Problem:    Decompensated hepatic cirrhosis ([place] [address] )  Active Problems:    Ascites    Hepatic encephalopathy (HCC)    PRANAY (acute kidney injury) ([place] [address] )    Hypoalbuminemia    Hyponatremia    Sepsis ([place] [address] )    Hyperammonemia (HCC)    Elevated alkaline phosphatase level    Anemia of chronic disease    Osteomyelitis of toe of right foot (HCC)    Alcohol abuse    Alcoholic hepatitis    Cough    Oliguria    Leukocytosis    Coagulopathy (HCC)    Aspiration pneumonia of right lower lobe (HCC)    Paroxysmal a-fib ([place] [address] )      Home Pulmonary Medications:  none    Past Medical History:   Diagnosis Date    Cirrhosis ([place] [address] )     Hypertension      Social History     Social History    Marital status: /Civil Union     Spouse name: N/A    Number of children: N/A    Years of education: N/A     Social History Main Topics    Smoking status: Current Every Day Smoker     Packs/day: 0 20     Years: 35 00     Types: Cigarettes    Smokeless tobacco: Never Used    Alcohol use Yes      Comment: Pt "I quite a couple of weeks ago"    Drug use: No      Comment: prior history     Sexual activity: Not Asked     Other Topics Concern    None     Social History Narrative    None       Subjective         Objective    Physical Exam:   Assessment Type: (P) Assess only  General Appearance: (P) Alert, Awake  Respiratory Pattern: (P) Normal  Chest Assessment: (P) Chest expansion symmetrical  Bilateral Breath Sounds: (P) Diminished  Cough: (P) Non-productive    Vitals:  Blood pressure 133/63, pulse 93, temperature 98 5 °F (36 9 °C), temperature source Oral, resp  rate 16, height 5' 10" (1 778 m), weight 74 8 kg (164 lb 14 5 oz), SpO2 93 %  Imaging and other studies: I have personally reviewed pertinent reports        O2 Device: 2L NC     Plan    Respiratory Plan: (P) Discontinue Protocol        Resp Comments: (P) pt has not needed prn albuterol  no distress at this time and on rma, d/c protocol

## 2018-03-07 NOTE — PROGRESS NOTES
Rounded with Dr Maral Melvin and Dr Roopa Pyle from SOD C team  To continue with current plan of care  Pt to remain on 1:1 for safety purposes  Will continue to monitor

## 2018-03-07 NOTE — SOCIAL WORK
CM late entry from 3/6/18  Cm met with pt mother and father   All aware pt will need rehab when medically clear for discharge  Long term plan is the mother would like for her son to come home with her   Pt has no insurance and is MA pending   All aware snf choices , cm requested to put referrals to [facility]   Pt family also aware [facility] and cm reviewed facility list   Pt mom requested [address] , referral in ecin as such

## 2018-03-07 NOTE — PROGRESS NOTES
NEPHROLOGY PROGRESS NOTE   Shane Parham 50 y o  male MRN: [id number]  Unit/Bed#: [facility] 802-01 Encounter: [id number]  Reason for Consult: ARF    ASSESSMENT and PLAN:    1 )  Acute renal failure currently dialysis dependent  - POA  -presumed to be secondary to hepatorenal syndrome as well as possibly a component of acute tubular necrosis  -baseline creatinine less than 1 mg/dL  -underwent hemodialysis yesterday  -remains oliguric, but more urine output is being recorded  -hypotensive in the last 24 hours which will slow renal recovery  -not an ideal biopsy patient given the high risk  -the GFR on the blood work is over estimating his true renal function as given his underlying cirrhosis I suspect his renal function be lot worse  -continue to monitor for renal recovery  -avoid hypotension, maintain mean arterial pressure greater than 65  -avoid nephrotoxins  -access:  IJ PermCath  -plan for next dialysis tomorrow  - continue octreotide    2 )  Anemia  - transfusions as per the primary team, has already required multiple blood transfusions  -hemoglobin currently 7 4    3 )  Decompensated cirrhosis  - secondary to alcohol abuse, continued active drinking prior to admission  - MELD 37    4 )  Blood pressure/volume status  - blood pressure has improved but was hypotensive the last 24 hours  - midodrine prior to dialysis, difficult to ultrafiltrate given intra dialytic hypotension  - extravascular volume overload, poor oncotic pressure    5 )  Hypokalemia  - resolved, aim to dialyze on a 4 potassium bath  - avoid hypokalemia to avoid worsening hepatic encephalopathy    6 )  Thrombocytopenia      SUBJECTIVE / INTERVAL HISTORY:    Hypotension in the last 24 hours    Seems slightly confused this morning but able to answer questions    OBJECTIVE:  Current Weight: Weight - Scale: 74 8 kg (164 lb 14 5 oz) (pt unsteady on feet)  Vitals:    03/06/18 1501 03/06/18 1950 03/06/18 2300 03/07/18 0741   BP: 122/70  133/63    BP Location: Left arm  Right arm    Pulse: 93  93    Resp: 16  16    Temp: 98 4 °F (36 9 °C)  98 5 °F (36 9 °C)    TempSrc: Oral  Oral    SpO2: 99% 100% 93% 98%   Weight:       Height:           Intake/Output Summary (Last 24 hours) at 03/07/18 0854  Last data filed at 03/07/18 0150   Gross per 24 hour   Intake              870 ml   Output              700 ml   Net              170 ml       Physical Exam   Constitutional: He appears well-developed and well-nourished  No distress  HENT:   Head: Normocephalic and atraumatic  Eyes: Pupils are equal, round, and reactive to light  No scleral icterus  Neck: Normal range of motion  Neck supple  Cardiovascular: Normal rate, regular rhythm and normal heart sounds  Exam reveals no gallop and no friction rub  No murmur heard  Pulmonary/Chest: Effort normal and breath sounds normal  No respiratory distress  He has no wheezes  He has no rales  He exhibits no tenderness  Abdominal: Soft  Bowel sounds are normal  He exhibits distension  There is no tenderness  There is no rebound  Musculoskeletal: Normal range of motion  He exhibits edema  Skin: No rash noted  He is not diaphoretic  Psychiatric: He has a normal mood and affect         Medications:    Current Facility-Administered Medications:     epoetin rebekah (EPOGEN,PROCRIT) injection 4,000 Units, 4,000 Units, Intravenous, After Dialysis, Luz Gan MD    folic acid (FOLVITE) tablet 1 mg, 1 mg, Oral, Daily, Mariama Hurtado DO, 1 mg at 03/05/18 1204    influenza inactivated quadrivalent vaccine (FLULAVAL) IM injection 0 5 mL, 0 5 mL, Intramuscular, Prior to discharge, Nan Hubbard MD    lactulose 20 g/30 mL oral solution 20 g, 20 g, Oral, TID, Jaxson Powell MD, 20 g at 03/06/18 2121    midodrine (PROAMATINE) tablet 10 mg, 10 mg, Oral, Before Dialysis, Luz Gan MD, 10 mg at 03/06/18 1035    octreotide (SandoSTATIN) injection 100 mcg, 100 mcg, Intravenous, Q8H [address], Mati DO Pee, 100 mcg at 03/07/18 0523    ondansetron (ZOFRAN) injection 4 mg, 4 mg, Intravenous, Q8H PRN, Mariama Hurtado DO    pantoprazole (PROTONIX) EC tablet 40 mg, 40 mg, Oral, Early Morning, Nagi Lux MD, 40 mg at 03/07/18 0523    rifaximin (XIFAXAN) tablet 550 mg, 550 mg, Oral, Q12H [facility], Sabrina Silva PA-C, 550 mg at 03/06/18 2121    sodium chloride (AYR SALINE NASAL) nasal gel 1 application, 1 application, Nasal, B1Y PRN, Devra Goldmann, PA-C    thiamine (VITAMIN B1) tablet 100 mg, 100 mg, Oral, Daily, Mariama Hurtado DO, 100 mg at 03/05/18 1204    Laboratory Results:    Results from last 7 days  Lab Units 03/07/18  0536 03/06/18  1028 03/06/18  0851 03/06/18  0534 03/05/18  0540 03/04/18  0557 03/03/18  1356 03/03/18  0453  03/02/18  0456 03/01/18  0428   WBC Thousand/uL 9 17 10 20* 9 53 11 21* 14 56* 16 07*  --  13 39*  --  16 38* 19 95*   HEMOGLOBIN g/dL 7 4* 6 4* 6 8* 6 7* 7 6* 8 2* 8 3* 6 8*  --  8 0* 7 9*   HEMATOCRIT % 21 6* 19 0* 19 8* 19 7* 21 8* 23 9*  --  20 0*  --  23 7* 23 5*   PLATELETS Thousands/uL 31* 35* 32* 34* 54* 43*  --  52*  < > 31* 23*   SODIUM mmol/L 135*  --   --  137 138 138  --  139  --  139 142   POTASSIUM mmol/L 3 6  --   --  3 4* 3 3* 3 6  --  3 8  --  3 9 3 8   CHLORIDE mmol/L 99*  --   --  100 101 101  --  103  --  103 105   CO2 mmol/L 28  --   --  28 28 27  --  29  --  30 27   BUN mg/dL 22  --   --  36* 45* 37*  --  28*  --  32* 54*   CREATININE mg/dL 2 47*  --   --  3 01* 3 39* 2 96*  --  2 40*  --  3 11* 3 87*   CALCIUM mg/dL 7 6*  --   --  7 9* 8 4 8 8  --  8 5  --  9 1 9 0   MAGNESIUM mg/dL  --   --   --   --   --  2 2  --   --   --  2 1  --    PHOSPHORUS mg/dL  --   --   --   --   --  4 7*  --  2 0*  --  2 8  --    TOTAL PROTEIN g/dL 5 5*  --   --  5 4* 5 5*  --   --   --   --   --   --    GLUCOSE RANDOM mg/dL 189*  --   --  115 142* 134  --  80  --  139 142*   < > = values in this interval not displayed

## 2018-03-07 NOTE — CONSULTS
Consultation - Medical Oncology   Dav Baker 50 y o  male MRN: [id number]  Unit/Bed#: [facility] 802-01 Encounter: [id number]      Assessment/Plan     Coagulopathy  -multifactorial:  DIC and liver cirrhosis/hepatic failure  -patient previously losing from PermCath site  Currently no losing present after dressing  Multiple sites of ecchymosis noted on bilateral upper extremity  -patient's blood work paints picture of DIC:  Elevated PT 26 4, PTT 46, INR 2 39  Decreased fibrinogen 110, haptoglobin 10, and platelet count 31  Elevated D-dimer 3850  Elevated   Jimena test positive  Schistocytes on smear   -cryoprecipitate is back bone of therapy  Recommend transfusion with cryoprecipitate  Goal is to increase fibrinogen level over 100  Last measured fibrinogen level 110  If fibrinogen level decreases again and patient starts to bleed, recommend transfusion with 4 units of cryoprecipitate and recheck blood work for response  Transfuse additional 2 units of cryoprecipitate depending on response   -if patient is able to tolerate oral intake recommend 1 time dose of 5 mg p o  vitamin K  -continue monitor with CBC, fibrinogen level, PT/INR, PTT  Continue monitor for signs of bleeding  -PT/INR, PTT likely will not correct given patient's underlying liver cirrhosis/hepatic failure  -transfuse PRBC if hemoglobin less than 7 0    Anemia  -likely secondary to chronic disease and blood loss  -H&H currently stable 7 4/21 6  -continue to monitor clinically and with CBC  -transfuse if hemoglobin less than 7 0    Thrombocytopenia  -likely secondary to liver cirrhosis/hepatic failure  -last platelet count 89,344  -transfuse platelets if below 89,920 to prevent spontaneous bleeding or transfuse to greater than 50,000 if patient requires surgery      History of Present Illness   Physician Requesting Consult:  Analia Ferreira MD  Reason for Consult / Principal Problem:  Coagulopathy    HPI: Dav Baker is a 50y o  year old male past medical history of advanced liver cirrhosis secondary to alcohol abuse, hypertension, tobacco abuse who present to the emergency department on 02/17/2018 for worsening abdominal pain and confusion  Of note, patient was evaluated at [facility] from [phone] for hypernatremia, abdominal distension  There he received paracentesis for 12 L of ascetic fluid  He was diagnosed with cirrhosis at that time  Patient had a EGD by Gastroenterology on 02/07/2018 which showed portal gastropathy and 1 small varix  While at [facility] he was also found to have right great toe gangrene and osteomyelitis and received a course of IV vancomycin  He was recommended to have toe amputation but patient refused  He was discharged from [facility] with rifaxamin and lactulose  Because of financial difficulties he did not take his rifaximin and he was noncompliant with his lactulose  Patient presented to [facility] on 02/17/2018 for worsening abdominal pain and distention with shortness of breath, chills, malaise and productive cough  There is initial concern for hepatorenal syndrome at patient was subsequently transferred to ICU and received 1 L paracentesis and experience hypotension and bloody drainage from paracentesis site  In the ICU patient was started on hemodialysis, treated with broad-spectrum antibiotics for aspiration pneumonia, and received blood transfusion for blood loss anemia [facility]  Patient again refused right toe amputation  Patient was transferred to the medical floor on 03/04/2018 with stable vitals  Hematology/oncology was consulted at this time for coagulopathy and for evaluation of DIC           Inpatient consult to Hematology     Date/Time 3/7/2018 10:10 AM     Performed by  Jose Carlos Jovel by Sesar Leal       Patient identity confirmed: verbally with patient and arm band             Review of Systems Constitutional: Negative for chills and fever  HENT: Negative for congestion, postnasal drip, rhinorrhea, sinus pain and sinus pressure  Eyes: Negative for photophobia and visual disturbance  Respiratory: Negative for chest tightness, shortness of breath and wheezing  Cardiovascular: Positive for leg swelling  Negative for chest pain and palpitations  Gastrointestinal: Positive for abdominal distention and abdominal pain  Negative for diarrhea, nausea and vomiting  Genitourinary: Negative for decreased urine volume, difficulty urinating, hematuria and urgency  Musculoskeletal: Positive for back pain and gait problem  Negative for neck pain  Skin: Positive for color change, pallor and wound  Negative for rash  Neurological: Positive for headaches  Negative for dizziness, tremors, syncope, speech difficulty and light-headedness  Hematological: Bruises/bleeds easily  Historical Information   Past Medical History:   Diagnosis Date    Cirrhosis ([facility] )     Hypertension      History reviewed  No pertinent surgical history  Social History   History   Alcohol Use    Yes     Comment: Pt "I quite a couple of weeks ago"     History   Drug Use No     Comment: prior history      History   Smoking Status    Current Every Day Smoker    Packs/day: 0 20    Years: 35 00    Types: Cigarettes   Smokeless Tobacco    Never Used     Family History: History reviewed  No pertinent family history      Meds/Allergies   all current active meds have been reviewed, current meds:   Current Facility-Administered Medications   Medication Dose Route Frequency    epoetin rebekah (EPOGEN,PROCRIT) injection 4,000 Units  4,000 Units Intravenous After Dialysis    folic acid (FOLVITE) tablet 1 mg  1 mg Oral Daily    influenza inactivated quadrivalent vaccine (FLULAVAL) IM injection 0 5 mL  0 5 mL Intramuscular Prior to discharge    lactulose 20 g/30 mL oral solution 20 g  20 g Oral TID    midodrine (PROAMATINE) tablet 10 mg  10 mg Oral Before Dialysis    octreotide (SandoSTATIN) injection 100 mcg  100 mcg Intravenous Q8H [address]    ondansetron (ZOFRAN) injection 4 mg  4 mg Intravenous Q8H PRN    pantoprazole (PROTONIX) EC tablet 40 mg  40 mg Oral Early Morning    rifaximin (XIFAXAN) tablet 550 mg  550 mg Oral Q12H [address]    sodium chloride (AYR SALINE NASAL) nasal gel 1 application  1 application Nasal T5S PRN    thiamine (VITAMIN B1) tablet 100 mg  100 mg Oral Daily    and PTA meds:   Prior to Admission Medications   Prescriptions Last Dose Informant Patient Reported? Taking? amLODIPine (NORVASC) 10 mg tablet 2/17/2018 at Unknown time  Yes Yes   Sig: Take 10 mg by mouth daily   folic acid (FOLVITE) 1 mg tablet 2/17/2018 at Unknown time  Yes Yes   Sig: Take by mouth daily   rifaximin (XIFAXAN) 550 mg tablet Unknown at Unknown time  Yes No   Sig: Take 550 mg by mouth every 12 (twelve) hours   valsartan (DIOVAN) 320 MG tablet 2/17/2018 at Unknown time  Yes Yes   Sig: Take 320 mg by mouth daily      Facility-Administered Medications: None     No Known Allergies    Objective   Vitals: Blood pressure 133/63, pulse 93, temperature 98 5 °F (36 9 °C), temperature source Oral, resp  rate 16, height 5' 10" (1 778 m), weight 74 8 kg (164 lb 14 5 oz), SpO2 98 %  Intake/Output Summary (Last 24 hours) at 03/07/18 1009  Last data filed at 03/07/18 0150   Gross per 24 hour   Intake              870 ml   Output              700 ml   Net              170 ml     Invasive Devices     Peripheral Intravenous Line            Peripheral IV 03/04/18 Right Forearm 2 days          Line            HD Cath Double 4 days                Physical Exam   Constitutional: He appears well-developed and well-nourished  No distress  Generalized jaundice   HENT:   Head: Normocephalic and atraumatic  Right Ear: External ear normal    Left Ear: External ear normal    Mouth/Throat: No oropharyngeal exudate     Eyes: Conjunctivae and EOM are normal  Pupils are equal, round, and reactive to light  Right eye exhibits no discharge  Left eye exhibits no discharge  Scleral icterus is present  Neck: No JVD present  No tracheal deviation present  Cardiovascular: Normal rate, regular rhythm and intact distal pulses  Exam reveals no gallop and no friction rub  Murmur heard  Pulmonary/Chest: Effort normal and breath sounds normal  No stridor  No respiratory distress  He has no wheezes  He has no rales  He exhibits no tenderness  Abdominal: Bowel sounds are normal  He exhibits distension  There is tenderness  There is no rebound and no guarding  Musculoskeletal: Normal range of motion  He exhibits edema  He exhibits no tenderness or deformity  Neurological: No cranial nerve deficit  Patient somnolent on examination but arousable  Patient is oriented x2 (person and place)   Skin: Skin is warm and dry  No rash noted  He is not diaphoretic  There is erythema  There is pallor  Vitals reviewed  Lab Results:   I have reviewed all pertinent labs  CBC:   Lab Results   Component Value Date    WBC 9 17 03/07/2018    HGB 7 4 (L) 03/07/2018    HCT 21 6 (L) 03/07/2018    MCV 95 03/07/2018    PLT 31 (LL) 03/07/2018    MCH 32 6 03/07/2018    MCHC 34 3 03/07/2018    RDW 25 0 (H) 03/07/2018     CMP:   Lab Results   Component Value Date     (L) 03/07/2018    CL 99 (L) 03/07/2018    CO2 28 03/07/2018    ANIONGAP 8 03/07/2018    BUN 22 03/07/2018    CREATININE 2 47 (H) 03/07/2018    GLUCOSE 189 (H) 03/07/2018    CALCIUM 7 6 (L) 03/07/2018    AST 29 03/07/2018    ALT 35 03/07/2018    ALKPHOS 142 (H) 03/07/2018    PROT 5 5 (L) 03/07/2018    BILITOT 6 29 (H) 03/07/2018    EGFR 29 03/07/2018     Imaging Studies: I have personally reviewed pertinent reports  EKG, Pathology, and Other Studies: I have personally reviewed pertinent reports      VTE Prophylaxis: Reason for no pharmacologic prophylaxis Low platelets and concern for DIC    Code Status: Level 3 - DNAR and DNI  Advance Directive and Living Will:      Power of :    POLST:

## 2018-03-08 LAB
ANION GAP SERPL CALCULATED.3IONS-SCNC: 7 MMOL/L (ref 4–13)
BUN SERPL-MCNC: 28 MG/DL (ref 5–25)
CALCIUM SERPL-MCNC: 8 MG/DL (ref 8.3–10.1)
CHLORIDE SERPL-SCNC: 99 MMOL/L (ref 100–108)
CO2 SERPL-SCNC: 29 MMOL/L (ref 21–32)
CREAT SERPL-MCNC: 2.98 MG/DL (ref 0.6–1.3)
ERYTHROCYTE [DISTWIDTH] IN BLOOD BY AUTOMATED COUNT: 25.1 % (ref 11.6–15.1)
GFR SERPL CREATININE-BSD FRML MDRD: 23 ML/MIN/1.73SQ M
GLUCOSE SERPL-MCNC: 151 MG/DL (ref 65–140)
HCT VFR BLD AUTO: 24.7 % (ref 36.5–49.3)
HGB BLD-MCNC: 8.3 G/DL (ref 12–17)
MCH RBC QN AUTO: 32.5 PG (ref 26.8–34.3)
MCHC RBC AUTO-ENTMCNC: 33.6 G/DL (ref 31.4–37.4)
MCV RBC AUTO: 97 FL (ref 82–98)
PLATELET # BLD AUTO: 46 THOUSANDS/UL (ref 149–390)
PMV BLD AUTO: 10 FL (ref 8.9–12.7)
POTASSIUM SERPL-SCNC: 3.4 MMOL/L (ref 3.5–5.3)
RBC # BLD AUTO: 2.55 MILLION/UL (ref 3.88–5.62)
SODIUM SERPL-SCNC: 135 MMOL/L (ref 136–145)
WBC # BLD AUTO: 10.12 THOUSAND/UL (ref 4.31–10.16)

## 2018-03-08 PROCEDURE — 99232 SBSQ HOSP IP/OBS MODERATE 35: CPT | Performed by: INTERNAL MEDICINE

## 2018-03-08 PROCEDURE — 85027 COMPLETE CBC AUTOMATED: CPT | Performed by: INTERNAL MEDICINE

## 2018-03-08 PROCEDURE — 94762 N-INVAS EAR/PLS OXIMTRY CONT: CPT

## 2018-03-08 PROCEDURE — 80048 BASIC METABOLIC PNL TOTAL CA: CPT | Performed by: INTERNAL MEDICINE

## 2018-03-08 RX ORDER — CALCIUM CARBONATE 200(500)MG
500 TABLET,CHEWABLE ORAL DAILY PRN
Status: DISCONTINUED | OUTPATIENT
Start: 2018-03-08 | End: 2018-03-10 | Stop reason: HOSPADM

## 2018-03-08 RX ORDER — PHYTONADIONE 5 MG/1
5 TABLET ORAL DAILY
Status: DISCONTINUED | OUTPATIENT
Start: 2018-03-08 | End: 2018-03-09

## 2018-03-08 RX ADMIN — Medication 500 MG: at 22:58

## 2018-03-08 RX ADMIN — OCTREOTIDE ACETATE 100 MCG: 100 INJECTION, SOLUTION INTRAVENOUS; SUBCUTANEOUS at 21:38

## 2018-03-08 RX ADMIN — PHYTONADIONE 5 MG: 5 TABLET ORAL at 17:42

## 2018-03-08 RX ADMIN — OCTREOTIDE ACETATE 100 MCG: 100 INJECTION, SOLUTION INTRAVENOUS; SUBCUTANEOUS at 05:14

## 2018-03-08 RX ADMIN — LACTULOSE 20 G: 20 SOLUTION ORAL at 17:42

## 2018-03-08 RX ADMIN — LACTULOSE 20 G: 20 SOLUTION ORAL at 20:08

## 2018-03-08 RX ADMIN — OCTREOTIDE ACETATE 100 MCG: 100 INJECTION, SOLUTION INTRAVENOUS; SUBCUTANEOUS at 14:35

## 2018-03-08 RX ADMIN — RIFAXIMIN 550 MG: 550 TABLET ORAL at 20:08

## 2018-03-08 NOTE — PROGRESS NOTES
Pt complaining of abdominal pain, abdomen is large and distended, pt says he feels like he's "going to explode" and requesting tums  No SOB noted, vitals obtained and stable, pt is on continuous pulse ox and is 100% on room air  HD cath continues to ooze slightly  [place] [facility] SOD resident aware

## 2018-03-08 NOTE — SOCIAL WORK
LALO was consulted to schedule an escalated team meeting with the following disciplines/individuals: SOD-C, Gi, Palliative Care, nephrology  CSGORDO also spoke with son, Debby Liu who states his brother is currently sleeping after working a 16 hour shift yesterday  Patient's son question as to why another meeting is needed  CSGordo explained that providers would like to meet together to provide medical updates and [address]  Per son the [address] did not change  Son states, "we still want all treatment to be done " Discussed that the meeting, although not ideal, could be held via conference call  CSWS updated treatment team that CSWS is awaiting response from patient's son to schedule the meeting  All disciplines do not have restrictions tomorrow

## 2018-03-08 NOTE — PROGRESS NOTES
Rounded with Dr Mayelin Chin  IR consult ordered for possible paracentesis  Plan for HD today  Possible family meeting  Will continue to monitor

## 2018-03-08 NOTE — SOCIAL WORK
CM spoke with pt father and gave cm a SS #   Pt father said he is not sure if this is correct   Cm called Liba and left voice message with Ss#

## 2018-03-08 NOTE — PROGRESS NOTES
NEPHROLOGY PROGRESS NOTE   Natalia Henley 50 y o  male MRN: [id number]  Unit/Bed#: [facility] 802-01 Encounter: [id number]  Reason for Consult: ARF    ASSESSMENT and PLAN:         1 )  Acute renal failure currently dialysis dependent  - POA  -presumed to be secondary to hepatorenal syndrome as well as possibly a component of acute tubular necrosis  -baseline creatinine less than 1 mg/dL  -underwent hemodialysis Tuesday  -remains oliguric, but more urine output is being recorded  -hypotensive in the last 24 hours which will slow renal recovery  -not an ideal biopsy patient given the high risk for bleeding  -the GFR on the blood work is over estimating his true renal function as given his underlying cirrhosis I suspect his renal function be lot worse  -continue to monitor for renal recovery  -avoid hypotension, maintain mean arterial pressure greater than 65  -avoid nephrotoxins  -access:  IJ PermCath, there was bleeding around the catheter site and using, if PermCath continues to have oozing, recommend platelet transfusion and FFP, and evaluation by interventional Radiology if needed  Can also consider a dose of DDAVP 0 3 mcg/kg IV x1 - though this is not uremic bleeding and more related to his underlying cirrhosis  -plan for next dialysis tomorrow, unless his long-term goals of care change  - continue octreotide  -with his decompensated cirrhosis and acute renal failure, patient overall has a very poor prognosis  Goals of care discussion with the family tomorrow is planned    - patient has been refusing medications and is currently lethargic     2 )  Anemia  - transfusions as per the primary team, has already required multiple blood transfusions  -hemoglobin currently 8 3  -plan for FFP  -transfuse as per the primary team     3 )  Decompensated cirrhosis  - secondary to alcohol abuse, continued active drinking prior to admission  - MELD 37  -increased abdominal distention/ascites, plan for paracentesis if his INR is acceptable, he is high risk for bleeding given his elevated INR and thrombocytopenia     4 )  Blood pressure/volume status  - blood pressure stable  - midodrine prior to dialysis, difficult to ultrafiltrate given intra dialytic hypotension  - extravascular volume overload, poor oncotic pressure     5 )  Hypokalemia  - recommend potassium chloride 20 mEq p o , patient has been refusing medications and is lethargic, would like to avoid hypokalemia to prevent hepatic encephalopathy     6 )  Thrombocytopenia    The patient's overall prognosis is quite poor with decompensated cirrhosis and acute renal failure, anemia, thrombocytopenia  His clinical condition continues to worsen  Will need to have a goals of care discussion with the family to discuss long-term goals of care with this patient  Overall prognosis is poor    SUBJECTIVE / INTERVAL HISTORY:    Lethargic, oozing from the catheter site, no overnight events refusing medications    OBJECTIVE:  Current Weight: Weight - Scale: 74 8 kg (164 lb 14 5 oz) (pt unsteady on feet)  Vitals:    03/08/18 0253 03/08/18 0500 03/08/18 0722 03/08/18 0727   BP: 136/77 129/76  113/65   BP Location: Left arm Left arm  Left arm   Pulse: 101 97 99 98   Resp: 20 20  18   Temp: 98 9 °F (37 2 °C)   98 6 °F (37 °C)   TempSrc: Oral   Axillary   SpO2: 100% 98% 98% 98%   Weight:       Height:           Intake/Output Summary (Last 24 hours) at 03/08/18 0956  Last data filed at 03/07/18 2101   Gross per 24 hour   Intake              820 ml   Output              150 ml   Net              670 ml       Physical Exam   Constitutional: He appears well-developed and well-nourished  No distress  Lethargic, cachectic, bitemporal wasting   HENT:   Dry and pale mucous membranes, jaundice   Eyes: Conjunctivae are normal  Pupils are equal, round, and reactive to light  Scleral icterus is present  Neck: Neck supple     Cardiovascular: Normal rate, regular rhythm, S1 normal, S2 normal and intact distal pulses  Exam reveals no gallop and no friction rub  No murmur heard  Pulmonary/Chest: Effort normal and breath sounds normal  No respiratory distress  He has no wheezes  He has no rales  Coarse breath sounds   Abdominal: Bowel sounds are normal  He exhibits distension  There is no tenderness  There is no rebound  Musculoskeletal: Normal range of motion  He exhibits edema  Neurological:   Lethargic, arousable   Skin: No rash noted  Psychiatric: He has a normal mood and affect   His behavior is normal        Medications:    Current Facility-Administered Medications:     epoetin rebekah (EPOGEN,PROCRIT) injection 4,000 Units, 4,000 Units, Intravenous, After Dialysis, Kelly Ness MD    folic acid (FOLVITE) tablet 1 mg, 1 mg, Oral, Daily, Mariama Hurtado DO, 1 mg at 03/07/18 0903    influenza inactivated quadrivalent vaccine (FLULAVAL) IM injection 0 5 mL, 0 5 mL, Intramuscular, Prior to discharge, Spencer Baker MD    lactulose 20 g/30 mL oral solution 20 g, 20 g, Oral, TID, Kavita Bains MD, 20 g at 03/07/18 2109    midodrine (PROAMATINE) tablet 10 mg, 10 mg, Oral, Before Dialysis, Kelly Ness MD, 10 mg at 03/06/18 1035    octreotide (SandoSTATIN) injection 100 mcg, 100 mcg, Intravenous, Q8H [address], Matikayla Glasgow DO, 100 mcg at 03/08/18 0514    ondansetron (ZOFRAN) injection 4 mg, 4 mg, Intravenous, Q8H PRN, Mariama Hurtado DO    pantoprazole (PROTONIX) EC tablet 40 mg, 40 mg, Oral, Early Morning, Kavita Bains MD, 40 mg at 03/07/18 0523    rifaximin (XIFAXAN) tablet 550 mg, 550 mg, Oral, Q12H [address], Eli Canseco PA-C, 550 mg at 03/07/18 2109    sodium chloride (AYR SALINE NASAL) nasal gel 1 application, 1 application, Nasal, U0O PRN, Lincoln Edmonds PA-C    thiamine (VITAMIN B1) tablet 100 mg, 100 mg, Oral, Daily, Mariama Hurtado DO, 100 mg at 03/07/18 4063    Laboratory Results:    Results from last 7 days  Lab Units 03/08/18  0431 03/07/18  0536 03/06/18  1028 03/06/18  6353 03/06/18  0534 03/05/18  0540 03/04/18  0557  03/03/18  0453  03/02/18  0456   WBC Thousand/uL 10 12 9 17 10 20* 9 53 11 21* 14 56* 16 07*  --  13 39*  --  16 38*   HEMOGLOBIN g/dL 8 3* 7 4* 6 4* 6 8* 6 7* 7 6* 8 2*  < > 6 8*  --  8 0*   HEMATOCRIT % 24 7* 21 6* 19 0* 19 8* 19 7* 21 8* 23 9*  --  20 0*  --  23 7*   PLATELETS Thousands/uL 46* 31* 35* 32* 34* 54* 43*  --  52*  < > 31*   SODIUM mmol/L 135* 135*  --   --  137 138 138  --  139  --  139   POTASSIUM mmol/L 3 4* 3 6  --   --  3 4* 3 3* 3 6  --  3 8  --  3 9   CHLORIDE mmol/L 99* 99*  --   --  100 101 101  --  103  --  103   CO2 mmol/L 29 28  --   --  28 28 27  --  29  --  30   BUN mg/dL 28* 22  --   --  36* 45* 37*  --  28*  --  32*   CREATININE mg/dL 2 98* 2 47*  --   --  3 01* 3 39* 2 96*  --  2 40*  --  3 11*   CALCIUM mg/dL 8 0* 7 6*  --   --  7 9* 8 4 8 8  --  8 5  --  9 1   MAGNESIUM mg/dL  --   --   --   --   --   --  2 2  --   --   --  2 1   PHOSPHORUS mg/dL  --   --   --   --   --   --  4 7*  --  2 0*  --  2 8   TOTAL PROTEIN g/dL  --  5 5*  --   --  5 4* 5 5*  --   --   --   --   --    GLUCOSE RANDOM mg/dL 151* 189*  --   --  115 142* 134  --  80  --  139   < > = values in this interval not displayed

## 2018-03-08 NOTE — PROGRESS NOTES
Paged SOD resident Joshua Johnston regarding patient's mental status; patient more confused and drowsy than he was previously but responsive to voice  Still complaining of abdominal pain  Vital signs obtained and stable  He said he would be up in about 15 mins

## 2018-03-08 NOTE — PROGRESS NOTES
Pt refusing medication at this time  Stated that he does not want to continue tx  Dr Chidi Will notified

## 2018-03-08 NOTE — PROGRESS NOTES
Noted patient's dialysis catheter to be oozing bloody drainage  SOD resident Leatha Garcia aware  Will reinforce dsg and notify him if drainage continues

## 2018-03-08 NOTE — SOCIAL WORK
CSWS spoke with patient's son Tony Meléndez, he has not heard back from patient's other son at this time and a meeting cannot be scheduled until he is aware of his schedule  Update was sent to all treatment team members  The goal remains to schedule the meeting for 3/9/18

## 2018-03-08 NOTE — PROGRESS NOTES
Progress Note - Podiatry  Sorin Gardner 50 y o  male MRN: [id number]  [facility] Encounter: [id number]    Assessment/Plan:  1  Right distal hallux eschar with underlying chronic osteomyelitis  2  PRANAY     - Right hallux continues to be stable with no acute clinical signs of infection noted  No ascending cellulitis, no purulent drainage, no crepitus felt   -pt off abx, continues to be stable, WBC: 10 12  - patient continues to refuse right hallux amputation  Podiatry will continue local wound care while in-house with Betadine paint  - podiatry will see qweekly and have nursing apply Betadine paint Tuesday Thursday Saturday    Subjective/Objective   Chief Complaint:   Chief Complaint   Patient presents with    Ascites     Pt "I need something for my belly  I got tapped for the first time two weeks ago  And its all hard and big again " Roomate "He is suppose to be on liver medication but its too expensive and the doctor wont prescribe anything" Pt c/o SOB "sometimes"        Subjective: 50 y o  y/o male was seen and evaluated at bedside  Blood pressure 113/65, pulse 98, temperature 98 6 °F (37 °C), temperature source Axillary, resp  rate 18, height 5' 10" (1 778 m), weight 74 8 kg (164 lb 14 5 oz), SpO2 98 %  ,Body mass index is 23 66 kg/m²  Invasive Devices     Peripheral Intravenous Line            Peripheral IV 03/04/18 Right Forearm 3 days          Line            HD Cath Double 5 days                Physical Exam:   General: Alert, cooperative and no distress  Lungs: Non labored breathing  Heart: Positive S1, S2  Abdomen: Soft, non-tender  Extremity:     Neurovascular status and gross motor function at baseline  Right hallux wound continues to be stable without clinical signs infection              Lab, Imaging and other studies:   CBC:   Lab Results   Component Value Date    WBC 10 12 03/08/2018    HGB 8 3 (L) 03/08/2018    HCT 24 7 (L) 03/08/2018    MCV 97 03/08/2018    PLT 46 (LL) 03/08/2018 [facility] 32 5 03/08/2018    [facility] 33 6 03/08/2018    RDW 25 1 (H) 03/08/2018    MPV 10 0 03/08/2018       Imaging: I have personally reviewed pertinent films in PACS  EKG, Pathology, and Other Studies: I have personally reviewed pertinent reports

## 2018-03-08 NOTE — PROGRESS NOTES
IM Residency Progress Note   Unit/Bed#: [facility] 802-01 Encounter: [id number]  SOD Team C       Benjamin Camara 50 y o  male [id number]    Hospital Stay Days: 23      Assessment/Plan:    Principal Problem:    Decompensated hepatic cirrhosis ([facility] 75 )  Active Problems:    Ascites    Hepatic encephalopathy (HCC)    PRANAY (acute kidney injury) ([facility] 75 )    Hypoalbuminemia    Hyponatremia    Sepsis (HCC)    Hyperammonemia (HCC)    Elevated alkaline phosphatase level    Anemia of chronic disease    Osteomyelitis of toe of right foot (HCC)    Alcohol abuse    Alcoholic hepatitis    Cough    Oliguria    Leukocytosis    Coagulopathy (HCC)    Aspiration pneumonia of right lower lobe (HCC)    Paroxysmal a-fib (HCC)    Coagulopathy - secondary to DIC versus hepatic failure vs MAHA  - from prior labs, INR is elevated at 2 26, APTT is elevated at 61, fibrinogen is low at 181, FDP >10<20, direct Jimena test is positive, haptoglobin is low at less <10 and peripheral blood smear is positive for Schistocytes    - repeat fibrinogen is 110, INR is 2 39, APTT is elevated at 46, D-dimer is elevated at 3850   - Bleeding from permacath site initially and has restarted this morning  - will transfuse 2 units of fresh frozen plasma and give patient vitamin K per Hematology Oncology  - hematology is on board and we will continue to follow the recommendations     Decompensated Hepatic Cirrhosis 2/2 to Alcoholic Cirrhosis (with Hepatic Encephalopathy and Ascites)  - Encephalopathy is Likely 2/2 to hepatic encephalopathy plus renal failure and PNA  - C/w Rifaximin and Lactulose   - C/w Ocreotide  - for IR paracentesis this morning for worsening ascites  -GI is on board and following, Pt is not a transplant candidate 2/2 non compliance and continued alcohol abuse  - S/p prednisone taper completed on 3/2/18  - C/w 1:1 observation because pt is unable to follow directions to avoid self injury   -palliative care is on board and his two sons have opted for ongoing treatment and dialysis  - We will arrange for a multidisciplinary meeting with patient's family     PRANAY in the setting of decompensated cirrhosis   - Likely  2/2 hepatorenal syndrome vs ATN  - Creatinine today is 2 98  - patient has been dialysis dependent since February 21st, 2018   Next dialysis session is due today  Per Nephrology, pt has been having severe hypotension during dialysis and has been requiring midodrine   - We appreciate Nephrology's continued recommendations        Acute blood loss anemia secondary to PermCath site bleeding  - S/p transfusion of one unit of PRBC yesterday and a total of 5 units during this admission  - Hb today is 8 5 out from 7 4  yesterday after transfusion of one unit of PRBC on 3/6/18  - continue to monitor CBC and transfuse as needed     Anemia of Chronic Disease (2/2 Liver/Renal disease and/or hemolysis/DIC)  - LDH mildly elevated at 279 with low Haptoglobin, decreased fibrinogen and increased fibrin degradation products and positive Direct Jimena test, and positive Schistocytes with a concern for hemolysis/DIC  - will continue to follow up with Hematology recommendations     Aspiration PNA of RLL  -Completed 7 days of Zosyn      Right Hallux ulcer/ostemyelitis  - Per ID, no further antibiotic  - Has been recommended toe amputation but patient refuses     Thrombocytopenia likely secondary to liver failure  - Platelets today for this, but patient is bleeding from his PermCath site    - we will give patient 2 units of fresh frozen plasma  - We will hold pharmacological anticoagulation  - will continue to transfuse if platelets are < 63,679 or <50,000 with bleeding     Hypocalcemia  - resolved   - Corrected Ca today is 9 04     Hypokalemia  - K today is 3 4  - will replete        Paroxysmal Afib  - No AC 2/2  To thrombocytopenia  - Rate controlled without beta-blockers      Leukocytosis  - resolved, WBC today is 10 1 to up from 9 17 yesterday  - patient does have the right hallux osteomyelitis          Disposition:  Patient has been refusing his treatment and medications  We will have a multi-disciplinary meeting with his family regarding goals of care  Subjective:   Patient seen and examined  Per nursing staff, he has been more lethargic and has been bleeding from his PermCath site  He he also states that he believes he will die if he goes to dialysis  He has been refusing medications and treatments  Vitals: Temp (24hrs), Av 7 °F (37 1 °C), Min:98 5 °F (36 9 °C), Max:98 9 °F (37 2 °C)  Current: Temperature: 98 6 °F (37 °C)  Vitals:    18 0253 18 0500 18 0722 18 0727   BP: 136/77 129/76  113/65   BP Location: Left arm Left arm  Left arm   Pulse: 101 97 99 98   Resp: 20 20  18   Temp: 98 9 °F (37 2 °C)   98 6 °F (37 °C)   TempSrc: Oral   Axillary   SpO2: 100% 98% 98% 98%   Weight:       Height:        Body mass index is 23 66 kg/m²  I/O last 24 hours: In: 820 [P O :820]  Out: 150 [Urine:150]      Physical Exam:   GENERAL:  Lethargic and somnolent, In no obvious distress, lying comfortably in bed, afebrile to touch  Generalized jaundice  HEENT:  Normocephalic, atraumatic  Pupils equal round and reactive to light and accommodation  Pale, + scleral icterus  No oropharyngeal erythema  NECK:  Supple, no lymphadenopathy  CVS:  S1, S2   Regular rate and rhythm  2/6 systolic murmur maximal in aortic valve area, no rubs or gallops  RESPIRATORY AND CHEST:  PermCath site on right upper chest is soaked in blood  Bibasilar rales  ABDOMEN:  Very Distended and tender to touch especially in the right upper quadrant and epigastric region  No masses or organomegaly  Normoactive bowel sounds    MSK:  2+ pitting pedal edema B/L  NEURO:  Very somnolent and lethargic         Invasive Devices     Peripheral Intravenous Line            Peripheral IV 18 Right Forearm 3 days          Line            HD Cath Double 5 days                          Labs: Recent Results (from the past 24 hour(s))   Prepare fresh frozen plasma:Transfusion Indications: Active bleeding or excessive/ diffuse microvascular bleeding in presence of INR > 2 or  aPTT > 60 sec (2X mean normal value); Has consent been obtained? Yes, 2 Units    Collection Time: 03/07/18  9:31 PM   Result Value Ref Range    Unit Product Code N9677N02     Unit Number [id number]     Unit ABO O     Unit [facility] POS     Unit Dispense Status Return to [facility]     Unit Product Code W4527P46     Unit Number [id number]     Unit ABO O     Unit [facility] POS     Unit Dispense Status Return to [facility]    CBC    Collection Time: 03/08/18  4:31 AM   Result Value Ref Range    WBC 10 12 4 31 - 10 16 Thousand/uL    RBC 2 55 (L) 3 88 - 5 62 Million/uL    Hemoglobin 8 3 (L) 12 0 - 17 0 g/dL    Hematocrit 24 7 (L) 36 5 - 49 3 %    MCV 97 82 - 98 fL    MCH 32 5 26 8 - 34 3 pg    MCHC 33 6 31 4 - 37 4 g/dL    RDW 25 1 (H) 11 6 - 15 1 %    Platelets 46 (LL) 706 - 390 Thousands/uL    MPV 10 0 8 9 - 12 7 fL   Basic metabolic panel    Collection Time: 03/08/18  4:31 AM   Result Value Ref Range    Sodium 135 (L) 136 - 145 mmol/L    Potassium 3 4 (L) 3 5 - 5 3 mmol/L    Chloride 99 (L) 100 - 108 mmol/L    CO2 29 21 - 32 mmol/L    Anion Gap 7 4 - 13 mmol/L    BUN 28 (H) 5 - 25 mg/dL    Creatinine 2 98 (H) 0 60 - 1 30 mg/dL    Glucose 151 (H) 65 - 140 mg/dL    Calcium 8 0 (L) 8 3 - 10 1 mg/dL    eGFR 23 ml/min/1 73sq m   Prepare fresh frozen plasma:Transfusion Indications: Active bleeding or excessive/ diffuse microvascular bleeding in presence of INR > 2 or  aPTT > 60 sec (2X mean normal value); Has consent been obtained?  Yes, 2 Units    Collection Time: 03/08/18  8:07 AM   Result Value Ref Range    Unit Product Code E0750N57     Unit Number [id number]     Unit ABO O     Unit [facility] POS     Unit Dispense Status Crossmatched     Unit Product Code G9444H80     Unit Number [id number]     Unit ABO O     Unit [facility] NEG     Unit Dispense Status Crossmatched        Radiology Results: I have personally reviewed pertinent reports  Other Diagnostic Testing:   I have personally reviewed pertinent reports          Active Meds:   Current Facility-Administered Medications   Medication Dose Route Frequency    epoetin rebekah (EPOGEN,PROCRIT) injection 4,000 Units  4,000 Units Intravenous After Dialysis    folic acid (FOLVITE) tablet 1 mg  1 mg Oral Daily    influenza inactivated quadrivalent vaccine (FLULAVAL) IM injection 0 5 mL  0 5 mL Intramuscular Prior to discharge    lactulose 20 g/30 mL oral solution 20 g  20 g Oral TID    midodrine (PROAMATINE) tablet 10 mg  10 mg Oral Before Dialysis    octreotide (SandoSTATIN) injection 100 mcg  100 mcg Intravenous Q8H [address]    ondansetron (ZOFRAN) injection 4 mg  4 mg Intravenous Q8H PRN    pantoprazole (PROTONIX) EC tablet 40 mg  40 mg Oral Early Morning    rifaximin (XIFAXAN) tablet 550 mg  550 mg Oral Q12H [address]    sodium chloride (AYR SALINE NASAL) nasal gel 1 application  1 application Nasal P4S PRN    thiamine (VITAMIN B1) tablet 100 mg  100 mg Oral Daily         VTE Pharmacologic Prophylaxis: Reason for no pharmacologic prophylaxis Held for thrombocytopenia  VTE Mechanical Prophylaxis: sequential compression device    Portia Crenshaw DO

## 2018-03-08 NOTE — SPEECH THERAPY NOTE
Speech Language/Pathology    Speech/Language Pathology Progress Note    Patient Name: Belinda Benedict Date: 3/8/2018     Attempted to see pt for dysphagia tx  Per nsg, when pt is awake and alert he swallows food, lquids and pills w/o difficulty  However recently has been refusing meds today, refusing blood products for paracentesis  Pt also has been lethargic today and not eating  Per MD notes, pt's overall prognosis is poor-planning for family mtg tomorrow  Pt seen at bedside, opened eyes to voice, asked pt about eating some lunch, pt stated "no, my eating days here are over  I'm not going to eat for at least a year"  Encouraged again, pt cont to refuse po  Will follow up as able, pending results of family mtg

## 2018-03-08 NOTE — WOUND OSTOMY CARE
Progress Note - Wound   Girish Hoff 50 y o  male MRN: [id number]  Unit/Bed#: [facility] 802-01 Encounter: [id number]      Assessment:   Patient seen this morning for wound care follow up  L hip with healing unstageable pressure injury measuring as documented below with 50% red and 50% yellow with intact periwound, small purulent exudate  Tip of R great toe with stably scabbed wound with no erythema or drainage, seen by podiatry  Sacrum, buttock remains intact as well as heels, no other wounds  Plan:   1-Continue with calcium alginate with hydrocolloid to L hip, change every other day  2-Continue offloading pressure to skin with turning, heel elevation and chair cushion  3-Continue moisturizing skin daily with skin nourishig cream       Vitals: Blood pressure 113/65, pulse 98, temperature 98 6 °F (37 °C), temperature source Axillary, resp  rate 18, height 5' 10" (1 778 m), weight 74 8 kg (164 lb 14 5 oz), SpO2 98 %  ,Body mass index is 23 66 kg/m²  Pressure Ulcer 02/18/18 Hip Outer;Left      L hip unstageable Pressure injury with dried brown skin to periwoud (Active)   Staging Unstagable 3/8/2018  8:30 AM   Wound Description Pink;Yellow 3/8/2018  8:30 AM   Mary-wound Assessment KIRSTEN 3/3/2018 12:00 AM   Shape round 2/27/2018  5:00 PM   Wound Length (cm) 0 5 cm 3/8/2018  8:30 AM   Wound Width (cm) 0 7 cm 3/8/2018  8:30 AM   Wound Depth (cm) 0 1 3/8/2018  8:30 AM   Calculated Wound Area (cm^2) 0 35 cm^2 3/8/2018  8:30 AM   Calculated Wound Volume (cm^3) 0 04 cm^3 3/8/2018  8:30 AM   Tunneling 0 cm 2/20/2018 11:00 AM   Undermining 0 2/20/2018 11:00 AM   Drainage Amount Scant 3/8/2018  8:30 AM   Drainage Description Purulent 3/8/2018  8:30 AM   Treatment Cleansed; Turn & reposition 3/8/2018  8:30 AM   Dressing Calcium Alginate;Hydrocolloid 3/8/2018  8:30 AM   Dressing Changed Changed 3/8/2018  8:30 AM   Patient Tolerance Tolerated poorly 3/8/2018  8:30 AM   Dressing Status Clean;Dry; Intact; Open to air 3/3/2018 12:00 AM       Wound 02/20/18 Other (Comment) Toe (Comment  which one) Right      Tip or R great toe with partial thickness wound-  (Active)   Wound Description Black 3/8/2018  7:27 AM   Mary-wound Assessment Clean;Dry; Intact 3/8/2018  7:27 AM   Shape round 2/27/2018  5:00 PM   Wound Length (cm) 0 6 cm 2/27/2018  5:00 PM   Wound Width (cm) 0 6 cm 2/27/2018  5:00 PM   Wound Depth (cm) 0 1 2/20/2018 11:00 AM   Calculated Wound Volume (cm^3) 0 05 cm^3 2/20/2018 11:00 AM   Closure Open to air 3/8/2018  7:27 AM   Drainage Amount None 3/7/2018  3:25 PM   Non-staged Wound Description Not applicable 6/73/0484  8:19 PM   Treatments Other (Comment) 3/6/2018  7:15 PM   Dressing Open to air 3/5/2018 12:09 PM   Patient Tolerance Tolerated well 3/7/2018  3:25 PM   Dressing Status Open to air 3/7/2018  3:25 PM       Incision 03/02/18 Neck Right; Anterior (Active)   Incision Description Clean;Dry; Intact 3/7/2018  3:25 PM   Mary-wound Assessment Clean;Dry; Intact 3/7/2018  3:25 PM   Closure Sutures; Hystocryl 3/7/2018  3:25 PM   Drainage Amount None 3/7/2018  3:25 PM   Drainage Description Bloody 3/5/2018 11:22 PM   Treatments Site care 3/3/2018  8:00 PM   Dressing Dry dressing 3/4/2018 11:58 PM   Patient Tolerance Tolerated well 3/3/2018  7:42 AM   Dressing Status Clean;Dry; Intact; Old drainage 3/7/2018  3:25 PM       Wound care to continue following, please call ext [phone] with questions or concerns  We will continue following weekly        Kimberly Edgar, RN, BSN, [facility]

## 2018-03-09 ENCOUNTER — APPOINTMENT (INPATIENT)
Dept: DIALYSIS | Facility: HOSPITAL | Age: 53
DRG: 264 | End: 2018-03-09
Attending: INTERNAL MEDICINE
Payer: COMMERCIAL

## 2018-03-09 VITALS
WEIGHT: 164.68 LBS | OXYGEN SATURATION: 99 % | SYSTOLIC BLOOD PRESSURE: 95 MMHG | TEMPERATURE: 97 F | HEIGHT: 70 IN | DIASTOLIC BLOOD PRESSURE: 51 MMHG | BODY MASS INDEX: 23.58 KG/M2 | RESPIRATION RATE: 17 BRPM | HEART RATE: 98 BPM

## 2018-03-09 LAB
ABO GROUP BLD BPU: NORMAL
ABO GROUP BLD BPU: NORMAL
ALBUMIN SERPL BCP-MCNC: 2.5 G/DL (ref 3.5–5)
ALP SERPL-CCNC: 139 U/L (ref 46–116)
ALT SERPL W P-5'-P-CCNC: 28 U/L (ref 12–78)
ANION GAP SERPL CALCULATED.3IONS-SCNC: 8 MMOL/L (ref 4–13)
AST SERPL W P-5'-P-CCNC: 28 U/L (ref 5–45)
BILIRUB SERPL-MCNC: 8.9 MG/DL (ref 0.2–1)
BPU ID: NORMAL
BPU ID: NORMAL
BUN SERPL-MCNC: 37 MG/DL (ref 5–25)
CALCIUM SERPL-MCNC: 8.2 MG/DL (ref 8.3–10.1)
CHLORIDE SERPL-SCNC: 97 MMOL/L (ref 100–108)
CO2 SERPL-SCNC: 27 MMOL/L (ref 21–32)
CREAT SERPL-MCNC: 3.8 MG/DL (ref 0.6–1.3)
ERYTHROCYTE [DISTWIDTH] IN BLOOD BY AUTOMATED COUNT: 25.1 % (ref 11.6–15.1)
GFR SERPL CREATININE-BSD FRML MDRD: 17 ML/MIN/1.73SQ M
GLUCOSE SERPL-MCNC: 156 MG/DL (ref 65–140)
HCT VFR BLD AUTO: 24.8 % (ref 36.5–49.3)
HGB BLD-MCNC: 8.5 G/DL (ref 12–17)
MCH RBC QN AUTO: 33.5 PG (ref 26.8–34.3)
MCHC RBC AUTO-ENTMCNC: 34.3 G/DL (ref 31.4–37.4)
MCV RBC AUTO: 98 FL (ref 82–98)
PLATELET # BLD AUTO: 50 THOUSANDS/UL (ref 149–390)
PMV BLD AUTO: 10 FL (ref 8.9–12.7)
POTASSIUM SERPL-SCNC: 3.5 MMOL/L (ref 3.5–5.3)
PROT SERPL-MCNC: 5.4 G/DL (ref 6.4–8.2)
RBC # BLD AUTO: 2.54 MILLION/UL (ref 3.88–5.62)
SODIUM SERPL-SCNC: 132 MMOL/L (ref 136–145)
UNIT DISPENSE STATUS: NORMAL
UNIT DISPENSE STATUS: NORMAL
UNIT PRODUCT CODE: NORMAL
UNIT PRODUCT CODE: NORMAL
UNIT RH: NORMAL
UNIT RH: NORMAL
WBC # BLD AUTO: 10.77 THOUSAND/UL (ref 4.31–10.16)

## 2018-03-09 PROCEDURE — 90935 HEMODIALYSIS ONE EVALUATION: CPT | Performed by: INTERNAL MEDICINE

## 2018-03-09 PROCEDURE — 99233 SBSQ HOSP IP/OBS HIGH 50: CPT | Performed by: INTERNAL MEDICINE

## 2018-03-09 PROCEDURE — 5A1D70Z PERFORMANCE OF URINARY FILTRATION, INTERMITTENT, LESS THAN 6 HOURS PER DAY: ICD-10-PCS | Performed by: INTERNAL MEDICINE

## 2018-03-09 PROCEDURE — 80053 COMPREHEN METABOLIC PANEL: CPT | Performed by: INTERNAL MEDICINE

## 2018-03-09 PROCEDURE — 85027 COMPLETE CBC AUTOMATED: CPT | Performed by: INTERNAL MEDICINE

## 2018-03-09 RX ORDER — OXYCODONE HCL 5 MG/5 ML
5 SOLUTION, ORAL ORAL EVERY 2 HOUR PRN
Qty: 100 ML | Refills: 0 | Status: SHIPPED | OUTPATIENT
Start: 2018-03-09 | End: 2018-03-19

## 2018-03-09 RX ORDER — HALOPERIDOL 2 MG/ML
1 SOLUTION ORAL EVERY 2 HOUR PRN
Status: DISCONTINUED | OUTPATIENT
Start: 2018-03-09 | End: 2018-03-10 | Stop reason: HOSPADM

## 2018-03-09 RX ORDER — ALBUMIN (HUMAN) 12.5 G/50ML
25 SOLUTION INTRAVENOUS ONCE
Status: COMPLETED | OUTPATIENT
Start: 2018-03-09 | End: 2018-03-09

## 2018-03-09 RX ORDER — LORAZEPAM 0.5 MG/1
0.5 TABLET ORAL EVERY 2 HOUR PRN
Status: DISCONTINUED | OUTPATIENT
Start: 2018-03-09 | End: 2018-03-10 | Stop reason: HOSPADM

## 2018-03-09 RX ORDER — OXYCODONE HCL 5 MG/5 ML
5 SOLUTION, ORAL ORAL EVERY 2 HOUR PRN
Status: DISCONTINUED | OUTPATIENT
Start: 2018-03-09 | End: 2018-03-10 | Stop reason: HOSPADM

## 2018-03-09 RX ORDER — LORAZEPAM 0.5 MG/1
0.5 TABLET ORAL EVERY 2 HOUR PRN
Qty: 30 TABLET | Refills: 0 | Status: SHIPPED | OUTPATIENT
Start: 2018-03-09 | End: 2018-03-19

## 2018-03-09 RX ORDER — HALOPERIDOL 2 MG/ML
1 SOLUTION ORAL EVERY 2 HOUR PRN
Qty: 30 ML | Refills: 0 | Status: SHIPPED | OUTPATIENT
Start: 2018-03-09

## 2018-03-09 RX ADMIN — LACTULOSE 20 G: 20 SOLUTION ORAL at 16:34

## 2018-03-09 RX ADMIN — OCTREOTIDE ACETATE 100 MCG: 100 INJECTION, SOLUTION INTRAVENOUS; SUBCUTANEOUS at 05:25

## 2018-03-09 RX ADMIN — ALBUMIN HUMAN 25 G: 0.25 SOLUTION INTRAVENOUS at 10:33

## 2018-03-09 RX ADMIN — LACTULOSE 10 G: 20 SOLUTION ORAL at 21:09

## 2018-03-09 RX ADMIN — MIDODRINE HYDROCHLORIDE 10 MG: 5 TABLET ORAL at 09:38

## 2018-03-09 RX ADMIN — EPOETIN ALFA 4000 UNITS: 4000 SOLUTION INTRAVENOUS; SUBCUTANEOUS at 10:37

## 2018-03-09 RX ADMIN — PANTOPRAZOLE SODIUM 40 MG: 40 TABLET, DELAYED RELEASE ORAL at 05:25

## 2018-03-09 RX ADMIN — LACTULOSE 20 G: 20 SOLUTION ORAL at 12:22

## 2018-03-09 NOTE — SOCIAL WORK
Patient will d/c with  Hospice to Pt's mother's home   Per Manager Ade Hernadez comfort care meds to be covered  Medications at [facility] cost $69 06  CM to deliver medications to patient's room prior to d/c    FRANCE arranged tentative transport for 1 pm Saturday March 10th via [facility] to patient's mother's home  FRANCE spoke with patient's mother Gunnar Guardado [phone] and informed of transport time    CM will follow

## 2018-03-09 NOTE — PROGRESS NOTES
IM Residency Progress Note   Unit/Bed#: [facility] 802-01 Encounter: [id number]  SOD Team C       Poornima Vera 50 y o  male [id number]    Hospital Stay Days: 21      Assessment/Plan:    Principal Problem:    Decompensated hepatic cirrhosis ([facility] 75 )  Active Problems:    Ascites    Hepatic encephalopathy (HCC)    PRANAY (acute kidney injury) ([facility] 75 )    Hypoalbuminemia    Hyponatremia    Sepsis (HCC)    Hyperammonemia (HCC)    Elevated alkaline phosphatase level    Anemia of chronic disease    Osteomyelitis of toe of right foot (HCC)    Alcohol abuse    Alcoholic hepatitis    Cough    Oliguria    Leukocytosis    Coagulopathy (HCC)    Aspiration pneumonia of right lower lobe (HCC)    Paroxysmal a-fib (HCC)    Coagulopathy - likely secondary to DIC versus hepatic failure vs MAHA  - from prior labs, INR is elevated at 2 26, APTT is elevated at 61, fibrinogen is low at 181, FDP >10<20, direct Jimena test is positive, haptoglobin is low at less <10 and peripheral blood smear is positive for Schistocytes    - repeat fibrinogen is 110, INR is 2 39, APTT is elevated at 46, D-dimer is elevated at 3850   - Bleeding from [facility] site has stopped this morning   - Plan was to transfuse 1 unit of fresh frozen plasma and give patient vitamin K per Hematology Oncology but patient refused a transfusion but accepted the vitamin K   - hematology is on board and we will continue to follow the recommendations     Decompensated Hepatic Cirrhosis 2/2 to Alcoholic Cirrhosis (with Hepatic Encephalopathy and Ascites)  - Encephalopathy is Likely 2/2 to hepatic encephalopathy plus renal failure and PNA  - C/w Rifaximin and Lactulose   - C/w Ocreotide  - IR paracentesis  for worsening ascites was canceled as patient was thrombocytopenic, needed platelets but was refusing all transfusions   - Per Gastroenterology, Pt is not a transplant candidate 2/2 non compliance and continued alcohol abuse  - S/p prednisone taper completed on 3/2/18  - C/w 1:1 observation because pt is unable to follow directions to avoid self injury   -palliative care is on board and his two sons have opted for ongoing treatment and dialysis  - plan is to arrange a multi-disciplinary meeting with the patient's family but he sons have been uncooperative     PRANAY in the setting of decompensated cirrhosis   - Likely  2/2 hepatorenal syndrome vs ATN  - Last creatinine yesterday was 3 80  - patient has been dialysis dependent since February 21st, 2018   Next dialysis session was due yesterday but was canceled as patient was bleeding from his PermCath site  Per Nephrology, pt has been having severe hypotension during dialysis and has been requiring midodrine  Plan is for dialysis today unless his long-term goals of care change  - We appreciate Nephrology's continued recommendations        Acute blood loss anemia secondary to PermCath site bleeding  - S/p transfusion of one unit of PRBC on 3/6/18 and a total of 5 units during this admission  - Hb today is 8 5 up from 8 3 yesterday after transfusion of one unit of PRBC on 3/6/18  - continue to monitor CBC and transfuse as needed     Anemia of Chronic Disease (2/2 Liver/Renal disease and/or hemolysis/DIC)  - LDH mildly elevated at 279 with low Haptoglobin, decreased fibrinogen and increased fibrin degradation products and positive Direct Jimena test, and positive Schistocytes with a concern for hemolysis/DIC  - will continue to follow up with Hematology recommendations     Aspiration PNA of RLL  -Completed 7 days of Zosyn      Right Hallux ulcer/ostemyelitis  - Per ID, no further antibiotic  - Has been recommended toe amputation but patient refuses     Thrombocytopenia likely secondary to liver failure  - Platelets today is 50 up from 55 yesterday     - We will hold pharmacological anticoagulation  - will continue to transfuse if platelets are < 09,121 or <50,000 with bleeding     Hypocalcemia  - resolved   - Corrected Ca today is  9 4     Hypokalemia  - resolved, K today is 3 5  - will continue to monitor BMP     Paroxysmal Afib  - No AC 2/2  To thrombocytopenia  - Rate controlled without beta-blockers      Leukocytosis  - WBC today is 10 77 from 10 1  yesterday  - patient does have the right hallux osteomyelitis           Disposition: We will Continue to plan for multi-disciplinary family meeting with patient's family to clarify goals of care and hopefully make patient comfort care  Subjective:   Seen and examined  Per nursing staff, he refuses medications and treatment intermittently  He states he has no complaints today  He denies abdominal pain but history is somewhat on reliable as patient is somnolent  Vitals: Temp (24hrs), Av 7 °F (37 1 °C), Min:98 6 °F (37 °C), Max:98 8 °F (37 1 °C)  Current: Temperature: 98 8 °F (37 1 °C)  Vitals:    18 1500 18 1957 18 2300 18 0535   BP: 112/62  98/54    BP Location: Left arm  Left arm    Pulse: 102  99    Resp: 18  16    Temp: 98 6 °F (37 °C)  98 8 °F (37 1 °C)    TempSrc: Oral  Oral    SpO2: 98% 98% 99%    Weight:    74 7 kg (164 lb 10 9 oz)   Height:        Body mass index is 23 63 kg/m²  I/O last 24 hours: In: 61 [P O :60]  Out: 0       Physical Exam:   GENERAL:  Very somnolent, In no obvious distress, lying comfortably in bed, afebrile to touch  Generalized jaundice  HEENT:  Normocephalic, atraumatic  Pupils equal round and reactive to light and accommodation  pale, + scleral icterus  Red blood stained tongue but no active bleeding at this time  NECK:  Supple, no lymphadenopathy  CVS:  S1, S2   Regular rate and rhythm  2/6 systolic murmur maximal in aortic valve region, no rubs or gallops  RESPIRATORY AND CHEST:  Dressing over his right upper chest PermCath is saturated with old blood  Bibasilar rales  ABDOMEN:  Very distended but nontender  No masses or organomegaly  Normoactive bowel sounds  MSK:  2+ pitting pedal edema  Right 1st toe crusted lesion    NEURO: Awake, alert and oriented x 2(not to time)      Invasive Devices     Peripheral Intravenous Line            Peripheral IV 03/08/18 Right Antecubital less than 1 day          Line            HD Cath Double 6 days                          Labs:   Recent Results (from the past 24 hour(s))   CBC    Collection Time: 03/09/18  4:31 AM   Result Value Ref Range    WBC 10 77 (H) 4 31 - 10 16 Thousand/uL    RBC 2 54 (L) 3 88 - 5 62 Million/uL    Hemoglobin 8 5 (L) 12 0 - 17 0 g/dL    Hematocrit 24 8 (L) 36 5 - 49 3 %    MCV 98 82 - 98 fL    MCH 33 5 26 8 - 34 3 pg    MCHC 34 3 31 4 - 37 4 g/dL    RDW 25 1 (H) 11 6 - 15 1 %    Platelets 50 (L) 262 - 390 Thousands/uL    MPV 10 0 8 9 - 12 7 fL   Comprehensive metabolic panel    Collection Time: 03/09/18  4:31 AM   Result Value Ref Range    Sodium 132 (L) 136 - 145 mmol/L    Potassium 3 5 3 5 - 5 3 mmol/L    Chloride 97 (L) 100 - 108 mmol/L    CO2 27 21 - 32 mmol/L    Anion Gap 8 4 - 13 mmol/L    BUN 37 (H) 5 - 25 mg/dL    Creatinine 3 80 (H) 0 60 - 1 30 mg/dL    Glucose 156 (H) 65 - 140 mg/dL    Calcium 8 2 (L) 8 3 - 10 1 mg/dL    AST 28 5 - 45 U/L    ALT 28 12 - 78 U/L    Alkaline Phosphatase 139 (H) 46 - 116 U/L    Total Protein 5 4 (L) 6 4 - 8 2 g/dL    Albumin 2 5 (L) 3 5 - 5 0 g/dL    Total Bilirubin 8 90 (H) 0 20 - 1 00 mg/dL    eGFR 17 ml/min/1 73sq m   Prepare fresh frozen plasma:Transfusion Indications: Active bleeding or excessive/ diffuse microvascular bleeding in presence of INR > 2 or  aPTT > 60 sec (2X mean normal value); Has consent been obtained? Yes, 2 Units    Collection Time: 03/09/18  7:13 AM   Result Value Ref Range    Unit Product Code G5413O91     Unit Number [id number]     Unit ABO O     Unit [facility] POS     Unit Dispense Status Return to [facility]     Unit Product Code O3689C31     Unit Number [id number]     Unit ABO O     Unit [facility] NEG     Unit Dispense Status Return to [facility]        Radiology Results: I have personally reviewed pertinent reports  Other Diagnostic Testing:   I have personally reviewed pertinent reports          Active Meds:   Current Facility-Administered Medications   Medication Dose Route Frequency    calcium carbonate (TUMS) chewable tablet 500 mg  500 mg Oral Daily PRN    epoetin rebekah (EPOGEN,PROCRIT) injection 4,000 Units  4,000 Units Intravenous After Dialysis    folic acid (FOLVITE) tablet 1 mg  1 mg Oral Daily    influenza inactivated quadrivalent vaccine (FLULAVAL) IM injection 0 5 mL  0 5 mL Intramuscular Prior to discharge    lactulose 20 g/30 mL oral solution 20 g  20 g Oral TID    midodrine (PROAMATINE) tablet 10 mg  10 mg Oral Before Dialysis    octreotide (SandoSTATIN) injection 100 mcg  100 mcg Intravenous Q8H [address]    ondansetron (ZOFRAN) injection 4 mg  4 mg Intravenous Q8H PRN    pantoprazole (PROTONIX) EC tablet 40 mg  40 mg Oral Early Morning    phytonadione (MEPHYTON) tablet 5 mg  5 mg Oral Daily    rifaximin (XIFAXAN) tablet 550 mg  550 mg Oral Q12H [address]    sodium chloride (AYR SALINE NASAL) nasal gel 1 application  1 application Nasal A5W PRN    thiamine (VITAMIN B1) tablet 100 mg  100 mg Oral Daily         VTE Pharmacologic Prophylaxis: Reason for no pharmacologic prophylaxis Held for thrombocytopenia  VTE Mechanical Prophylaxis: sequential compression device    Portia Crenshaw DO

## 2018-03-09 NOTE — PROGRESS NOTES
03/09/18 1500   Plan of Care   Comments  attempted to visited pt     Assessment Completed by: Unit visit

## 2018-03-09 NOTE — CASE MANAGEMENT
Thank you,  [address] [facility] in the [facility] by [address] for 2017  Network Utilization Review Department  Phone: [phone]; Fax [phone]  ATTENTION: The Network Utilization Review Department is now centralized for our 7 Facilities  Make a note that we have a new phone and fax numbers for our Department  Please call with any questions or concerns to [phone] and carefully follow the prompts so that you are directed to the right person  All voicemails are confidential  Fax any determinations, approvals, denials, and requests for initial or continue stay review clinical to [phone]  Due to HIGH CALL volume, it would be easier if you could please send faxed requests to expedite your requests and in part, help us provide discharge notifications faster      Continued Stay Review    Date: 3/9/18    Vital Signs: BP (!) 78/45   Pulse 104   Temp (!) 97 2 °F (36 2 °C) (Tympanic)   Resp 17   Ht 5' 10" (1 778 m)   Wt 74 7 kg (164 lb 10 9 oz)   SpO2 99%   BMI 23 63 kg/m²     Medications:   Scheduled Meds:   Current Facility-Administered Medications:  calcium carbonate 500 mg Oral Daily PRN Antonia Cha, DO   epoetin rebekah 4,000 Units Intravenous After Dialysis Monica Sierra MD   folic acid 1 mg Oral Daily Mariama Hurtado, DO   influenza vaccine 0 5 mL Intramuscular Prior to discharge Xiao Waterman MD   lactulose 20 g Oral TID Nathaly Harrington MD   midodrine 10 mg Oral Before Dialysis Monica Sierra MD   octreotide 100 mcg Intravenous [facility] Mati Glasgow, DO   ondansetron 4 mg Intravenous Q8H PRN Mariama Hurtado, DO   pantoprazole 40 mg Oral Early Morning Nathaly Harrington MD   phytonadione 5 mg Oral Daily Portia Okigbo, DO   rifaximin 550 mg Oral Q12H [address] Mery Degroot PA-C   sodium chloride 1 application Nasal B5Q PRN Raghu Grajeda PA-C   thiamine 100 mg Oral Daily Manju Randolph, DO     PRN Meds:   calcium carbonate    epoetin rebekah x1   influenza vaccine    Midodrine x1    ondansetron    sodium chloride    Abnormal Labs/Diagnostic Results:    Ref Range & Units 03/09/18 0431   Sodium 136 - 145 mmol/L 132     Chloride 100 - 108 mmol/L 97     BUN 5 - 25 mg/dL 37     Creatinine 0 60 - 1 30 mg/dL 3 80     Glucose 65 - 140 mg/dL 156     Calcium 8 3 - 10 1 mg/dL 8 2     Alkaline Phosphatase 46 - 116 U/L 139     Total Protein 6 4 - 8 2 g/dL 5 4     Albumin 3 5 - 5 0 g/dL 2 5     Total Bilirubin 0 20 - 1 00 mg/dL 8 90        Ref Range & Units 03/09/18 0431   WBC 4 31 - 10 16 Thousand/uL 10 77     RBC 3 88 - 5 62 Million/uL 2 54     Hemoglobin 12 0 - 17 0 g/dL 8 5     Hematocrit 36 5 - 49 3 % 24 8     RDW 11 6 - 15 1 % 25 1     Platelets 768 - 868 Thousands/uL 50       Age/Sex: 50 y o  male     Assessment/Plan:   3/9 Medicine Progress Note    Decompensated hepatic cirrhosis (HCC)  Active Problems:    Ascites    Hepatic encephalopathy (HCC)    PRANAY (acute kidney injury) (HCC)    Hypoalbuminemia    Hyponatremia    Sepsis (HCC)    Hyperammonemia (HCC)    Elevated alkaline phosphatase level    Anemia of chronic disease    Osteomyelitis of toe of right foot (HCC)    Alcohol abuse    Alcoholic hepatitis    Cough    Oliguria    Leukocytosis    Coagulopathy (HCC)    Aspiration pneumonia of right lower lobe (HCC)    Paroxysmal a-fib (HCC)     Coagulopathy - likely secondary to DIC versus hepatic failure vs MAHA  - from prior labs, INR is elevated at 2 26, APTT is elevated at 61, fibrinogen is low at 181, FDP >10<20, direct Jimena test is positive, haptoglobin is low at less <10 and peripheral blood smear is positive for Schistocytes    - repeat fibrinogen is 110, INR is 2 39, APTT is elevated at 46, D-dimer is elevated at 3850   [facility]   - Plan was to transfuse 1 unit of fresh frozen plasma and give patient vitamin K per Hematology Oncology but patient refused a transfusion but accepted the vitamin K   - hematology is on board and we will continue to follow the recommendations     Decompensated Hepatic Cirrhosis 2/2 to Alcoholic Cirrhosis (with Hepatic Encephalopathy and Ascites)  - Encephalopathy is Likely 2/2 to hepatic encephalopathy plus renal failure and PNA  - C/w Rifaximin and Lactulose   - C/w Ocreotide  - IR paracentesis  for worsening ascites was canceled as patient was thrombocytopenic, needed platelets but was refusing all transfusions   - Per Gastroenterology, Pt is not a transplant candidate 2/2 non compliance and continued alcohol abuse  - S/p prednisone taper completed on 3/2/18  - C/w 1:1 observation because pt is unable to follow directions to avoid self injury   -palliative care is on board and his two sons have opted for ongoing treatment and dialysis  - plan is to arrange a multi-disciplinary meeting with the patient's family but he sons have been uncooperative     PRANAY in the setting of decompensated cirrhosis   - Likely  2/2 hepatorenal syndrome vs ATN  - Last creatinine yesterday was 3 80  - patient has been dialysis dependent since February 21st, 2018   Next dialysis session was due yesterday but was canceled as patient was bleeding from his PermCath site  Per Nephrology, pt has been having severe hypotension during dialysis and has been requiring midodrine  Plan is for dialysis today unless his long-term goals of care change    - We appreciate Nephrology's continued recommendations        Acute blood loss anemia secondary to PermCath site bleeding  - S/p transfusion of one unit of PRBC on 3/6/18 and a total of 5 units during this admission  - Hb today is 8 5 up from 8 3 yesterday after transfusion of one unit of PRBC on 3/6/18  - continue to monitor CBC and transfuse as needed     Anemia of Chronic Disease (2/2 Liver/Renal disease and/or hemolysis/DIC)  - LDH mildly elevated at 279 with low Haptoglobin, decreased fibrinogen and increased fibrin degradation products and positive Direct Jimena test, and positive Schistocytes with a concern for hemolysis/DIC  - will continue to follow up with Hematology recommendations     Aspiration PNA of RLL  -Completed 7 days of Zosyn      Right Hallux ulcer/ostemyelitis  - Per ID, no further antibiotic  - Has been recommended toe amputation but patient refuses     Thrombocytopenia likely secondary to liver failure  - Platelets today is 50 up from 55 yesterday  - We will hold pharmacological anticoagulation  - will continue to transfuse if platelets are < 70,882 or <50,000 with bleeding     Hypocalcemia  - resolved   - Corrected Ca today is  9 4     Hypokalemia  - resolved, K today is 3 5  - will continue to monitor BMP     Paroxysmal Afib  - No AC 2/2  To thrombocytopenia  - Rate controlled without beta-blockers      Leukocytosis  - WBC today is 10 77 from 10 1  yesterday  - patient does have the right hallux osteomyelitis  _______________________  3/9 Nephrology Progress Note  PRANAY (POA): due to hepatorenal syndrome in the setting of decompensated cirrhosis vs ATN  Baseline creatinine <1 with last creatinine 0 7 on 2/7/18   -  Remains dialysis dependent since 2/21/18  - continue to monitor for renal recovery   Urine output overall inaccurate, although no signs of renal recovery yet   Need accurate intake and output measurement   Bladder scan will be unreliable due to underlying ascites issues  - continue midodrine before dialysis  Will give IV albumin 25 g once now  - urinalysis showed 4 to 10 RBCs, innumerable WBCs, trace proteinuria, urine sodium was six initially on admission   - follow case management for acute HD unit placement if family decides to continue with ongoing care  - Overall very difficult to remove UF due to ongoing hypotension issues and this certainly becomes a problem as outpatient  Patient is overall not tolerating dialysis well    Will discuss this further in detail with family   - given overall non compliance issues, overall poor prognosis, less likely to have benefit for further aggressive intervention like evaluation of serological work up, renal biopsy (high risk) etc       I saw and examined patient during hemodialysis treatment at 10:00 AM on 3/9/2018  The patient was receiving hemodialysis for treatment of end stage renal disease  I also reviewed vital signs, intake and output, lab results and recent events, and agree with dialysis order  Tolerating HD  BP acceptable  Time: 3 5 hours                       Qb: 400                       Sodium: 138  Dialyzer: F160             Qd: 1 5 times Qb                     Potassium: as per protocol  Access: Perm cath                  UF goal: as BP tolerated                    Bicarbonate: 35           Calcium 2 5     Decompensated alcoholic cirrhosis: not transplant candidate   Encephalopathy: due to cirrhosis and pneumonia  Aspiration PNA: s/p zosyn per ID      Anemia: likely related to chronic disease but work up also suspicious for hemolysis/DIC  - transfuse prn   - on Epogen 4000 units IV with HD   - Iron saturation 86% in February 2018   - +schistocytes and helmet cells noted on hemolysis smear  mild LDH elevation noted with low haptoglobin  -VERNELL c3 negative, VERNELL Igg + as well as direct Jimena test+; fibrin split products, fibrinogen low  - Iron normal, ferritin high, iron sat ok, with low TIBC  Low plts likely d/t cirrhosis but above findings supsicious for hemolysis/DIC  - appreciate Hematology input     Hypokalemia, four K bath with dialysis today     Overall prognosis remains poor  Family discussion and meeting today AM        Discharge Plan: We will Continue to plan for multi-disciplinary family meeting 3/9 with patient's family to clarify goals of care and hopefully make patient comfort care

## 2018-03-09 NOTE — HOSPICE NOTE
Patient approved for home hospice  Met with patients sons earlier today and obtained consents for home hospice, both medicare and non medicare consents signed  Pt is MA Pending  Palliative care, Dr Artur Baugh wrote scripts for haldol ativan and roxanol liquids  CM will send scripts down to pharmacy and same will be filled  Spoke with patients mother Ruddy Espinal on phone, she states she will come to hospital to  meds tomorrow prior to patient discharge home  Ruddy Espinal aware hospice open will be on Monday, provided hospice main number for any needs prior to pt open on Monday  Ruddy Espinal states she is fine with patient coming home whenever they are discharging on Sat  CM aware

## 2018-03-09 NOTE — SOCIAL WORK
Escalated team meeting held today  The following individual's were present: Snehal Patel (sons), parents, Dr Eboni Holm (palliative care), Glenn Herrera (GI), Dr Olimpia Watson (SOD), Dr Manuel Rawls (SOD), Dr Maryjo Campos (nephrology)  Family was asked to described what they understand as patient's medical condition  Patient's son Rony Yates) reports: "he (patient) is turning backwards again "  Pt's son further expressed "there is no light at the end of the tunnel " He stated patient told him yesterday "I don't want to do this anymore  Medical condition was explained  Patient has two failing organs: liver and kidneys  Patient is not a candidate for a transplant  Patient was unable to get dialysis yesterday due to bleeding from the site  Dialysis has been difficult due to patient's blood pressure dropping which would make the patient feel uncomfortable  At this time patient is unable to tolerate dialysis and if this was done as an outpatient it is likely his BP would not be managed and he would come back to the Ed  It was expressed that patient's medical condition is futile  Discussed family's goals of care  Family has decided on comfort care  Patient's mother has a history of being employed in hospice for many years  Family believes that patient would prefer to be discharged to the home environment  Patient would be discharged to patient's mother Hernandez Goodwin) home  Possible discharge to home with hospice tomorrow  Discussed hospice level of care and explained that care would be focused on comfort and aggressive forms of treatment would cease  Family expressed their preferred hospice agency is  Hospice  Updated [place], KANA Rudd and [facility] about the transition to comfort care/hospice with the likely discharge to patient's mother Hernandez Michelle home tomorrow, 3/10/18  Dr Eboni Holm will be writing the order for comfort care/hospice and CM will submit referral to [facility]   Transportation will need to be arranged to patient's mother's home

## 2018-03-09 NOTE — PHYSICAL THERAPY NOTE
Physical Therapy Cancellation Note:    Per pt medical chart documentation, pt is currently comfort care with home with hospice service   No skilled inpt PT services needed at this time,D/C from PT

## 2018-03-09 NOTE — PROGRESS NOTES
NEPHROLOGY PROGRESS NOTE   Antionette Riggs 50 y o  male MRN: [id number]  Unit/Bed#: [facility] 802-01 Encounter: [id number]  Reason for Consult: PRANAY    ASSESSMENT AND PLAN:  PRANAY (POA): due to hepatorenal syndrome in the setting of decompensated cirrhosis vs ATN  Baseline creatinine <1 with last creatinine 0 7 on 2/7/18   -  Remains dialysis dependent since 2/21/18  - continue to monitor for renal recovery  Urine output overall inaccurate, although no signs of renal recovery yet  Need accurate intake and output measurement  Bladder scan will be unreliable due to underlying ascites issues  - continue midodrine before dialysis  Will give IV albumin 25 g once now  - urinalysis showed 4 to 10 RBCs, innumerable WBCs, trace proteinuria, urine sodium was six initially on admission   - follow case management for acute HD unit placement if family decides to continue with ongoing care  - Overall very difficult to remove UF due to ongoing hypotension issues and this certainly becomes a problem as outpatient  Patient is overall not tolerating dialysis well  Will discuss this further in detail with family   - given overall non compliance issues, overall poor prognosis, less likely to have benefit for further aggressive intervention like evaluation of serological work up, renal biopsy (high risk) etc       I saw and examined patient during hemodialysis treatment at 10:00 AM on 3/9/2018  The patient was receiving hemodialysis for treatment of end stage renal disease  I also reviewed vital signs, intake and output, lab results and recent events, and agree with dialysis order  Tolerating HD   BP acceptable  Time: 3 5 hours  Qb: 400  Sodium: 138  Dialyzer: F160  Qd: 1 5 times Qb  Potassium: as per protocol  Access: Perm cath  UF goal: as BP tolerated  Bicarbonate: 35 Calcium 2 5     Decompensated alcoholic cirrhosis: not transplant candidate   Encephalopathy: due to cirrhosis and pneumonia  Aspiration PNA: s/p zosyn per ID    Anemia: likely related to chronic disease but work up also suspicious for hemolysis/DIC  - transfuse prn   - on Epogen 4000 units IV with HD   - Iron saturation 86% in February 2018   - +schistocytes and helmet cells noted on hemolysis smear  mild LDH elevation noted with low haptoglobin  -VERNELL c3 negative, VERNELL Igg + as well as direct Jimena test+; fibrin split products, fibrinogen low  - Iron normal, ferritin high, iron sat ok, with low TIBC  Low plts likely d/t cirrhosis but above findings supsicious for hemolysis/DIC  - appreciate Hematology input     Hypokalemia, four K bath with dialysis today     Overall prognosis remains poor  Family discussion and meeting today AM      SUBJECTIVE:  Patient seen and examined at bedside  Patient overall confused and very poor historian  Does not provide detailed history        OBJECTIVE:  Current Weight: Weight - Scale: 74 7 kg (164 lb 10 9 oz)  Vitals:    03/09/18 0930   BP: 100/57   Pulse: 100   Resp:    Temp:    SpO2:        Intake/Output Summary (Last 24 hours) at 03/09/18 3134  Last data filed at 03/09/18 1192   Gross per 24 hour   Intake              260 ml   Output                0 ml   Net              260 ml       Physical Examination:  General:  Lying in bed, in mild acute distress   Eyes:  Sclerae icteric, mild conjunctival pallor present  ENT:  External examination of ears and nose unremarkable  Neck:  Supple  Respiratory:  Bilateral air entry present, decreased breath sound at bases  CVS:  S1, S2 present  GI:  Soft, distended, nontender  CNS:  Active alert oriented x1  Extremities:  Mild edema in both lower extremities  Skin:  No new rash in both legs    Medications:    Current Facility-Administered Medications:     calcium carbonate (TUMS) chewable tablet 500 mg, 500 mg, Oral, Daily PRN, Alvenia Filler, DO, 500 mg at 03/08/18 8065    epoetin rebekah (EPOGEN,PROCRIT) injection 4,000 Units, 4,000 Units, Intravenous, After Dialysis, Zeb Contreras MD    folic acid (FOLVITE) tablet 1 mg, 1 mg, Oral, Daily, Mariama Hurtado DO, 1 mg at 03/07/18 0903    influenza inactivated quadrivalent vaccine (FLULAVAL) IM injection 0 5 mL, 0 5 mL, Intramuscular, Prior to discharge, Ajit Lynch MD    lactulose 20 g/30 mL oral solution 20 g, 20 g, Oral, TID, Desire Mendez MD, 20 g at 03/08/18 2008    midodrine (PROAMATINE) tablet 10 mg, 10 mg, Oral, Before Dialysis, Verner Hollering, MD, 10 mg at 03/09/18 0938    octreotide (SandoSTATIN) injection 100 mcg, 100 mcg, Intravenous, Q8H [facility], Mati Glasgow DO, 100 mcg at 03/09/18 0525    ondansetron (ZOFRAN) injection 4 mg, 4 mg, Intravenous, Q8H PRN, Mariama Hurtado DO    pantoprazole (PROTONIX) EC tablet 40 mg, 40 mg, Oral, Early Morning, Desire Mendez MD, 40 mg at 03/09/18 0525    phytonadione (MEPHYTON) tablet 5 mg, 5 mg, Oral, Daily, Portia Crenshaw DO, 5 mg at 03/08/18 1742    rifaximin (XIFAXAN) tablet 550 mg, 550 mg, Oral, Q12H [facility], Rudy Strickland PA-C, 550 mg at 03/08/18 2008    sodium chloride (AYR SALINE NASAL) nasal gel 1 application, 1 application, Nasal, A7N PRN, Refugio Angel PA-C    thiamine (VITAMIN B1) tablet 100 mg, 100 mg, Oral, Daily, Mariama Hurtado DO, 100 mg at 03/07/18 2716    Laboratory Results:    Results from last 7 days  Lab Units 03/09/18  0431 03/08/18  0431 03/07/18  0536 03/06/18  1028 03/06/18  0851 03/06/18  0534 03/05/18  0540 03/04/18  0557  03/03/18  0453   WBC Thousand/uL 10 77* 10 12 9 17 10 20* 9 53 11 21* 14 56* 16 07*  --  13 39*   HEMOGLOBIN g/dL 8 5* 8 3* 7 4* 6 4* 6 8* 6 7* 7 6* 8 2*  < > 6 8*   HEMATOCRIT % 24 8* 24 7* 21 6* 19 0* 19 8* 19 7* 21 8* 23 9*  --  20 0*   PLATELETS Thousands/uL 50* 46* 31* 35* 32* 34* 54* 43*  --  52*   SODIUM mmol/L 132* 135* 135*  --   --  137 138 138  --  139   POTASSIUM mmol/L 3 5 3 4* 3 6  --   --  3 4* 3 3* 3 6  --  3 8   CHLORIDE mmol/L 97* 99* 99*  --   --  100 101 101  --  103   CO2 mmol/L 27 29 28  --   --  28 28 27  --  29   BUN mg/dL 37* 28* 22  --   --  36* 45* 37*  --  28*   CREATININE mg/dL 3 80* 2 98* 2 47*  --   --  3 01* 3 39* 2 96*  --  2 40*   CALCIUM mg/dL 8 2* 8 0* 7 6*  --   --  7 9* 8 4 8 8  --  8 5   MAGNESIUM mg/dL  --   --   --   --   --   --   --  2 2  --   --    PHOSPHORUS mg/dL  --   --   --   --   --   --   --  4 7*  --  2 0*   TOTAL PROTEIN g/dL 5 4*  --  5 5*  --   --  5 4* 5 5*  --   --   --    GLUCOSE RANDOM mg/dL 156* 151* 189*  --   --  115 142* 134  --  80   < > = values in this interval not displayed  Results for orders placed during the hospital encounter of 02/17/18   XR chest portable    Narrative CHEST     INDICATION: tachycardia, portacath placed yesterday    COMPARISON:  Chest x-ray from 3/1/2018    EXAM PERFORMED/VIEWS:  XR CHEST PORTABLE      FINDINGS:  Right-sided dialysis catheter has been placed, tip overlies the right atrium  Enteric tube has been removed  Heart size is not well evaluated on this single portable AP view  Scattered patchy airspace disease bilaterally has partially cleared on the left, stable on the right  There is probably a small right pleural effusion  No definite left pleural effusion  No pneumothorax  Osseous structures appear within normal limits for patient age  Impression 1  Scattered patchy airspace disease bilaterally has partially cleared on the left, stable on the right  There is probably a small right pleural effusion  2   Interval right sided dialysis catheter placement  Workstation performed: [id number]       Results for orders placed during the hospital encounter of 02/17/18   XR chest pa & lateral    Narrative CHEST     INDICATION:  Cough and hypoxia    COMPARISON:  None    VIEWS:  Frontal and lateral projections    IMAGES:  2    FINDINGS:    Cardiomediastinal silhouette appears unremarkable  The lungs are clear  No pneumothorax or pleural effusion      Visualized osseous structures appear within normal limits for the patient's age     There is free air under the right hemidiaphragm  Impression 1  No active pulmonary disease  2   Free intraperitoneal air which may be related to reported paracentesis the prior day though clinical correlation for acute abdominal symptoms recommended  I personally discussed this study with Marissa Fortune on 2/18/2018 at 10:48 AM           Workstation performed: [id number]       No results found for this or any previous visit  No results found for this or any previous visit  Results for orders placed during the hospital encounter of 02/17/18   CT abdomen pelvis wo contrast    Narrative CT ABDOMEN AND PELVIS WITHOUT IV CONTRAST    INDICATION:  82-year-old man status post paracentesis 2/17/2018  Clinical concern for abdominal hemorrhage due to decreasing hemoglobin  COMPARISON: None available at time of image interpretation       TECHNIQUE:  CT examination of the abdomen and pelvis was performed without intravenous contrast   Axial, sagittal, and coronal 2D reformatted images were created from the source data and submitted for interpretation  Radiation dose length product (DLP) for this visit:  1457 61 mGy-cm   This examination, like all CT scans performed in the [facility], was performed utilizing techniques to minimize radiation dose exposure, including the use of   iterative reconstruction and automated exposure control  Enteric contrast was administered  FINDINGS:    ABDOMEN    LOWER CHEST:  Small right pleural effusion  Right basal opacity most likely associated passive atelectasis  Trace left effusion with minimal left base atelectasis  LIVER/BILIARY TREE:  The liver is cirrhotic  Lack of contrast precludes evaluation for underlying hepatic mass  No biliary dilation on this noncontrast study  GALLBLADDER:  There are gallstone(s) within the gallbladder, without pericholecystic inflammatory changes  SPLEEN:  Not enlarged      PANCREAS: Unremarkable  ADRENAL GLANDS:  Unremarkable  KIDNEYS/URETERS:  Unremarkable  No hydronephrosis  STOMACH AND BOWEL:  Unremarkable  APPENDIX:  A normal appendix was visualized  ABDOMINOPELVIC CAVITY:  Large volume of abdominal pelvic ascites  In the dependent pelvis, there is layering effect with the dependent ascites having attenuation 17 Hounsfield units with the nondependent ascites around having attenuation of -3 5 HU  This suggests some degree   of blood products layering in the pelvis  Trace pneumoperitoneum from recent paracentesis  No lymphadenopathy  VESSELS:  Atherosclerotic changes are present  No evidence of aneurysm  PELVIS    REPRODUCTIVE ORGANS:  Unremarkable for patient's age  URINARY BLADDER:  Class by Mendoza catheter  ABDOMINAL WALL/INGUINAL REGIONS:  Anasarca  Needle tract in the left mid abdomen is in the vicinity of a large body wall collateral (series 2 image 60)  OSSEOUS STRUCTURES:  No acute fracture or destructive osseous lesion  Anterior compression deformities of T12 and T7  Impression 1  Cirrhosis with stigmata of portal hypertension  2   Large volume of abdominopelvic ascites  There is a layering hematocrit level in the dependent pelvis  However, with the attenuation of the dependent component measuring only 15 Hounsfield units, this is most likely small volume of blood products   rather than large volume hemoperitoneum  3   Postprocedural pneumoperitoneum  Needle tract in the left mid abdomen is in the vicinity of a large body wall collateral   4   Small right pleural effusion and right base atelectasis  5   Anterior compression deformities of T7 and T12  I personally discussed this study with Erika Harrington on 2/18/2018 at 2:42 PM           Workstation performed: [id number]       No results found for this or any previous visit  Portions of the record may have been created with voice recognition software   Occasional wrong word or "sound a like" substitutions may have occurred due to the inherent limitations of voice recognition software  Read the chart carefully and recognize, using context, where substitutions have occurred

## 2018-03-09 NOTE — SOCIAL WORK
CSWS spoke with patient's son, Linda Starkey who stated he is picking up his brother now to be at the hospital for the family meeting  He will be here at 9:30- 10 am today   CSWS spoke with SOD-C, GI, Nephrology, CM and Palliative Care who confirmed attendance for the meeting in the p7 conference room today at 10 am

## 2018-03-09 NOTE — SOCIAL WORK
Cm spoke with Angela Chin, who held family meeting  Family has decided on comfort care  Family to discharge to patient's mother's home  Home Hospice referral placed for [address] [place] [address] following

## 2018-03-09 NOTE — PROGRESS NOTES
Progress note - Palliative and Supportive Care   Ada Cure 50 y o  male [id number]    Assessment:   [facility]    Ascites    Hepatic encephalopathy ([facility] )    PRANAY (acute kidney injury) ([facility] )    Hypoalbuminemia    Hyponatremia    Sepsis ([facility] )    Hyperammonemia (HCC)    Elevated alkaline phosphatase level    Anemia of chronic disease    Osteomyelitis of toe of right foot (HCC)    Alcohol abuse    Alcoholic hepatitis    Cough    Oliguria    Leukocytosis    Coagulopathy (HCC)    Aspiration pneumonia of right lower lobe (HCC)    Paroxysmal a-fib ([place] [address] )         Plan:  1  Symptom management:    Pain: start oxycodone 5mg Q2H prn fpr pain    Nausea: start haldol 1mg PO Q2H prn    Agitation: start haldol 1mg PO Q2H prn    Anxiety: start ativan 0 5mg PO Q2H prn    Alcohol craving: OK for sons to bring patient a beer to the hospital    2  Goals:  Comfort measures  Family would like to take patient home (to patient's mother's house)  She has equipment at home (bed, commode, walked)  Will ask hospice liaison to meet with sons now  DC dialysis  DC lab work   Place comfort cart at bedside     Code Status: comfort measures - Level 4   Decisional apparatus:  Patient is not competent on my exam today  If competence is lost, patient's substitute decision maker would default to sons by [place]  Advance Directive / Living Will / POLST:  ? Interval history:  Family meeting started at 10:10 this am and ended around 10:45am  GI, Nephrology, case management, Internal medicine and palliative care were all present  Family has opted for comfort measures  The patient's mother is a retired hospice nurse and she would like to bring him home to her house  Both sons have agreed to this       MEDICATIONS / ALLERGIES:     all current active meds have been reviewed and current meds:   Current Facility-Administered Medications   Medication Dose Route Frequency    calcium carbonate (TUMS) chewable tablet 500 mg  500 mg Oral Daily PRN    epoetin rebekah (EPOGEN,PROCRIT) injection 4,000 Units  4,000 Units Intravenous After Dialysis    folic acid (FOLVITE) tablet 1 mg  1 mg Oral Daily    influenza inactivated quadrivalent vaccine (FLULAVAL) IM injection 0 5 mL  0 5 mL Intramuscular Prior to discharge    lactulose 20 g/30 mL oral solution 20 g  20 g Oral TID    midodrine (PROAMATINE) tablet 10 mg  10 mg Oral Before Dialysis    octreotide (SandoSTATIN) injection 100 mcg  100 mcg Intravenous Q8H [facility]    ondansetron (ZOFRAN) injection 4 mg  4 mg Intravenous Q8H PRN    pantoprazole (PROTONIX) EC tablet 40 mg  40 mg Oral Early Morning    phytonadione (MEPHYTON) tablet 5 mg  5 mg Oral Daily    rifaximin (XIFAXAN) tablet 550 mg  550 mg Oral Q12H [facility]    sodium chloride (AYR SALINE NASAL) nasal gel 1 application  1 application Nasal Q6V PRN    thiamine (VITAMIN B1) tablet 100 mg  100 mg Oral Daily       No Known Allergies    OBJECTIVE:    Physical Exam  Physical Exam   Constitutional: He appears well-developed  He appears listless  He is uncooperative  He has a sickly appearance  He appears ill  No distress  Nasal cannula in place  HENT:   Head: Atraumatic  Not macrocephalic and not microcephalic  Head is without raccoon's eyes and without Freeman's sign  Eyes: Lids are normal    Pulmonary/Chest: No accessory muscle usage  Tachypnea noted  No apnea and no bradypnea  Abdominal: He exhibits distension  Neurological: He appears listless  He is disoriented  GCS eye subscore is 4  GCS verbal subscore is 5  Skin: Skin is dry  He is not diaphoretic  There is pallor  +grey and jaundiced skin; +bleeding from right subclavian? Psychiatric: He has a normal mood and affect  He is slowed  Cognition and memory are impaired  He exhibits abnormal recent memory  Lab Results:   I have personally reviewed pertinent labs  , CBC:   Lab Results   Component Value Date    WBC 10 77 (H) 03/09/2018    HGB 8 5 (L) 03/09/2018    HCT 24 8 (L) 03/09/2018    MCV 98 03/09/2018    PLT 50 (L) 03/09/2018    MCH 33 5 03/09/2018    MCHC 34 3 03/09/2018    RDW 25 1 (H) 03/09/2018    MPV 10 0 03/09/2018   , CMP:   Lab Results   Component Value Date     (L) 03/09/2018    K 3 5 03/09/2018    CL 97 (L) 03/09/2018    CO2 27 03/09/2018    ANIONGAP 8 03/09/2018    BUN 37 (H) 03/09/2018    CREATININE 3 80 (H) 03/09/2018    GLUCOSE 156 (H) 03/09/2018    CALCIUM 8 2 (L) 03/09/2018    AST 28 03/09/2018    ALT 28 03/09/2018    ALKPHOS 139 (H) 03/09/2018    PROT 5 4 (L) 03/09/2018    BILITOT 8 90 (H) 03/09/2018    EGFR 17 03/09/2018     Imaging Studies: none new   EKG, Pathology, and Other Studies: none new

## 2018-03-10 PROCEDURE — 99239 HOSP IP/OBS DSCHRG MGMT >30: CPT | Performed by: INTERNAL MEDICINE

## 2018-03-10 RX ORDER — LACTULOSE 20 G/30ML
20 SOLUTION ORAL 3 TIMES DAILY
Qty: 473 ML | Refills: 0 | Status: SHIPPED | OUTPATIENT
Start: 2018-03-10

## 2018-03-10 NOTE — SOCIAL WORK
CM provided patient's mother Phill Butter with home comfort care medications  No other CM needs identified at this time

## 2018-03-10 NOTE — NURSING NOTE
Discharge instructions reviewed with pts mother Jaylen Hicks via phone; Jaylen Hicks aware Carmen Landaverde will not be removed as previously told; Jaylen Hicks is agreeable;

## 2018-03-10 NOTE — PROGRESS NOTES
permacath is not able to be dc'd at bedside as per hospital policy; dr Rosemarie Pringle aware; orders rec'd to maintain permacath and to dc order to dc central line

## 2018-03-10 NOTE — PROGRESS NOTES
Pt due for lactulose; pt lethargic; not opening eyes; not following commands; moaning; unsafe to swallow; will make md aware

## 2018-03-10 NOTE — PROGRESS NOTES
IM Residency Progress Note   Unit/Bed#: [facility] 802-01 Encounter: [id number]  SOD Team C       Girish Hoff 50 y o  male [id number]    Hospital Stay Days: 21      Assessment/Plan:    Principal Problem:    Decompensated hepatic cirrhosis ([place] [address] )  Active Problems:    Ascites    Hepatic encephalopathy (HCC)    PRANAY (acute kidney injury) ([place] [address] )    Hypoalbuminemia    Hyponatremia    Sepsis (HCC)    Hyperammonemia (HCC)    Elevated alkaline phosphatase level    Anemia of chronic disease    Osteomyelitis of toe of right foot (HCC)    Alcohol abuse    Alcoholic hepatitis    Cough    Oliguria    Leukocytosis    Coagulopathy (HCC)    Aspiration pneumonia of right lower lobe (HCC)    Paroxysmal a-fib ([place] [address] )    1  Coagulopathy-secondary to DIC versus hepatic failure versus MAHA  -patient has require multiple transfusions of blood products some vitamin K during his hospital stay  -after family conversation yesterday with multi disciplinary team decision was made for comfort care/hospice  -will continue to monitor at this time with plan for discharge to hospice today  2   Decompensated headache cirrhosis secondary to alcoholic cirrhosis with current hepatic encephalopathy and ascites  -at this time will continue with lactulose 20 g t i d  for comfort  -can continue with haloperidol p r n , lorazepam p r n   -plan for patient for home hospice with prescriptions written by palliative care  -will follow up with case management for range minutes for outpatient hospice  3   Acute kidney injury in the setting of decompensated cirrhosis  -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  -patient was unable to undergo hemodialysis secondary to severe hypotension despite Midodrine prior to dialysis  -appreciate Nephrology recommendations    4    Acute blood loss anemia  -patient is require multiple transfusions of blood products and vitamin K during this hospital stay  -despite this patient had persistent bleeding likely secondary to coagulopathy as listed above  -will continue to monitor with goal of patient comfort  5   Aspiration pneumonia  -patient completed 7 day course IV Zosyn in the inpatient setting  -continue monitor    6  Right hallux ulcer/osteomyelitis  -per Infectious Disease, no further antibiotics required  -prior to patient's significant encephalopathy had been recommended time patient but patient refused  -will continue with goal of patient comfort  7   Hypocalcemia/hypokalemia  -will continue monitor    8  Paroxysmal atrial fibrillation  -in the setting of significant coagulopathy and significant blood loss anemia requiring transfusions anticoagulation was not initiated  -goal patient comfort at this time      Disposition:  Plan to discharge to home hospice today  Subjective:   Patient seen in exam   Per nursing, patient given p r n  pain medications overnight for comfort  Review of systems were patient is unobtainable secondary to encephalopathy  Vitals: Temp (24hrs), Av 1 °F (36 2 °C), Min:97 °F (36 1 °C), Max:97 2 °F (36 2 °C)  Current: Temperature: (!) 97 °F (36 1 °C)  Vitals:    18 1038 18 1100 18 1115 18 1130   BP: (!) 78/45 (!) 74/46 (!) 83/48 95/51   BP Location:       Pulse: 104 101 102 98   Resp:       Temp:    (!) 97 °F (36 1 °C)   TempSrc:    Tympanic   SpO2:       Weight:       Height:        Body mass index is 23 63 kg/m²  I/O last 24 hours: In: 700 [I V :500;  Other:200]  Out: 200 [Other:200]      Physical Exam: General appearance: Resting comfortably, in no acute distress  Head: Jaundice, normocephalic  Lungs: Decreased breath sounds bilaterally, no wheezing appreciated  Heart: irregularly irregular rhythm  Abdomen: Distended, active bowel sounds present  Extremities: Bilateral lower extremity edema     Invasive Devices     Peripheral Intravenous Line            Peripheral IV 18 Right Antecubital 1 day          Line            HD Cath Double 7 days Labs: No results found for this or any previous visit (from the past 24 hour(s))  Radiology Results: I have personally reviewed pertinent reports  Other Diagnostic Testing:   I have personally reviewed pertinent reports          Active Meds:   Current Facility-Administered Medications   Medication Dose Route Frequency    calcium carbonate (TUMS) chewable tablet 500 mg  500 mg Oral Daily PRN    haloperidol (HALDOL) oral concentrated solution 1 mg  1 mg Oral Q2H PRN    lactulose 20 g/30 mL oral solution 20 g  20 g Oral TID    LORazepam (ATIVAN) tablet 0 5 mg  0 5 mg Oral Q2H PRN    midodrine (PROAMATINE) tablet 10 mg  10 mg Oral Before Dialysis    ondansetron (ZOFRAN) injection 4 mg  4 mg Intravenous Q8H PRN    oxyCODONE (ROXICODONE) oral solution 5 mg  5 mg Oral Q2H PRN    sodium chloride (AYR SALINE NASAL) nasal gel 1 application  1 application Nasal H0O PRN         VTE Pharmacologic Prophylaxis: Sequential compression device (Venodyne)   VTE Mechanical Prophylaxis: sequential compression device    Mark Conte DO

## 2018-03-10 NOTE — PLAN OF CARE
CARDIOVASCULAR - ADULT     Maintains optimal cardiac output and hemodynamic stability Completed        DISCHARGE PLANNING - CARE MANAGEMENT     Discharge to post-acute care or home with appropriate resources Completed        GASTROINTESTINAL - ADULT     Minimal or absence of nausea and/or vomiting Completed     Maintains or returns to baseline bowel function Completed     Maintains adequate nutritional intake Completed        GENITOURINARY - ADULT     Maintains or returns to baseline urinary function Completed     Absence of urinary retention Completed        HEMATOLOGIC - ADULT     Maintains hematologic stability Completed        INFECTION - ADULT     Absence or prevention of progression during hospitalization Completed     Absence of fever/infection during neutropenic period Completed        METABOLIC, FLUID AND ELECTROLYTES - ADULT     Electrolytes maintained within normal limits Completed     Fluid balance maintained Completed     Glucose maintained within target range Completed        Nutrition/Hydration-ADULT     Nutrient/Hydration intake appropriate for improving, restoring or maintaining nutritional needs Completed        PAIN - ADULT     Verbalizes/displays adequate comfort level or baseline comfort level Completed        Potential for Falls     Patient will remain free of falls Completed        Prexisting or High Potential for Compromised Skin Integrity     Skin integrity is maintained or improved Completed        SAFETY,RESTRAINT: NV/NON-SELF DESTRUCTIVE BEHAVIOR     Remains free of harm/injury (restraint for non violent/non self-detsructive behavior) Completed     Returns to optimal restraint-free functioning Completed     Remains free of harm/injury (restraint for non violent/non self-detsructive behavior) Completed     Returns to optimal restraint-free functioning Completed        SKIN/TISSUE INTEGRITY - ADULT     Skin integrity remains intact Completed     Incision(s), wounds(s) or drain site(s) healing without S/S of infection Completed     Oral mucous membranes remain intact Completed

## 2018-03-10 NOTE — DISCHARGE SUMMARY
[facility] CENTRAL Discharge Summary - Ryan Sosa 50 y o  male MRN: [id number]    [address] PPHP 8 Room / Bed: [facility] 802/[facility] 802-01 Encounter: [id number]    BRIEF OVERVIEW    Admitting Provider: Jojo Miles MD  Discharge Provider: Jojo Miles MD  Primary Care Physician at Discharge:  [facility] hospice home hospice care    Discharge To: Home with home hospice care  Facility / Family Member Name:  Home with home hospice care      Admission Date: 2/17/2018     Discharge Date:3/10/2018  Primary Discharge Diagnosis  Principal Problem:    Decompensated hepatic cirrhosis ([address] )  Active Problems:    Ascites    Hepatic encephalopathy (HCC)    PRANAY (acute kidney injury) ([facility] [address] )    Hypoalbuminemia    Hyponatremia    Sepsis ([facility] [address] )    Hyperammonemia (HCC)    Elevated alkaline phosphatase level    Anemia of chronic disease    Osteomyelitis of toe of right foot (HCC)    Alcohol abuse    Alcoholic hepatitis    Cough    Oliguria    Leukocytosis    Coagulopathy (HCC)    Aspiration pneumonia of right lower lobe (HCC)    Paroxysmal a-fib (HCC)  Resolved Problems:    Abdominal pain    Hypotension    Lactic acidosis    Hypoxia     1  Coagulopathy-secondary to DIC versus hepatic failure versus MAHA  -patient has require multiple transfusions of blood products some vitamin K during his hospital stay  -after family conversation yesterday with multi disciplinary team and family decision was made for comfort care/hospice  -will continue to monitor at this time with plan for discharge to hospice   -at time of discharge prescriptions for comfort care were delivered to patient bedside for family back up    Patient discharged on haloperidol 1 mg every 2 hours as needed, lorazepam 0 5 mg every 2 hours as needed, oxycodone 5 mg every 2 hours as needed      2   Decompensated headache cirrhosis secondary to alcoholic cirrhosis with current hepatic encephalopathy and ascites  -at this time will continue with lactulose 20 g t i d  for comfort  -can continue with haloperidol p r n , lorazepam p r n   -plan for patient for home hospice with prescriptions written by palliative care and delivered bedside by case management  -will follow up with case management for outpatient hospice      3  Acute kidney injury in the setting of decompensated cirrhosis  -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  -patient was unable to undergo hemodialysis secondary to severe hypotension despite Midodrine prior to dialysis  -appreciate Nephrology recommendations     4   Acute blood loss anemia  -patient is require multiple transfusions of blood products and vitamin K during this hospital stay  -despite this patient had persistent bleeding likely secondary to coagulopathy as listed above  -will continue to monitor with goal of patient comfort      5  Aspiration pneumonia  -patient completed 7 day course IV Zosyn in the inpatient setting  -continue monitor     6  Right hallux ulcer/osteomyelitis  -per Infectious Disease, no further antibiotics required  -prior to patient's significant encephalopathy had been recommended time patient but patient refused  -will continue with goal of patient comfort      7  Hypocalcemia/hypokalemia  -will continue monitor     8  Paroxysmal atrial fibrillation  -in the setting of significant coagulopathy and significant blood loss anemia requiring transfusions anticoagulation was not initiated  -goal patient comfort at this time       Consulting Providers          Therapeutic Operative Procedures Performed  During his hospital stay patient was intubated, had right internal jugular line placed, had temporary dialysis catheter placed and underwent multiple transfusions      In the setting of patient's protracted hospital stay with multiple procedures and diagnostic testing performed please see electronic medical record for more specific list of therapeutic/operative procedures as well as diagnostic procedures  Diagnostic Procedures Performed  Ct Abdomen Pelvis Wo Contrast    Result Date: 2/18/2018  Impression: 1  Cirrhosis with stigmata of portal hypertension  2   Large volume of abdominopelvic ascites  There is a layering hematocrit level in the dependent pelvis  However, with the attenuation of the dependent component measuring only 15 Hounsfield units, this is most likely small volume of blood products rather than large volume hemoperitoneum  3   Postprocedural pneumoperitoneum  Needle tract in the left mid abdomen is in the vicinity of a large body wall collateral  4   Small right pleural effusion and right base atelectasis  5   Anterior compression deformities of T7 and T12  I personally discussed this study with Bernie Russell on 2/18/2018 at 2:42 PM  Workstation performed: [id number]     Xr Chest Portable    Result Date: 2/21/2018  Impression: 1  Right internal jugular dialysis catheter with tip in the midsuperior vena cava  2   Mild vascular congestion  Right-sided alveolar infiltrates, likely cardiogenic in nature, however superimposed pneumonia not excluded  Clinical correlation and follow-up examination recommended  Workstation performed: [id number]     Xr Chest Pa & Lateral    Result Date: 2/18/2018  Impression: 1  No active pulmonary disease  2   Free intraperitoneal air which may be related to reported paracentesis the prior day though clinical correlation for acute abdominal symptoms recommended  I personally discussed this study with Osmin Schneider on 2/18/2018 at 10:48 AM  Workstation performed: [id number]     Xr Foot 3+ Vw Right    Result Date: 2/19/2018  Impression: Soft tissue ulceration with bony changes of the tuft of the great toe, suggesting osteomyelitis  If there is concern for osteomyelitis, consider nuclear medicine white blood cell scan or MRI with gadolinium for further evaluation  A verbal report will be called by the reading room liaison following this dictation  Workstation performed: [id number]     Ct Head Wo Contrast    Result Date: 2/21/2018  Impression: Motion degraded examination  No gross intracranial abnormality  Workstation performed: [id number]     Ir Paracentesis    Result Date: 2/23/2018  Impression: Impression: Successful diagnostic and therapeutic paracentesis Workstation performed: [id number]     Xr Chest Pa Only    Result Date: 2/21/2018  Impression: Increasing airspace opacities in the right perihilar region and left suprahilar region may represent aspiration and/or pneumonia  Workstation performed: [id number]     Us Abdomen Limited    Result Date: 2/20/2018  Impression: Cirrhosis with sequela of portal hypertension and ascites as detailed above  Cholelithiasis  If there is clinical concern for cholecystitis, nuclear medicine HIDA scan may be obtained  Workstation performed: [id number]     Ir Temp Hd Cath    Result Date: 2/23/2018  Impression: Impression: Successful placement of a temporary dialysis catheter via the internal jugular vein Workstation performed: [id number]       Discharge Disposition:  Patient will be discharged to home with home hospice care   Discharged With Lines:  Perma-Cath in place right chest wall  Test Results Pending at Discharge: none    Outpatient Follow-Up  Patient will be followed by [facility] hospice in the outpatient setting  Follow up with consulting providers  Patient will be followed by [facility] hospice in the outpatient setting   Active Issues Requiring Follow-up   Patient will be followed by [facility] in the outpatient setting    Code Status: Level 4 - Comfort Care  Advance Directive and Living Will: <no information>  Power of :    POLST:      Medications   See after visit summary for reconciled discharge medications provided to patient and family      Allergies  No Known Allergies  Discharge Diet:  As tolerated by patient  Activity restrictions: As tolerated by patient    [address] [facility] Course  Patient is a 61-year-old male past medical history significant for advanced end-stage liver cirrhosis secondary to alcohol abuse, hypertension, tobacco abuse who was brought into the emergency department on February 17, 2018 by his girlfriend for worsening abdominal pain and confusion  Of note, patient was recently admitted to [facility] from 130/2017 to 02/07/2018 for hyponatremia, abdominal distension where he was found to have extensive ascites that required 12 L paracentesis and was newly diagnosed at that time with cirrhosis  His creatinine at that time was 0 5 and he was seen by Gastroenterology who completed a full workup including EGD on 02/07/2018 which showed portal gastropathy and 1 small varices  While he was also found on the right great toe to have gangrene and osteomyelitis he did not want treatment did undergo brief course of IV vancomycin by Infectious Disease and refused surgical management at that time  When he arrived at [facility] he is complaining of worsening abdominal pain distention and also complaining shortness of breath, chills, cough productive yellow sputum  In emergency department he was tachycardic, hypoxic, hypotensive and with an elevated lactic acidosis as well as ammonia level of 78 elevated INR and elevated creatinine  With concern for hepatorenal syndrome was transferred to the intensive care unit after receiving 1 L paracentesis  His ICU stay was complicated  His renal dysfunction did not improve with fluids and albumin or withdrawal nephrotoxin agents via hemodialysis  Patient initially had temporary hemodialysis catheter placed on 02/21 however had permanent cath placed on 03/02/2018  He was also found to have aspiration Kaitlin for which she was completed and treated with a 7 day course of Zosyn    Secondary to his worsening anemia patient underwent multiple transfusions and blood products as well as vitamin K during his hospital admission  There was concern for DIC  Patient was extubated and returned to the medical floor  As patient's blood pressure was unable to tolerate hemodialysis had to be discontinued despite attempting Midodrine prior to dialysis  A multi disciplinary team meeting along with family was held on 03/09/2018 and patient's prognosis was explained to patient's family that he was not liver transplant candidate secondary to his persistent drinking and that at this point medical therapy was few tile  He was unable to undergo hemodialysis secondary to his hypotension which was unresponsive to medication  At that time family's decision was to transition patient to comfort care  Hospice was consulted as patient's mother is former hospice nurse  Patient will be discharged to home today on home hospice care  Medications including haloperidol, oxycodone, lorazepam were filled and brought to patient's bedside  Patient's mother was given prescription for lactulose as patient tolerates to hopefully improved his quality and comfort  Patient will be followed in the outpatient setting by home hospice services  Presenting Problem/History of Present Illness  Principal Problem:    Decompensated hepatic cirrhosis (HCC)  Active Problems:    Ascites    Hepatic encephalopathy (HCC)    PRANAY (acute kidney injury) ([facility] [address] )    Hypoalbuminemia    Hyponatremia    Sepsis (HCC)    Hyperammonemia (HCC)    Elevated alkaline phosphatase level    Anemia of chronic disease    Osteomyelitis of toe of right foot (HCC)    Alcohol abuse    Alcoholic hepatitis    Cough    Oliguria    Leukocytosis    Coagulopathy (HCC)    Aspiration pneumonia of right lower lobe (HCC)    Paroxysmal a-fib (HCC)  Resolved Problems:    Abdominal pain    Hypotension    Lactic acidosis    Hypoxia        Discharge Condition: End of life      Discharge  Statement   I spent 30 minutes minutes discharging the patient   This time was spent on the day of discharge  I had direct contact with the patient on the day of discharge  Additional documentation is required if more than 30 minutes were spent on discharge

## 2025-05-14 NOTE — PROGRESS NOTES
NEPHROLOGY PROGRESS NOTE   Evelyn Bautista 50 y o  male MRN: [id number]  Unit/Bed#: [facility] 802-01 Encounter: [id number]      ASSESSMENT and PLAN:  1  PRANAY (POA): due to hepatorenal syndrome in the setting of decompensated cirrhosis vs ATN  Baseline creatinine <1 with last creatinine 0 7 on 2/7/18     -currently dialysis dependant    -seen and examined on hemodialysis: Na 138, 4K, bicarb 35, F160 dialyzer, ,    -goal initially 2kg UF but BP dropping so decreased goal to 1 kg   -will also decrease temp to 35 5 degrees and give albumin 25g x1 to help with hypotension   -monitor for signs of recovery   -will add back midodrine 10mg pre HD   2  Decompensated alcoholic cirrhosis: not transplant candidate   3  Encephalopathy: due to cirrhosis and pneumonia  4  Aspiration PNA: s/p zosyn per ID   5  Anemia: likely related to chronic disease but work up also suspicious for hemolysis/DIC   -transfuse prn   6  Hypertension: BP previously low so was on midodrine but then BP into 598I systolic so midodrine stopped  Will restart pre HD midodrine since BP dropping   7  Hypokalemia: on 4K bath with HD today   8  Pulmonary edema: continue UF with HD as tolerated by BP but seems to be improving based on last CXR  9  Access: R pemacath placed 3/2       SUBJECTIVE:  Patient lethargic but does awaken to voice and states he is fine then quickly falls back to sleep   Seen and examined on HD and BP is dropping with UF      OBJECTIVE:  Current Weight: Weight - Scale: 77 1 kg (169 lb 15 6 oz) (rn agreed not tp get pt oob)  Vitals:    03/05/18 0932   BP: 99/55   Pulse: 89   Resp:    Temp:    SpO2:        Intake/Output Summary (Last 24 hours) at 03/05/18 0132  Last data filed at 03/05/18 0910   Gross per 24 hour   Intake             1396 ml   Output              585 ml   Net              811 ml     General: no acute distress   Skin: jaundice   Eyes: scleral icterus   ENT: dry oral mucosa   Neck: supple, symmetric   Chest: coarse breath sounds bilaterally    CVS: regular rate and rhythm   Abdomen: soft, non-tender   Extremities: no edema   Neuro: lethargic     Medications:  Scheduled Meds:  Current Facility-Administered Medications:  albuterol 2 5 mg Nebulization Q4H PRN Charis Santana,    folic acid 1 mg Oral Daily Mariama Hurtado, DO   influenza vaccine 0 5 mL Intramuscular Prior to discharge Nataly Jackman MD   lactulose 20 g Oral TID Jesus Vogt MD   multivitamin 1 tablet Oral Daily Sabina Kehr, PA-C   octreotide 100 mcg Intravenous [facility] Mati Glasgow,    ondansetron 4 mg Intravenous Q8H PRN Mariama Hurtado,    pantoprazole 40 mg Oral Early Morning Jesus Vogt MD   potassium chloride 20 mEq Intravenous BID Portia Crenshaw,    rifaximin 550 mg Oral Q12H [facility] Regina Noyola PA-C   sodium chloride 1 application Nasal W5D PRN Sabina Kehr, PA-C   thiamine 100 mg Oral Daily Mariama Hurtado, DO       PRN Meds:   albuterol    influenza vaccine    ondansetron    sodium chloride    Continuous Infusions:     Laboratory Results:  Lab Results   Component Value Date    WBC 14 56 (H) 03/05/2018    HGB 7 6 (L) 03/05/2018    HCT 21 8 (L) 03/05/2018    MCV 93 03/05/2018    PLT 54 (L) 03/05/2018     Lab Results   Component Value Date    GLUCOSE 142 (H) 03/05/2018    CALCIUM 8 4 03/05/2018     03/05/2018    K 3 3 (L) 03/05/2018    CO2 28 03/05/2018     03/05/2018    BUN 45 (H) 03/05/2018    CREATININE 3 39 (H) 03/05/2018     Lab Results   Component Value Date    CALCIUM 8 4 03/05/2018    PHOS 4 7 (H) 03/04/2018 oral